# Patient Record
Sex: FEMALE | Race: WHITE | NOT HISPANIC OR LATINO | Employment: OTHER | URBAN - METROPOLITAN AREA
[De-identification: names, ages, dates, MRNs, and addresses within clinical notes are randomized per-mention and may not be internally consistent; named-entity substitution may affect disease eponyms.]

---

## 2017-02-10 DIAGNOSIS — Z94.4 LIVER REPLACED BY TRANSPLANT (H): ICD-10-CM

## 2017-02-10 LAB
ALBUMIN SERPL-MCNC: 3.8 G/DL (ref 3.4–5)
ALP SERPL-CCNC: 137 U/L (ref 40–150)
ALT SERPL W P-5'-P-CCNC: 25 U/L (ref 0–50)
ANION GAP SERPL CALCULATED.3IONS-SCNC: 7 MMOL/L (ref 3–14)
AST SERPL W P-5'-P-CCNC: 31 U/L (ref 0–45)
BILIRUB DIRECT SERPL-MCNC: 0.3 MG/DL (ref 0–0.2)
BILIRUB SERPL-MCNC: 1.1 MG/DL (ref 0.2–1.3)
BUN SERPL-MCNC: 14 MG/DL (ref 7–30)
CALCIUM SERPL-MCNC: 8.8 MG/DL (ref 8.5–10.1)
CHLORIDE SERPL-SCNC: 105 MMOL/L (ref 94–109)
CO2 SERPL-SCNC: 27 MMOL/L (ref 20–32)
CREAT SERPL-MCNC: 0.71 MG/DL (ref 0.52–1.04)
ERYTHROCYTE [DISTWIDTH] IN BLOOD BY AUTOMATED COUNT: 12.5 % (ref 10–15)
GFR SERPL CREATININE-BSD FRML MDRD: NORMAL ML/MIN/1.7M2
GLUCOSE SERPL-MCNC: 72 MG/DL (ref 70–99)
HCT VFR BLD AUTO: 40.2 % (ref 35–47)
HGB BLD-MCNC: 14.2 G/DL (ref 11.7–15.7)
MCH RBC QN AUTO: 36.6 PG (ref 26.5–33)
MCHC RBC AUTO-ENTMCNC: 35.3 G/DL (ref 31.5–36.5)
MCV RBC AUTO: 104 FL (ref 78–100)
PLATELET # BLD AUTO: 210 10E9/L (ref 150–450)
POTASSIUM SERPL-SCNC: 4.7 MMOL/L (ref 3.4–5.3)
PROT SERPL-MCNC: 7.2 G/DL (ref 6.8–8.8)
RBC # BLD AUTO: 3.88 10E12/L (ref 3.8–5.2)
SODIUM SERPL-SCNC: 140 MMOL/L (ref 133–144)
TACROLIMUS BLD-MCNC: 3.9 UG/L (ref 5–15)
TME LAST DOSE: ABNORMAL H
WBC # BLD AUTO: 4.3 10E9/L (ref 4–11)

## 2017-02-25 DIAGNOSIS — F41.0 PANIC DISORDER: ICD-10-CM

## 2017-02-27 NOTE — TELEPHONE ENCOUNTER
PARoxetine (PAXIL) 40 MG tablet     Last Written Prescription Date: 11/7/16  Last Fill Quantity: 45, # refills: 1  Last Office Visit with G primary care provider:  9/15/16        Last PHQ-9 score on record= No flowsheet data found.

## 2017-02-28 RX ORDER — PAROXETINE 40 MG/1
TABLET, FILM COATED ORAL
Qty: 45 TABLET | Refills: 3 | Status: SHIPPED | OUTPATIENT
Start: 2017-02-28 | End: 2017-06-25

## 2017-02-28 NOTE — TELEPHONE ENCOUNTER
Prescription approved per [facility] Refill Protocol.  Pt. Using for anxiety only.  Lyndsay Segura RN

## 2017-03-26 ENCOUNTER — OFFICE VISIT (OUTPATIENT)
Dept: URGENT CARE | Facility: URGENT CARE | Age: 37
End: 2017-03-26
Payer: COMMERCIAL

## 2017-03-26 VITALS
TEMPERATURE: 99.2 F | SYSTOLIC BLOOD PRESSURE: 123 MMHG | BODY MASS INDEX: 21.61 KG/M2 | OXYGEN SATURATION: 100 % | DIASTOLIC BLOOD PRESSURE: 77 MMHG | HEART RATE: 79 BPM | WEIGHT: 122 LBS

## 2017-03-26 DIAGNOSIS — R68.83 CHILLS: ICD-10-CM

## 2017-03-26 DIAGNOSIS — R30.0 DYSURIA: Primary | ICD-10-CM

## 2017-03-26 LAB
ALBUMIN UR-MCNC: ABNORMAL MG/DL
APPEARANCE UR: ABNORMAL
BACTERIA #/AREA URNS HPF: ABNORMAL /HPF
BASOPHILS # BLD AUTO: 0.1 10E9/L (ref 0–0.2)
BASOPHILS NFR BLD AUTO: 1.2 %
BILIRUB UR QL STRIP: NEGATIVE
COLOR UR AUTO: YELLOW
DIFFERENTIAL METHOD BLD: ABNORMAL
EOSINOPHIL # BLD AUTO: 0.3 10E9/L (ref 0–0.7)
EOSINOPHIL NFR BLD AUTO: 6.2 %
ERYTHROCYTE [DISTWIDTH] IN BLOOD BY AUTOMATED COUNT: 11.7 % (ref 10–15)
FLUAV+FLUBV AG SPEC QL: NEGATIVE
FLUAV+FLUBV AG SPEC QL: NORMAL
GLUCOSE UR STRIP-MCNC: NEGATIVE MG/DL
HCT VFR BLD AUTO: 39.4 % (ref 35–47)
HGB BLD-MCNC: 13.6 G/DL (ref 11.7–15.7)
HGB UR QL STRIP: ABNORMAL
KETONES UR STRIP-MCNC: NEGATIVE MG/DL
LEUKOCYTE ESTERASE UR QL STRIP: ABNORMAL
LYMPHOCYTES # BLD AUTO: 1.3 10E9/L (ref 0.8–5.3)
LYMPHOCYTES NFR BLD AUTO: 32.8 %
MCH RBC QN AUTO: 35.8 PG (ref 26.5–33)
MCHC RBC AUTO-ENTMCNC: 34.5 G/DL (ref 31.5–36.5)
MCV RBC AUTO: 104 FL (ref 78–100)
MICRO REPORT STATUS: NORMAL
MONOCYTES # BLD AUTO: 0.5 10E9/L (ref 0–1.3)
MONOCYTES NFR BLD AUTO: 11.1 %
NEUTROPHILS # BLD AUTO: 2 10E9/L (ref 1.6–8.3)
NEUTROPHILS NFR BLD AUTO: 48.7 %
NITRATE UR QL: ABNORMAL
NON-SQ EPI CELLS #/AREA URNS LPF: ABNORMAL /LPF
PH UR STRIP: 6 PH (ref 5–7)
PLATELET # BLD AUTO: 200 10E9/L (ref 150–450)
RBC # BLD AUTO: 3.8 10E12/L (ref 3.8–5.2)
RBC #/AREA URNS AUTO: ABNORMAL /HPF (ref 0–2)
SP GR UR STRIP: ABNORMAL (ref 1–1.03)
SPECIMEN SOURCE: NORMAL
SPECIMEN SOURCE: NORMAL
URN SPEC COLLECT METH UR: ABNORMAL
UROBILINOGEN UR STRIP-ACNC: 0.2 EU/DL (ref 0.2–1)
WBC # BLD AUTO: 4.1 10E9/L (ref 4–11)
WBC #/AREA URNS AUTO: ABNORMAL /HPF (ref 0–2)
WET PREP SPEC: NORMAL

## 2017-03-26 PROCEDURE — 85025 COMPLETE CBC W/AUTO DIFF WBC: CPT | Performed by: PHYSICIAN ASSISTANT

## 2017-03-26 PROCEDURE — 99213 OFFICE O/P EST LOW 20 MIN: CPT | Performed by: PHYSICIAN ASSISTANT

## 2017-03-26 PROCEDURE — 87804 INFLUENZA ASSAY W/OPTIC: CPT | Performed by: PHYSICIAN ASSISTANT

## 2017-03-26 PROCEDURE — 87086 URINE CULTURE/COLONY COUNT: CPT | Performed by: PHYSICIAN ASSISTANT

## 2017-03-26 PROCEDURE — 81001 URINALYSIS AUTO W/SCOPE: CPT | Performed by: PHYSICIAN ASSISTANT

## 2017-03-26 PROCEDURE — 87210 SMEAR WET MOUNT SALINE/INK: CPT | Performed by: PHYSICIAN ASSISTANT

## 2017-03-26 PROCEDURE — 36415 COLL VENOUS BLD VENIPUNCTURE: CPT | Performed by: PHYSICIAN ASSISTANT

## 2017-03-26 NOTE — MR AVS SNAPSHOT
After Visit Summary   3/26/2017    Charito Wilcox    MRN: [id number]           Patient Information     Date Of Birth          1980        Visit Information        Provider Department      3/26/2017 9:25 AM Carole Bowers PA-C [facility]        Today's Diagnoses     Dysuria    -  1    Chills           Follow-ups after your visit        Your next 10 appointments already scheduled     Jun 02, 2017  8:00 AM CDT   Lab with  LAB   [facility] ([facility])    [address]  1st Floor  [facility] [id number] [phone]            Jun 02, 2017  8:30 AM CDT   (Arrive by 8:15 AM)   Return Liver Transplant with Roldan Maria MD   [facility] ([facility])    [address] [place] [address]  3rd Floor  [facility] [id number] [phone]              Who to contact     If you have questions or need follow up information about today's clinic visit or your schedule please contact [facility] directly at [phone].  Normal or non-critical lab and imaging results will be communicated to you by MongoDBhart, letter or phone within 4 business days after the clinic has received the results. If you do not hear from us within 7 days, please contact the clinic through Smarter Remarketert or phone. If you have a critical or abnormal lab result, we will notify you by phone as soon as possible.  Submit refill requests through PartSimple or call your pharmacy and they will forward the refill request to us. Please allow 3 business days for your refill to be completed.          Additional Information About Your Visit        MongoDBhart Information     PartSimple gives you secure access to your electronic health record. If you see a primary care provider, you can also send messages to your care team and make appointments. If you have questions, please call your primary care clinic.  If you do not have a primary care provider, please call  [phone] and they will assist you.        Care EveryWhere ID     This is your Care EveryWhere ID. This could be used by other organizations to access your [facility] medical records  [id number]        Your Vitals Were     Pulse Temperature Pulse Oximetry Breastfeeding? BMI (Body Mass Index)       79 99.2  F (37.3  C) (Oral) 100% No 21.61 kg/m2        Blood Pressure from Last 3 Encounters:   03/26/17 123/77   12/02/16 112/67   11/17/16 106/69    Weight from Last 3 Encounters:   03/26/17 122 lb (55.3 kg)   12/02/16 119 lb (54 kg)   09/16/16 116 lb (52.6 kg)              We Performed the Following     CBC with platelets and differential     Influenza A/B antigen     UA with Microscopic reflex to Culture     Urine Culture Aerobic Bacterial     Wet prep        Primary Care Provider Office Phone # Fax #    Katharine Gaxiola [phone]       [address] [address]        Thank you!     Thank you for choosing [facility]  for your care. Our goal is always to provide you with excellent care. Hearing back from our patients is one way we can continue to improve our services. Please take a few minutes to complete the written survey that you may receive in the mail after your visit with us. Thank you!             Your Updated Medication List - Protect others around you: Learn how to safely use, store and throw away your medicines at www.disposemymeds.org.          This list is accurate as of: 3/26/17 11:26 AM.  Always use your most recent med list.                   Brand Name Dispense Instructions for use    ACIDOPHILUS EXTRA STRENGTH Caps      Take by mouth daily       ALPRAZolam 0.5 MG tablet    XANAX    30 tablet    Take 1 tablet (0.5 mg) by mouth 3 times daily as needed for anxiety 1 month supply       azaTHIOprine 100 MG Tabs      Take 50 mg by mouth daily       MIRALAX powder   Generic drug:  polyethylene glycol     510 g    Take 17 g (1  capful) by mouth daily       omeprazole 40 MG capsule    priLOSEC    180 capsule    Take 1 capsule (40 mg) by mouth 2 times daily (before meals) Take 30-60 minutes before a meal.       ondansetron 4 MG ODT tab    ZOFRAN ODT    20 tablet    Take 1-2 tablets (4-8 mg) by mouth 3 times daily (before meals)       PARoxetine 40 MG tablet    PAXIL    45 tablet    TAKE 1 AND 1/2 TABLETS(60 MG) BY MOUTH AT BEDTIME       sennosides 8.6 MG tablet    SENOKOT    60 each    Take 1 tablet by mouth daily as needed for constipation       tacrolimus 1 MG capsule    PROGRAF - GENERIC EQUIVALENT    180 capsule    Take 1 capsule (1 mg) by mouth every 12 hours       ursodiol 300 MG capsule    ACTIGALL    60 capsule    TAKE ONE CAPSULE BY MOUTH TWICE A DAY

## 2017-03-26 NOTE — PROGRESS NOTES
SUBJECTIVE:                                                    Charito Wilcox is a 36 year old female who presents to clinic today for the following health issues:    URINARY TRACT SYMPTOMS      Duration: 2-3 days    Description  Vaginal pain, fatigue, chills/shakiness, lightheadedness, vaginal burning sensation and uncomfortableness    Intensity:  moderate    Accompanying signs and symptoms:  Fever/chills: low-grade  Flank pain YES- some right pain. Pt states that she is going to be having her menstrual cycle soon.  Nausea and vomiting: YES some.   Vaginal symptoms: itching  Abdominal/Pelvic Pain: no     History  History of frequent UTI's: YES  History of kidney stones: no   Sexually Active: YES  Possibility of pregnancy: No    Precipitating or alleviating factors: None    Therapies tried and outcome: none   Outcome:         s/p liver transplant. Does not feel. Chills, fatigue, mild congestion. Dysuria started yest. S/P TL        Allergies   Allergen Reactions     Gluten Meal Nausea and Vomiting     Milk Protein Extract Nausea and Vomiting       Past Medical History:   Diagnosis Date     Esophageal reflux      Liver replaced by transplant (H) 2000    liver failure of unknown etiology, doing well with new liver     Moderate recurrent major depression (H) 12/2/2008     Papanicolaou smear of cervix with atypical squamous cells of undetermined significance (ASC-US)     negative HPV         Current Outpatient Prescriptions on File Prior to Visit:  PARoxetine (PAXIL) 40 MG tablet TAKE 1 AND 1/2 TABLETS(60 MG) BY MOUTH AT BEDTIME   tacrolimus (PROGRAF - GENERIC EQUIVALENT) 1 MG capsule Take 1 capsule (1 mg) by mouth every 12 hours   omeprazole (PRILOSEC) 40 MG capsule Take 1 capsule (40 mg) by mouth 2 times daily (before meals) Take 30-60 minutes before a meal.   ondansetron (ZOFRAN ODT) 4 MG disintegrating tablet Take 1-2 tablets (4-8 mg) by mouth 3 times daily (before meals)   ursodiol (ACTIGALL) 300 MG capsule  TAKE ONE CAPSULE BY MOUTH TWICE A DAY   azaTHIOprine 100 MG TABS Take 50 mg by mouth daily    polyethylene glycol (MIRALAX) powder Take 17 g (1 capful) by mouth daily   Lactobacillus (ACIDOPHILUS EXTRA STRENGTH) CAPS Take by mouth daily    ALPRAZolam (XANAX) 0.5 MG tablet Take 1 tablet (0.5 mg) by mouth 3 times daily as needed for anxiety 1 month supply (Patient not taking: Reported on 3/26/2017)   sennosides (SENOKOT) 8.6 MG tablet Take 1 tablet by mouth daily as needed for constipation (Patient not taking: Reported on 3/26/2017)     No current facility-administered medications on file prior to visit.     Social History   Substance Use Topics     Smoking status: Never Smoker     Smokeless tobacco: Never Used     Alcohol use No      Comment: Quit drinking in 2012       ROS:  General: negative for fever  ABD: Denies abd pain  : as above    OBJECTIVE:  /77 (BP Location: Left arm, Patient Position: Chair, Cuff Size: Adult Regular)  Pulse 79  Temp 99.2  F (37.3  C) (Oral)  Wt 122 lb (55.3 kg)  SpO2 100%  Breastfeeding? No  BMI 21.61 kg/m2   General:   awake, alert, and cooperative.  NAD.   Head: Normocephalic, atraumatic.  Eyes: Conjunctiva clear, non icteric.   ABD: soft, no tenderness to palpation , no rigidity, guarding or rebound . No CVAT  Neuro: Alert and oriented - normal speech.   Results for orders placed or performed in visit on 03/26/17   UA with Microscopic reflex to Culture   Result Value Ref Range    Color Urine Yellow     Appearance Urine       Clear  CORRECTED ON 03/26 AT 1018: PREVIOUSLY REPORTED AS Slightly Cloudy      Glucose Urine Negative NEG mg/dL    Bilirubin Urine Negative NEG    Ketones Urine Negative NEG mg/dL    Specific Gravity Urine  1.003 - 1.035     <1.005  CORRECTED ON 03/26 AT 1018: PREVIOUSLY REPORTED AS 1.025      pH Urine 6.0 5.0 - 7.0 pH    Protein Albumin Urine  NEG mg/dL     Negative  CORRECTED ON 03/26 AT 1018: PREVIOUSLY REPORTED AS Trace      Urobilinogen Urine 0.2  0.2 - 1.0 EU/dL    Nitrite Urine  NEG     Negative  CORRECTED ON 03/26 AT 1018: PREVIOUSLY REPORTED AS Positive      Blood Urine (A) NEG     Trace  CORRECTED ON 03/26 AT 1018: PREVIOUSLY REPORTED AS Large      Leukocyte Esterase Urine  NEG     Negative  CORRECTED ON 03/26 AT 1018: PREVIOUSLY REPORTED AS Moderate      Source Midstream Urine     WBC Urine  0 - 2 /HPF     O - 2  CORRECTED ON 03/26 AT 1018: PREVIOUSLY REPORTED AS >100      RBC Urine  0 - 2 /HPF     O - 2  CORRECTED ON 03/26 AT 1018: PREVIOUSLY REPORTED AS 5 10      Squamous Epithelial /LPF Urine  FEW /LPF     Few  CORRECTED ON 03/26 AT 1018: PREVIOUSLY REPORTED AS Moderate      Bacteria Urine (A) NEG /HPF     Few  CORRECTED ON 03/26 AT 1018: PREVIOUSLY REPORTED AS Many     CBC with platelets and differential   Result Value Ref Range    WBC 4.1 4.0 - 11.0 10e9/L    RBC Count 3.80 3.8 - 5.2 10e12/L    Hemoglobin 13.6 11.7 - 15.7 g/dL    Hematocrit 39.4 35.0 - 47.0 %     (H) 78 - 100 fl    MCH 35.8 (H) 26.5 - 33.0 pg    MCHC 34.5 31.5 - 36.5 g/dL    RDW 11.7 10.0 - 15.0 %    Platelet Count 200 150 - 450 10e9/L    Diff Method Automated Method     % Neutrophils 48.7 %    % Lymphocytes 32.8 %    % Monocytes 11.1 %    % Eosinophils 6.2 %    % Basophils 1.2 %    Absolute Neutrophil 2.0 1.6 - 8.3 10e9/L    Absolute Lymphocytes 1.3 0.8 - 5.3 10e9/L    Absolute Monocytes 0.5 0.0 - 1.3 10e9/L    Absolute Eosinophils 0.3 0.0 - 0.7 10e9/L    Absolute Basophils 0.1 0.0 - 0.2 10e9/L   Wet prep   Result Value Ref Range    Specimen Description Vagina     Wet Prep       No Trichomonas seen  No clue cells seen  No yeast seen      Micro Report Status FINAL 03/26/2017    Influenza A/B antigen   Result Value Ref Range    Influenza A/B Agn Specimen Nasal     Influenza A Negative NEG    Influenza B  NEG     Negative   Test results must be correlated with clinical data. If necessary, results   should be confirmed by a molecular assay or viral culture.       CBC RESULTS:    Recent Labs   Lab Test  03/26/17   1035   WBC  4.1   RBC  3.80   HGB  13.6   HCT  39.4   MCV  104*   MCH  35.8*   MCHC  34.5   RDW  11.7   PLT  200       ASSESSMENT:      ICD-10-CM    1. Dysuria R30.0 UA with Microscopic reflex to Culture     Wet prep   2. Chills R68.83 CBC with platelets and differential     Influenza A/B antigen       PLAN:  Declines Cipro while we wait for UC. Could be early onset viral illness. F/U PCP this week if not better  As per ordered above.  Drink plenty of fluids.    Carole Bowers PA-C

## 2017-03-26 NOTE — NURSING NOTE
"Chief Complaint   Patient presents with     Vaginal Problem     Pt c/o vaginal pain, fatigue, chills, lightheadedness and some itching.       Initial /77 (BP Location: Left arm, Patient Position: Chair, Cuff Size: Adult Regular)  Pulse 79  Temp 99.2  F (37.3  C) (Oral)  Wt 122 lb (55.3 kg)  SpO2 100%  Breastfeeding? No  BMI 21.61 kg/m2 Estimated body mass index is 21.61 kg/(m^2) as calculated from the following:    Height as of 12/2/16: 5' 3\" (1.6 m).    Weight as of this encounter: 122 lb (55.3 kg).  Medication Reconciliation: complete     Erin Kumar CMA (AAMA)  .    "

## 2017-03-27 ENCOUNTER — VIRTUAL VISIT (OUTPATIENT)
Dept: FAMILY MEDICINE | Facility: CLINIC | Age: 37
End: 2017-03-27
Payer: COMMERCIAL

## 2017-03-27 ENCOUNTER — TELEPHONE (OUTPATIENT)
Dept: URGENT CARE | Facility: URGENT CARE | Age: 37
End: 2017-03-27

## 2017-03-27 DIAGNOSIS — R61 NIGHT SWEATS: ICD-10-CM

## 2017-03-27 DIAGNOSIS — R68.89 COLD FEELING: ICD-10-CM

## 2017-03-27 DIAGNOSIS — Z94.4 LIVER REPLACED BY TRANSPLANT (H): ICD-10-CM

## 2017-03-27 DIAGNOSIS — R53.83 FATIGUE, UNSPECIFIED TYPE: Primary | ICD-10-CM

## 2017-03-27 LAB
BACTERIA SPEC CULT: NO GROWTH
MICRO REPORT STATUS: NORMAL
SPECIMEN SOURCE: NORMAL

## 2017-03-27 PROCEDURE — 99207 ZZC NO BILLABLE SERVICE THIS VISIT: CPT | Performed by: FAMILY MEDICINE

## 2017-03-27 NOTE — TELEPHONE ENCOUNTER
Edith Pop contacted Charito on 03/27/17 and left a message. If patient calls back please notify with lab result. Thanks! Edith Pop MA      Urine culture was negative, no growth. Please notify.     Carole Bowers PA-C

## 2017-03-27 NOTE — PROGRESS NOTES
"Charito Wilcox is a 36 year old female who is being evaluated via a telephone visit.      The patient has been notified of following:     \"This telephone visit will be conducted via a call between you and your physician/provider. We have found that certain health care needs can be provided without the need for a physical exam.  This service lets us provide the care you need with a short phone conversation.  If a prescription is necessary we can send it directly to your pharmacy.  If lab work is needed we can place an order for that and you can then stop by our lab to have the test done at a later time.    We will bill your insurance company for this service.  Please check with your medical insurance if this type of visit is covered. You may be responsible for the cost of this type of visit if insurance coverage is denied.  The typical cost is $30 (10min), $59 (11-20min) and $85 (21-30min).  Most often these visits are shorter than 10 minutes.    If during the course of the call the physician/provider feels a telephone visit is not appropriate, you will not be charged for this service.\"       Consent has been obtained for this service by 2 care team members: yes. See the scanned image in the medical record.    Charito Wilcox complains of  No chief complaint on file.      I have reviewed and updated the patient's Past Medical History, Social History, Family History and Medication List.    ALLERGIES  Gluten meal and Milk protein extract    Nellie Torrez MA    Additional provider notes:   Has symptoms that she ascribes to low blood sugar with ovulation and leading up to her period. Gets light headed, shaky, feels she has to eat right away. Can come on quickly, often predictable. More likely to happen if she hasn't eaten in awhile.    Has had 2 weeks of being visibly cold, shaking, feeling chilled  Hands have been shaking as well past 2 months-- can be a sign of high prograf. Had labs done-- that is fine.    Had " dinner, sweet potatoes, hamburger: felt light headed, wondered about low blood sugar-- similar symptoms to when she has her menses  Has been more tired than normal.    Hasn't been sleeping as well-- wakes up more in the night, sweaty at night at times. Very cold going to bed, wakes up sweaty. Often can get back to sleep-- will wake up 3-4 times at night.    No hormones-- had tubal ligation.  1 day of bleeding, no heavy periods.     Denies other symptoms of cough, chest pain, heartburn.      Results for orders placed or performed in visit on 03/26/17   UA with Microscopic reflex to Culture   Result Value Ref Range    Color Urine Yellow     Appearance Urine       Clear  CORRECTED ON 03/26 AT 1018: PREVIOUSLY REPORTED AS Slightly Cloudy      Glucose Urine Negative NEG mg/dL    Bilirubin Urine Negative NEG    Ketones Urine Negative NEG mg/dL    Specific Gravity Urine  1.003 - 1.035     <1.005  CORRECTED ON 03/26 AT 1018: PREVIOUSLY REPORTED AS 1.025      pH Urine 6.0 5.0 - 7.0 pH    Protein Albumin Urine  NEG mg/dL     Negative  CORRECTED ON 03/26 AT 1018: PREVIOUSLY REPORTED AS Trace      Urobilinogen Urine 0.2 0.2 - 1.0 EU/dL    Nitrite Urine  NEG     Negative  CORRECTED ON 03/26 AT 1018: PREVIOUSLY REPORTED AS Positive      Blood Urine (A) NEG     Trace  CORRECTED ON 03/26 AT 1018: PREVIOUSLY REPORTED AS Large      Leukocyte Esterase Urine  NEG     Negative  CORRECTED ON 03/26 AT 1018: PREVIOUSLY REPORTED AS Moderate      Source Midstream Urine     WBC Urine  0 - 2 /HPF     O - 2  CORRECTED ON 03/26 AT 1018: PREVIOUSLY REPORTED AS >100      RBC Urine  0 - 2 /HPF     O - 2  CORRECTED ON 03/26 AT 1018: PREVIOUSLY REPORTED AS 5 10      Squamous Epithelial /LPF Urine  FEW /LPF     Few  CORRECTED ON 03/26 AT 1018: PREVIOUSLY REPORTED AS Moderate      Bacteria Urine (A) NEG /HPF     Few  CORRECTED ON 03/26 AT 1018: PREVIOUSLY REPORTED AS Many     CBC with platelets and differential   Result Value Ref Range    WBC 4.1 4.0 -  "11.0 10e9/L    RBC Count 3.80 3.8 - 5.2 10e12/L    Hemoglobin 13.6 11.7 - 15.7 g/dL    Hematocrit 39.4 35.0 - 47.0 %     (H) 78 - 100 fl    MCH 35.8 (H) 26.5 - 33.0 pg    MCHC 34.5 31.5 - 36.5 g/dL    RDW 11.7 10.0 - 15.0 %    Platelet Count 200 150 - 450 10e9/L    Diff Method Automated Method     % Neutrophils 48.7 %    % Lymphocytes 32.8 %    % Monocytes 11.1 %    % Eosinophils 6.2 %    % Basophils 1.2 %    Absolute Neutrophil 2.0 1.6 - 8.3 10e9/L    Absolute Lymphocytes 1.3 0.8 - 5.3 10e9/L    Absolute Monocytes 0.5 0.0 - 1.3 10e9/L    Absolute Eosinophils 0.3 0.0 - 0.7 10e9/L    Absolute Basophils 0.1 0.0 - 0.2 10e9/L   Wet prep   Result Value Ref Range    Specimen Description Vagina     Wet Prep       No Trichomonas seen  No clue cells seen  No yeast seen      Micro Report Status FINAL 03/26/2017    Influenza A/B antigen   Result Value Ref Range    Influenza A/B Agn Specimen Nasal     Influenza A Negative NEG    Influenza B  NEG     Negative   Test results must be correlated with clinical data. If necessary, results   should be confirmed by a molecular assay or viral culture.     Urine Culture Aerobic Bacterial   Result Value Ref Range    Specimen Description Midstream Urine     Culture Micro No growth     Micro Report Status FINAL 03/27/2017            Assessment/Plan:    ICD-10-CM    1. Fatigue, unspecified type R53.83 Glucose     TSH with free T4 reflex   2. Cold feeling R68.89    3. Night sweats R61 Follicle stimulating hormone     Lutropin     M Tuberculosis by Quantiferon   4. Liver replaced by transplant (H) Z94.4 M Tuberculosis by Quantiferon     Discussed with pt that we may not explain her symptoms  Pt complains of \"feeling low blood sugar,\" but very likely does not have low blood sugar. Last BS done in Feb was 72-- unclear if fasting or not (pt does not remember).  Given her immunosuppression from transplant and symptoms, will also check quantiferon gold test.  Thyroid disease a possibility-- " check this    Will check labs as above after a true fast (at least 10 hours without food/only water to drink)  I will follow up with her by phone/mychart after this is done    I have reviewed the note as documented above.  This accurately captures the substance of my conversation with the patient,  Katharine Gaxiola MD     Total time of call between patient and provider was 12  minutes

## 2017-03-27 NOTE — MR AVS SNAPSHOT
After Visit Summary   3/27/2017    Charito Wilcox    MRN: [id number]           Patient Information     Date Of Birth          1980        Visit Information        Provider Department      3/27/2017 6:45 PM Katharine Gaxiola MD [facility]        Today's Diagnoses     Fatigue, unspecified type    -  1    Cold feeling        Night sweats        Liver replaced by transplant (H)           Follow-ups after your visit        Your next 10 appointments already scheduled     Jun 02, 2017  8:00 AM CDT   Lab with  LAB   [facility] ([facility])    [address]  1st Floor  [facility] [address] [phone]            Jun 02, 2017  8:30 AM CDT   (Arrive by 8:15 AM)   Return Liver Transplant with Roldan Maria MD   [facility] ([facility])    [address]  3rd Floor  [facility] [address] [phone]              Future tests that were ordered for you today     Open Future Orders        Priority Expected Expires Ordered    Follicle stimulating hormone Routine  4/27/2017 3/27/2017    Lutropin Routine  4/27/2017 3/27/2017    Glucose Routine  4/27/2017 3/27/2017    TSH with free T4 reflex Routine  4/27/2017 3/27/2017    M Tuberculosis by Quantiferon Routine  4/27/2017 3/27/2017            Who to contact     If you have questions or need follow up information about today's clinic visit or your schedule please contact [facility] directly at [phone].  Normal or non-critical lab and imaging results will be communicated to you by MyChart, letter or phone within 4 business days after the clinic has received the results. If you do not hear from us within 7 days, please contact the clinic through MyChart or phone. If you have a critical or abnormal lab result, we will notify you by phone as soon as possible.  Submit refill requests through Schrodinger or call your pharmacy and they will forward the  refill request to us. Please allow 3 business days for your refill to be completed.          Additional Information About Your Visit        GL 2ourshart Information     Helic[employer]m gives you secure access to your electronic health record. If you see a primary care provider, you can also send messages to your care team and make appointments. If you have questions, please call your primary care clinic.  If you do not have a primary care provider, please call [phone] and they will assist you.        Care EveryWhere ID     This is your Care EveryWhere ID. This could be used by other organizations to access your [facility] medical records  [id number]         Blood Pressure from Last 3 Encounters:   03/26/17 123/77   12/02/16 112/67   11/17/16 106/69    Weight from Last 3 Encounters:   03/26/17 122 lb (55.3 kg)   12/02/16 119 lb (54 kg)   09/16/16 116 lb (52.6 kg)               Primary Care Provider Office Phone # Fax #    Katharine Gaxiola [phone]       [address] [address]        Thank you!     Thank you for choosing [facility]  for your care. Our goal is always to provide you with excellent care. Hearing back from our patients is one way we can continue to improve our services. Please take a few minutes to complete the written survey that you may receive in the mail after your visit with us. Thank you!             Your Updated Medication List - Protect others around you: Learn how to safely use, store and throw away your medicines at www.disposemymeds.org.          This list is accurate as of: 3/27/17  7:27 PM.  Always use your most recent med list.                   Brand Name Dispense Instructions for use    ACIDOPHILUS EXTRA STRENGTH Caps      Take by mouth daily       ALPRAZolam 0.5 MG tablet    XANAX    30 tablet    Take 1 tablet (0.5 mg) by mouth 3 times daily as needed for anxiety 1 month supply       azaTHIOprine 100 MG Tabs      Take  50 mg by mouth daily       MIRALAX powder   Generic drug:  polyethylene glycol     510 g    Take 17 g (1 capful) by mouth daily       omeprazole 40 MG capsule    priLOSEC    180 capsule    Take 1 capsule (40 mg) by mouth 2 times daily (before meals) Take 30-60 minutes before a meal.       ondansetron 4 MG ODT tab    ZOFRAN ODT    20 tablet    Take 1-2 tablets (4-8 mg) by mouth 3 times daily (before meals)       PARoxetine 40 MG tablet    PAXIL    45 tablet    TAKE 1 AND 1/2 TABLETS(60 MG) BY MOUTH AT BEDTIME       sennosides 8.6 MG tablet    SENOKOT    60 each    Take 1 tablet by mouth daily as needed for constipation       tacrolimus 1 MG capsule    PROGRAF - GENERIC EQUIVALENT    180 capsule    Take 1 capsule (1 mg) by mouth every 12 hours       ursodiol 300 MG capsule    ACTIGALL    60 capsule    TAKE ONE CAPSULE BY MOUTH TWICE A DAY

## 2017-03-29 DIAGNOSIS — Z94.4 LIVER REPLACED BY TRANSPLANT (H): ICD-10-CM

## 2017-03-29 DIAGNOSIS — R61 NIGHT SWEATS: ICD-10-CM

## 2017-03-29 DIAGNOSIS — R53.83 FATIGUE, UNSPECIFIED TYPE: ICD-10-CM

## 2017-03-29 LAB
FSH SERPL-ACNC: 9.8 IU/L
GLUCOSE SERPL-MCNC: 77 MG/DL (ref 70–99)
LH SERPL-ACNC: 9.3 IU/L
TSH SERPL DL<=0.005 MIU/L-ACNC: 1.11 MU/L (ref 0.4–4)

## 2017-03-29 PROCEDURE — 83001 ASSAY OF GONADOTROPIN (FSH): CPT | Performed by: FAMILY MEDICINE

## 2017-03-29 PROCEDURE — 82947 ASSAY GLUCOSE BLOOD QUANT: CPT | Performed by: FAMILY MEDICINE

## 2017-03-29 PROCEDURE — 84443 ASSAY THYROID STIM HORMONE: CPT | Performed by: FAMILY MEDICINE

## 2017-03-29 PROCEDURE — 83002 ASSAY OF GONADOTROPIN (LH): CPT | Performed by: FAMILY MEDICINE

## 2017-03-29 PROCEDURE — 86480 TB TEST CELL IMMUN MEASURE: CPT | Performed by: FAMILY MEDICINE

## 2017-03-29 PROCEDURE — 36415 COLL VENOUS BLD VENIPUNCTURE: CPT | Performed by: FAMILY MEDICINE

## 2017-03-30 LAB
M TB TUBERC IFN-G BLD QL: NEGATIVE
M TB TUBERC IFN-G/MITOGEN IGNF BLD: 0 IU/ML

## 2017-03-30 NOTE — PROGRESS NOTES
Your results are normal.  Your final test results are pending.  Please check your chart again within 3 to 5 days. You will receive further instruction when your full test result panel is complete.    Cortney Rivera MD

## 2017-04-07 ENCOUNTER — MYC MEDICAL ADVICE (OUTPATIENT)
Dept: FAMILY MEDICINE | Facility: CLINIC | Age: 37
End: 2017-04-07

## 2017-04-07 DIAGNOSIS — R11.0 NAUSEA: ICD-10-CM

## 2017-04-07 DIAGNOSIS — R53.83 FATIGUE, UNSPECIFIED TYPE: ICD-10-CM

## 2017-04-07 DIAGNOSIS — Z94.4 LIVER REPLACED BY TRANSPLANT (H): Primary | ICD-10-CM

## 2017-04-07 RX ORDER — ONDANSETRON 4 MG/1
4-8 TABLET, ORALLY DISINTEGRATING ORAL
Qty: 20 TABLET | Refills: 0 | Status: SHIPPED | OUTPATIENT
Start: 2017-04-07 | End: 2017-10-27

## 2017-04-07 NOTE — TELEPHONE ENCOUNTER
Patient had virtual visit on 3/27/17.    Zofran       Last Written Prescription Date: 10/26/16  Last Fill Quantity: 20,  # refills: 0   Last Office Visit with G, [facility] or [facility] prescribing provider: 9/15/16  Chantell Mcgill RN

## 2017-04-10 PROBLEM — R53.83 FATIGUE, UNSPECIFIED TYPE: Status: ACTIVE | Noted: 2017-04-10

## 2017-04-10 NOTE — TELEPHONE ENCOUNTER
Huddled with provider okay for IUD placement on 5/4/17 at 3pm or on 5/8/17 at 9:30am.  Please call patient to schedule.   Chantell Mcgill RN

## 2017-04-10 NOTE — TELEPHONE ENCOUNTER
Please call pt to help her schedule with me-- ask if she wants to do the IUD or instead have a visit to discuss more then schedule back.  Would like to do the IUD the week of her period (ie, at the end)  Huddle with me re: appointment once we know the timing    Endocrinology referral pended-- they would need to do any further testing for low sugars.  Katharine Gaxiola MD

## 2017-04-10 NOTE — TELEPHONE ENCOUNTER
Patient notified of providers message as written.  Patient states she wants to go ahead with the IUD, patient should be near the end of her period around may 4th or 5th.  Patient would like endocrinology info send via [employer].  [employer] message sent.   Chantell Mcgill RN

## 2017-04-11 ENCOUNTER — MYC MEDICAL ADVICE (OUTPATIENT)
Dept: FAMILY MEDICINE | Facility: CLINIC | Age: 37
End: 2017-04-11

## 2017-04-11 NOTE — TELEPHONE ENCOUNTER
Patient would like May 8th at 9:30  Had staffing open schedule put patient on at 9:30 sent patient a [employer] message that this was done.  Mary Muhammad,

## 2017-04-26 NOTE — TELEPHONE ENCOUNTER
Let's do Monday 5-1-- book for 1:45, but have her come at 12:30  Cancel other appointment  Katharine Gaxiola MD

## 2017-04-27 NOTE — TELEPHONE ENCOUNTER
Called patient changed her appointment to 05/01/2017 at 1:45 to show up at 12:30.  Mary Muhammad,

## 2017-04-27 NOTE — PROGRESS NOTES
IUD  INSERTION PROCEDURE   Charito Wilcox who presents for mirena IUD insertion. Indication for IUD insertion is period regulation. Patient's last menstrual period was Patient's last menstrual period was 04/26/2017.. . The patient is currently using tubal ligation for contraception. She is in a monogamous sexual relationship.   Lab Results   Component Value Date    PAP NIL 02/24/2016     A complete discussion of the risks and benefits of IUD use and the details of the insertion procedure was held with the patient.   All questions were answered. A consent form was signed.   Prior to the beginning of the procedure the team paused to verify the patient's identity, as well as the procedure to be performed and the site. All equipment required was ready and available. The patient was positioned appropriately.   IUD Lot # [id number]  NDC # 40930-596-61 USE ONLY FOR MIRENA    The patient was placed in low lithotomy. A bimanual exam was performed and the uterus noted to be mid-position. A speculum was placed and the cervix swabbed with Betadine. A tenaculum was placed on the anterior cervical lip. The uterus sounded to 7 cm. The Mirena IUD was placed to the uterine fundus without difficulty. The strings were cut to 3 cms. The tenaculum was removed and hemostasis was ensured. The speculum was removed. The patient tolerated the procedure well.   Pap smear was obtained (pt is due)  PLAN:   The patient was asked to contact the clinic for any fever/chills/severe pelvic or abdominal pain or heavy bleeding. She was instructed in how to palpate her IUD strings.   FOLLOW-UP:   She was asked to follow up in one month for IUD check, and for her routine annual screening.     Katharine Gaxiola MD        Patient has had ongoing symptoms of fatigue, decreased exercise tolerance, hot flashes, sleep disturbance and blood sugar feeling low (but no confirmed lows) with ovulation and the week prior to menses. Menses are monthly, no cramping,  heavy for 1 day, last 5-7 days in all.  Has normal Bs after a fast here in clinic-- has not purchased a glucometer to check bs at home  Is s/p liver transplant for cryptogenic cirrhosis.    A/P    ICD-10-CM    1. Encounter for IUD insertion Z30.430 INSERTION INTRAUTERINE DEVICE     IUD,MIRENA   2. Immunosuppressed status (H) D89.9    3. Liver replaced by transplant (H) Z94.4    4. Fatigue, unspecified type R53.83    5. Cervical cancer screening Z12.4 HPV High Risk Types DNA Cervical     Pap imaged thin layer screen with HPV - recommended age 30 - 65 years (select HPV order below)      Trial of mirena for 3 months to see if symptoms improve  Plan to have her get a glucometer to monitor her bs

## 2017-05-01 ENCOUNTER — OFFICE VISIT (OUTPATIENT)
Dept: FAMILY MEDICINE | Facility: CLINIC | Age: 37
End: 2017-05-01
Payer: COMMERCIAL

## 2017-05-01 VITALS
HEART RATE: 72 BPM | BODY MASS INDEX: 21.88 KG/M2 | TEMPERATURE: 97.7 F | DIASTOLIC BLOOD PRESSURE: 64 MMHG | WEIGHT: 123.5 LBS | OXYGEN SATURATION: 98 % | HEIGHT: 63 IN | SYSTOLIC BLOOD PRESSURE: 118 MMHG

## 2017-05-01 DIAGNOSIS — R53.83 FATIGUE, UNSPECIFIED TYPE: ICD-10-CM

## 2017-05-01 DIAGNOSIS — D84.9 IMMUNOSUPPRESSED STATUS (H): ICD-10-CM

## 2017-05-01 DIAGNOSIS — Z94.4 LIVER REPLACED BY TRANSPLANT (H): ICD-10-CM

## 2017-05-01 DIAGNOSIS — Z12.4 CERVICAL CANCER SCREENING: ICD-10-CM

## 2017-05-01 DIAGNOSIS — Z30.430 ENCOUNTER FOR IUD INSERTION: Primary | ICD-10-CM

## 2017-05-01 PROBLEM — Z30.431 IUD CHECK UP: Status: ACTIVE | Noted: 2017-05-01

## 2017-05-01 PROCEDURE — 87624 HPV HI-RISK TYP POOLED RSLT: CPT | Performed by: FAMILY MEDICINE

## 2017-05-01 PROCEDURE — 58300 INSERT INTRAUTERINE DEVICE: CPT | Performed by: FAMILY MEDICINE

## 2017-05-01 PROCEDURE — G0145 SCR C/V CYTO,THINLAYER,RESCR: HCPCS | Performed by: FAMILY MEDICINE

## 2017-05-01 PROCEDURE — 99212 OFFICE O/P EST SF 10 MIN: CPT | Mod: 25 | Performed by: FAMILY MEDICINE

## 2017-05-01 ASSESSMENT — PAIN SCALES - GENERAL: PAINLEVEL: NO PAIN (0)

## 2017-05-01 NOTE — NURSING NOTE
"Chief Complaint   Patient presents with     IUD     insert      Health Maintenance     PAP     Derm Problem     check spot on left upper arm, does not itch or hurt.       Initial /64  Pulse 72  Temp 97.7  F (36.5  C) (Oral)  Ht 5' 2.52\" (1.588 m)  Wt 123 lb 8 oz (56 kg)  LMP 04/26/2017  SpO2 98%  BMI 22.21 kg/m2 Estimated body mass index is 22.21 kg/(m^2) as calculated from the following:    Height as of this encounter: 5' 2.52\" (1.588 m).    Weight as of this encounter: 123 lb 8 oz (56 kg).  Medication Reconciliation: complete    "

## 2017-05-01 NOTE — MR AVS SNAPSHOT
After Visit Summary   5/1/2017    Charito Wilcox    MRN: [id number]           Patient Information     Date Of Birth          1980        Visit Information        Provider Department      5/1/2017 1:45 PM Katharine Gaxiola MD [facility]        Today's Diagnoses     Immunosuppressed status (H)    -  1    Cervical cancer screening        Encounter for IUD insertion          Care Instructions    Gynecologic Post-Procedure Patient Instructions      You may experience any of the following for a couple of days following your procedure    Mild cramping  Vaginal bleeding   Vaginal discharge that looks black and clumpy (with colposcopy only)    Please monitor for any of the following:    Fever of 100.5 degrees Farenheit or more  Cramping after 48 hours  Bleeding in the first 24-48 hours that is heavier than a normal period  Any bleeding that soaks more than a pad an hour    Please call your healthcare provider if you develop any of the above symptoms or any other problems.      OK to use tampons or pads for bleeding  You may resume sexual activity in 2-3 days, or after bleeding stops.  You may use Tylenol or ibuprofen (Motrin or Advil) for any discomfort.    Check your blood sugars when you feel low. Values under 60 would be concerning, values between 60-70 are still on the low side.       [facility]    If you have any questions regarding to your visit please contact your care team:       Team Purple:   Clinic Hours Telephone Number   [address]   7am-7pm  Monday - Thursday   7am-5pm  Fridays  [phone]  (Appointment scheduling available 24/7)    Questions about your Visit?   Team Line:  [phone]   Urgent Care - [place] and [place] - 11am-9pm Monday-Friday Saturday-Sunday- 9am-5pm   [place] - 5pm-9pm Monday-Friday Saturday-Sunday- 9am-5pm  [phone] - [place]  [phone]  - [place]       What options do I have for visits at the clinic other than the traditional office visit?  To expand how we care for you, many of our providers are utilizing electronic visits (e-visits) and telephone visits, when medically appropriate, for interactions with their patients rather than a visit in the clinic.   We also offer nurse visits for many medical concerns. Just like any other service, we will bill your insurance company for this type of visit based on time spent on the phone with your provider. Not all insurance companies cover these visits. Please check with your medical insurance if this type of visit is covered. You will be responsible for any charges that are not paid by your insurance.      E-visits via [employer]:  generally incur a $35.00 fee.  Telephone visits:  Time spent on the phone: *charged based on time that is spent on the phone in increments of 10 minutes. Estimated cost:   5-10 mins $30.00   11-20 mins. $59.00   21-30 mins. $85.00     Use [employer] (secure email communication and access to your chart) to send your primary care provider a message or make an appointment. Ask someone on your Team how to sign up for [employer].  For a Price Quote for your services, please call our [employer] Price Line at [phone].  As always, Thank you for trusting us with your health care needs!    Discharged By: An          Follow-ups after your visit        Your next 10 appointments already scheduled     May 01, 2017  1:45 PM CDT   Office Visit with Katharine Gaxiola MD   [facility] ([address] [address]   [phone]           Bring a current list of meds and any records pertaining to this visit.  For Physicals, please bring immunization records and any forms needing to be filled out.  Please arrive 10 minutes early to complete paperwork.            May 15, 2017  1:45 PM CDT   SHORT with Katharine Gaxiola MD   [facility]  [place] ([facility])    [address]  [place] [address] [phone]            Jun 07, 2017  1:45 PM CDT   Lab with  LAB   [facility] ([facility])    [address]  1st Floor  [place] [address] [phone]            Jun 07, 2017  2:45 PM CDT   (Arrive by 2:30 PM)   Return Liver Transplant with Roldan Maria MD   [facility] ([facility])    [address]  3rd Floor  [place] [address] [phone]              Who to contact     If you have questions or need follow up information about today's clinic visit or your schedule please contact [facility] directly at [phone].  Normal or non-critical lab and imaging results will be communicated to you by MyChart, letter or phone within 4 business days after the clinic has received the results. If you do not hear from us within 7 days, please contact the clinic through [employer] or phone. If you have a critical or abnormal lab result, we will notify you by phone as soon as possible.  Submit refill requests through [employer] or call your pharmacy and they will forward the refill request to us. Please allow 3 business days for your refill to be completed.          Additional Information About Your Visit        MyChart Information     [employer] gives you secure access to your electronic health record. If you see a primary care provider, you can also send messages to your care team and make appointments. If you have questions, please call your primary care clinic.  If you do not have a primary care provider, please call [phone] and they will assist you.        Care EveryWhere ID     This is your Care EveryWhere ID. This could be used by other organizations to access your [facility] medical records  [id number]        Your Vitals Were     Pulse Temperature Height Last Period Pulse Oximetry BMI (Body Mass Index)    72 97.7  F (36.5  C)  "(Oral) 5' 2.52\" (1.588 m) 04/26/2017 98% 22.21 kg/m2       Blood Pressure from Last 3 Encounters:   05/01/17 118/64   03/26/17 123/77   12/02/16 112/67    Weight from Last 3 Encounters:   05/01/17 123 lb 8 oz (56 kg)   03/26/17 122 lb (55.3 kg)   12/02/16 119 lb (54 kg)              We Performed the Following     HPV High Risk Types DNA Cervical     INSERTION INTRAUTERINE DEVICE     IUD,MIRENA     Pap imaged thin layer screen with HPV - recommended age 30 - 65 years (select HPV order below)        Primary Care Provider Office Phone # Fax #    Katharine Gaxiola [phone]       [address] [address]        Thank you!     Thank you for choosing [facility]  for your care. Our goal is always to provide you with excellent care. Hearing back from our patients is one way we can continue to improve our services. Please take a few minutes to complete the written survey that you may receive in the mail after your visit with us. Thank you!             Your Updated Medication List - Protect others around you: Learn how to safely use, store and throw away your medicines at www.disposemymeds.org.          This list is accurate as of: 5/1/17  1:23 PM.  Always use your most recent med list.                   Brand Name Dispense Instructions for use    ACIDOPHILUS EXTRA STRENGTH Caps      Take by mouth daily       ALPRAZolam 0.5 MG tablet    XANAX    30 tablet    Take 1 tablet (0.5 mg) by mouth 3 times daily as needed for anxiety 1 month supply       azaTHIOprine 100 MG Tabs      Take 50 mg by mouth daily       MIRALAX powder   Generic drug:  polyethylene glycol     510 g    Take 17 g (1 capful) by mouth daily       omeprazole 40 MG capsule    priLOSEC    180 capsule    Take 1 capsule (40 mg) by mouth 2 times daily (before meals) Take 30-60 minutes before a meal.       ondansetron 4 MG ODT tab    ZOFRAN ODT    20 tablet    Take 1-2 tablets (4-8 mg) by mouth " 3 times daily (before meals)       PARoxetine 40 MG tablet    PAXIL    45 tablet    TAKE 1 AND 1/2 TABLETS(60 MG) BY MOUTH AT BEDTIME       sennosides 8.6 MG tablet    SENOKOT    60 each    Take 1 tablet by mouth daily as needed for constipation       tacrolimus 1 MG capsule    PROGRAF - GENERIC EQUIVALENT    180 capsule    Take 1 capsule (1 mg) by mouth every 12 hours       ursodiol 300 MG capsule    ACTIGALL    60 capsule    TAKE ONE CAPSULE BY MOUTH TWICE A DAY

## 2017-05-01 NOTE — PATIENT INSTRUCTIONS
Gynecologic Post-Procedure Patient Instructions      You may experience any of the following for a couple of days following your procedure    Mild cramping  Vaginal bleeding   Vaginal discharge that looks black and clumpy (with colposcopy only)    Please monitor for any of the following:    Fever of 100.5 degrees Farenheit or more  Cramping after 48 hours  Bleeding in the first 24-48 hours that is heavier than a normal period  Any bleeding that soaks more than a pad an hour    Please call your healthcare provider if you develop any of the above symptoms or any other problems.      OK to use tampons or pads for bleeding  You may resume sexual activity in 2-3 days, or after bleeding stops.  You may use Tylenol or ibuprofen (Motrin or Advil) for any discomfort.    Check your blood sugars when you feel low. Values under 60 would be concerning, values between 60-70 are still on the low side.       [facility]    If you have any questions regarding to your visit please contact your care team:       Team Purple:   Clinic Hours Telephone Number   [address] Dr[address]   7am-7pm  Monday - Thursday   7am-5pm  Fridays  [phone]  (Appointment scheduling available 24/7)    Questions about your Visit?   Team Line:  [phone]   Urgent Care - [place] [facility] - 11am-9pm Monday-Friday Saturday-Sunday- 9am-5pm   [place] - 5pm-9pm Monday-Friday Saturday-Sunday- 9am-5pm  [phone] - [place]   [phone] - [place]       What options do I have for visits at the clinic other than the traditional office visit?  To expand how we care for you, many of our providers are utilizing electronic visits (e-visits) and telephone visits, when medically appropriate, for interactions with their patients rather than a visit in the clinic.   We also offer nurse visits for many medical concerns. Just like any other service, we will bill your  insurance company for this type of visit based on time spent on the phone with your provider. Not all insurance companies cover these visits. Please check with your medical insurance if this type of visit is covered. You will be responsible for any charges that are not paid by your insurance.      E-visits via [employer]:  generally incur a $35.00 fee.  Telephone visits:  Time spent on the phone: *charged based on time that is spent on the phone in increments of 10 minutes. Estimated cost:   5-10 mins $30.00   11-20 mins. $59.00   21-30 mins. $85.00     Use [employer] (secure email communication and access to your chart) to send your primary care provider a message or make an appointment. Ask someone on your Team how to sign up for [employer].  For a Price Quote for your services, please call our [employer] Price Line at [phone].  As always, Thank you for trusting us with your health care needs!    Discharged By: Lana

## 2017-05-02 ENCOUNTER — APPOINTMENT (OUTPATIENT)
Dept: ULTRASOUND IMAGING | Facility: CLINIC | Age: 37
End: 2017-05-02
Attending: EMERGENCY MEDICINE
Payer: COMMERCIAL

## 2017-05-02 ENCOUNTER — TELEPHONE (OUTPATIENT)
Dept: FAMILY MEDICINE | Facility: CLINIC | Age: 37
End: 2017-05-02

## 2017-05-02 ENCOUNTER — TELEPHONE (OUTPATIENT)
Dept: NURSING | Facility: CLINIC | Age: 37
End: 2017-05-02

## 2017-05-02 ENCOUNTER — HOSPITAL ENCOUNTER (EMERGENCY)
Facility: CLINIC | Age: 37
Discharge: HOME | End: 2017-05-02
Attending: EMERGENCY MEDICINE | Admitting: EMERGENCY MEDICINE
Payer: COMMERCIAL

## 2017-05-02 VITALS
HEIGHT: 62 IN | WEIGHT: 115 LBS | BODY MASS INDEX: 21.16 KG/M2 | TEMPERATURE: 98.5 F | RESPIRATION RATE: 18 BRPM | OXYGEN SATURATION: 99 % | HEART RATE: 86 BPM | SYSTOLIC BLOOD PRESSURE: 111 MMHG | DIASTOLIC BLOOD PRESSURE: 58 MMHG

## 2017-05-02 DIAGNOSIS — T83.84XA PAIN DUE TO INTRAUTERINE CONTRACEPTIVE DEVICE (IUD) (H): ICD-10-CM
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
## 2017-05-02 DIAGNOSIS — R10.2 PELVIC PAIN IN FEMALE: ICD-10-CM

## 2017-05-02 DIAGNOSIS — T83.9XXA COMPLICATION OF INTRAUTERINE DEVICE (IUD), UNSPECIFIED COMPLICATION, INITIAL ENCOUNTER (H): ICD-10-CM

## 2017-05-02 LAB
ALBUMIN UR-MCNC: NEGATIVE MG/DL
APPEARANCE UR: CLEAR
BILIRUB UR QL STRIP: NEGATIVE
COLOR UR AUTO: ABNORMAL
GLUCOSE UR STRIP-MCNC: NEGATIVE MG/DL
HGB UR QL STRIP: ABNORMAL
KETONES UR STRIP-MCNC: NEGATIVE MG/DL
LEUKOCYTE ESTERASE UR QL STRIP: NEGATIVE
MICRO REPORT STATUS: NORMAL
NITRATE UR QL: NEGATIVE
PH UR STRIP: 7.5 PH (ref 5–7)
RADIOLOGIST FLAGS: ABNORMAL
RBC #/AREA URNS AUTO: 0 /HPF (ref 0–2)
SP GR UR STRIP: 1 (ref 1–1.03)
SPECIMEN SOURCE: NORMAL
URN SPEC COLLECT METH UR: ABNORMAL
UROBILINOGEN UR STRIP-MCNC: NORMAL MG/DL (ref 0–2)
WBC #/AREA URNS AUTO: 0 /HPF (ref 0–2)
WET PREP SPEC: NORMAL

## 2017-05-02 PROCEDURE — 87491 CHLMYD TRACH DNA AMP PROBE: CPT | Performed by: EMERGENCY MEDICINE

## 2017-05-02 PROCEDURE — 25000132 ZZH RX MED GY IP 250 OP 250 PS 637

## 2017-05-02 PROCEDURE — 87591 N.GONORRHOEAE DNA AMP PROB: CPT | Performed by: EMERGENCY MEDICINE

## 2017-05-02 PROCEDURE — 96374 THER/PROPH/DIAG INJ IV PUSH: CPT

## 2017-05-02 PROCEDURE — 96361 HYDRATE IV INFUSION ADD-ON: CPT | Mod: 59

## 2017-05-02 PROCEDURE — 81001 URINALYSIS AUTO W/SCOPE: CPT | Performed by: EMERGENCY MEDICINE

## 2017-05-02 PROCEDURE — 76830 TRANSVAGINAL US NON-OB: CPT

## 2017-05-02 PROCEDURE — 87210 SMEAR WET MOUNT SALINE/INK: CPT | Performed by: EMERGENCY MEDICINE

## 2017-05-02 PROCEDURE — 99284 EMERGENCY DEPT VISIT MOD MDM: CPT | Mod: Z6 | Performed by: EMERGENCY MEDICINE

## 2017-05-02 PROCEDURE — 25000128 H RX IP 250 OP 636: Performed by: EMERGENCY MEDICINE

## 2017-05-02 PROCEDURE — 96376 TX/PRO/DX INJ SAME DRUG ADON: CPT

## 2017-05-02 PROCEDURE — 99284 EMERGENCY DEPT VISIT MOD MDM: CPT | Mod: 25

## 2017-05-02 RX ORDER — OXYCODONE HYDROCHLORIDE 5 MG/1
5 TABLET ORAL EVERY 4 HOURS PRN
Qty: 6 TABLET | Refills: 0 | Status: SHIPPED | OUTPATIENT
Start: 2017-05-02 | End: 2017-06-07

## 2017-05-02 RX ORDER — OXYCODONE HYDROCHLORIDE 5 MG/1
TABLET ORAL
Status: COMPLETED
Start: 2017-05-02 | End: 2017-05-02

## 2017-05-02 RX ORDER — KETOROLAC TROMETHAMINE 30 MG/ML
30 INJECTION, SOLUTION INTRAMUSCULAR; INTRAVENOUS ONCE
Status: COMPLETED | OUTPATIENT
Start: 2017-05-02 | End: 2017-05-02

## 2017-05-02 RX ORDER — SODIUM CHLORIDE 9 MG/ML
1000 INJECTION, SOLUTION INTRAVENOUS CONTINUOUS
Status: DISCONTINUED | OUTPATIENT
Start: 2017-05-02 | End: 2017-05-02 | Stop reason: HOSPADM

## 2017-05-02 RX ORDER — OXYCODONE HYDROCHLORIDE 5 MG/1
5 TABLET ORAL ONCE
Status: COMPLETED | OUTPATIENT
Start: 2017-05-02 | End: 2017-05-02

## 2017-05-02 RX ADMIN — OXYCODONE HYDROCHLORIDE 5 MG: 5 TABLET ORAL at 14:15

## 2017-05-02 RX ADMIN — SODIUM CHLORIDE 1000 ML: 9 INJECTION, SOLUTION INTRAVENOUS at 15:00

## 2017-05-02 RX ADMIN — KETOROLAC TROMETHAMINE 30 MG: 30 INJECTION, SOLUTION INTRAMUSCULAR at 18:24

## 2017-05-02 RX ADMIN — KETOROLAC TROMETHAMINE 30 MG: 30 INJECTION, SOLUTION INTRAMUSCULAR at 15:00

## 2017-05-02 RX ADMIN — SODIUM CHLORIDE 1000 ML: 9 INJECTION, SOLUTION INTRAVENOUS at 16:22

## 2017-05-02 ASSESSMENT — ENCOUNTER SYMPTOMS
FEVER: 0
ABDOMINAL PAIN: 0
FREQUENCY: 1

## 2017-05-02 NOTE — ED AVS SNAPSHOT
[facility], Emergency Department    [address] [address]    Phone:  [phone]                                       Charito Wilcox   MRN: [id number]    Department:  [facility], Emergency Department   Date of Visit:  5/2/2017           Patient Information     Date Of Birth          1980        Your diagnoses for this visit were:     Pelvic pain in female     Complication of intrauterine device (IUD), unspecified complication, initial encounter (H)        You were seen by Ayse Fournier MD.        Discharge Instructions       Follow up with your OB/GYN doctor or primary care for further care and for future birth control options.     Return to the ER if vaginal bleeding is aggressive (soaking 2 pads/hour for 2 hours in a row).     Future Appointments        Provider Department Dept Phone Center    5/15/2017 1:45 PM Katharine Gaxiola MD [facility] [phone] [facility]    6/7/2017 1:45 PM Lab [facility] [phone] [facility] [[email or web address]    6/7/2017 2:45 PM Roldan Maria MD [facility] Hepatology [phone] [facility] [[email or web address]]    6/15/2017 11:30 AM Katharine Gaxiola MD [facility] [phone] [facility]      24 Hour Appointment Hotline       To make an appointment at any [facility], call [phone] ([phone]). If you don't have a family doctor or clinic, we will help you find one. [facility] are conveniently located to serve the needs of you and your family.             Review of your medicines      Our records show that you are taking the medicines listed below. If these are incorrect, please call your family doctor or clinic.        Dose / Directions Last dose taken    ACIDOPHILUS EXTRA STRENGTH Caps        Take by mouth daily   Refills:  0        ALPRAZolam 0.5 MG tablet   Commonly known as:  XANAX   Dose:  0.5 mg   Quantity:  30 tablet        Take 1 tablet (0.5 mg) by mouth 3 times daily as needed for anxiety 1 month supply   Refills:  0         azaTHIOprine 100 MG Tabs   Dose:  50 mg        Take 50 mg by mouth daily   Refills:  0        MIRALAX powder   Dose:  1 capful   Quantity:  510 g   Generic drug:  polyethylene glycol        Take 17 g (1 capful) by mouth daily   Refills:  1        omeprazole 40 MG capsule   Commonly known as:  priLOSEC   Dose:  40 mg   Quantity:  180 capsule        Take 1 capsule (40 mg) by mouth 2 times daily (before meals) Take 30-60 minutes before a meal.   Refills:  3        ondansetron 4 MG ODT tab   Commonly known as:  ZOFRAN ODT   Dose:  4-8 mg   Quantity:  20 tablet        Take 1-2 tablets (4-8 mg) by mouth 3 times daily (before meals)   Refills:  0        PARoxetine 40 MG tablet   Commonly known as:  PAXIL   Quantity:  45 tablet        TAKE 1 AND 1/2 TABLETS(60 MG) BY MOUTH AT BEDTIME   Refills:  3        sennosides 8.6 MG tablet   Commonly known as:  SENOKOT   Dose:  1 tablet   Quantity:  60 each        Take 1 tablet by mouth daily as needed for constipation   Refills:  0        tacrolimus 1 MG capsule   Commonly known as:  PROGRAF - GENERIC EQUIVALENT   Dose:  1 mg   Quantity:  180 capsule        Take 1 capsule (1 mg) by mouth every 12 hours   Refills:  3        ursodiol 300 MG capsule   Commonly known as:  ACTIGALL   Quantity:  60 capsule        TAKE ONE CAPSULE BY MOUTH TWICE A DAY   Refills:  11                Procedures and tests performed during your visit     Chlamydia trachomatis PCR    Neisseria gonorrhoea PCR    Routine UA with microscopic    US Pelvic Complete w Transvaginal    Wet prep      Orders Needing Specimen Collection     None      Pending Results     Date and Time Order Name Status Description    5/2/2017 1503 Neisseria gonorrhoea PCR In process     5/2/2017 1503 Chlamydia trachomatis PCR In process     5/1/2017 1322 PAP IMAGED THIN LAYER SCREEN In process     5/1/2017 1322 HPV HIGH RISK TYPES DNA CERVICAL In process             Pending Culture Results     Date and Time Order Name Status Description     5/2/2017 1503 Neisseria gonorrhoea PCR In process     5/2/2017 1503 Chlamydia trachomatis PCR In process     5/1/2017 1322 PAP IMAGED THIN LAYER SCREEN In process     5/1/2017 1322 HPV HIGH RISK TYPES DNA CERVICAL In process             Pending Results Instructions     If you had any lab results that were not finalized at the time of your Discharge, you can call the ED Lab Result RN at [phone]. You will be contacted by this team for any positive Lab results or changes in treatment. The nurses are available 7 days a week from 10A to 6:30P.  You can leave a message 24 hours per day and they will return your call.        Thank you for choosing [facility]       Thank you for choosing [facility] for your care. Our goal is always to provide you with excellent care. Hearing back from our patients is one way we can continue to improve our services. Please take a few minutes to complete the written survey that you may receive in the mail after you visit with us. Thank you!        [employer] Information     [employer] gives you secure access to your electronic health record. If you see a primary care provider, you can also send messages to your care team and make appointments. If you have questions, please call your primary care clinic.  If you do not have a primary care provider, please call [phone] and they will assist you.        Care EveryWhere ID     This is your Care EveryWhere ID. This could be used by other organizations to access your [facility] medical records  [id number]        After Visit Summary       This is your record. Keep this with you and show to your community pharmacist(s) and doctor(s) at your next visit.

## 2017-05-02 NOTE — ED AVS SNAPSHOT
[facility], [place], Emergency Department    [address] [place] [address]    Phone:  [phone]                                       Charito Wilcox   MRN: [id number]    Department:  [facility], Emergency Department   Date of Visit:  5/2/2017           After Visit Summary Signature Page     I have received my discharge instructions, and my questions have been answered. I have discussed any challenges I see with this plan with the nurse or doctor.    ..........................................................................................................................................  Patient/Patient Representative Signature      ..........................................................................................................................................  Patient Representative Print Name and Relationship to Patient    ..................................................               ................................................  Date                                            Time    ..........................................................................................................................................  Reviewed by Signature/Title    ...................................................              ..............................................  Date                                                            Time

## 2017-05-02 NOTE — PROGRESS NOTES
OB/GYN Procedure Note    Was asked by our benign GYN colleagues on the [facility] regarding IUD removal for this patient.  Charito Wilcox is a 36 year old  female who had an IUD placed yesterday at her primary OB/GYN clinic for menstrual/pre-menstrual management (has a tubal ligation in for contraception).  Reports painful cramping ever since placement.  She is very uncomfortable.  Pelvic US today demonstrated abnormal placement of IUD which appears to track into the myometrium distally, although we were not too impressed after personal review of the images.  However, if myometrial tracking, it could be the etiology of her cramping pelvic pain.  Patient desired removal.  A speculum was placed into the vagina and the cervix with IUD strings were visible.  The IUD strings were grasped with a ring forceps and the Mirena IUD was easily pulled out and appeared intact.  Patient tolerated this well.  EBL 0 mL.  She is to follow up with her primary OB/GYN regarding further management and follow up.      Juany Rizvi MD  OB/GYN PGY4

## 2017-05-02 NOTE — ED NOTES
Sign out from Dr. Fournier.     Situation:  35 yo F presenting with pelvic pain after IUD placement.     Plan:  OB/gyn consult pending.     Events:   Pt seen by OB/gyn. IUD removed by them without difficulty. Pt to follow up with her primary care doctor or OB/gyn regarding birth control options.    MD Jhon Ferrer, Susana Gresham MD  05/02/17 [id number]

## 2017-05-02 NOTE — DISCHARGE INSTRUCTIONS
Follow up with your OB/GYN doctor or primary care for further care and for future birth control options.     Return to the ER if vaginal bleeding is aggressive (soaking 2 pads/hour for 2 hours in a row).     Use ibuprofen 800mg every 8 hours for cramping pain. Vicodin for pain not controlled with ibuprofen.

## 2017-05-02 NOTE — TELEPHONE ENCOUNTER
"Call Type: Triage Call    Presenting Problem: Charito had IUD placed 24 hours ago and today is  having  \"burning vaginally.\"  Charito is not able to get off toilet as  it is burning.  [facility] Triage/?Contraception:  Intrauterine  Device/disposition is to See ED Immediately and Charito agreed.  Triage Note:  Guideline Title: Contraception: Intrauterine Device (IUD)  Recommended Disposition: See ED Immediately  Original Inclination: Wanted to speak with a nurse  Override Disposition:  Intended Action: Go to Hospital / ED  Physician Contacted: No  Unbearable abdominal/pelvic pain or cramping ?  YES  Sexually active AND ectopic pregnancy risk factors ? NO  New or worsening signs and symptoms that may indicate shock ? NO  Profuse vaginal bleeding not contained by pads or tampons ? NO  Heavy vaginal bleeding (soaking 1 pad/tampon every hour for 2 hours or more) ? NO  Physician Instructions:  Care Advice: Allow the patient to be in a position of comfort.  Another adult should drive.  Bring any tissue that has passed for examination by provider.  Do not eat or drink anything until evaluated by provider.  Call  if signs and symptoms of shock develop (such as unable to  stand due to faintness, dizziness, or lightheadedness  new onset of confusion  slow to respond or difficult to awaken  skin is pale, gray, cool, or moist to touch  severe weakness  loss of consciousness).  IMMEDIATE ACTION  Write down provider's name. List or place the following in a bag for  transport with the patient: current prescription and/or nonprescription  medications  alternative treatments, therapies and medications  and street drugs.  "

## 2017-05-02 NOTE — ED PROVIDER NOTES
History     Chief Complaint   Patient presents with     Vaginal Bleeding     vaginal pain     HPI  Charito Wilcox is a 36 year old female with a history of pancreatitis and liver failure s/p transplant () who presents to the Emergency Department with vaginal pain. She states that she had an IUD placed yesterday and yesterday developed abdominal cramping. This morning she developed vaginal pain. She states that she has had an increase in urinary frequency but has also been drinking multiple bottles of water. Patient has had some vaginal bleeding which she states is expected, but has had very little bleeding today. No upper abdominal pain or fevers.     She took ibuprofen today and applied vaginal cream to try and manage her pain.     I have reviewed the Medications, Allergies, Past Medical and Surgical History, and Social History in the [facility] at Hand system.    PAST MEDICAL HISTORY  Past Medical History:   Diagnosis Date     Acute pancreatitis 2016     Esophageal reflux      Fatigue, unspecified type 4/10/2017     Intractable nausea and vomiting 9/15/2016     Liver replaced by transplant (H)     liver failure of unknown etiology, doing well with new liver     Moderate recurrent major depression (H) 2008     Papanicolaou smear of cervix with atypical squamous cells of undetermined significance (ASC-US)     negative HPV     PAST SURGICAL HISTORY  Past Surgical History:   Procedure Laterality Date     C TRANSPLANT LIVER,HETERTOPIC      had appendectomy, gallbladder out with transplant.       SECTION  2008      SECTION  3/2010     ESOPHAGOSCOPY, GASTROSCOPY, DUODENOSCOPY (EGD), COMBINED Left 2015    Procedure: COMBINED ESOPHAGOSCOPY, GASTROSCOPY, DUODENOSCOPY (EGD), BIOPSY SINGLE OR MULTIPLE;  Surgeon: Ottoniel Martin MD;  Location:  GI     ESOPHAGOSCOPY, GASTROSCOPY, DUODENOSCOPY (EGD), COMBINED N/A 2016    Procedure: COMBINED ESOPHAGOSCOPY, GASTROSCOPY,  DUODENOSCOPY (EGD), BIOPSY SINGLE OR MULTIPLE;  Surgeon: Homar Adams MD;  Location:  GI     ESOPHAGOSCOPY, GASTROSCOPY, DUODENOSCOPY (EGD), COMBINED N/A 11/17/2016    Procedure: COMBINED ESOPHAGOSCOPY, GASTROSCOPY, DUODENOSCOPY (EGD), BIOPSY SINGLE OR MULTIPLE;  Surgeon: Homar Adams MD;  Location:  GI     HIP SURGERY Right 2/2016    Torn labrum repair      TUBAL LIGATION  3/2010     FAMILY HISTORY  Family History   Problem Relation Age of Onset     GASTROINTESTINAL DISEASE No family hx of      C.A.D. No family hx of      Hypertension Maternal Grandfather      CEREBROVASCULAR DISEASE Paternal Grandmother      Breast Cancer No family hx of      Cancer - colorectal No family hx of      Prostate Cancer No family hx of      Alcohol/Drug No family hx of      Breast Cancer Paternal Grandmother      Depression Maternal Grandmother      Anxiety Disorder Maternal Grandmother      SOCIAL HISTORY  Social History   Substance Use Topics     Smoking status: Never Smoker     Smokeless tobacco: Never Used     Alcohol use No      Comment: Quit drinking in 2012     MEDICATIONS  Current Facility-Administered Medications   Medication     0.9% sodium chloride infusion     Current Outpatient Prescriptions   Medication     PARoxetine (PAXIL) 40 MG tablet     tacrolimus (PROGRAF - GENERIC EQUIVALENT) 1 MG capsule     omeprazole (PRILOSEC) 40 MG capsule     ALPRAZolam (XANAX) 0.5 MG tablet     ursodiol (ACTIGALL) 300 MG capsule     azaTHIOprine 100 MG TABS     ondansetron (ZOFRAN ODT) 4 MG ODT tab     sennosides (SENOKOT) 8.6 MG tablet     polyethylene glycol (MIRALAX) powder     Lactobacillus (ACIDOPHILUS EXTRA STRENGTH) CAPS     ALLERGIES  Allergies   Allergen Reactions     Gluten Meal Nausea and Vomiting     Milk Protein Extract Nausea and Vomiting      Review of Systems   Constitutional: Negative for fever.   Gastrointestinal: Negative for abdominal pain.   Genitourinary: Positive for frequency,  "pelvic pain, vaginal bleeding and vaginal pain.   All other systems reviewed and are negative.      Physical Exam   BP: 117/77  Pulse: 75  Temp: 98.5  F (36.9  C)  Resp: 16  Height: 157.5 cm (5' 2\")  Weight: 52.2 kg (115 lb)  SpO2: 100 %  Physical Exam   Constitutional: She is oriented to person, place, and time. She appears well-developed and well-nourished.   HENT:   Head: Normocephalic and atraumatic.   Neck: Normal range of motion.   Cardiovascular: Normal rate, regular rhythm and normal heart sounds.    Pulmonary/Chest: Effort normal and breath sounds normal. No respiratory distress.   Abdominal: Soft. She exhibits no distension. There is no tenderness. There is no rebound.   Genitourinary:   Genitourinary Comments: No CMT; IUD In place; no discharge; no bleeding noted;    Musculoskeletal: She exhibits no tenderness.   Neurological: She is alert and oriented to person, place, and time.   Skin: Skin is warm and dry.   Psychiatric: She has a normal mood and affect. Her behavior is normal. Thought content normal.       ED Course     ED Course     Procedures             Critical Care time:  none        Results for orders placed or performed during the hospital encounter of 05/02/17   US Pelvic Complete w Transvaginal   Result Value Ref Range    Radiologist flags Abnormal IUD (Urgent)     Narrative    EXAMINATION: US PELVIC COMPLETE WITH TRANSVAGINAL, 5/2/2017 3:53 PM     COMPARISON: None    HISTORY: Pelvic pain.    TECHNIQUE: The pelvis was scanned in standard fashion with  transabdominal and transvaginal transducer(s) using both grey scale  and color Doppler techniques.    FINDINGS:  The uterus measures 7.1 x 4.4 x 4.7 cm. There is an IUD which tracks  into the myometrium, as seen on transverse cine clip, images 62  through 78. No fibroid. The endometrium is within normal limits and  measures 6 mm. There is trace free fluid in the pelvis.    The right ovary measures 2.7 x 1.6 x 2.9 cm and the left ovary  measures " 3.8 x 1.7 x 3.0 cm. There is no adnexal mass. There is normal  blood flow to the ovaries.      Impression    IMPRESSION:   Abnormal placement of the IUD, which appears to track into the  myometrium distally. Trace free fluid in the pelvis, without evidence  of hemorrhage.    [Urgent Result: Abnormal IUD]    Finding was identified on 5/2/2017 3:59 PM.     Dr. Ayse Fourneir was contacted by Dr. Kevin Penaloza at 5/2/2017 4:03 PM  and verbalized understanding of the urgent finding.     I have personally reviewed the examination and initial interpretation  and I agree with the findings.    JESSI ESPINO MD   Routine UA with microscopic   Result Value Ref Range    Color Urine Straw     Appearance Urine Clear     Glucose Urine Negative NEG mg/dL    Bilirubin Urine Negative NEG    Ketones Urine Negative NEG mg/dL    Specific Gravity Urine 1.002 (L) 1.003 - 1.035    Blood Urine Small (A) NEG    pH Urine 7.5 (H) 5.0 - 7.0 pH    Protein Albumin Urine Negative NEG mg/dL    Urobilinogen mg/dL Normal 0.0 - 2.0 mg/dL    Nitrite Urine Negative NEG    Leukocyte Esterase Urine Negative NEG    Source Midstream Urine     WBC Urine 0 0 - 2 /HPF    RBC Urine 0 0 - 2 /HPF   Wet prep   Result Value Ref Range    Specimen Description Vagina SWAP     Wet Prep       No Trichomonas seen  No yeast seen  PMNs seen Moderate  No clue cells seen      Micro Report Status FINAL 05/02/2017      Medications   0.9% sodium chloride BOLUS (0 mLs Intravenous Stopped 5/2/17 1621)     Followed by   0.9% sodium chloride infusion (1,000 mLs Intravenous New Bag 5/2/17 1622)   oxyCODONE (ROXICODONE) IR tablet 5 mg (5 mg Oral Given 5/2/17 1415)   ketorolac (TORADOL) injection 30 mg (30 mg Intravenous Given 5/2/17 1500)            Labs Ordered and Resulted from Time of ED Arrival Up to the Time of Departure from the ED   ROUTINE UA WITH MICROSCOPIC - Abnormal; Notable for the following:        Result Value    Specific Gravity Urine 1.002 (*)     Blood Urine Small  (*)     pH Urine 7.5 (*)     All other components within normal limits   WET PREP   CHLAMYDIA TRACHOMATIS PCR   NEISSERIA GONORRHOEAE PCR            Assessments & Plan (with Medical Decision Making)  Patient is a 36 year old female who is status post liver transplant who presented to the ER due to acute pelvic pain after an IUD that was placed yesterday. Patient here said that she took some oxy at home as well as receiving some here but she has still had ongoing pain. Patient received some Toradol and is currently feeling better. Did perform a pelvic exam that showed normal IUD in place. Obtained an ultrasound that showed possible IUD in the myometrium and not the uterus itself. Discussed these findings with the gynecologist on call. They feel that the IUD is in the correct location. They are going to come and evaluate the patient to make sure that there are no other interventions that they recommend. Patient was signed out to Dr. Ferreira and patient is aware of the plan of care. Patient will likely be discharged home with no acute interventions or processes needed.    This part of the medical record was transcribed by Roldan Weathers, Medical Scribe, from a dictation done by Ayse Fournier MD.      I have reviewed the nursing notes.    I have reviewed the findings, diagnosis, plan and need for follow up with the patient.    New Prescriptions    No medications on file       Final diagnoses:   Pelvic pain in female   Complication of intrauterine device (IUD), unspecified complication, initial encounter (H)   I, Roldan Weathers, am serving as a trained medical scribe to document services personally performed by Ayse Fournier MD, based on the provider's statements to me.      IAyse MD, was physically present and have reviewed and verified the accuracy of this note documented by Roldan Weathers.      5/2/2017   [facility], [place], EMERGENCY DEPARTMENT     Ayse Forunier MD  05/03/17 [id number]

## 2017-05-03 LAB
C TRACH DNA SPEC QL NAA+PROBE: NORMAL
N GONORRHOEA DNA SPEC QL NAA+PROBE: NORMAL
SPECIMEN SOURCE: NORMAL
SPECIMEN SOURCE: NORMAL

## 2017-05-04 ENCOUNTER — MYC MEDICAL ADVICE (OUTPATIENT)
Dept: FAMILY MEDICINE | Facility: CLINIC | Age: 37
End: 2017-05-04

## 2017-05-04 LAB
COPATH REPORT: NORMAL
PAP: NORMAL

## 2017-05-05 PROBLEM — Z30.431 IUD CHECK UP: Status: RESOLVED | Noted: 2017-05-01 | Resolved: 2017-05-05

## 2017-05-05 LAB
FINAL DIAGNOSIS: NORMAL
HPV HR 12 DNA CVX QL NAA+PROBE: NEGATIVE
HPV16 DNA SPEC QL NAA+PROBE: NEGATIVE
HPV18 DNA SPEC QL NAA+PROBE: NEGATIVE
SPECIMEN DESCRIPTION: NORMAL

## 2017-05-08 DIAGNOSIS — R82.90 CLOUDY URINE: ICD-10-CM

## 2017-05-08 LAB
ALBUMIN UR-MCNC: NEGATIVE MG/DL
APPEARANCE UR: ABNORMAL
BILIRUB UR QL STRIP: NEGATIVE
COLOR UR AUTO: YELLOW
GLUCOSE UR STRIP-MCNC: NEGATIVE MG/DL
HGB UR QL STRIP: NEGATIVE
KETONES UR STRIP-MCNC: NEGATIVE MG/DL
LEUKOCYTE ESTERASE UR QL STRIP: ABNORMAL
NITRATE UR QL: NEGATIVE
NON-SQ EPI CELLS #/AREA URNS LPF: ABNORMAL /LPF
PH UR STRIP: 6.5 PH (ref 5–7)
RBC #/AREA URNS AUTO: ABNORMAL /HPF (ref 0–2)
SP GR UR STRIP: <=1.005 (ref 1–1.03)
URN SPEC COLLECT METH UR: ABNORMAL
UROBILINOGEN UR STRIP-ACNC: 0.2 EU/DL (ref 0.2–1)
WBC #/AREA URNS AUTO: ABNORMAL /HPF (ref 0–2)

## 2017-05-08 PROCEDURE — 81001 URINALYSIS AUTO W/SCOPE: CPT | Performed by: FAMILY MEDICINE

## 2017-05-09 ENCOUNTER — MYC MEDICAL ADVICE (OUTPATIENT)
Dept: FAMILY MEDICINE | Facility: CLINIC | Age: 37
End: 2017-05-09

## 2017-05-09 DIAGNOSIS — N30.00 ACUTE CYSTITIS WITHOUT HEMATURIA: Primary | ICD-10-CM

## 2017-05-09 RX ORDER — SULFAMETHOXAZOLE/TRIMETHOPRIM 800-160 MG
1 TABLET ORAL 2 TIMES DAILY
Qty: 6 TABLET | Refills: 0 | Status: SHIPPED | OUTPATIENT
Start: 2017-05-09 | End: 2017-05-12

## 2017-05-10 NOTE — PROGRESS NOTES
SUBJECTIVE:                                                    Charito Wilcox is a 36 year old female who presents to clinic today for the following health issues:    Chief Complaint   Patient presents with     Blood Sugar Problem     blood sugar is low     ER F/U     [facility]     Not checking her sugars.     ED/UC Followup:    Facility:  [facility]  Date of visit: May 2,2017  Reason for visit: Vaginal pain and bleeding, IUD was removed  Current Status: still having symptoms     Patient was seen in the ED on 2017 due to vaginal cramping. She had an IUD placed the day before her ED visit. During her ED visit, she was told that it was possible that the IUD was in the wrong location. She was evaluated by the on call OB/gyn who felt the IUD was in the correct location. Patient opted to have the IUD taken out due to severe cramping. Since then, she is still experiencing occasional very mild cramping and burning sensation when urinating. She thinks this may be an UTI since burning sensation is usually a first symptoms for her when she experiences UTI's. States that drinking water does help but feels she has to constantly drink water. She reports she did otc yeast treatment last night.       Problem list and histories reviewed & adjusted, as indicated.  Additional history: as documented    Patient Active Problem List   Diagnosis     Liver replaced by transplant (H)     Esophageal reflux     CARDIOVASCULAR SCREENING; LDL GOAL LESS THAN 160     Panic disorder     IBS (irritable bowel syndrome)     Immunosuppressed status (H)     PMS (premenstrual syndrome)     Labral tear of hip, degenerative     Right hip impingement syndrome     Fatigue, unspecified type     Past Surgical History:   Procedure Laterality Date     C TRANSPLANT LIVER,HETERTOPIC      had appendectomy, gallbladder out with transplant.       SECTION  2008      SECTION  3/2010     ESOPHAGOSCOPY,  GASTROSCOPY, DUODENOSCOPY (EGD), COMBINED Left 2/6/2015    Procedure: COMBINED ESOPHAGOSCOPY, GASTROSCOPY, DUODENOSCOPY (EGD), BIOPSY SINGLE OR MULTIPLE;  Surgeon: Ottoniel Martin MD;  Location:  GI     ESOPHAGOSCOPY, GASTROSCOPY, DUODENOSCOPY (EGD), COMBINED N/A 9/16/2016    Procedure: COMBINED ESOPHAGOSCOPY, GASTROSCOPY, DUODENOSCOPY (EGD), BIOPSY SINGLE OR MULTIPLE;  Surgeon: Homar Adams MD;  Location:  GI     ESOPHAGOSCOPY, GASTROSCOPY, DUODENOSCOPY (EGD), COMBINED N/A 11/17/2016    Procedure: COMBINED ESOPHAGOSCOPY, GASTROSCOPY, DUODENOSCOPY (EGD), BIOPSY SINGLE OR MULTIPLE;  Surgeon: Homar Adams MD;  Location:  GI     HIP SURGERY Right 2/2016    Torn labrum repair      TUBAL LIGATION  3/2010       Social History   Substance Use Topics     Smoking status: Never Smoker     Smokeless tobacco: Never Used     Alcohol use No      Comment: Quit drinking in 2012     Family History   Problem Relation Age of Onset     GASTROINTESTINAL DISEASE No family hx of      C.A.D. No family hx of      Hypertension Maternal Grandfather      CEREBROVASCULAR DISEASE Paternal Grandmother      Breast Cancer No family hx of      Cancer - colorectal No family hx of      Prostate Cancer No family hx of      Alcohol/Drug No family hx of      Breast Cancer Paternal Grandmother      Depression Maternal Grandmother      Anxiety Disorder Maternal Grandmother          Current Outpatient Prescriptions   Medication Sig Dispense Refill     Cranberry 200 MG CAPS Take 200 mg by mouth 2 times daily       L-GLUTAMINE PO        ondansetron (ZOFRAN ODT) 4 MG ODT tab Take 1-2 tablets (4-8 mg) by mouth 3 times daily (before meals) 20 tablet 0     PARoxetine (PAXIL) 40 MG tablet TAKE 1 AND 1/2 TABLETS(60 MG) BY MOUTH AT BEDTIME 45 tablet 3     tacrolimus (PROGRAF - GENERIC EQUIVALENT) 1 MG capsule Take 1 capsule (1 mg) by mouth every 12 hours 180 capsule 3     omeprazole (PRILOSEC) 40 MG capsule Take 1  capsule (40 mg) by mouth 2 times daily (before meals) Take 30-60 minutes before a meal. 180 capsule 3     ALPRAZolam (XANAX) 0.5 MG tablet Take 1 tablet (0.5 mg) by mouth 3 times daily as needed for anxiety 1 month supply 30 tablet 0     ursodiol (ACTIGALL) 300 MG capsule TAKE ONE CAPSULE BY MOUTH TWICE A DAY 60 capsule 11     azaTHIOprine 100 MG TABS Take 50 mg by mouth daily        polyethylene glycol (MIRALAX) powder Take 17 g (1 capful) by mouth daily 510 g 1     Lactobacillus (ACIDOPHILUS EXTRA STRENGTH) CAPS Take by mouth daily        oxyCODONE (ROXICODONE) 5 MG IR tablet Take 1 tablet (5 mg) by mouth every 4 hours as needed for moderate to severe pain (Patient not taking: Reported on 5/15/2017) 6 tablet 0     sennosides (SENOKOT) 8.6 MG tablet Take 1 tablet by mouth daily as needed for constipation (Patient not taking: Reported on 5/1/2017) 60 each 0     Allergies   Allergen Reactions     Gluten Meal Nausea and Vomiting     Milk Protein Extract Nausea and Vomiting     Recent Labs   Lab Test  03/29/17   0819  02/10/17   0755  12/02/16   0737  09/30/16   0804  09/17/16   0920   09/09/16   0851   09/01/16   1539   LDL   --    --   64  50   --    --   49   --    --    HDL   --    --   67  64   --    --   55   --    --    TRIG   --    --   58  64   --    --   70   --    --    ALT   --   25  17 -- 17   < >   --    < >  24   CR   --   0.71  0.74   --   0.60   < >   --    < >  0.79   GFRESTIMATED   --   >90  Non  GFR Calc    89   --   >90  Non  GFR Calc     < >   --    < >  83   GFRESTBLACK   --   >90   GFR Calc    >90   GFR Calc     --   >90   GFR Calc     < >   --    < >  >90   GFR Calc     POTASSIUM   --   4.7  4.1   --   3.8   < >   --    < >  4.3   TSH  1.11   --    --    --    --    --    --    --   0.93    < > = values in this interval not displayed.      BP Readings from Last 3 Encounters:   05/15/17 108/64    05/02/17 111/58   05/01/17 118/64    Wt Readings from Last 3 Encounters:   05/15/17 123 lb (55.8 kg)   05/02/17 115 lb (52.2 kg)   05/01/17 123 lb 8 oz (56 kg)         Reviewed and updated as needed this visit by clinical staff       Reviewed and updated as needed this visit by Provider         ROS:  Constitutional, HEENT, cardiovascular, pulmonary, GI, , musculoskeletal, neuro, skin, endocrine and psych systems are negative, except as otherwise noted.    This document serves as a record of the services and decisions personally performed and made by Katharine Gaxiola MD. It was created on her behalf by Joy Lieberman, a trained medical scribe. The creation of this document is based the provider's statements to the medical scribe.    Joy Lieberman May 15, 2017 2:16 PM    OBJECTIVE:                                                    /64  Pulse 84  Temp 98.8  F (37.1  C) (Oral)  Wt 123 lb (55.8 kg)  LMP 04/26/2017  SpO2 100%  BMI 22.5 kg/m2  Body mass index is 22.5 kg/(m^2).  GENERAL: healthy, alert and no distress  NECK: no adenopathy, no asymmetry, masses, or scars and thyroid normal to palpation  RESP: lungs clear to auscultation - no rales, rhonchi or wheezes  CV: regular rate and rhythm, normal S1 S2, no S3 or S4, no murmur, click or rub, no peripheral edema and peripheral pulses strong  ABDOMEN: soft, nontender, no hepatosplenomegaly, no masses and bowel sounds normal.  Well healed incision. Mild, bilateral flank tenderness    (female): normal female external genitalia, normal urethral meatus, vaginal mucosa, normal cervix/adnexa/uterus without masses or discharge  MS: no gross musculoskeletal defects noted, no edema    Diagnostic Test Results:  Results for orders placed or performed in visit on 05/15/17   *UA reflex to Microscopic and Culture ([place] and [facility] (except [place] and [place])   Result Value Ref Range    Color Urine Yellow     Appearance Urine Clear     Glucose Urine Negative  NEG mg/dL    Bilirubin Urine Negative NEG    Ketones Urine Negative NEG mg/dL    Specific Gravity Urine <=1.005 1.003 - 1.035    Blood Urine Negative NEG    pH Urine 5.5 5.0 - 7.0 pH    Protein Albumin Urine Negative NEG mg/dL    Urobilinogen Urine 0.2 0.2 - 1.0 EU/dL    Nitrite Urine Negative NEG    Leukocyte Esterase Urine Negative NEG    Source Midstream Urine    UA with Microscopic   Result Value Ref Range    Color Urine Yellow     Appearance Urine Clear     Glucose Urine Negative NEG mg/dL    Bilirubin Urine Negative NEG    Ketones Urine Negative NEG mg/dL    Specific Gravity Urine <=1.005 1.003 - 1.035    pH Urine 5.5 5.0 - 7.0 pH    Protein Albumin Urine Negative NEG mg/dL    Urobilinogen Urine 0.2 0.2 - 1.0 EU/dL    Nitrite Urine Negative NEG    Blood Urine Negative NEG    Leukocyte Esterase Urine Negative NEG    Source Midstream Urine     WBC Urine O - 2 0 - 2 /HPF    RBC Urine O - 2 0 - 2 /HPF    Squamous Epithelial /LPF Urine Few FEW /LPF    Bacteria Urine Few (A) NEG /HPF   Urine Culture Aerobic Bacterial   Result Value Ref Range    Specimen Description Midstream Urine     Culture Micro No growth     Micro Report Status FINAL 05/16/2017    Wet prep   Result Value Ref Range    Specimen Description Vagina     Wet Prep       No Trichomonas seen  No clue cells seen  No yeast seen      Micro Report Status FINAL 05/15/2017          ASSESSMENT/PLAN:                                                          ICD-10-CM    1. Dysuria R30.0 *UA reflex to Microscopic and Culture ([place] and [facility] (except [place] and [place])     UA with Microscopic     Urine Culture Aerobic Bacterial   2. Vaginal irritation N89.8 Wet prep   3. Liver replaced by transplant (H) Z94.4        Dysuria and vaginal irritation, s/p treatment for UTI with septra. Also with history of recent IUD insertion, malposition and pain with removal. Check labs as above.     Has history of odd feelings during menses that she is attributing  to low BS. She has a meter, getting period next week and will check sugars when she has this feeling and keep me posted.   See Patient Instructions    The information in this document, created by the medical scribe for me, accurately reflects the services I personally performed and the decisions made by me. I have reviewed and approved this document for accuracy prior to leaving the patient care area.    Katharine Gaxiola MD  [facility]

## 2017-05-15 ENCOUNTER — OFFICE VISIT (OUTPATIENT)
Dept: FAMILY MEDICINE | Facility: CLINIC | Age: 37
End: 2017-05-15
Payer: COMMERCIAL

## 2017-05-15 VITALS
DIASTOLIC BLOOD PRESSURE: 64 MMHG | SYSTOLIC BLOOD PRESSURE: 108 MMHG | BODY MASS INDEX: 22.5 KG/M2 | OXYGEN SATURATION: 100 % | WEIGHT: 123 LBS | HEART RATE: 84 BPM | TEMPERATURE: 98.8 F

## 2017-05-15 DIAGNOSIS — R30.0 DYSURIA: Primary | ICD-10-CM

## 2017-05-15 DIAGNOSIS — N89.8 VAGINAL IRRITATION: ICD-10-CM

## 2017-05-15 DIAGNOSIS — Z94.4 LIVER REPLACED BY TRANSPLANT (H): ICD-10-CM

## 2017-05-15 LAB
ALBUMIN UR-MCNC: NEGATIVE MG/DL
ALBUMIN UR-MCNC: NEGATIVE MG/DL
APPEARANCE UR: CLEAR
APPEARANCE UR: CLEAR
BACTERIA #/AREA URNS HPF: ABNORMAL /HPF
BILIRUB UR QL STRIP: NEGATIVE
BILIRUB UR QL STRIP: NEGATIVE
COLOR UR AUTO: YELLOW
COLOR UR AUTO: YELLOW
GLUCOSE UR STRIP-MCNC: NEGATIVE MG/DL
GLUCOSE UR STRIP-MCNC: NEGATIVE MG/DL
HGB UR QL STRIP: NEGATIVE
HGB UR QL STRIP: NEGATIVE
KETONES UR STRIP-MCNC: NEGATIVE MG/DL
KETONES UR STRIP-MCNC: NEGATIVE MG/DL
LEUKOCYTE ESTERASE UR QL STRIP: NEGATIVE
LEUKOCYTE ESTERASE UR QL STRIP: NEGATIVE
MICRO REPORT STATUS: NORMAL
NITRATE UR QL: NEGATIVE
NITRATE UR QL: NEGATIVE
NON-SQ EPI CELLS #/AREA URNS LPF: ABNORMAL /LPF
PH UR STRIP: 5.5 PH (ref 5–7)
PH UR STRIP: 5.5 PH (ref 5–7)
RBC #/AREA URNS AUTO: ABNORMAL /HPF (ref 0–2)
SP GR UR STRIP: <=1.005 (ref 1–1.03)
SP GR UR STRIP: <=1.005 (ref 1–1.03)
SPECIMEN SOURCE: NORMAL
URN SPEC COLLECT METH UR: ABNORMAL
URN SPEC COLLECT METH UR: NORMAL
UROBILINOGEN UR STRIP-ACNC: 0.2 EU/DL (ref 0.2–1)
UROBILINOGEN UR STRIP-ACNC: 0.2 EU/DL (ref 0.2–1)
WBC #/AREA URNS AUTO: ABNORMAL /HPF (ref 0–2)
WET PREP SPEC: NORMAL

## 2017-05-15 PROCEDURE — 81001 URINALYSIS AUTO W/SCOPE: CPT | Performed by: FAMILY MEDICINE

## 2017-05-15 PROCEDURE — 81003 URINALYSIS AUTO W/O SCOPE: CPT | Performed by: FAMILY MEDICINE

## 2017-05-15 PROCEDURE — 87086 URINE CULTURE/COLONY COUNT: CPT | Performed by: FAMILY MEDICINE

## 2017-05-15 PROCEDURE — 87210 SMEAR WET MOUNT SALINE/INK: CPT | Performed by: FAMILY MEDICINE

## 2017-05-15 PROCEDURE — 99213 OFFICE O/P EST LOW 20 MIN: CPT | Performed by: FAMILY MEDICINE

## 2017-05-15 RX ORDER — GINSENG 100 MG
200 CAPSULE ORAL 2 TIMES DAILY
COMMUNITY
End: 2017-06-07

## 2017-05-15 ASSESSMENT — PAIN SCALES - GENERAL: PAINLEVEL: MODERATE PAIN (5)

## 2017-05-15 NOTE — PATIENT INSTRUCTIONS
It's fine to finish the yeast treatment  I'll keep you posted on the labs results  Katharine Gaxiola MD   [facility]    If you have any questions regarding to your visit please contact your care team:       Team Purple:   Clinic Hours Telephone Number   [address] Dr.   7am-7pm  Monday - Thursday   7am-5pm  Fridays  [phone]  (Appointment scheduling available 24/7)    Questions about your Visit?   Team Line:  [phone]   Urgent Care - [place] - 11am-9pm Monday-Friday Saturday-Sunday- 9am-5pm   [place] - 5pm-9pm Monday-Friday Saturday-Sunday- 9am-5pm  [phone] - [facility]  [phone] - [place]       What options do I have for visits at the clinic other than the traditional office visit?  To expand how we care for you, many of our providers are utilizing electronic visits (e-visits) and telephone visits, when medically appropriate, for interactions with their patients rather than a visit in the clinic.   We also offer nurse visits for many medical concerns. Just like any other service, we will bill your insurance company for this type of visit based on time spent on the phone with your provider. Not all insurance companies cover these visits. Please check with your medical insurance if this type of visit is covered. You will be responsible for any charges that are not paid by your insurance.      E-visits via [employer]:  generally incur a $35.00 fee.  Telephone visits:  Time spent on the phone: *charged based on time that is spent on the phone in increments of 10 minutes. Estimated cost:   5-10 mins $30.00   11-20 mins. $59.00   21-30 mins. $85.00     Use [employer] (secure email communication and access to your chart) to send your primary care provider a message or make an appointment. Ask someone on your Team how to sign up for [employer].  For a Price Quote for your services, please call our  Consumer Rogers Line at [phone].  As always, Thank you for trusting us with your health care needs!    Nellie Torrez MA

## 2017-05-15 NOTE — MR AVS SNAPSHOT
After Visit Summary   5/15/2017    Charito Wilcox    MRN: [id number]           Patient Information     Date Of Birth          1980        Visit Information        Provider Department      5/15/2017 1:45 PM Katharine Gaxiola MD [facility]        Today's Diagnoses     Dysuria    -  1    Vaginal irritation        Liver replaced by transplant (H)          Care Instructions    It's fine to finish the yeast treatment  I'll keep you posted on the labs results  Katharine Gaxiola MD   [facility]    If you have any questions regarding to your visit please contact your care team:       Team Purple:   Clinic Hours Telephone Number   Dr. Dilia Wilcox, PA   7am-7pm  Monday - Thursday   7am-5pm  Fridays  [phone]  (Appointment scheduling available 24/7)    Questions about your Visit?   Team Line:  [phone]   Urgent Care - [place] - 11am-9pm Monday-Friday Saturday-Sunday- 9am-5pm   [place] - 5pm-9pm Monday-Friday Saturday-Sunday- 9am-5pm  [phone] - [place]   [phone] - [place]       What options do I have for visits at the clinic other than the traditional office visit?  To expand how we care for you, many of our providers are utilizing electronic visits (e-visits) and telephone visits, when medically appropriate, for interactions with their patients rather than a visit in the clinic.   We also offer nurse visits for many medical concerns. Just like any other service, we will bill your insurance company for this type of visit based on time spent on the phone with your provider. Not all insurance companies cover these visits. Please check with your medical insurance if this type of visit is covered. You will be responsible for any charges that are not paid by your insurance.      E-visits via [employer]:  generally incur a $35.00 fee.  Telephone visits:  Time spent on the  phone: *charged based on time that is spent on the phone in increments of 10 minutes. Estimated cost:   5-10 mins $30.00   11-20 mins. $59.00   21-30 mins. $85.00     Use [employer]: [employer] (secure email communication and access to your chart) to send your primary care provider a message or make an appointment. Ask someone on your Team how to sign up for [employer].  For a Price Quote for your services, please call our [employer] Price Line at [phone].  As always, Thank you for trusting us with your health care needs!    Nellie Torrez MA          Follow-ups after your visit        Your next 10 appointments already scheduled     Jun 07, 2017  1:45 PM CDT   Lab with  LAB   [facility] ([facility])    [address] [place] [id number] [phone]            Jun 07, 2017  2:45 PM CDT   (Arrive by 2:30 PM)   Return Liver Transplant with Roldan Maria MD   [facility] Hepatology ([facility])    [address] [place] [id number] [phone]            Leodan 15, 2017 11:30 AM CDT   Office Visit with Katharine Gaxiola MD   [facility] ([facility])    [address] [place] [id number] [phone]           Bring a current list of meds and any records pertaining to this visit.  For Physicals, please bring immunization records and any forms needing to be filled out.  Please arrive 10 minutes early to complete paperwork.              Who to contact     If you have questions or need follow up information about today's clinic visit or your schedule please contact [facility] directly at [phone].  Normal or non-critical lab and imaging results will be communicated to you by MyChart, letter or phone within 4 business days after the clinic has received the results. If you do not hear from us within 7 days, please contact the clinic through MyChart or phone. If you have a critical or  abnormal lab result, we will notify you by phone as soon as possible.  Submit refill requests through [employer] or call your pharmacy and they will forward the refill request to us. Please allow 3 business days for your refill to be completed.          Additional Information About Your Visit        [employer] Information     W[employer] gives you secure access to your electronic health record. If you see a primary care provider, you can also send messages to your care team and make appointments. If you have questions, please call your primary care clinic.  If you do not have a primary care provider, please call [phone] and they will assist you.        Care EveryWhere ID     This is your Care EveryWhere ID. This could be used by other organizations to access your [facility] medical records  [id number]        Your Vitals Were     Pulse Temperature Last Period Pulse Oximetry BMI (Body Mass Index)       84 98.8  F (37.1  C) (Oral) 04/26/2017 100% 22.5 kg/m2        Blood Pressure from Last 3 Encounters:   05/15/17 108/64   05/02/17 111/58   05/01/17 118/64    Weight from Last 3 Encounters:   05/15/17 123 lb (55.8 kg)   05/02/17 115 lb (52.2 kg)   05/01/17 123 lb 8 oz (56 kg)              We Performed the Following     *UA reflex to Microscopic and Culture ([place] [facility] (except [place] and [place])     UA with Microscopic     Urine Culture Aerobic Bacterial     Wet prep        Primary Care Provider Office Phone # Fax #    Katharine Gaxiola [phone]       [address] [address]        Thank you!     Thank you for choosing [facility]  for your care. Our goal is always to provide you with excellent care. Hearing back from our patients is one way we can continue to improve our services. Please take a few minutes to complete the written survey that you may receive in the mail after your visit with us. Thank you!             Your Updated  Medication List - Protect others around you: Learn how to safely use, store and throw away your medicines at www.disposemymeds.org.          This list is accurate as of: 5/15/17  2:52 PM.  Always use your most recent med list.                   Brand Name Dispense Instructions for use    ACIDOPHILUS EXTRA STRENGTH Caps      Take by mouth daily       ALPRAZolam 0.5 MG tablet    XANAX    30 tablet    Take 1 tablet (0.5 mg) by mouth 3 times daily as needed for anxiety 1 month supply       azaTHIOprine 100 MG Tabs      Take 50 mg by mouth daily       Cranberry 200 MG Caps      Take 200 mg by mouth 2 times daily       L-GLUTAMINE PO          MIRALAX powder   Generic drug:  polyethylene glycol     510 g    Take 17 g (1 capful) by mouth daily       omeprazole 40 MG capsule    priLOSEC    180 capsule    Take 1 capsule (40 mg) by mouth 2 times daily (before meals) Take 30-60 minutes before a meal.       ondansetron 4 MG ODT tab    ZOFRAN ODT    20 tablet    Take 1-2 tablets (4-8 mg) by mouth 3 times daily (before meals)       oxyCODONE 5 MG IR tablet    ROXICODONE    6 tablet    Take 1 tablet (5 mg) by mouth every 4 hours as needed for moderate to severe pain       PARoxetine 40 MG tablet    PAXIL    45 tablet    TAKE 1 AND 1/2 TABLETS(60 MG) BY MOUTH AT BEDTIME       sennosides 8.6 MG tablet    SENOKOT    60 each    Take 1 tablet by mouth daily as needed for constipation       tacrolimus 1 MG capsule    PROGRAF - GENERIC EQUIVALENT    180 capsule    Take 1 capsule (1 mg) by mouth every 12 hours       ursodiol 300 MG capsule    ACTIGALL    60 capsule    TAKE ONE CAPSULE BY MOUTH TWICE A DAY

## 2017-05-15 NOTE — NURSING NOTE
"Chief Complaint   Patient presents with     Blood Sugar Problem     blood sugar is low     ER F/U     [facility]       Initial /64  Pulse 84  Temp 98.8  F (37.1  C) (Oral)  Wt 123 lb (55.8 kg)  LMP 04/26/2017  SpO2 100%  BMI 22.5 kg/m2 Estimated body mass index is 22.5 kg/(m^2) as calculated from the following:    Height as of 5/2/17: 5' 2\" (1.575 m).    Weight as of this encounter: 123 lb (55.8 kg).  Medication Reconciliation: complete    "

## 2017-05-16 LAB
BACTERIA SPEC CULT: NO GROWTH
MICRO REPORT STATUS: NORMAL
SPECIMEN SOURCE: NORMAL

## 2017-06-07 ENCOUNTER — OFFICE VISIT (OUTPATIENT)
Dept: GASTROENTEROLOGY | Facility: CLINIC | Age: 37
End: 2017-06-07
Attending: INTERNAL MEDICINE
Payer: COMMERCIAL

## 2017-06-07 VITALS
WEIGHT: 122.2 LBS | RESPIRATION RATE: 16 BRPM | DIASTOLIC BLOOD PRESSURE: 72 MMHG | HEART RATE: 70 BPM | HEIGHT: 62 IN | OXYGEN SATURATION: 100 % | SYSTOLIC BLOOD PRESSURE: 107 MMHG | BODY MASS INDEX: 22.49 KG/M2 | TEMPERATURE: 98.1 F

## 2017-06-07 DIAGNOSIS — Z94.4 HISTORY OF LIVER TRANSPLANT (H): Primary | ICD-10-CM

## 2017-06-07 DIAGNOSIS — Z79.60 LONG-TERM USE OF IMMUNOSUPPRESSANT MEDICATION: ICD-10-CM

## 2017-06-07 DIAGNOSIS — Z94.4 STATUS POST LIVER TRANSPLANT (H): Primary | ICD-10-CM

## 2017-06-07 DIAGNOSIS — Z94.4 HISTORY OF LIVER TRANSPLANT (H): ICD-10-CM

## 2017-06-07 LAB
ALBUMIN SERPL-MCNC: 3.9 G/DL (ref 3.4–5)
ALBUMIN UR-MCNC: NEGATIVE MG/DL
ALP SERPL-CCNC: 166 U/L (ref 40–150)
ALT SERPL W P-5'-P-CCNC: 20 U/L (ref 0–50)
ANION GAP SERPL CALCULATED.3IONS-SCNC: 7 MMOL/L (ref 3–14)
APPEARANCE UR: CLEAR
AST SERPL W P-5'-P-CCNC: 28 U/L (ref 0–45)
BILIRUB DIRECT SERPL-MCNC: 0.2 MG/DL (ref 0–0.2)
BILIRUB SERPL-MCNC: 0.7 MG/DL (ref 0.2–1.3)
BILIRUB UR QL STRIP: NEGATIVE
BUN SERPL-MCNC: 11 MG/DL (ref 7–30)
CALCIUM SERPL-MCNC: 8.5 MG/DL (ref 8.5–10.1)
CHLORIDE SERPL-SCNC: 107 MMOL/L (ref 94–109)
CHOLEST SERPL-MCNC: 156 MG/DL
CO2 SERPL-SCNC: 27 MMOL/L (ref 20–32)
COLOR UR AUTO: YELLOW
CREAT SERPL-MCNC: 0.7 MG/DL (ref 0.52–1.04)
CREAT UR-MCNC: 69 MG/DL
ERYTHROCYTE [DISTWIDTH] IN BLOOD BY AUTOMATED COUNT: 12.2 % (ref 10–15)
GFR SERPL CREATININE-BSD FRML MDRD: NORMAL ML/MIN/1.7M2
GLUCOSE SERPL-MCNC: 89 MG/DL (ref 70–99)
GLUCOSE UR STRIP-MCNC: NEGATIVE MG/DL
HCT VFR BLD AUTO: 40.4 % (ref 35–47)
HDLC SERPL-MCNC: 70 MG/DL
HGB BLD-MCNC: 13.9 G/DL (ref 11.7–15.7)
HGB UR QL STRIP: NEGATIVE
KETONES UR STRIP-MCNC: NEGATIVE MG/DL
LDLC SERPL CALC-MCNC: 58 MG/DL
LEUKOCYTE ESTERASE UR QL STRIP: NEGATIVE
MCH RBC QN AUTO: 35.3 PG (ref 26.5–33)
MCHC RBC AUTO-ENTMCNC: 34.4 G/DL (ref 31.5–36.5)
MCV RBC AUTO: 103 FL (ref 78–100)
NITRATE UR QL: NEGATIVE
NONHDLC SERPL-MCNC: 86 MG/DL
PH UR STRIP: 7 PH (ref 5–7)
PLATELET # BLD AUTO: 197 10E9/L (ref 150–450)
POTASSIUM SERPL-SCNC: 4 MMOL/L (ref 3.4–5.3)
PROT SERPL-MCNC: 7.3 G/DL (ref 6.8–8.8)
PROT UR-MCNC: 0.13 G/L
PROT/CREAT 24H UR: 0.18 G/G CR (ref 0–0.2)
RBC # BLD AUTO: 3.94 10E12/L (ref 3.8–5.2)
RBC #/AREA URNS AUTO: 1 /HPF (ref 0–2)
SODIUM SERPL-SCNC: 141 MMOL/L (ref 133–144)
SP GR UR STRIP: 1.01 (ref 1–1.03)
SQUAMOUS #/AREA URNS AUTO: 1 /HPF (ref 0–1)
TRIGL SERPL-MCNC: 136 MG/DL
URN SPEC COLLECT METH UR: NORMAL
UROBILINOGEN UR STRIP-MCNC: 2 MG/DL (ref 0–2)
WBC # BLD AUTO: 5.3 10E9/L (ref 4–11)
WBC #/AREA URNS AUTO: <1 /HPF (ref 0–2)

## 2017-06-07 PROCEDURE — 80061 LIPID PANEL: CPT | Performed by: INTERNAL MEDICINE

## 2017-06-07 PROCEDURE — 85027 COMPLETE CBC AUTOMATED: CPT | Performed by: INTERNAL MEDICINE

## 2017-06-07 PROCEDURE — 81001 URINALYSIS AUTO W/SCOPE: CPT | Performed by: INTERNAL MEDICINE

## 2017-06-07 PROCEDURE — 80048 BASIC METABOLIC PNL TOTAL CA: CPT | Performed by: INTERNAL MEDICINE

## 2017-06-07 PROCEDURE — 84156 ASSAY OF PROTEIN URINE: CPT | Performed by: INTERNAL MEDICINE

## 2017-06-07 PROCEDURE — 99212 OFFICE O/P EST SF 10 MIN: CPT | Mod: ZF

## 2017-06-07 PROCEDURE — 80076 HEPATIC FUNCTION PANEL: CPT | Performed by: INTERNAL MEDICINE

## 2017-06-07 PROCEDURE — 36415 COLL VENOUS BLD VENIPUNCTURE: CPT | Performed by: INTERNAL MEDICINE

## 2017-06-07 PROCEDURE — 40000809 ZZH STATISTIC NO DOCUMENTATION TO SUPPORT CHARGE

## 2017-06-07 ASSESSMENT — PAIN SCALES - GENERAL: PAINLEVEL: NO PAIN (0)

## 2017-06-07 NOTE — LETTER
6/7/2017      RE: Charito Wilcox  [address]  [place] [address]       HISTORY OF PRESENT ILLNESS:  I had the pleasure of seeing Charito Wilcox for followup in the Liver Transplantation Clinic at the [facility] on 06/07/2017.  Ms. Wilcox returns for followup now almost 17 years status post liver transplantation for acute liver failure.        She is doing well at this point in time.  She has had a number of issues over the past several months.  She had an IUD placed because of some perimenopausal symptoms and unfortunately, there was a perforation that occurred and she had it removed.  She does have about 2 weeks of what sounds like fairly significant perimenopausal symptoms.      She otherwise denies any abdominal pain, itching or skin rash or fatigue.  She denies any increased abdominal girth or lower extremity edema.  She denies any fevers or chills, cough or shortness of breath.  She denies any nausea, vomiting, diarrhea or constipation.  Her appetite has been good and her weight has been stable.  There have been no other new events since she was last seen.       Current Outpatient Prescriptions   Medication     L-GLUTAMINE PO     ondansetron (ZOFRAN ODT) 4 MG ODT tab     PARoxetine (PAXIL) 40 MG tablet     tacrolimus (PROGRAF - GENERIC EQUIVALENT) 1 MG capsule     omeprazole (PRILOSEC) 40 MG capsule     ALPRAZolam (XANAX) 0.5 MG tablet     ursodiol (ACTIGALL) 300 MG capsule     azaTHIOprine 100 MG TABS     polyethylene glycol (MIRALAX) powder     Lactobacillus (ACIDOPHILUS EXTRA STRENGTH) CAPS     No current facility-administered medications for this visit.      B/P: 107/72, T: 98.1, P: 70, R: 16    HEENT exam shows no scleral icterus and no temporal muscle wasting.  Chest is clear.  Abdominal exam shows no increase in girth.  No masses or tenderness to palpation are present.  Her liver is 10 cm in span without left lobe enlargement.  No spleen tip is palpable.   Extremity exam shows no edema.  Skin exam shows no stigmata of chronic liver disease.  Neurologic exam is nonfocal.       Recent Results (from the past 168 hour(s))   Hepatic panel    Collection Time: 06/07/17  2:11 PM   Result Value Ref Range    Bilirubin Direct 0.2 0.0 - 0.2 mg/dL    Bilirubin Total 0.7 0.2 - 1.3 mg/dL    Albumin 3.9 3.4 - 5.0 g/dL    Protein Total 7.3 6.8 - 8.8 g/dL    Alkaline Phosphatase 166 (H) 40 - 150 U/L    ALT 20 0 - 50 U/L    AST 28 0 - 45 U/L   CBC with platelets    Collection Time: 06/07/17  2:11 PM   Result Value Ref Range    WBC 5.3 4.0 - 11.0 10e9/L    RBC Count 3.94 3.8 - 5.2 10e12/L    Hemoglobin 13.9 11.7 - 15.7 g/dL    Hematocrit 40.4 35.0 - 47.0 %     (H) 78 - 100 fl    MCH 35.3 (H) 26.5 - 33.0 pg    MCHC 34.4 31.5 - 36.5 g/dL    RDW 12.2 10.0 - 15.0 %    Platelet Count 197 150 - 450 10e9/L   Basic metabolic panel    Collection Time: 06/07/17  2:11 PM   Result Value Ref Range    Sodium 141 133 - 144 mmol/L    Potassium 4.0 3.4 - 5.3 mmol/L    Chloride 107 94 - 109 mmol/L    Carbon Dioxide 27 20 - 32 mmol/L    Anion Gap 7 3 - 14 mmol/L    Glucose 89 70 - 99 mg/dL    Urea Nitrogen 11 7 - 30 mg/dL    Creatinine 0.70 0.52 - 1.04 mg/dL    GFR Estimate >90  Non  GFR Calc   >60 mL/min/1.7m2    GFR Estimate If Black >90   GFR Calc   >60 mL/min/1.7m2    Calcium 8.5 8.5 - 10.1 mg/dL   Lipid Profile    Collection Time: 06/07/17  2:11 PM   Result Value Ref Range    Cholesterol 156 <200 mg/dL    Triglycerides 136 <150 mg/dL    HDL Cholesterol 70 >49 mg/dL    LDL Cholesterol Calculated 58 <100 mg/dL    Non HDL Cholesterol 86 <130 mg/dL   Protein  random urine    Collection Time: 06/07/17  2:16 PM   Result Value Ref Range    Protein Random Urine 0.13 g/L    Protein Total Urine g/gr Creatinine 0.18 0 - 0.2 g/g Cr   UA with Microscopic    Collection Time: 06/07/17  2:16 PM   Result Value Ref Range    Color Urine Yellow     Appearance Urine Clear     Glucose  Urine Negative NEG mg/dL    Bilirubin Urine Negative NEG    Ketones Urine Negative NEG mg/dL    Specific Gravity Urine 1.012 1.003 - 1.035    Blood Urine Negative NEG    pH Urine 7.0 5.0 - 7.0 pH    Protein Albumin Urine Negative NEG mg/dL    Urobilinogen mg/dL 2.0 0.0 - 2.0 mg/dL    Nitrite Urine Negative NEG    Leukocyte Esterase Urine Negative NEG    Source Midstream Urine     WBC Urine <1 0 - 2 /HPF    RBC Urine 1 0 - 2 /HPF    Squamous Epithelial /HPF Urine 1 0 - 1 /HPF   Creatinine urine calculation only    Collection Time: 06/07/17  2:16 PM   Result Value Ref Range    Creatinine Urine 69 mg/dL      My impression is that Ms. Wilcox is now almost 17 years status post liver transplantation for acute liver failure.  From a liver transplant standpoint, she is doing very well.  She is on azathioprine and tacrolimus, which is controlling her liver quite well.      In terms of her perimenopausal symptoms, while ordinarily I do not like to see a 37-year-old woman on estrogens, if that is all that is really going to control her symptoms, I would be fine with her being on it.  One could also explore other things such as progestins to try to manage those symptoms as well.  Whether they would do as effective a job as estrogens is not clear.  Otherwise, she is up-to-date with regard to vaccines and cancer screening, and my plan will be to see her back in the clinic in 1 year.      Thank you very much for allowing me to participate in the care of this patient.  If you have any questions regarding my recommendations, please do not hesitate to contact me.       Roldan Maria MD      Professor of Medicine  [facility] [employer]      Executive Medical Director, Solid Organ Transplant Program  [facility]     Roldan Maria MD

## 2017-06-07 NOTE — PROGRESS NOTES
HISTORY OF PRESENT ILLNESS:  I had the pleasure of seeing Charito Wilcox for followup in the Liver Transplantation Clinic at the [facility] on 06/07/2017.  Ms. Wilcox returns for followup now almost 17 years status post liver transplantation for acute liver failure.        She is doing well at this point in time.  She has had a number of issues over the past several months.  She had an IUD placed because of some perimenopausal symptoms and unfortunately, there was a perforation that occurred and she had it removed.  She does have about 2 weeks of what sounds like fairly significant perimenopausal symptoms.      She otherwise denies any abdominal pain, itching or skin rash or fatigue.  She denies any increased abdominal girth or lower extremity edema.  She denies any fevers or chills, cough or shortness of breath.  She denies any nausea, vomiting, diarrhea or constipation.  Her appetite has been good and her weight has been stable.  There have been no other new events since she was last seen.       Current Outpatient Prescriptions   Medication     L-GLUTAMINE PO     ondansetron (ZOFRAN ODT) 4 MG ODT tab     PARoxetine (PAXIL) 40 MG tablet     tacrolimus (PROGRAF - GENERIC EQUIVALENT) 1 MG capsule     omeprazole (PRILOSEC) 40 MG capsule     ALPRAZolam (XANAX) 0.5 MG tablet     ursodiol (ACTIGALL) 300 MG capsule     azaTHIOprine 100 MG TABS     polyethylene glycol (MIRALAX) powder     Lactobacillus (ACIDOPHILUS EXTRA STRENGTH) CAPS     No current facility-administered medications for this visit.      B/P: 107/72, T: 98.1, P: 70, R: 16    HEENT exam shows no scleral icterus and no temporal muscle wasting.  Chest is clear.  Abdominal exam shows no increase in girth.  No masses or tenderness to palpation are present.  Her liver is 10 cm in span without left lobe enlargement.  No spleen tip is palpable.  Extremity exam shows no edema.  Skin exam shows no stigmata of chronic liver disease.   Neurologic exam is nonfocal.       Recent Results (from the past 168 hour(s))   Hepatic panel    Collection Time: 06/07/17  2:11 PM   Result Value Ref Range    Bilirubin Direct 0.2 0.0 - 0.2 mg/dL    Bilirubin Total 0.7 0.2 - 1.3 mg/dL    Albumin 3.9 3.4 - 5.0 g/dL    Protein Total 7.3 6.8 - 8.8 g/dL    Alkaline Phosphatase 166 (H) 40 - 150 U/L    ALT 20 0 - 50 U/L    AST 28 0 - 45 U/L   CBC with platelets    Collection Time: 06/07/17  2:11 PM   Result Value Ref Range    WBC 5.3 4.0 - 11.0 10e9/L    RBC Count 3.94 3.8 - 5.2 10e12/L    Hemoglobin 13.9 11.7 - 15.7 g/dL    Hematocrit 40.4 35.0 - 47.0 %     (H) 78 - 100 fl    MCH 35.3 (H) 26.5 - 33.0 pg    MCHC 34.4 31.5 - 36.5 g/dL    RDW 12.2 10.0 - 15.0 %    Platelet Count 197 150 - 450 10e9/L   Basic metabolic panel    Collection Time: 06/07/17  2:11 PM   Result Value Ref Range    Sodium 141 133 - 144 mmol/L    Potassium 4.0 3.4 - 5.3 mmol/L    Chloride 107 94 - 109 mmol/L    Carbon Dioxide 27 20 - 32 mmol/L    Anion Gap 7 3 - 14 mmol/L    Glucose 89 70 - 99 mg/dL    Urea Nitrogen 11 7 - 30 mg/dL    Creatinine 0.70 0.52 - 1.04 mg/dL    GFR Estimate >90  Non  GFR Calc   >60 mL/min/1.7m2    GFR Estimate If Black >90   GFR Calc   >60 mL/min/1.7m2    Calcium 8.5 8.5 - 10.1 mg/dL   Lipid Profile    Collection Time: 06/07/17  2:11 PM   Result Value Ref Range    Cholesterol 156 <200 mg/dL    Triglycerides 136 <150 mg/dL    HDL Cholesterol 70 >49 mg/dL    LDL Cholesterol Calculated 58 <100 mg/dL    Non HDL Cholesterol 86 <130 mg/dL   Protein  random urine    Collection Time: 06/07/17  2:16 PM   Result Value Ref Range    Protein Random Urine 0.13 g/L    Protein Total Urine g/gr Creatinine 0.18 0 - 0.2 g/g Cr   UA with Microscopic    Collection Time: 06/07/17  2:16 PM   Result Value Ref Range    Color Urine Yellow     Appearance Urine Clear     Glucose Urine Negative NEG mg/dL    Bilirubin Urine Negative NEG    Ketones Urine Negative NEG  mg/dL    Specific Gravity Urine 1.012 1.003 - 1.035    Blood Urine Negative NEG    pH Urine 7.0 5.0 - 7.0 pH    Protein Albumin Urine Negative NEG mg/dL    Urobilinogen mg/dL 2.0 0.0 - 2.0 mg/dL    Nitrite Urine Negative NEG    Leukocyte Esterase Urine Negative NEG    Source Midstream Urine     WBC Urine <1 0 - 2 /HPF    RBC Urine 1 0 - 2 /HPF    Squamous Epithelial /HPF Urine 1 0 - 1 /HPF   Creatinine urine calculation only    Collection Time: 06/07/17  2:16 PM   Result Value Ref Range    Creatinine Urine 69 mg/dL      My impression is that Ms. Wilcox is now almost 17 years status post liver transplantation for acute liver failure.  From a liver transplant standpoint, she is doing very well.  She is on azathioprine and tacrolimus, which is controlling her liver quite well.      In terms of her perimenopausal symptoms, while ordinarily I do not like to see a 37-year-old woman on estrogens, if that is all that is really going to control her symptoms, I would be fine with her being on it.  One could also explore other things such as progestins to try to manage those symptoms as well.  Whether they would do as effective a job as estrogens is not clear.  Otherwise, she is up-to-date with regard to vaccines and cancer screening, and my plan will be to see her back in the clinic in 1 year.      Thank you very much for allowing me to participate in the care of this patient.  If you have any questions regarding my recommendations, please do not hesitate to contact me.       Roldan Maria MD      Professor of Medicine  [employer]      Executive Medical Director, Solid Organ Transplant Program  [facility]

## 2017-06-07 NOTE — MR AVS SNAPSHOT
"              After Visit Summary   6/7/2017    Charito Wilcox    MRN: [id number]           Patient Information     Date Of Birth          1980        Visit Information        Provider Department      6/7/2017 2:45 PM Roldan Maria MD [facility] Hepatology        Today's Diagnoses     Status post liver transplant (H)    -  1       Follow-ups after your visit        Follow-up notes from your care team     Return in about 1 year (around 6/7/2018).      Who to contact     If you have questions or need follow up information about today's clinic visit or your schedule please contact [facility]PATOLOG[facility] directly at [phone].  Normal or non-critical lab and imaging results will be communicated to you by MyChart, letter or phone within 4 business days after the clinic has received the results. If you do not hear from us within 7 days, please contact the clinic through Grabbed[employer] or phone. If you have a critical or abnormal lab result, we will notify you by phone as soon as possible.  Submit refill requests through [employer] or call your pharmacy and they will forward the refill request to us. Please allow 3 business days for your refill to be completed.          Additional Information About Your Visit        MyChart Information     [employer] gives you secure access to your electronic health record. If you see a primary care provider, you can also send messages to your care team and make appointments. If you have questions, please call your primary care clinic.  If you do not have a primary care provider, please call [phone] and they will assist you.        Care EveryWhere ID     This is your Care EveryWhere ID. This could be used by other organizations to access your [facility] medical records  [id number]        Your Vitals Were     Pulse Temperature Respirations Height Pulse Oximetry BMI (Body Mass Index)    70 98.1  F (36.7  C) (Oral) 16 1.575 m (5' 2.01\") 100% 22.34 kg/m2       Blood Pressure from Last 3 " Encounters:   06/07/17 107/72   05/15/17 108/64   05/02/17 111/58    Weight from Last 3 Encounters:   06/07/17 55.4 kg (122 lb 3.2 oz)   05/15/17 55.8 kg (123 lb)   05/02/17 52.2 kg (115 lb)              Today, you had the following     No orders found for display       Primary Care Provider Office Phone # Fax #    Katharine Gaxiola [phone]       [place] [id number] [facility] [address]        Thank you!     Thank you for choosing [facility]PAT[facility]  for your care. Our goal is always to provide you with excellent care. Hearing back from our patients is one way we can continue to improve our services. Please take a few minutes to complete the written survey that you may receive in the mail after your visit with us. Thank you!             Your Updated Medication List - Protect others around you: Learn how to safely use, store and throw away your medicines at www.disposemymeds.org.          This list is accurate as of: 6/7/17 11:59 PM.  Always use your most recent med list.                   Brand Name Dispense Instructions for use    ACIDOPHILUS EXTRA STRENGTH Caps      Take by mouth daily       ALPRAZolam 0.5 MG tablet    XANAX    30 tablet    Take 1 tablet (0.5 mg) by mouth 3 times daily as needed for anxiety 1 month supply       azaTHIOprine 100 MG Tabs      Take 50 mg by mouth daily       L-GLUTAMINE PO      Take 1 tablet by mouth daily       MIRALAX powder   Generic drug:  polyethylene glycol     510 g    Take 17 g (1 capful) by mouth daily       omeprazole 40 MG capsule    priLOSEC    180 capsule    Take 1 capsule (40 mg) by mouth 2 times daily (before meals) Take 30-60 minutes before a meal.       ondansetron 4 MG ODT tab    ZOFRAN ODT    20 tablet    Take 1-2 tablets (4-8 mg) by mouth 3 times daily (before meals)       PARoxetine 40 MG tablet    PAXIL    45 tablet    TAKE 1 AND 1/2 TABLETS(60 MG) BY MOUTH AT BEDTIME       tacrolimus 1 MG capsule    PROGRAF -  GENERIC EQUIVALENT    180 capsule    Take 1 capsule (1 mg) by mouth every 12 hours       ursodiol 300 MG capsule    ACTIGALL    60 capsule    TAKE ONE CAPSULE BY MOUTH TWICE A DAY

## 2017-06-14 ENCOUNTER — OFFICE VISIT (OUTPATIENT)
Dept: URGENT CARE | Facility: URGENT CARE | Age: 37
End: 2017-06-14
Payer: COMMERCIAL

## 2017-06-14 VITALS
TEMPERATURE: 99 F | SYSTOLIC BLOOD PRESSURE: 102 MMHG | HEART RATE: 78 BPM | OXYGEN SATURATION: 98 % | DIASTOLIC BLOOD PRESSURE: 68 MMHG | WEIGHT: 124 LBS | BODY MASS INDEX: 22.67 KG/M2

## 2017-06-14 DIAGNOSIS — N89.8 VAGINAL DISCHARGE: ICD-10-CM

## 2017-06-14 DIAGNOSIS — R30.0 DYSURIA: Primary | ICD-10-CM

## 2017-06-14 DIAGNOSIS — N94.9 VAGINAL DISCOMFORT: ICD-10-CM

## 2017-06-14 LAB
ALBUMIN UR-MCNC: NEGATIVE MG/DL
APPEARANCE UR: CLEAR
BILIRUB UR QL STRIP: NEGATIVE
COLOR UR AUTO: YELLOW
GLUCOSE UR STRIP-MCNC: NEGATIVE MG/DL
HGB UR QL STRIP: NEGATIVE
KETONES UR STRIP-MCNC: NEGATIVE MG/DL
LEUKOCYTE ESTERASE UR QL STRIP: NEGATIVE
MICRO REPORT STATUS: NORMAL
NITRATE UR QL: NEGATIVE
PH UR STRIP: 7 PH (ref 5–7)
SP GR UR STRIP: <=1.005 (ref 1–1.03)
SPECIMEN SOURCE: NORMAL
URN SPEC COLLECT METH UR: NORMAL
UROBILINOGEN UR STRIP-ACNC: 0.2 EU/DL (ref 0.2–1)
WET PREP SPEC: NORMAL

## 2017-06-14 PROCEDURE — 99213 OFFICE O/P EST LOW 20 MIN: CPT | Performed by: NURSE PRACTITIONER

## 2017-06-14 PROCEDURE — 81003 URINALYSIS AUTO W/O SCOPE: CPT | Performed by: NURSE PRACTITIONER

## 2017-06-14 PROCEDURE — 87210 SMEAR WET MOUNT SALINE/INK: CPT | Performed by: NURSE PRACTITIONER

## 2017-06-14 RX ORDER — METRONIDAZOLE 7.5 MG/G
1 GEL VAGINAL AT BEDTIME
Qty: 70 G | Refills: 0 | Status: SHIPPED | OUTPATIENT
Start: 2017-06-14 | End: 2017-06-19

## 2017-06-14 NOTE — MR AVS SNAPSHOT
After Visit Summary   6/14/2017    Charito Wilcox    MRN: [id number]           Patient Information     Date Of Birth          1980        Visit Information        Provider Department      6/14/2017 3:50 PM Yazmin Curran NP [facility]        Today's Diagnoses     Dysuria    -  1    Vaginal discharge        Vaginal discomfort           Follow-ups after your visit        Who to contact     If you have questions or need follow up information about today's clinic visit or your schedule please contact [facility] directly at [phone].  Normal or non-critical lab and imaging results will be communicated to you by [employer], letter or phone within 4 business days after the clinic has received the results. If you do not hear from us within 7 days, please contact the clinic through [employer] or phone. If you have a critical or abnormal lab result, we will notify you by phone as soon as possible.  Submit refill requests through [employer] or call your pharmacy and they will forward the refill request to us. Please allow 3 business days for your refill to be completed.          Additional Information About Your Visit        MyChart Information     [employer] gives you secure access to your electronic health record. If you see a primary care provider, you can also send messages to your care team and make appointments. If you have questions, please call your primary care clinic.  If you do not have a primary care provider, please call [phone] and they will assist you.        Care EveryWhere ID     This is your Care EveryWhere ID. This could be used by other organizations to access your [facility] medical records  [id number]        Your Vitals Were     Pulse Temperature Pulse Oximetry BMI (Body Mass Index)          78 99  F (37.2  C) (Oral) 98% 22.67 kg/m2         Blood Pressure from Last 3 Encounters:   06/14/17 102/68   06/07/17 107/72   05/15/17 108/64    Weight from  Last 3 Encounters:   06/14/17 124 lb (56.2 kg)   06/07/17 122 lb 3.2 oz (55.4 kg)   05/15/17 123 lb (55.8 kg)              We Performed the Following     *UA reflex to Microscopic and Culture ([place] [facility] (except [place] and [place])     Wet prep          Today's Medication Changes          These changes are accurate as of: 6/14/17  5:02 PM.  If you have any questions, ask your nurse or doctor.               Start taking these medicines.        Dose/Directions    metroNIDAZOLE 0.75 % vaginal gel   Commonly known as:  METROGEL   Used for:  Vaginal discomfort   Started by:  Yazmin Curran NP        Dose:  1 applicator   Place 1 applicator (5 g) vaginally At Bedtime for 5 days   Quantity:  70 g   Refills:  0            Where to get your medicines      These medications were sent to [facility] - [place], [place] - [address]  [address], [facility] [address]     Phone:  [phone]     metroNIDAZOLE 0.75 % vaginal gel                Primary Care Provider Office Phone # Fax #    Katharine Gaxiola [phone]       [address] [address]        Thank you!     Thank you for choosing [facility]  for your care. Our goal is always to provide you with excellent care. Hearing back from our patients is one way we can continue to improve our services. Please take a few minutes to complete the written survey that you may receive in the mail after your visit with us. Thank you!             Your Updated Medication List - Protect others around you: Learn how to safely use, store and throw away your medicines at www.disposemymeds.org.          This list is accurate as of: 6/14/17  5:02 PM.  Always use your most recent med list.                   Brand Name Dispense Instructions for use    ACIDOPHILUS EXTRA STRENGTH Caps      Take by mouth daily       ALPRAZolam 0.5 MG tablet    XANAX    30 tablet     Take 1 tablet (0.5 mg) by mouth 3 times daily as needed for anxiety 1 month supply       azaTHIOprine 100 MG Tabs      Take 50 mg by mouth daily       L-GLUTAMINE PO      Take 1 tablet by mouth daily       metroNIDAZOLE 0.75 % vaginal gel    METROGEL    70 g    Place 1 applicator (5 g) vaginally At Bedtime for 5 days       MIRALAX powder   Generic drug:  polyethylene glycol     510 g    Take 17 g (1 capful) by mouth daily       omeprazole 40 MG capsule    priLOSEC    180 capsule    Take 1 capsule (40 mg) by mouth 2 times daily (before meals) Take 30-60 minutes before a meal.       ondansetron 4 MG ODT tab    ZOFRAN ODT    20 tablet    Take 1-2 tablets (4-8 mg) by mouth 3 times daily (before meals)       PARoxetine 40 MG tablet    PAXIL    45 tablet    TAKE 1 AND 1/2 TABLETS(60 MG) BY MOUTH AT BEDTIME       tacrolimus 1 MG capsule    PROGRAF - GENERIC EQUIVALENT    180 capsule    Take 1 capsule (1 mg) by mouth every 12 hours       ursodiol 300 MG capsule    ACTIGALL    60 capsule    TAKE ONE CAPSULE BY MOUTH TWICE A DAY

## 2017-06-14 NOTE — NURSING NOTE
"Chief Complaint   Patient presents with     Vaginal Problem     4-5 days, [facility]       Initial /68 (BP Location: Right arm, Patient Position: Chair, Cuff Size: Adult Regular)  Pulse 78  Temp 99  F (37.2  C) (Oral)  Wt 124 lb (56.2 kg)  SpO2 98%  BMI 22.67 kg/m2 Estimated body mass index is 22.67 kg/(m^2) as calculated from the following:    Height as of 6/7/17: 5' 2.01\" (1.575 m).    Weight as of this encounter: 124 lb (56.2 kg).  Medication Reconciliation: complete     Marilyn Fernandez CMA      "

## 2017-06-14 NOTE — PROGRESS NOTES
SUBJECTIVE:                                                    Charito Wilcox is a 36 year old female who presents to clinic today for the following health issues:      Vaginal Symptoms      Duration: 4-5 days    Description  vaginal discharge - white clear, itching, burning and odor    Intensity:  moderate    Accompanying signs and symptoms (fever/dysuria/abdominal or back pain): back pain    History  Sexually active: yes, single partner, contraception - not needed  Possibility of pregnancy: No  Recent antibiotic use: no     Precipitating or alleviating factors: None    Therapies tried and outcome: Monistat   Outcome: somewhat helped        Allergies   Allergen Reactions     Gluten Meal Nausea and Vomiting     Milk Protein Extract Nausea and Vomiting       Past Medical History:   Diagnosis Date     Acute pancreatitis 9/9/2016     Esophageal reflux      Fatigue, unspecified type 4/10/2017     Intractable nausea and vomiting 9/15/2016     Liver replaced by transplant (H) 2000    liver failure of unknown etiology, doing well with new liver     Moderate recurrent major depression (H) 12/2/2008     Papanicolaou smear of cervix with atypical squamous cells of undetermined significance (ASC-US)     negative HPV         Current Outpatient Prescriptions on File Prior to Visit:  L-GLUTAMINE PO Take 1 tablet by mouth daily    ondansetron (ZOFRAN ODT) 4 MG ODT tab Take 1-2 tablets (4-8 mg) by mouth 3 times daily (before meals)   PARoxetine (PAXIL) 40 MG tablet TAKE 1 AND 1/2 TABLETS(60 MG) BY MOUTH AT BEDTIME   tacrolimus (PROGRAF - GENERIC EQUIVALENT) 1 MG capsule Take 1 capsule (1 mg) by mouth every 12 hours   omeprazole (PRILOSEC) 40 MG capsule Take 1 capsule (40 mg) by mouth 2 times daily (before meals) Take 30-60 minutes before a meal.   ALPRAZolam (XANAX) 0.5 MG tablet Take 1 tablet (0.5 mg) by mouth 3 times daily as needed for anxiety 1 month supply   ursodiol (ACTIGALL) 300 MG capsule TAKE ONE CAPSULE BY MOUTH  TWICE A DAY   azaTHIOprine 100 MG TABS Take 50 mg by mouth daily    polyethylene glycol (MIRALAX) powder Take 17 g (1 capful) by mouth daily   Lactobacillus (ACIDOPHILUS EXTRA STRENGTH) CAPS Take by mouth daily      No current facility-administered medications on file prior to visit.     Social History   Substance Use Topics     Smoking status: Never Smoker     Smokeless tobacco: Never Used     Alcohol use Yes      Comment: once per month       ROS:  General: negative for fever  ABD: Denies abd pain  : as above    OBJECTIVE:  /68 (BP Location: Right arm, Patient Position: Chair, Cuff Size: Adult Regular)  Pulse 78  Temp 99  F (37.2  C) (Oral)  Wt 124 lb (56.2 kg)  SpO2 98%  BMI 22.67 kg/m2   General:   awake, alert, and cooperative.  NAD.   Head: Normocephalic, atraumatic.  Eyes: Conjunctiva clear, non icteric.   ABD: soft, no tenderness to palpation , no rigidity, guarding or rebound . No CVAT  Neuro: Alert and oriented - normal speech.     ASSESSMENT:      ICD-10-CM    1. Dysuria R30.0 *UA reflex to Microscopic and Culture ([place] [facility] (except [place] and [place])   2. Vaginal discharge N89.8 Wet prep   3. Vaginal discomfort N94.9 metroNIDAZOLE (METROGEL) 0.75 % vaginal gel           PLAN:   I discussed lab results with the patient.  Patient has been on monistat for the last 3 days. No infection in urine, she has vaginal discomfort. I advised follow up with primary care physician if not improving.  Patient educational/instructional material provided including reasons for follow-up . The patient indicates understanding of these issues and agrees with the plan.

## 2017-06-25 DIAGNOSIS — F41.0 PANIC DISORDER: ICD-10-CM

## 2017-06-27 RX ORDER — PAROXETINE 40 MG/1
TABLET, FILM COATED ORAL
Qty: 45 TABLET | Refills: 10 | Status: SHIPPED | OUTPATIENT
Start: 2017-06-27 | End: 2018-06-12

## 2017-06-27 NOTE — TELEPHONE ENCOUNTER
PARoxetine (PAXIL) 40 MG tablet     Last Written Prescription Date: 2/28/17  Last Fill Quantity: 45, # refills: 3  Last Office Visit with [facility] primary care provider:  5/15/17        Last PHQ-9 score on record= No flowsheet data found.

## 2017-08-23 DIAGNOSIS — Z94.4 LIVER REPLACED BY TRANSPLANT (H): ICD-10-CM

## 2017-08-23 DIAGNOSIS — K21.9 ACID REFLUX: ICD-10-CM

## 2017-08-23 RX ORDER — OMEPRAZOLE 40 MG/1
CAPSULE, DELAYED RELEASE ORAL
Qty: 180 CAPSULE | Refills: 0 | Status: SHIPPED | OUTPATIENT
Start: 2017-08-23 | End: 2017-11-24

## 2017-09-26 DIAGNOSIS — Z94.4 LIVER REPLACED BY TRANSPLANT (H): Primary | ICD-10-CM

## 2017-09-26 RX ORDER — AZATHIOPRINE 100 MG/1
50 TABLET ORAL DAILY
Qty: 30 TABLET | Refills: 11 | Status: SHIPPED | OUTPATIENT
Start: 2017-09-26 | End: 2017-11-12 | Stop reason: DRUGHIGH

## 2017-10-17 DIAGNOSIS — Z94.4 LIVER REPLACED BY TRANSPLANT (H): ICD-10-CM

## 2017-10-17 NOTE — TELEPHONE ENCOUNTER
Patient states new dose is 100mg daily, please send new rx    Thank you    Caren Garcia   [facility]  [phone]

## 2017-10-18 RX ORDER — AZATHIOPRINE 100 MG/1
100 TABLET ORAL DAILY
Qty: 30 TABLET | Refills: 11 | Status: CANCELLED | OUTPATIENT
Start: 2017-10-18

## 2017-10-18 RX ORDER — AZATHIOPRINE 50 MG/1
50 TABLET ORAL DAILY
Qty: 90 TABLET | Refills: 3 | Status: SHIPPED | OUTPATIENT
Start: 2017-10-18 | End: 2017-10-24

## 2017-10-20 DIAGNOSIS — Z94.4 LIVER REPLACED BY TRANSPLANT (H): ICD-10-CM

## 2017-10-20 NOTE — TELEPHONE ENCOUNTER
Drug Name: ursodiol 300mg  Last Fill Date: 9/27/17  Quantity: 60    Caren Garcia   [facility]  [phone]

## 2017-10-23 RX ORDER — URSODIOL 300 MG/1
300 CAPSULE ORAL 2 TIMES DAILY
Qty: 60 CAPSULE | Refills: 11 | Status: SHIPPED | OUTPATIENT
Start: 2017-10-23 | End: 2018-03-01

## 2017-10-24 ENCOUNTER — TELEPHONE (OUTPATIENT)
Dept: TRANSPLANT | Facility: CLINIC | Age: 37
End: 2017-10-24

## 2017-10-24 DIAGNOSIS — Z94.4 LIVER REPLACED BY TRANSPLANT (H): ICD-10-CM

## 2017-10-24 RX ORDER — AZATHIOPRINE 50 MG/1
50 TABLET ORAL DAILY
Qty: 90 TABLET | Refills: 3 | Status: SHIPPED | OUTPATIENT
Start: 2017-10-24 | End: 2018-03-01

## 2017-10-24 NOTE — TELEPHONE ENCOUNTER
Phone message left for Charito regarding imuran dose.  Clarified that  has ordered a dose of 50mg q day, and [facility].

## 2017-10-24 NOTE — TELEPHONE ENCOUNTER
----- Message from Roldan Maria MD sent at 10/18/2017  3:49 PM CDT -----  Regarding: RE: dose of imuran  Use 50 mg.    ----- Message -----     From: Yue Duckworth RN     Sent: 10/18/2017   3:47 PM       To: Roldan Maria MD  Subject: dose of imuran                                   We have 2 different scripts for imuran  50mg q day or 100mg q day.    Which dose is the one?   Yue

## 2017-10-27 ENCOUNTER — MYC MEDICAL ADVICE (OUTPATIENT)
Dept: FAMILY MEDICINE | Facility: CLINIC | Age: 37
End: 2017-10-27

## 2017-10-27 DIAGNOSIS — F41.0 PANIC DISORDER: ICD-10-CM

## 2017-10-27 DIAGNOSIS — Z94.4 LIVER REPLACED BY TRANSPLANT (H): ICD-10-CM

## 2017-10-27 DIAGNOSIS — R53.83 FATIGUE, UNSPECIFIED TYPE: ICD-10-CM

## 2017-10-27 DIAGNOSIS — R11.0 NAUSEA: ICD-10-CM

## 2017-10-27 RX ORDER — ONDANSETRON 4 MG/1
4-8 TABLET, ORALLY DISINTEGRATING ORAL
Qty: 20 TABLET | Refills: 0 | Status: SHIPPED | OUTPATIENT
Start: 2017-10-27 | End: 2018-03-01

## 2017-10-27 RX ORDER — ALPRAZOLAM 0.5 MG
0.5 TABLET ORAL 3 TIMES DAILY PRN
Qty: 30 TABLET | Refills: 0 | Status: SHIPPED | OUTPATIENT
Start: 2017-10-27 | End: 2018-03-01

## 2017-10-27 NOTE — TELEPHONE ENCOUNTER
Controlled Substance Refill Request for Xanax   Problem List Complete:  Yes  Lifelong  Emesis phobia  Has seen therapy on going  Very sparing use of alprazolam  Patient is followed by JORJE MULLINS for ongoing prescription of benzodiazepines.  All refills should be approved by this provider, or covering partner.    Medication(s): alprazolam, 0.5 mg.   Maximum quantity per month: 20  Clinic visit frequency required: Q 6  months     Controlled substance agreement on file: No  Benzodiazepine use reviewed by psychiatry:  No    Last [facility] website verification:  done on 11-, 10/27/17   [email or web address]     checked in past 6 months?  Yes 10/27/17     [place]:  reviewed- no concerns    [place]  [email or web address]    Zofran      Last Written Prescription Date: 4/7/17   Last Fill Quantity: 20,  # refills: 0   Last Office Visit with [facility], [facility] or [facility] prescribing provider: 5/15/17                                               Chantell Mcgill RN

## 2017-11-12 ENCOUNTER — TELEPHONE (OUTPATIENT)
Dept: TRANSPLANT | Facility: CLINIC | Age: 37
End: 2017-11-12

## 2017-11-12 ENCOUNTER — OFFICE VISIT (OUTPATIENT)
Dept: URGENT CARE | Facility: URGENT CARE | Age: 37
End: 2017-11-12
Payer: COMMERCIAL

## 2017-11-12 VITALS
DIASTOLIC BLOOD PRESSURE: 80 MMHG | WEIGHT: 124 LBS | OXYGEN SATURATION: 100 % | TEMPERATURE: 99.2 F | HEART RATE: 88 BPM | BODY MASS INDEX: 22.67 KG/M2 | SYSTOLIC BLOOD PRESSURE: 122 MMHG

## 2017-11-12 DIAGNOSIS — B27.90 INFECTIOUS MONONUCLEOSIS WITHOUT COMPLICATION, INFECTIOUS MONONUCLEOSIS DUE TO UNSPECIFIED ORGANISM: Primary | ICD-10-CM

## 2017-11-12 DIAGNOSIS — R50.9 FEVER, UNSPECIFIED FEVER CAUSE: ICD-10-CM

## 2017-11-12 DIAGNOSIS — R07.0 THROAT PAIN: ICD-10-CM

## 2017-11-12 LAB
ALBUMIN UR-MCNC: NEGATIVE MG/DL
APPEARANCE UR: CLEAR
BACTERIA #/AREA URNS HPF: ABNORMAL /HPF
BILIRUB UR QL STRIP: NEGATIVE
COLOR UR AUTO: YELLOW
DEPRECATED S PYO AG THROAT QL EIA: NORMAL
ERYTHROCYTE [DISTWIDTH] IN BLOOD BY AUTOMATED COUNT: 12 % (ref 10–15)
FLUAV+FLUBV AG SPEC QL: NEGATIVE
FLUAV+FLUBV AG SPEC QL: NEGATIVE
GLUCOSE UR STRIP-MCNC: NEGATIVE MG/DL
HCT VFR BLD AUTO: 42.2 % (ref 35–47)
HETEROPH AB SER QL: POSITIVE
HGB BLD-MCNC: 15 G/DL (ref 11.7–15.7)
HGB UR QL STRIP: ABNORMAL
KETONES UR STRIP-MCNC: NEGATIVE MG/DL
LEUKOCYTE ESTERASE UR QL STRIP: NEGATIVE
MCH RBC QN AUTO: 36 PG (ref 26.5–33)
MCHC RBC AUTO-ENTMCNC: 35.5 G/DL (ref 31.5–36.5)
MCV RBC AUTO: 101 FL (ref 78–100)
NITRATE UR QL: NEGATIVE
PH UR STRIP: 7 PH (ref 5–7)
PLATELET # BLD AUTO: 194 10E9/L (ref 150–450)
RBC # BLD AUTO: 4.17 10E12/L (ref 3.8–5.2)
RBC #/AREA URNS AUTO: ABNORMAL /HPF
SOURCE: ABNORMAL
SP GR UR STRIP: <=1.005 (ref 1–1.03)
SPECIMEN SOURCE: NORMAL
SPECIMEN SOURCE: NORMAL
UROBILINOGEN UR STRIP-ACNC: 0.2 EU/DL (ref 0.2–1)
WBC # BLD AUTO: 6.9 10E9/L (ref 4–11)
WBC #/AREA URNS AUTO: ABNORMAL /HPF

## 2017-11-12 PROCEDURE — 36415 COLL VENOUS BLD VENIPUNCTURE: CPT | Performed by: FAMILY MEDICINE

## 2017-11-12 PROCEDURE — 87880 STREP A ASSAY W/OPTIC: CPT | Performed by: FAMILY MEDICINE

## 2017-11-12 PROCEDURE — 81001 URINALYSIS AUTO W/SCOPE: CPT | Performed by: FAMILY MEDICINE

## 2017-11-12 PROCEDURE — 87804 INFLUENZA ASSAY W/OPTIC: CPT | Performed by: FAMILY MEDICINE

## 2017-11-12 PROCEDURE — 87081 CULTURE SCREEN ONLY: CPT | Performed by: FAMILY MEDICINE

## 2017-11-12 PROCEDURE — 85027 COMPLETE CBC AUTOMATED: CPT | Performed by: FAMILY MEDICINE

## 2017-11-12 PROCEDURE — 99213 OFFICE O/P EST LOW 20 MIN: CPT | Performed by: FAMILY MEDICINE

## 2017-11-12 PROCEDURE — 86308 HETEROPHILE ANTIBODY SCREEN: CPT | Performed by: FAMILY MEDICINE

## 2017-11-12 RX ORDER — OXYCODONE HYDROCHLORIDE 5 MG/1
TABLET ORAL
Refills: 0 | COMMUNITY
Start: 2017-05-02 | End: 2018-01-07

## 2017-11-12 NOTE — NURSING NOTE
"Chief Complaint   Patient presents with     URI     Transplant patient - Liver Tx 18 yrs ago, fatigued, fever/chills, sore throat, headache        Initial /80 (BP Location: Left arm, Patient Position: Chair, Cuff Size: Adult Small)  Pulse 88  Temp 99.2  F (37.3  C) (Oral)  Wt 124 lb (56.2 kg)  SpO2 100%  Breastfeeding? No  BMI 22.67 kg/m2 Estimated body mass index is 22.67 kg/(m^2) as calculated from the following:    Height as of 6/7/17: 5' 2.01\" (1.575 m).    Weight as of this encounter: 124 lb (56.2 kg).  Medication Reconciliation: complete   Nela Lino MA    "

## 2017-11-12 NOTE — PROGRESS NOTES
SUBJECTIVE:   Charito Wilcox is a 37 year old female who presents to clinic today for the following health issues:      RESPIRATORY SYMPTOMS      Duration: Since this past Tuesday evening    Description  nasal congestion, sore throat, fever, chills, ear pain left, headache, fatigue/malaise, hoarse voice and stomach ache    Severity: moderate    Accompanying signs and symptoms: as indicated above    History (predisposing factors):  none    Precipitating or alleviating factors: resting helps a little    Therapies tried and outcome:  rest and fluids      Problem list and histories reviewed & adjusted, as indicated.  Additional history: as documented    Patient Active Problem List   Diagnosis     Liver replaced by transplant (H)     Esophageal reflux     CARDIOVASCULAR SCREENING; LDL GOAL LESS THAN 160     Panic disorder     IBS (irritable bowel syndrome)     Immunosuppressed status (H)     PMS (premenstrual syndrome)     Labral tear of hip, degenerative     Right hip impingement syndrome     Fatigue, unspecified type     Past Surgical History:   Procedure Laterality Date     C TRANSPLANT LIVER,HETERTOPIC      had appendectomy, gallbladder out with transplant.       SECTION  2008      SECTION  3/2010     ESOPHAGOSCOPY, GASTROSCOPY, DUODENOSCOPY (EGD), COMBINED Left 2015    Procedure: COMBINED ESOPHAGOSCOPY, GASTROSCOPY, DUODENOSCOPY (EGD), BIOPSY SINGLE OR MULTIPLE;  Surgeon: Ottoniel Martin MD;  Location: [facility]     ESOPHAGOSCOPY, GASTROSCOPY, DUODENOSCOPY (EGD), COMBINED N/A 2016    Procedure: COMBINED ESOPHAGOSCOPY, GASTROSCOPY, DUODENOSCOPY (EGD), BIOPSY SINGLE OR MULTIPLE;  Surgeon: Homar Adams MD;  Location: [facility]     ESOPHAGOSCOPY, GASTROSCOPY, DUODENOSCOPY (EGD), COMBINED N/A 2016    Procedure: COMBINED ESOPHAGOSCOPY, GASTROSCOPY, DUODENOSCOPY (EGD), BIOPSY SINGLE OR MULTIPLE;  Surgeon: Homar Adams MD;  Location: [facility]     HIP  SURGERY Right 2/2016    Torn labrum repair      TUBAL LIGATION  3/2010       Social History   Substance Use Topics     Smoking status: Never Smoker     Smokeless tobacco: Never Used     Alcohol use Yes      Comment: once per month     Family History   Problem Relation Age of Onset     GASTROINTESTINAL DISEASE No family hx of      C.A.D. No family hx of      Hypertension Maternal Grandfather      CEREBROVASCULAR DISEASE Paternal Grandmother      Breast Cancer No family hx of      Cancer - colorectal No family hx of      Prostate Cancer No family hx of      Alcohol/Drug No family hx of      Breast Cancer Paternal Grandmother      Depression Maternal Grandmother      Anxiety Disorder Maternal Grandmother          Current Outpatient Prescriptions   Medication Sig Dispense Refill     ALPRAZolam (XANAX) 0.5 MG tablet Take 1 tablet (0.5 mg) by mouth 3 times daily as needed for anxiety 1 month supply 30 tablet 0     ondansetron (ZOFRAN ODT) 4 MG ODT tab Take 1-2 tablets (4-8 mg) by mouth 3 times daily (before meals) 20 tablet 0     azaTHIOprine (IMURAN) 50 MG tablet Take 1 tablet (50 mg) by mouth daily 90 tablet 3     ursodiol (ACTIGALL) 300 MG capsule Take 1 capsule (300 mg) by mouth 2 times daily 60 capsule 11     omeprazole (PRILOSEC) 40 MG capsule TAKE 1 CAPSULE BY MOUTH TWICE DAILY BEFORE MEALS(TAKE 30 TO 60 MINUTES BEFORE A MEAL) 180 capsule 0     PARoxetine (PAXIL) 40 MG tablet TAKE 1 AND 1/2 TABLETS(60 MG) BY MOUTH AT BEDTIME 45 tablet 10     L-GLUTAMINE PO Take 1 tablet by mouth daily        tacrolimus (PROGRAF - GENERIC EQUIVALENT) 1 MG capsule Take 1 capsule (1 mg) by mouth every 12 hours 180 capsule 3     polyethylene glycol (MIRALAX) powder Take 17 g (1 capful) by mouth daily 510 g 1     Lactobacillus (ACIDOPHILUS EXTRA STRENGTH) CAPS Take by mouth daily        oxyCODONE IR (ROXICODONE) 5 MG tablet TK 1 T PO  EVERY 4 HOURS AS NEEDED FOR MODERATE TO SEVERE PAIN  0     [DISCONTINUED] azaTHIOprine 100 MG TABS Take  50 mg by mouth daily 30 tablet 11     Allergies   Allergen Reactions     Gluten Meal Nausea and Vomiting     Milk Protein Extract Nausea and Vomiting     Recent Labs   Lab Test  06/07/17   1411  03/29/17   0819  02/10/17   0755  12/02/16   0737  09/30/16   0804   09/01/16   1539   LDL  58   --    --   64  50   < >   --    HDL  70   --    --   67  64   < >   --    TRIG  136   --    --   58  64   < >   --    ALT  20   --   25  17   --    < >  24   CR  0.70   --   0.71  0.74   --    < >  0.79   GFRESTIMATED  >90  Non  GFR Calc     --   >90  Non  GFR Calc    89   --    < >  83   GFRESTBLACK  >90   GFR Calc     --   >90   GFR Calc    >90   GFR Calc     --    < >  >90   GFR Calc     POTASSIUM  4.0   --   4.7  4.1   --    < >  4.3   TSH   --   1.11   --    --    --    --   0.93    < > = values in this interval not displayed.      BP Readings from Last 3 Encounters:   11/12/17 122/80   06/14/17 102/68   06/07/17 107/72    Wt Readings from Last 3 Encounters:   11/12/17 124 lb (56.2 kg)   06/14/17 124 lb (56.2 kg)   06/07/17 122 lb 3.2 oz (55.4 kg)                  Labs reviewed in EPIC          Reviewed and updated as needed this visit by clinical staffTobao  Allergies  Meds       Reviewed and updated as needed this visit by Provider         ROS:  Constitutional, HEENT, cardiovascular, pulmonary, gi and gu systems are negative, except as otherwise noted.      OBJECTIVE:   /80 (BP Location: Left arm, Patient Position: Chair, Cuff Size: Adult Small)  Pulse 88  Temp 99.2  F (37.3  C) (Oral)  Wt 124 lb (56.2 kg)  SpO2 100%  Breastfeeding? No  BMI 22.67 kg/m2  Body mass index is 22.67 kg/(m^2).  GENERAL: healthy, alert and no distress  EYES: Eyes grossly normal to inspection, PERRL and conjunctivae and sclerae normal  HENT: normal cephalic/atraumatic, ear canals and TM's normal, oral mucous membranes moist and  oropharxnx crowded  NECK: no adenopathy, no asymmetry, masses, or scars and thyroid normal to palpation  RESP: lungs clear to auscultation - no rales, rhonchi or wheezes  CV: regular rate and rhythm, normal S1 S2, no S3 or S4, no murmur, click or rub, no peripheral edema and peripheral pulses strong  ABDOMEN: soft, nontender, no hepatosplenomegaly, no masses and bowel sounds normal  MS: no gross musculoskeletal defects noted, no edema    Diagnostic Test Results:  Results for orders placed or performed in visit on 11/12/17   CBC with platelets   Result Value Ref Range    WBC 6.9 4.0 - 11.0 10e9/L    RBC Count 4.17 3.8 - 5.2 10e12/L    Hemoglobin 15.0 11.7 - 15.7 g/dL    Hematocrit 42.2 35.0 - 47.0 %     (H) 78 - 100 fl    MCH 36.0 (H) 26.5 - 33.0 pg    MCHC 35.5 31.5 - 36.5 g/dL    RDW 12.0 10.0 - 15.0 %    Platelet Count 194 150 - 450 10e9/L   Mononucleosis screen   Result Value Ref Range    Mononucleosis Screen Positive (A) NEG^Negative   UA reflex to Microscopic   Result Value Ref Range    Color Urine Yellow     Appearance Urine Clear     Glucose Urine Negative NEG^Negative mg/dL    Bilirubin Urine Negative NEG^Negative    Ketones Urine Negative NEG^Negative mg/dL    Specific Gravity Urine <=1.005 1.003 - 1.035    Blood Urine Trace (A) NEG^Negative    pH Urine 7.0 5.0 - 7.0 pH    Protein Albumin Urine Negative NEG^Negative mg/dL    Urobilinogen Urine 0.2 0.2 - 1.0 EU/dL    Nitrite Urine Negative NEG^Negative    Leukocyte Esterase Urine Negative NEG^Negative    Source Midstream Urine    Urine Microscopic   Result Value Ref Range    WBC Urine O - 2 OTO2^O - 2 /HPF    RBC Urine O - 2 OTO2^O - 2 /HPF    Bacteria Urine Few (A) NEG^Negative /HPF   Strep, Rapid Screen   Result Value Ref Range    Specimen Description Throat     Rapid Strep A Screen       NEGATIVE: No Group A streptococcal antigen detected by immunoassay, await culture report.   Influenza A/B antigen   Result Value Ref Range    Influenza A/B Agn  Specimen Nasal     Influenza A Negative NEG^Negative    Influenza B Negative NEG^Negative       ASSESSMENT/PLAN:         ICD-10-CM    1. Infectious mononucleosis without complication, infectious mononucleosis due to unspecified organism B27.90    2. Throat pain R07.0 Strep, Rapid Screen     Mononucleosis screen     Influenza A/B antigen     Urine Microscopic   3. Fever, unspecified fever cause R50.9 CBC with platelets     UA reflex to Microscopic     Influenza A/B antigen       Discussed in detail differentials and further management. Symptoms are secondary to infectious mononucleosis. Recommended well hydration, over-the-counter analgesia, warm fluids and saline gargles. Patient should be avoiding sports activities for 3 weeks or so. Written instructions/information provided. Patient understood and in agreement with the above plan. All questions are answered. Follow-up if symptoms persist or worsen.        Patient Instructions     Mononucleosis  Mononucleosis (also called mono) is a contagious viral infection. Most infants and children exposed to the virus get only mild flu-like symptoms or no symptoms at all. However, infection is usually more serious in teens and young adults. While the virus is active it causes symptoms and can spread to others. After symptoms subside, the virus stays in the body and eventually becomes inactive. Once you have one case of mono, you are unlikely to develop symptoms again.  The virus is usually spread by contact with saliva, often by kissing. It may also spread by breast milk, blood, or sexual contact. It takes about 4 to 6 weeks to develop symptoms after exposure.  Early symptoms include headache, nausea, tiredness and general muscle aching. This is followed by sore throat and fever. Lymph glands in the neck, under the arms, or in the groin may be swollen. Symptoms usually go away in about 1 to 2 months. But they can last up to four months.  If symptoms have been present less than  1 week or more than 3 weeks, the blood test used to diagnose this disease may be negative even though you have the illness.  In this case, other tests may be done.  Note: Taking the antibiotics ampicillin or amoxicillin during a mono infection may cause a skin rash. This is not serious and will fade in about one week. The cause is a reaction of the drug with the virus.  Note: Mono can cause your spleen to swell. The spleen is a fist-sized organ in the upper left abdomen that stores red blood cells. Injury to a swollen spleen can cause the spleen to rupture. This can cause life-threatening internal bleeding. To avoid this, do not play contact sports or perform strenuous activity for 8 weeks, or until cleared by your healthcare provider. A sharp blow could rupture a swollen spleen  Home care    Rest in bed until the fever and weakness have gone away.    Drink plenty of fluids, but avoid alcohol. Otherwise, you may eat a regular diet.    Ask your healthcare provider about using over-the-counter medicines to treat symptoms such as fever, pain, or an itchy rash.    Over-the-counter throat lozenges may help soothe a sore throat. Gargling with warm salt water (1/2 teaspoon in 1 glass of warm water) may also be soothing to the throat.    You may return to work or school after the fever goes away and you are feeling better. Continue to follow any activity restrictions you have been given.  Preventing spread of the virus  To limit the spread of the virus, avoid exposing others to your saliva for at least 6 months after your illness (no kissing or sharing utensils, drinking glasses, or toothbrushes).  Follow-up care  Follow up with your healthcare provider within 1 to 2 weeks or as advised by our staff to be sure that there are no complications. If symptoms of extreme fatigue and swollen glands last longer than 6 months, see your healthcare provider for further testing.  When to seek medical advice  Call your healthcare  provider if any of the following occur:    Excessive coughing    Yellow skin or eyes     Trouble swallowing  Call 911  Get emergency medical care if any of the following occur:    Severe or worsening abdominal pain    Trouble breathing  Date Last Reviewed: 9/25/2015 2000-2017 [employer]. [address] [place] [address]. All rights reserved. This information is not intended as a substitute for professional medical care. Always follow your healthcare professional's instructions.            Tapan Paez MD  [facility]

## 2017-11-12 NOTE — MR AVS SNAPSHOT
After Visit Summary   11/12/2017    Charito Wilcox    MRN: [id number]           Patient Information     Date Of Birth          1980        Visit Information        Provider Department      11/12/2017 10:30 AM Tapan Paez MD [facility]        Today's Diagnoses     Infectious mononucleosis without complication, infectious mononucleosis due to unspecified organism    -  1    Throat pain        Fever, unspecified fever cause          Care Instructions      Mononucleosis  Mononucleosis (also called mono) is a contagious viral infection. Most infants and children exposed to the virus get only mild flu-like symptoms or no symptoms at all. However, infection is usually more serious in teens and young adults. While the virus is active it causes symptoms and can spread to others. After symptoms subside, the virus stays in the body and eventually becomes inactive. Once you have one case of mono, you are unlikely to develop symptoms again.  The virus is usually spread by contact with saliva, often by kissing. It may also spread by breast milk, blood, or sexual contact. It takes about 4 to 6 weeks to develop symptoms after exposure.  Early symptoms include headache, nausea, tiredness and general muscle aching. This is followed by sore throat and fever. Lymph glands in the neck, under the arms, or in the groin may be swollen. Symptoms usually go away in about 1 to 2 months. But they can last up to four months.  If symptoms have been present less than 1 week or more than 3 weeks, the blood test used to diagnose this disease may be negative even though you have the illness.  In this case, other tests may be done.  Note: Taking the antibiotics ampicillin or amoxicillin during a mono infection may cause a skin rash. This is not serious and will fade in about one week. The cause is a reaction of the drug with the virus.  Note: Mono can cause your spleen to swell. The spleen is a  fist-sized organ in the upper left abdomen that stores red blood cells. Injury to a swollen spleen can cause the spleen to rupture. This can cause life-threatening internal bleeding. To avoid this, do not play contact sports or perform strenuous activity for 8 weeks, or until cleared by your healthcare provider. A sharp blow could rupture a swollen spleen  Home care    Rest in bed until the fever and weakness have gone away.    Drink plenty of fluids, but avoid alcohol. Otherwise, you may eat a regular diet.    Ask your healthcare provider about using over-the-counter medicines to treat symptoms such as fever, pain, or an itchy rash.    Over-the-counter throat lozenges may help soothe a sore throat. Gargling with warm salt water (1/2 teaspoon in 1 glass of warm water) may also be soothing to the throat.    You may return to work or school after the fever goes away and you are feeling better. Continue to follow any activity restrictions you have been given.  Preventing spread of the virus  To limit the spread of the virus, avoid exposing others to your saliva for at least 6 months after your illness (no kissing or sharing utensils, drinking glasses, or toothbrushes).  Follow-up care  Follow up with your healthcare provider within 1 to 2 weeks or as advised by our staff to be sure that there are no complications. If symptoms of extreme fatigue and swollen glands last longer than 6 months, see your healthcare provider for further testing.  When to seek medical advice  Call your healthcare provider if any of the following occur:    Excessive coughing    Yellow skin or eyes     Trouble swallowing  Call 911  Get emergency medical care if any of the following occur:    Severe or worsening abdominal pain    Trouble breathing  Date Last Reviewed: 9/25/2015 2000-2017 [employer]. [address] [place] [address], [place] [address]. All rights reserved. This information is not intended as a substitute for professional  medical care. Always follow your healthcare professional's instructions.                Follow-ups after your visit        Who to contact     If you have questions or need follow up information about today's clinic visit or your schedule please contact [facility] directly at [phone].  Normal or non-critical lab and imaging results will be communicated to you by MyChart, letter or phone within 4 business days after the clinic has received the results. If you do not hear from us within 7 days, please contact the clinic through [employer] or phone. If you have a critical or abnormal lab result, we will notify you by phone as soon as possible.  Submit refill requests through [employer] or call your pharmacy and they will forward the refill request to us. Please allow 3 business days for your refill to be completed.          Additional Information About Your Visit        MyChart Information     [employer] gives you secure access to your electronic health record. If you see a primary care provider, you can also send messages to your care team and make appointments. If you have questions, please call your primary care clinic.  If you do not have a primary care provider, please call [phone] and they will assist you.        Care EveryWhere ID     This is your Care EveryWhere ID. This could be used by other organizations to access your [facility] medical records  [id number]        Your Vitals Were     Pulse Temperature Pulse Oximetry Breastfeeding? BMI (Body Mass Index)       88 99.2  F (37.3  C) (Oral) 100% No 22.67 kg/m2        Blood Pressure from Last 3 Encounters:   11/12/17 122/80   06/14/17 102/68   06/07/17 107/72    Weight from Last 3 Encounters:   11/12/17 124 lb (56.2 kg)   06/14/17 124 lb (56.2 kg)   06/07/17 122 lb 3.2 oz (55.4 kg)              We Performed the Following     CBC with platelets     Influenza A/B antigen     Mononucleosis screen     Strep, Rapid Screen     UA reflex to  Microscopic     Urine Microscopic          Today's Medication Changes          These changes are accurate as of: 11/12/17  1:07 PM.  If you have any questions, ask your nurse or doctor.               These medicines have changed or have updated prescriptions.        Dose/Directions    azaTHIOprine 50 MG tablet   Commonly known as:  IMURAN   This may have changed:  Another medication with the same name was removed. Continue taking this medication, and follow the directions you see here.   Used for:  Liver replaced by transplant (H)   Changed by:  Yue Duckworth RN        Dose:  50 mg   Take 1 tablet (50 mg) by mouth daily   Quantity:  90 tablet   Refills:  3                Primary Care Provider Office Phone # Fax #    Katharine Gaxiola [phone] [address] [facility] [address]        Equal Access to Services     [facility] : Hadii kristy rasheed hadasho Soomaali, waaxda luqadaha, qaybta kaalmada adeegyada, waxay nathaliain tatum foote . So [facility] [phone].    ATENCIÓN: Si habla español, tiene a rowell disposición servicios gratuitos de asistencia lingüística. Llame al [phone].    We comply with applicable federal civil rights laws and [place] laws. We do not discriminate on the basis of race, color, national origin, age, disability, sex, sexual orientation, or gender identity.            Thank you!     Thank you for choosing [facility]  for your care. Our goal is always to provide you with excellent care. Hearing back from our patients is one way we can continue to improve our services. Please take a few minutes to complete the written survey that you may receive in the mail after your visit with us. Thank you!             Your Updated Medication List - Protect others around you: Learn how to safely use, store and throw away your medicines at www.disposemymeds.org.          This list is accurate as of: 11/12/17  1:07 PM.  Always use your most recent  med list.                   Brand Name Dispense Instructions for use Diagnosis    ACIDOPHILUS EXTRA STRENGTH Caps      Take by mouth daily    Cellulitis       ALPRAZolam 0.5 MG tablet    XANAX    30 tablet    Take 1 tablet (0.5 mg) by mouth 3 times daily as needed for anxiety 1 month supply    Panic disorder       azaTHIOprine 50 MG tablet    IMURAN    90 tablet    Take 1 tablet (50 mg) by mouth daily    Liver replaced by transplant (H)       L-GLUTAMINE PO      Take 1 tablet by mouth daily        MIRALAX powder   Generic drug:  polyethylene glycol     510 g    Take 17 g (1 capful) by mouth daily    Other constipation       omeprazole 40 MG capsule    priLOSEC    180 capsule    TAKE 1 CAPSULE BY MOUTH TWICE DAILY BEFORE MEALS(TAKE 30 TO 60 MINUTES BEFORE A MEAL)    Acid reflux, Liver replaced by transplant (H)       ondansetron 4 MG ODT tab    ZOFRAN ODT    20 tablet    Take 1-2 tablets (4-8 mg) by mouth 3 times daily (before meals)    Nausea, Liver replaced by transplant (H), Fatigue, unspecified type       oxyCODONE IR 5 MG tablet    ROXICODONE     TK 1 T PO  EVERY 4 HOURS AS NEEDED FOR MODERATE TO SEVERE PAIN        PARoxetine 40 MG tablet    PAXIL    45 tablet    TAKE 1 AND 1/2 TABLETS(60 MG) BY MOUTH AT BEDTIME    Panic disorder       tacrolimus 1 MG capsule    GENERIC EQUIVALENT    180 capsule    Take 1 capsule (1 mg) by mouth every 12 hours    Liver replaced by transplant (H)       ursodiol 300 MG capsule    ACTIGALL    60 capsule    Take 1 capsule (300 mg) by mouth 2 times daily    Liver replaced by transplant (H)

## 2017-11-12 NOTE — TELEPHONE ENCOUNTER
2:05 PM  November 12, 2017  Returned Cesar's call.  Charito had a positive mono spot test today at Urgent Care.  Per Dr. Newberry, notify coordinator in AM to determine if ID consult is needed.  He expressed understanding.  Edda Grullon, RN, CCTC  On Call Organ Coordinator

## 2017-11-12 NOTE — PATIENT INSTRUCTIONS
Mononucleosis  Mononucleosis (also called mono) is a contagious viral infection. Most infants and children exposed to the virus get only mild flu-like symptoms or no symptoms at all. However, infection is usually more serious in teens and young adults. While the virus is active it causes symptoms and can spread to others. After symptoms subside, the virus stays in the body and eventually becomes inactive. Once you have one case of mono, you are unlikely to develop symptoms again.  The virus is usually spread by contact with saliva, often by kissing. It may also spread by breast milk, blood, or sexual contact. It takes about 4 to 6 weeks to develop symptoms after exposure.  Early symptoms include headache, nausea, tiredness and general muscle aching. This is followed by sore throat and fever. Lymph glands in the neck, under the arms, or in the groin may be swollen. Symptoms usually go away in about 1 to 2 months. But they can last up to four months.  If symptoms have been present less than 1 week or more than 3 weeks, the blood test used to diagnose this disease may be negative even though you have the illness.  In this case, other tests may be done.  Note: Taking the antibiotics ampicillin or amoxicillin during a mono infection may cause a skin rash. This is not serious and will fade in about one week. The cause is a reaction of the drug with the virus.  Note: Mono can cause your spleen to swell. The spleen is a fist-sized organ in the upper left abdomen that stores red blood cells. Injury to a swollen spleen can cause the spleen to rupture. This can cause life-threatening internal bleeding. To avoid this, do not play contact sports or perform strenuous activity for 8 weeks, or until cleared by your healthcare provider. A sharp blow could rupture a swollen spleen  Home care    Rest in bed until the fever and weakness have gone away.    Drink plenty of fluids, but avoid alcohol. Otherwise, you may eat a regular  diet.    Ask your healthcare provider about using over-the-counter medicines to treat symptoms such as fever, pain, or an itchy rash.    Over-the-counter throat lozenges may help soothe a sore throat. Gargling with warm salt water (1/2 teaspoon in 1 glass of warm water) may also be soothing to the throat.    You may return to work or school after the fever goes away and you are feeling better. Continue to follow any activity restrictions you have been given.  Preventing spread of the virus  To limit the spread of the virus, avoid exposing others to your saliva for at least 6 months after your illness (no kissing or sharing utensils, drinking glasses, or toothbrushes).  Follow-up care  Follow up with your healthcare provider within 1 to 2 weeks or as advised by our staff to be sure that there are no complications. If symptoms of extreme fatigue and swollen glands last longer than 6 months, see your healthcare provider for further testing.  When to seek medical advice  Call your healthcare provider if any of the following occur:    Excessive coughing    Yellow skin or eyes     Trouble swallowing  Call 911  Get emergency medical care if any of the following occur:    Severe or worsening abdominal pain    Trouble breathing  Date Last Reviewed: 9/25/2015 2000-2017 [employer]. [address] [place] [address], [place] [address]. All rights reserved. This information is not intended as a substitute for professional medical care. Always follow your healthcare professional's instructions.

## 2017-11-13 ENCOUNTER — TELEPHONE (OUTPATIENT)
Dept: TRANSPLANT | Facility: CLINIC | Age: 37
End: 2017-11-13

## 2017-11-13 DIAGNOSIS — Z94.4 LIVER REPLACED BY TRANSPLANT (H): Primary | ICD-10-CM

## 2017-11-13 DIAGNOSIS — Z94.4 HISTORY OF LIVER TRANSPLANT (H): ICD-10-CM

## 2017-11-13 DIAGNOSIS — Z79.60 LONG-TERM USE OF IMMUNOSUPPRESSANT MEDICATION: ICD-10-CM

## 2017-11-13 DIAGNOSIS — Z20.828 MONO EXPOSURE: ICD-10-CM

## 2017-11-13 LAB
ALBUMIN SERPL-MCNC: 3.8 G/DL (ref 3.4–5)
ALP SERPL-CCNC: 147 U/L (ref 40–150)
ALT SERPL W P-5'-P-CCNC: 25 U/L (ref 0–50)
ANION GAP SERPL CALCULATED.3IONS-SCNC: 6 MMOL/L (ref 3–14)
AST SERPL W P-5'-P-CCNC: 24 U/L (ref 0–45)
BACTERIA SPEC CULT: NORMAL
BILIRUB DIRECT SERPL-MCNC: 0.2 MG/DL (ref 0–0.2)
BILIRUB SERPL-MCNC: 0.8 MG/DL (ref 0.2–1.3)
BUN SERPL-MCNC: 10 MG/DL (ref 7–30)
CALCIUM SERPL-MCNC: 8.8 MG/DL (ref 8.5–10.1)
CHLORIDE SERPL-SCNC: 105 MMOL/L (ref 94–109)
CO2 SERPL-SCNC: 25 MMOL/L (ref 20–32)
CREAT SERPL-MCNC: 0.75 MG/DL (ref 0.52–1.04)
ERYTHROCYTE [DISTWIDTH] IN BLOOD BY AUTOMATED COUNT: 11.7 % (ref 10–15)
GFR SERPL CREATININE-BSD FRML MDRD: 88 ML/MIN/1.7M2
GLUCOSE SERPL-MCNC: 137 MG/DL (ref 70–99)
HCT VFR BLD AUTO: 42.9 % (ref 35–47)
HGB BLD-MCNC: 15.5 G/DL (ref 11.7–15.7)
MCH RBC QN AUTO: 35.6 PG (ref 26.5–33)
MCHC RBC AUTO-ENTMCNC: 36.1 G/DL (ref 31.5–36.5)
MCV RBC AUTO: 99 FL (ref 78–100)
PLATELET # BLD AUTO: 206 10E9/L (ref 150–450)
POTASSIUM SERPL-SCNC: 4.2 MMOL/L (ref 3.4–5.3)
PROT SERPL-MCNC: 7.5 G/DL (ref 6.8–8.8)
RBC # BLD AUTO: 4.35 10E12/L (ref 3.8–5.2)
SODIUM SERPL-SCNC: 137 MMOL/L (ref 133–144)
SPECIMEN SOURCE: NORMAL
WBC # BLD AUTO: 7.4 10E9/L (ref 4–11)

## 2017-11-13 NOTE — TELEPHONE ENCOUNTER
Spoke with Charito who has found out she has mono, had gone in to see her doctor thinking she had strep throat, and surprised to find that she has mono.  She has come in to the lab and did her post tx labs, her liver labs are unchanged, labs unchanged.  She asks about abd pain, and if she should be taking something for mono, advised to be off work and rest until symptoms improve.    Plan made with Charito to review her symptoms with  to identify any other f/u she may need.

## 2017-11-13 NOTE — TELEPHONE ENCOUNTER
Patient Call: Transplant Illness  Duration of illness: was just diagnosed with mono  Illness: Pain   Please call  Cesar or Charito #[phone]

## 2017-11-21 ENCOUNTER — OFFICE VISIT (OUTPATIENT)
Dept: FAMILY MEDICINE | Facility: CLINIC | Age: 37
End: 2017-11-21
Payer: COMMERCIAL

## 2017-11-21 ENCOUNTER — TELEPHONE (OUTPATIENT)
Dept: TRANSPLANT | Facility: CLINIC | Age: 37
End: 2017-11-21

## 2017-11-21 VITALS
DIASTOLIC BLOOD PRESSURE: 62 MMHG | OXYGEN SATURATION: 97 % | BODY MASS INDEX: 22.95 KG/M2 | WEIGHT: 125.5 LBS | SYSTOLIC BLOOD PRESSURE: 98 MMHG | TEMPERATURE: 98.3 F | HEART RATE: 100 BPM

## 2017-11-21 DIAGNOSIS — B34.9 HEADACHE DUE TO VIRAL INFECTION: Primary | ICD-10-CM

## 2017-11-21 DIAGNOSIS — B27.00 GAMMAHERPESVIRAL MONONUCLEOSIS WITHOUT COMPLICATION: ICD-10-CM

## 2017-11-21 DIAGNOSIS — Z94.4 STATUS POST LIVER TRANSPLANTATION (H): ICD-10-CM

## 2017-11-21 DIAGNOSIS — R51.9 HEADACHE DUE TO VIRAL INFECTION: Primary | ICD-10-CM

## 2017-11-21 LAB
ALBUMIN SERPL-MCNC: 3.8 G/DL (ref 3.4–5)
ALP SERPL-CCNC: 142 U/L (ref 40–150)
ALT SERPL W P-5'-P-CCNC: 30 U/L (ref 0–50)
ANION GAP SERPL CALCULATED.3IONS-SCNC: 5 MMOL/L (ref 3–14)
AST SERPL W P-5'-P-CCNC: 33 U/L (ref 0–45)
BILIRUB SERPL-MCNC: 0.5 MG/DL (ref 0.2–1.3)
BUN SERPL-MCNC: 9 MG/DL (ref 7–30)
CALCIUM SERPL-MCNC: 8.6 MG/DL (ref 8.5–10.1)
CHLORIDE SERPL-SCNC: 107 MMOL/L (ref 94–109)
CO2 SERPL-SCNC: 28 MMOL/L (ref 20–32)
CREAT SERPL-MCNC: 0.68 MG/DL (ref 0.52–1.04)
ERYTHROCYTE [DISTWIDTH] IN BLOOD BY AUTOMATED COUNT: 11.9 % (ref 10–15)
GFR SERPL CREATININE-BSD FRML MDRD: >90 ML/MIN/1.7M2
GLUCOSE SERPL-MCNC: 91 MG/DL (ref 70–99)
HCT VFR BLD AUTO: 40 % (ref 35–47)
HGB BLD-MCNC: 14.2 G/DL (ref 11.7–15.7)
MCH RBC QN AUTO: 35.7 PG (ref 26.5–33)
MCHC RBC AUTO-ENTMCNC: 35.5 G/DL (ref 31.5–36.5)
MCV RBC AUTO: 101 FL (ref 78–100)
PLATELET # BLD AUTO: 203 10E9/L (ref 150–450)
POTASSIUM SERPL-SCNC: 4.3 MMOL/L (ref 3.4–5.3)
PROT SERPL-MCNC: 7.3 G/DL (ref 6.8–8.8)
RBC # BLD AUTO: 3.98 10E12/L (ref 3.8–5.2)
SODIUM SERPL-SCNC: 140 MMOL/L (ref 133–144)
WBC # BLD AUTO: 7.9 10E9/L (ref 4–11)

## 2017-11-21 PROCEDURE — 99214 OFFICE O/P EST MOD 30 MIN: CPT | Mod: 25 | Performed by: FAMILY MEDICINE

## 2017-11-21 PROCEDURE — 85027 COMPLETE CBC AUTOMATED: CPT | Performed by: FAMILY MEDICINE

## 2017-11-21 PROCEDURE — 96372 THER/PROPH/DIAG INJ SC/IM: CPT | Performed by: FAMILY MEDICINE

## 2017-11-21 PROCEDURE — 80053 COMPREHEN METABOLIC PANEL: CPT | Performed by: FAMILY MEDICINE

## 2017-11-21 PROCEDURE — 36415 COLL VENOUS BLD VENIPUNCTURE: CPT | Performed by: FAMILY MEDICINE

## 2017-11-21 RX ORDER — METOCLOPRAMIDE 10 MG/1
10 TABLET ORAL
Qty: 20 TABLET | Refills: 1 | Status: SHIPPED | OUTPATIENT
Start: 2017-11-21 | End: 2018-03-01

## 2017-11-21 RX ORDER — KETOROLAC TROMETHAMINE 30 MG/ML
30 INJECTION, SOLUTION INTRAMUSCULAR; INTRAVENOUS ONCE
Qty: 1 ML | Refills: 0 | OUTPATIENT
Start: 2017-11-21 | End: 2017-11-21

## 2017-11-21 ASSESSMENT — PAIN SCALES - GENERAL: PAINLEVEL: SEVERE PAIN (6)

## 2017-11-21 NOTE — PATIENT INSTRUCTIONS
Mononucleosis  Mononucleosis (also called mono) is a contagious viral infection. Most infants and children exposed to the virus get only mild flu-like symptoms or no symptoms at all. However, infection is usually more serious in teens and young adults. While the virus is active it causes symptoms and can spread to others. After symptoms subside, the virus stays in the body and eventually becomes inactive. Once you have one case of mono, you are unlikely to develop symptoms again.  The virus is usually spread by contact with saliva, often by kissing. It may also spread by breast milk, blood, or sexual contact. It takes about 4 to 6 weeks to develop symptoms after exposure.  Early symptoms include headache, nausea, tiredness and general muscle aching. This is followed by sore throat and fever. Lymph glands in the neck, under the arms, or in the groin may be swollen. Symptoms usually go away in about 1 to 2 months. But they can last up to four months.  If symptoms have been present less than 1 week or more than 3 weeks, the blood test used to diagnose this disease may be negative even though you have the illness.  In this case, other tests may be done.  Note: Taking the antibiotics ampicillin or amoxicillin during a mono infection may cause a skin rash. This is not serious and will fade in about one week. The cause is a reaction of the drug with the virus.  Note: Mono can cause your spleen to swell. The spleen is a fist-sized organ in the upper left abdomen that stores red blood cells. Injury to a swollen spleen can cause the spleen to rupture. This can cause life-threatening internal bleeding. To avoid this, do not play contact sports or perform strenuous activity for 8 weeks, or until cleared by your healthcare provider. A sharp blow could rupture a swollen spleen  Home care    Rest in bed until the fever and weakness have gone away.    Drink plenty of fluids, but avoid alcohol. Otherwise, you may eat a regular  diet.    Ask your healthcare provider about using over-the-counter medicines to treat symptoms such as fever, pain, or an itchy rash.    Over-the-counter throat lozenges may help soothe a sore throat. Gargling with warm salt water (1/2 teaspoon in 1 glass of warm water) may also be soothing to the throat.    You may return to work or school after the fever goes away and you are feeling better. Continue to follow any activity restrictions you have been given.  Preventing spread of the virus  To limit the spread of the virus, avoid exposing others to your saliva for at least 6 months after your illness (no kissing or sharing utensils, drinking glasses, or toothbrushes).  Follow-up care  Follow up with your healthcare provider within 1 to 2 weeks or as advised by our staff to be sure that there are no complications. If symptoms of extreme fatigue and swollen glands last longer than 6 months, see your healthcare provider for further testing.  When to seek medical advice  Call your healthcare provider if any of the following occur:    Excessive coughing    Yellow skin or eyes     Trouble swallowing  Call 911  Get emergency medical care if any of the following occur:    Severe or worsening abdominal pain    Trouble breathing  Date Last Reviewed: 9/25/2015 2000-2017 [employer]. [address] [place] [address], [place] [address]. All rights reserved. This information is not intended as a substitute for professional medical care. Always follow your healthcare professional's instructions.      [facility]    If you have any questions regarding to your visit please contact your care team:       Team Purple:   Clinic Hours Telephone Number   Dr. Dilia Rizvi   7am-7pm  Monday - Thursday   7am-5pm  Fridays  [phone]  (Appointment scheduling available 24/7)    Questions about your Visit?   Team Line:  [phone]   Urgent Care - [place]  and [place] - 11am-9pm Monday-Friday Saturday-Sunday- 9am-5pm   [place] - 5pm-9pm Monday-Friday Saturday-Sunday- 9am-5pm  [phone] - [place]   [phone] - [place]       What options do I have for visits at the clinic other than the traditional office visit?  To expand how we care for you, many of our providers are utilizing electronic visits (e-visits) and telephone visits, when medically appropriate, for interactions with their patients rather than a visit in the clinic.   We also offer nurse visits for many medical concerns. Just like any other service, we will bill your insurance company for this type of visit based on time spent on the phone with your provider. Not all insurance companies cover these visits. Please check with your medical insurance if this type of visit is covered. You will be responsible for any charges that are not paid by your insurance.      E-visits via [employer] 3P:  generally incur a $35.00 fee.  Telephone visits:  Time spent on the phone: *charged based on time that is spent on the phone in increments of 10 minutes. Estimated cost:   5-10 mins $30.00   11-20 mins. $59.00   21-30 mins. $85.00     Use [employer] (secure email communication and access to your chart) to send your primary care provider a message or make an appointment. Ask someone on your Team how to sign up for [employer] 3P.  For a Price Quote for your services, please call our Consumer Price Line at [phone].  As always, Thank you for trusting us with your health care needs!    Discharged By: Lana

## 2017-11-21 NOTE — MR AVS SNAPSHOT
After Visit Summary   11/21/2017    Charito Wilcox    MRN: [id number]           Patient Information     Date Of Birth          1980        Visit Information        Provider Department      11/21/2017 11:20 AM Italo Naqvi MD [facility]        Today's Diagnoses     Gammaherpesviral mononucleosis without complication    -  1    Status post liver transplantation (H)        Headache due to viral infection          Care Instructions      Mononucleosis  Mononucleosis (also called mono) is a contagious viral infection. Most infants and children exposed to the virus get only mild flu-like symptoms or no symptoms at all. However, infection is usually more serious in teens and young adults. While the virus is active it causes symptoms and can spread to others. After symptoms subside, the virus stays in the body and eventually becomes inactive. Once you have one case of mono, you are unlikely to develop symptoms again.  The virus is usually spread by contact with saliva, often by kissing. It may also spread by breast milk, blood, or sexual contact. It takes about 4 to 6 weeks to develop symptoms after exposure.  Early symptoms include headache, nausea, tiredness and general muscle aching. This is followed by sore throat and fever. Lymph glands in the neck, under the arms, or in the groin may be swollen. Symptoms usually go away in about 1 to 2 months. But they can last up to four months.  If symptoms have been present less than 1 week or more than 3 weeks, the blood test used to diagnose this disease may be negative even though you have the illness.  In this case, other tests may be done.  Note: Taking the antibiotics ampicillin or amoxicillin during a mono infection may cause a skin rash. This is not serious and will fade in about one week. The cause is a reaction of the drug with the virus.  Note: Mono can cause your spleen to swell. The spleen is a fist-sized organ  in the upper left abdomen that stores red blood cells. Injury to a swollen spleen can cause the spleen to rupture. This can cause life-threatening internal bleeding. To avoid this, do not play contact sports or perform strenuous activity for 8 weeks, or until cleared by your healthcare provider. A sharp blow could rupture a swollen spleen  Home care    Rest in bed until the fever and weakness have gone away.    Drink plenty of fluids, but avoid alcohol. Otherwise, you may eat a regular diet.    Ask your healthcare provider about using over-the-counter medicines to treat symptoms such as fever, pain, or an itchy rash.    Over-the-counter throat lozenges may help soothe a sore throat. Gargling with warm salt water (1/2 teaspoon in 1 glass of warm water) may also be soothing to the throat.    You may return to work or school after the fever goes away and you are feeling better. Continue to follow any activity restrictions you have been given.  Preventing spread of the virus  To limit the spread of the virus, avoid exposing others to your saliva for at least 6 months after your illness (no kissing or sharing utensils, drinking glasses, or toothbrushes).  Follow-up care  Follow up with your healthcare provider within 1 to 2 weeks or as advised by our staff to be sure that there are no complications. If symptoms of extreme fatigue and swollen glands last longer than 6 months, see your healthcare provider for further testing.  When to seek medical advice  Call your healthcare provider if any of the following occur:    Excessive coughing    Yellow skin or eyes     Trouble swallowing  Call 911  Get emergency medical care if any of the following occur:    Severe or worsening abdominal pain    Trouble breathing  Date Last Reviewed: 9/25/2015 2000-2017 [employer]. [address] [place] [address]. All rights reserved. This information is not intended as a substitute for professional medical care.  Always follow your healthcare professional's instructions.      [facility]    If you have any questions regarding to your visit please contact your care team:       Team Purple:   Clinic Hours Telephone Number   Dr. Dilia Rizvi   7am-7pm  Monday - Thursday   7am-5pm  Fridays  [phone]  (Appointment scheduling available 24/7)    Questions about your Visit?   Team Line:  [phone]   Urgent Care - [place] and [facility] - 11am-9pm Monday-Friday Saturday-Sunday- 9am-5pm   [place] - 5pm-9pm Monday-Friday Saturday-Sunday- 9am-5pm  [phone] - [facility]  [phone] - [place]       What options do I have for visits at the clinic other than the traditional office visit?  To expand how we care for you, many of our providers are utilizing electronic visits (e-visits) and telephone visits, when medically appropriate, for interactions with their patients rather than a visit in the clinic.   We also offer nurse visits for many medical concerns. Just like any other service, we will bill your insurance company for this type of visit based on time spent on the phone with your provider. Not all insurance companies cover these visits. Please check with your medical insurance if this type of visit is covered. You will be responsible for any charges that are not paid by your insurance.      E-visits via [employer]:  generally incur a $35.00 fee.  Telephone visits:  Time spent on the phone: *charged based on time that is spent on the phone in increments of 10 minutes. Estimated cost:   5-10 mins $30.00   11-20 mins. $59.00   21-30 mins. $85.00     Use [employer]out (secure email communication and access to your chart) to send your primary care provider a message or make an appointment. Ask someone on your Team how to sign up for [employer].  For a Price Quote for your services, please call our Consumer Price Line at [phone].  As  always, Thank you for trusting us with your health care needs!    Discharged By: An            Follow-ups after your visit        Your next 10 appointments already scheduled     Nov 22, 2017  7:40 AM CST   US ABDOMEN COMPLETE with [id number]   [place] [facility] [place] ([facility] [place])    [address] [place] [address]  [place] [address]   [phone]           Please bring a list of your medicines (including vitamins, minerals and over-the-counter drugs). Also, tell your doctor about any allergies you may have. Wear comfortable clothes and leave your valuables at home.  Adults: No eating or drinking for 8 hours before the exam. You may take medicine with a small sip of water.  Children: - Children 6+ years: No food or drink for 6 hours before exam. - Children 1-5 years: No food or drink for 4 hours before exam. - Infants, breast-fed: may have breast milk up to 2 hours before exam. - Infants, formula: may have bottle until 4 hours before exam.  Please call the Imaging Department at your exam site with any questions.              Future tests that were ordered for you today     Open Future Orders        Priority Expected Expires Ordered    US Abdomen Complete Routine  11/21/2018 11/21/2017            Who to contact     If you have questions or need follow up information about today's clinic visit or your schedule please contact [facility] [place] directly at [phone].  Normal or non-critical lab and imaging results will be communicated to you by MyChart, letter or phone within 4 business days after the clinic has received the results. If you do not hear from us within 7 days, please contact the clinic through MyChart or phone. If you have a critical or abnormal lab result, we will notify you by phone as soon as possible.  Submit refill requests through [employer] or call your pharmacy and they will forward the refill request to us. Please allow 3 business days for your refill to be completed.           Additional Information About Your Visit        [employer] Information     [employer] gives you secure access to your electronic health record. If you see a primary care provider, you can also send messages to your care team and make appointments. If you have questions, please call your primary care clinic.  If you do not have a primary care provider, please call [phone] and they will assist you.        Care EveryWhere ID     This is your Care EveryWhere ID. This could be used by other organizations to access your [place] medical records  [id number]        Your Vitals Were     Pulse Temperature Pulse Oximetry BMI (Body Mass Index)          100 98.3  F (36.8  C) (Oral) 97% 22.95 kg/m2         Blood Pressure from Last 3 Encounters:   11/21/17 98/62   11/12/17 122/80   06/14/17 102/68    Weight from Last 3 Encounters:   11/21/17 125 lb 8 oz (56.9 kg)   11/12/17 124 lb (56.2 kg)   06/14/17 124 lb (56.2 kg)              We Performed the Following     CBC with platelets     Comprehensive metabolic panel     INJECTION INTRAMUSCULAR OR SUB-Q     KETOROLAC TROMETHAMINE 15MG          Today's Medication Changes          These changes are accurate as of: 11/21/17 12:45 PM.  If you have any questions, ask your nurse or doctor.               Start taking these medicines.        Dose/Directions    ketorolac 30 MG/ML injection   Commonly known as:  TORADOL   Used for:  Gammaherpesviral mononucleosis without complication, Headache due to viral infection   Started by:  Italo Naqvi MD        Dose:  30 mg   Inject 1 mL (30 mg) into the muscle once for 1 dose   Quantity:  1 mL   Refills:  0       metoclopramide 10 MG tablet   Commonly known as:  REGLAN   Used for:  Headache due to viral infection   Started by:  Italo Naqvi MD        Dose:  10 mg   Take 1 tablet (10 mg) by mouth once as needed   Quantity:  20 tablet   Refills:  1            Where to get your medicines      These medications  were sent to [facility] - [place], [place] - [address] AT [facility] [address], [place] [address]     Phone:  [phone]     metoclopramide 10 MG tablet         Some of these will need a paper prescription and others can be bought over the counter.  Ask your nurse if you have questions.     You don't need a prescription for these medications     ketorolac 30 MG/ML injection                Primary Care Provider Office Phone # Fax #    Katharine Gaxiola -[phone] [address] [facility]  [place] [address]        Equal Access to Services     NAOMI BELL : Hadii kristy dhillono Soraman, waaxda luqadaha, qaybta kaalmada adejozefyada, radha alba. So [facility] [phone].    ATENCIÓN: Si habla español, tiene a rowell disposición servicios gratuitos de asistencia lingüística. Llame al [phone].    We comply with applicable federal civil rights laws and [place] laws. We do not discriminate on the basis of race, color, national origin, age, disability, sex, sexual orientation, or gender identity.            Thank you!     Thank you for choosing [facility]  for your care. Our goal is always to provide you with excellent care. Hearing back from our patients is one way we can continue to improve our services. Please take a few minutes to complete the written survey that you may receive in the mail after your visit with us. Thank you!             Your Updated Medication List - Protect others around you: Learn how to safely use, store and throw away your medicines at www.disposemymeds.org.          This list is accurate as of: 11/21/17 12:45 PM.  Always use your most recent med list.                   Brand Name Dispense Instructions for use Diagnosis    ACIDOPHILUS EXTRA STRENGTH Caps      Take by mouth daily    Cellulitis       ALPRAZolam 0.5 MG tablet    XANAX    30 tablet    Take 1 tablet (0.5  mg) by mouth 3 times daily as needed for anxiety 1 month supply    Panic disorder       azaTHIOprine 50 MG tablet    IMURAN    90 tablet    Take 1 tablet (50 mg) by mouth daily    Liver replaced by transplant (H)       ketorolac 30 MG/ML injection    TORADOL    1 mL    Inject 1 mL (30 mg) into the muscle once for 1 dose    Gammaherpesviral mononucleosis without complication, Headache due to viral infection       L-GLUTAMINE PO      Take 1 tablet by mouth daily        metoclopramide 10 MG tablet    REGLAN    20 tablet    Take 1 tablet (10 mg) by mouth once as needed    Headache due to viral infection       MIRALAX powder   Generic drug:  polyethylene glycol     510 g    Take 17 g (1 capful) by mouth daily    Other constipation       omeprazole 40 MG capsule    priLOSEC    180 capsule    TAKE 1 CAPSULE BY MOUTH TWICE DAILY BEFORE MEALS(TAKE 30 TO 60 MINUTES BEFORE A MEAL)    Acid reflux, Liver replaced by transplant (H)       ondansetron 4 MG ODT tab    ZOFRAN ODT    20 tablet    Take 1-2 tablets (4-8 mg) by mouth 3 times daily (before meals)    Nausea, Liver replaced by transplant (H), Fatigue, unspecified type       oxyCODONE IR 5 MG tablet    ROXICODONE     TK 1 T PO  EVERY 4 HOURS AS NEEDED FOR MODERATE TO SEVERE PAIN        PARoxetine 40 MG tablet    PAXIL    45 tablet    TAKE 1 AND 1/2 TABLETS(60 MG) BY MOUTH AT BEDTIME    Panic disorder       tacrolimus 1 MG capsule    GENERIC EQUIVALENT    180 capsule    Take 1 capsule (1 mg) by mouth every 12 hours    Liver replaced by transplant (H)       ursodiol 300 MG capsule    ACTIGALL    60 capsule    Take 1 capsule (300 mg) by mouth 2 times daily    Liver replaced by transplant (H)

## 2017-11-21 NOTE — PROGRESS NOTES
SUBJECTIVE:   Charito Wilcox is a 37 year old female who presents to clinic today for the following health issues:    Chief Complaint   Patient presents with     RECHECK     Infectious mononucleosis.      Headache     Liver Transplant     patient is requesting liver test today.       ED/UC Followup:    Facility:  [facility]  Date of visit: November 12, 2017  Reason for visit: Fever, throat pain, headaches. Diagnosed with Mononucleosis  Current Status: Headaches are more severe, unable to sleep the last 4 days.        Patient with recent diagnosis of mono presents headache at night 4 days ago, prevents her from going to sleep.  Taking tylenol, which helps somewhat.  No nausea, vomiting, headache that wakes her up, no aura.  Of note, patient has h/o liver transplant done at 20yoa and would like to make sure her liver is ok.    Problem list and histories reviewed & adjusted, as indicated.  Additional history: as documented    BP Readings from Last 3 Encounters:   11/21/17 98/62   11/12/17 122/80   06/14/17 102/68    Wt Readings from Last 3 Encounters:   11/21/17 125 lb 8 oz (56.9 kg)   11/12/17 124 lb (56.2 kg)   06/14/17 124 lb (56.2 kg)            Reviewed and updated as needed this visit by clinical staffTobacco  Allergies  Meds  Problems       Reviewed and updated as needed this visit by Provider  Allergies  Meds  Problems         ROS:  Constitutional, HEENT, cardiovascular, pulmonary, gi and gu systems are negative, except as otherwise noted.      OBJECTIVE:   BP 98/62  Pulse 100  Temp 98.3  F (36.8  C) (Oral)  Wt 125 lb 8 oz (56.9 kg)  SpO2 97%  BMI 22.95 kg/m2  Body mass index is 22.95 kg/(m^2).  GENERAL: healthy, alert and no distress  EYES: Eyes grossly normal to inspection, PERRL and conjunctivae and sclerae normal  HENT: ear canals and TM's normal, nose and mouth without ulcers or lesions  NECK: no adenopathy, no asymmetry, masses, or scars and thyroid normal to  palpation  RESP: lungs clear to auscultation - no rales, rhonchi or wheezes  CV: regular rate and rhythm, normal S1 S2, no S3 or S4, no murmur, click or rub, no peripheral edema and peripheral pulses strong  NEURO: Normal strength and tone, mentation intact and speech normal  PSYCH: mentation appears normal, affect normal/bright    ASSESSMENT/PLAN:     1. Headache due to viral infection  Toradol injection given in clinic today, will prescribe reglan for headache.  Discussed that headache is a common symptom of infectious mono.  - ketorolac (TORADOL) 30 MG/ML injection; Inject 1 mL (30 mg) into the muscle once for 1 dose  Dispense: 1 mL; Refill: 0  - metoclopramide (REGLAN) 10 MG tablet; Take 1 tablet (10 mg) by mouth once as needed  Dispense: 20 tablet; Refill: 1  - KETOROLAC TROMETHAMINE 15MG  - INJECTION INTRAMUSCULAR OR SUB-Q  - CBC with platelets    2. Gammaherpesviral mononucleosis without complication  Conservative symptom management  - Comprehensive metabolic panel  - ketorolac (TORADOL) 30 MG/ML injection; Inject 1 mL (30 mg) into the muscle once for 1 dose  Dispense: 1 mL; Refill: 0  - KETOROLAC TROMETHAMINE 15MG  - INJECTION INTRAMUSCULAR OR SUB-Q  - CBC with platelets  - US Abdomen Complete; Future    3. Status post liver transplantation (H)  Will obtain cmp and abdominal u/s  - Comprehensive metabolic panel  - US Abdomen Complete; Future    Follow up if symptoms worsen or fail to improve.    Italo Rizvi MD  [facility]

## 2017-11-21 NOTE — TELEPHONE ENCOUNTER
Message left for Charito to let her know that  requests that Charito have a test called Merced Purvis viral load.   This is the common virus linked to mono,  had reviewed her chart and noted that she has previously had elevations of this virus.  Left a detailed message that her questions about metoclopramide that she sent to , were sent over to transplant center.  Provided information that the metoclopramide, also known as reglan is typically used for nausea, and that her questions about taking it would be sent to , but unclear when an answer would be received.  Advised that she should recontact the Primary care clinic with questions about headache treatment.    Did detail that she can take up to 4 extra strength tylenol or 6 regular strength tylenol for headaches, this is considered safe for post transplant pts.

## 2017-11-21 NOTE — NURSING NOTE
"Chief Complaint   Patient presents with     RECHECK     Infectious mononucleosis      Headache       Initial BP 98/62  Pulse 100  Temp 98.3  F (36.8  C) (Oral)  Wt 125 lb 8 oz (56.9 kg)  SpO2 97%  BMI 22.95 kg/m2 Estimated body mass index is 22.95 kg/(m^2) as calculated from the following:    Height as of 6/7/17: 5' 2.01\" (1.575 m).    Weight as of this encounter: 125 lb 8 oz (56.9 kg).  Medication Reconciliation: complete    "

## 2017-11-21 NOTE — NURSING NOTE
The following medication was given:     MEDICATION: Ketorolac Tromethamine  (30 mg/mL) (Toradol)  ROUTE: IM  SITE: Ventrogluteal - Left  DOSE: 1 ML  LOT #: [id number]  :  Fiix  EXPIRATION DATE:  06/01/2018  NDC#: 18069-409-53  Lana Hughes MA

## 2017-11-22 ENCOUNTER — MYC MEDICAL ADVICE (OUTPATIENT)
Dept: FAMILY MEDICINE | Facility: CLINIC | Age: 37
End: 2017-11-22

## 2017-11-22 ENCOUNTER — RADIANT APPOINTMENT (OUTPATIENT)
Dept: ULTRASOUND IMAGING | Facility: CLINIC | Age: 37
End: 2017-11-22
Attending: FAMILY MEDICINE
Payer: COMMERCIAL

## 2017-11-22 DIAGNOSIS — Z94.4 STATUS POST LIVER TRANSPLANTATION (H): ICD-10-CM

## 2017-11-22 DIAGNOSIS — B27.00 GAMMAHERPESVIRAL MONONUCLEOSIS WITHOUT COMPLICATION: ICD-10-CM

## 2017-11-22 PROCEDURE — 76700 US EXAM ABDOM COMPLETE: CPT

## 2017-11-22 NOTE — TELEPHONE ENCOUNTER
Spoke with Charito, who describes headaches that interfere with sleep.  She did not want to take the medication prescribed at pcp clinic.  Reviewed with her that she can take tylenol.  Reviewed with  who recommends that she take headache medication, can take tylenol and also episodically she can take ibuprofen  Provided information to Charito, to take tylenol, up to 2000mg /day, she can try tylenol pm for overnight, take regular tylenol and ibuprofen sparingly.  She and her family are in [place], verbalizes understanding of plan.

## 2017-11-24 DIAGNOSIS — K21.9 ACID REFLUX: ICD-10-CM

## 2017-11-24 DIAGNOSIS — Z94.4 LIVER REPLACED BY TRANSPLANT (H): ICD-10-CM

## 2017-11-24 RX ORDER — OMEPRAZOLE 40 MG/1
CAPSULE, DELAYED RELEASE ORAL
Qty: 180 CAPSULE | Refills: 0 | Status: SHIPPED | OUTPATIENT
Start: 2017-11-24 | End: 2018-03-01

## 2017-11-27 DIAGNOSIS — Z20.828 MONO EXPOSURE: ICD-10-CM

## 2017-11-27 DIAGNOSIS — Z94.4 LIVER REPLACED BY TRANSPLANT (H): ICD-10-CM

## 2017-11-27 LAB
ALBUMIN SERPL-MCNC: 4.1 G/DL (ref 3.4–5)
ALP SERPL-CCNC: 165 U/L (ref 40–150)
ALT SERPL W P-5'-P-CCNC: 38 U/L (ref 0–50)
ANION GAP SERPL CALCULATED.3IONS-SCNC: 6 MMOL/L (ref 3–14)
AST SERPL W P-5'-P-CCNC: 38 U/L (ref 0–45)
BILIRUB DIRECT SERPL-MCNC: 0.1 MG/DL (ref 0–0.2)
BILIRUB SERPL-MCNC: 0.5 MG/DL (ref 0.2–1.3)
BUN SERPL-MCNC: 13 MG/DL (ref 7–30)
CALCIUM SERPL-MCNC: 9 MG/DL (ref 8.5–10.1)
CHLORIDE SERPL-SCNC: 105 MMOL/L (ref 94–109)
CO2 SERPL-SCNC: 28 MMOL/L (ref 20–32)
CREAT SERPL-MCNC: 0.7 MG/DL (ref 0.52–1.04)
ERYTHROCYTE [DISTWIDTH] IN BLOOD BY AUTOMATED COUNT: 11.4 % (ref 10–15)
GFR SERPL CREATININE-BSD FRML MDRD: >90 ML/MIN/1.7M2
GLUCOSE SERPL-MCNC: 86 MG/DL (ref 70–99)
HCT VFR BLD AUTO: 40.3 % (ref 35–47)
HGB BLD-MCNC: 14 G/DL (ref 11.7–15.7)
MCH RBC QN AUTO: 34.9 PG (ref 26.5–33)
MCHC RBC AUTO-ENTMCNC: 34.7 G/DL (ref 31.5–36.5)
MCV RBC AUTO: 101 FL (ref 78–100)
PLATELET # BLD AUTO: 205 10E9/L (ref 150–450)
POTASSIUM SERPL-SCNC: 4.2 MMOL/L (ref 3.4–5.3)
PROT SERPL-MCNC: 7.5 G/DL (ref 6.8–8.8)
RBC # BLD AUTO: 4.01 10E12/L (ref 3.8–5.2)
SODIUM SERPL-SCNC: 139 MMOL/L (ref 133–144)
WBC # BLD AUTO: 4.6 10E9/L (ref 4–11)

## 2017-11-29 LAB
EBV DNA # SPEC NAA+PROBE: NORMAL {COPIES}/ML
EBV DNA SPEC NAA+PROBE-LOG#: NORMAL {LOG_COPIES}/ML

## 2017-12-08 DIAGNOSIS — Z94.4 LIVER REPLACED BY TRANSPLANT (H): ICD-10-CM

## 2017-12-08 RX ORDER — TACROLIMUS 1 MG/1
1 CAPSULE ORAL EVERY 12 HOURS
Qty: 180 CAPSULE | Refills: 3 | Status: SHIPPED | OUTPATIENT
Start: 2017-12-08 | End: 2018-03-01

## 2017-12-08 NOTE — TELEPHONE ENCOUNTER
Drug Name: tacrolimus 1mg  Last Fill Date: 11/15/17  Quantity: 180    Caren Jose   [facility]  [phone]

## 2018-01-07 ENCOUNTER — OFFICE VISIT (OUTPATIENT)
Dept: URGENT CARE | Facility: URGENT CARE | Age: 38
End: 2018-01-07
Payer: COMMERCIAL

## 2018-01-07 VITALS
SYSTOLIC BLOOD PRESSURE: 120 MMHG | WEIGHT: 124.38 LBS | BODY MASS INDEX: 22.74 KG/M2 | DIASTOLIC BLOOD PRESSURE: 79 MMHG | OXYGEN SATURATION: 100 % | TEMPERATURE: 98.1 F | HEART RATE: 71 BPM

## 2018-01-07 DIAGNOSIS — R30.0 DYSURIA: Primary | ICD-10-CM

## 2018-01-07 DIAGNOSIS — N89.8 VAGINAL DISCHARGE: ICD-10-CM

## 2018-01-07 LAB
ALBUMIN UR-MCNC: NEGATIVE MG/DL
APPEARANCE UR: CLEAR
BILIRUB UR QL STRIP: NEGATIVE
COLOR UR AUTO: YELLOW
GLUCOSE UR STRIP-MCNC: NEGATIVE MG/DL
HGB UR QL STRIP: NEGATIVE
KETONES UR STRIP-MCNC: NEGATIVE MG/DL
LEUKOCYTE ESTERASE UR QL STRIP: NEGATIVE
NITRATE UR QL: NEGATIVE
PH UR STRIP: 6 PH (ref 5–7)
SOURCE: NORMAL
SP GR UR STRIP: <=1.005 (ref 1–1.03)
SPECIMEN SOURCE: NORMAL
UROBILINOGEN UR STRIP-ACNC: 0.2 EU/DL (ref 0.2–1)
WET PREP SPEC: NORMAL

## 2018-01-07 PROCEDURE — 87210 SMEAR WET MOUNT SALINE/INK: CPT | Performed by: NURSE PRACTITIONER

## 2018-01-07 PROCEDURE — 81003 URINALYSIS AUTO W/O SCOPE: CPT | Performed by: NURSE PRACTITIONER

## 2018-01-07 PROCEDURE — 99213 OFFICE O/P EST LOW 20 MIN: CPT | Performed by: NURSE PRACTITIONER

## 2018-01-07 RX ORDER — METRONIDAZOLE 7.5 MG/G
1 GEL VAGINAL AT BEDTIME
Qty: 70 G | Refills: 0 | Status: SHIPPED | OUTPATIENT
Start: 2018-01-07 | End: 2018-01-12

## 2018-01-07 RX ORDER — NITROFURANTOIN 25; 75 MG/1; MG/1
CAPSULE ORAL
Refills: 0 | COMMUNITY
Start: 2017-12-05 | End: 2018-01-07

## 2018-01-07 ASSESSMENT — ENCOUNTER SYMPTOMS
NEUROLOGICAL NEGATIVE: 1
DYSURIA: 1
RESPIRATORY NEGATIVE: 1
CARDIOVASCULAR NEGATIVE: 1
CONSTITUTIONAL NEGATIVE: 1

## 2018-01-07 NOTE — MR AVS SNAPSHOT
After Visit Summary   1/7/2018    Charito Wilcox    MRN: [id number]           Patient Information     Date Of Birth          1980        Visit Information        Provider Department      1/7/2018 10:00 AM Yazmin Curran NP [facility]        Today's Diagnoses     Dysuria    -  1    Vaginal discharge          Care Instructions      Dysuria with Uncertain Cause (Adult)    The urethra is the tube that allows urine to pass out of the body. In a woman, the urethra is the opening above the vagina. In men, the urethra is the opening on the tip of the penis. Dysuria is the feeling of pain or burning in the urethra when passing urine.  Dysuria can be caused by anything that irritates or inflames the urethra. An infection or chemical irritation can cause this reaction. A bladder infection is the most common cause of dysuria in adults. A urine test can diagnose this. A bladder infection needs antibiotic treatment.  Soaps, lotions, colognes and feminine hygiene products can cause dysuria. So can birth control jellies, creams, and foams. It will go away 1 to 3 days after using these irritants.  Sexually transmitted diseases (STDs) such as chlamydia or gonorrhea can cause dysuria. Your healthcare provider may take a culture sample. Your provider may start you on antibiotic medicine before the culture test returns.  In women who have gone through menopause, dysuria can be from dryness in the lining of the urethra. This can be treated with hormones. Dysuria becomes long-term (chronic) when it lasts for weeks or months. You may need to see a specialist (urologist) to diagnose and treat chronic dysuria.  Home care  These home care tips may help:    Don't use any chemicals or products that you think may be causing your symptoms.    If you were given a prescription medicine, take as directed. Be sure to take it until it is all used up.    If a culture was taken, don't have sex until you have been  told that it is negative. This means you don't have an infection. Then follow your healthcare provider's advice to treat your condition.  If a culture was done and it is positive:    Both you and your sexual partner may need to be treated. This is true even if your partner has no symptoms.    Contact your healthcare provider or go to an urgent care clinic or the public health department to be looked at and treated.    Don't have sex until both you and your partner(s) have finished all antibiotics and your healthcare provider says you are no longer contagious.    Learn about and use safe sex practices. The safest sex is with a partner who has tested negative and only has sex with you. Condoms can prevent STDs from spreading, but they aren't a guarantee.  Follow-up care  Follow up with your healthcare provider, or as advised. If a culture was taken, you may call as directed for the results. If you have an STD, follow up with your provider or the public health department for a complete STD screening, including HIV testing. For more information, contact CDC-INFO at [phone].  When to seek medical advice  Call your healthcare provider right away if any of these occur:    You aren't better after 3 days of treatment    Fever of 100.4 F (38 C) or higher, or as directed by your healthcare provider    Back or belly pain that gets worse    You can't urinate because of pain    New discharge from the urethra, vagina, or penis    Painful sores on the penis    Rash or joint pain    Painful lumps (lymph nodes) in the groin    Testicle pain or swelling of the scrotum  Date Last Reviewed: 11/1/2016 2000-2017 [employer]. [address] [place] [address], [place] [address]. All rights reserved. This information is not intended as a substitute for professional medical care. Always follow your healthcare professional's instructions.                Follow-ups after your visit        Who to contact     If you have questions or  need follow up information about today's clinic visit or your schedule please contact [facility] KIRAN CHAVARRIA directly at [phone].  Normal or non-critical lab and imaging results will be communicated to you by MyChart, letter or phone within 4 business days after the clinic has received the results. If you do not hear from us within 7 days, please contact the clinic through [employer] or phone. If you have a critical or abnormal lab result, we will notify you by phone as soon as possible.  Submit refill requests through [employer] or call your pharmacy and they will forward the refill request to us. Please allow 3 business days for your refill to be completed.          Additional Information About Your Visit        [employer] Information     [employer] gives you secure access to your electronic health record. If you see a primary care provider, you can also send messages to your care team and make appointments. If you have questions, please call your primary care clinic.  If you do not have a primary care provider, please call [phone] and they will assist you.        Care EveryWhere ID     This is your Care EveryWhere ID. This could be used by other organizations to access your [place] medical records  [id number]        Your Vitals Were     Pulse Temperature Pulse Oximetry Breastfeeding? BMI (Body Mass Index)       71 98.1  F (36.7  C) (Oral) 100% No 22.74 kg/m2        Blood Pressure from Last 3 Encounters:   01/07/18 120/79   11/21/17 98/62   11/12/17 122/80    Weight from Last 3 Encounters:   01/07/18 124 lb 6 oz (56.4 kg)   11/21/17 125 lb 8 oz (56.9 kg)   11/12/17 124 lb (56.2 kg)              We Performed the Following     *UA reflex to Microscopic and Culture ([place] and [facility] (except [place] and [place])     Wet prep          Today's Medication Changes          These changes are accurate as of: 1/7/18 11:53 AM.  If you have any questions, ask your nurse or doctor.               Start  taking these medicines.        Dose/Directions    metroNIDAZOLE 0.75 % vaginal gel   Commonly known as:  METROGEL   Used for:  Vaginal discharge   Started by:  Yazmin Curran NP        Dose:  1 applicator   Place 1 applicator (5 g) vaginally At Bedtime for 5 days   Quantity:  70 g   Refills:  0            Where to get your medicines      These medications [place] [facility] [place] [address]  [address], [facility] [address]     Phone:  [phone]     metroNIDAZOLE 0.75 % vaginal gel                Primary Care Provider Office Phone # Fax #    Katharine Gaxiola [phone] [address] [facility]  [place] [address]        Equal Access to Services     [facility]: Hadii kristy ku hadasho Soomaali, waaxda luqadaha, qaybta kaalmada adeegyada, waxay idiin hayrio foote . So [facility] [phone].    ATENCIÓN: Si habla español, tiene a rowell disposición servicios gratuitos de asistencia lingüística. Ll[facility] [phone].    We comply with applicable federal civil rights laws and [place] laws. We do not discriminate on the basis of race, color, national origin, age, disability, sex, sexual orientation, or gender identity.            Thank you!     Thank you for choosing [facility]  for your care. Our goal is always to provide you with excellent care. Hearing back from our patients is one way we can continue to improve our services. Please take a few minutes to complete the written survey that you may receive in the mail after your visit with us. Thank you!             Your Updated Medication List - Protect others around you: Learn how to safely use, store and throw away your medicines at www.disposemymeds.org.          This list is accurate as of: 1/7/18 11:53 AM.  Always use your most recent med list.                   Brand Name Dispense Instructions for use Diagnosis    ACIDOPHILUS EXTRA STRENGTH Caps      Take by  mouth daily    Cellulitis       ALPRAZolam 0.5 MG tablet    XANAX    30 tablet    Take 1 tablet (0.5 mg) by mouth 3 times daily as needed for anxiety 1 month supply    Panic disorder       azaTHIOprine 50 MG tablet    IMURAN    90 tablet    Take 1 tablet (50 mg) by mouth daily    Liver replaced by transplant (H)       L-GLUTAMINE PO      Take 1 tablet by mouth daily        metoclopramide 10 MG tablet    REGLAN    20 tablet    Take 1 tablet (10 mg) by mouth once as needed    Headache due to viral infection       metroNIDAZOLE 0.75 % vaginal gel    METROGEL    70 g    Place 1 applicator (5 g) vaginally At Bedtime for 5 days    Vaginal discharge       MIRALAX powder   Generic drug:  polyethylene glycol     510 g    Take 17 g (1 capful) by mouth daily    Other constipation       omeprazole 40 MG capsule    priLOSEC    180 capsule    TAKE 1 CAPSULE BY MOUTH TWICE DAILY BEFORE MEALS, TAKE 30-60 MINUTES PRIOR TO A MEAL    Acid reflux, Liver replaced by transplant (H)       ondansetron 4 MG ODT tab    ZOFRAN ODT    20 tablet    Take 1-2 tablets (4-8 mg) by mouth 3 times daily (before meals)    Nausea, Liver replaced by transplant (H), Fatigue, unspecified type       PARoxetine 40 MG tablet    PAXIL    45 tablet    TAKE 1 AND 1/2 TABLETS(60 MG) BY MOUTH AT BEDTIME    Panic disorder       sucralfate 1 GM/10ML suspension    CARAFATE    1200 mL    Take 10 mLs (1 g) by mouth 4 times daily (before meals and nightly)    Duodenal ulcer without hemorrhage or perforation and without obstruction       tacrolimus 1 MG capsule    GENERIC EQUIVALENT    180 capsule    Take 1 capsule (1 mg) by mouth every 12 hours    Liver replaced by transplant (H)       ursodiol 300 MG capsule    ACTIGALL    60 capsule    Take 1 capsule (300 mg) by mouth 2 times daily    Liver replaced by transplant (H)

## 2018-01-07 NOTE — NURSING NOTE
"Chief Complaint   Patient presents with     Vaginal Problem     burning, abnormal whitish discharge, discomfort, hx frequent UTI's, a little bit of fever/chills       Initial /79 (BP Location: Left arm, Patient Position: Chair, Cuff Size: Adult Regular)  Pulse 71  Temp 98.1  F (36.7  C) (Oral)  Wt 124 lb 6 oz (56.4 kg)  SpO2 100%  Breastfeeding? No  BMI 22.74 kg/m2 Estimated body mass index is 22.74 kg/(m^2) as calculated from the following:    Height as of 6/7/17: 5' 2.01\" (1.575 m).    Weight as of this encounter: 124 lb 6 oz (56.4 kg).  Medication Reconciliation: complete   Nela Lino MA    "

## 2018-01-07 NOTE — PROGRESS NOTES
SUBJECTIVE:   Charito Wilcox is a 37 year old female who presents to clinic today for the following health issues:      URINARY TRACT SYMPTOMS      Duration: 3 days    Description    dysuria, frequency, odor,     Intensity:  moderate    Accompanying signs and symptoms:  Fever/chills: YES  Flank pain no   Nausea and vomiting: no   Vaginal symptoms: discharge, odor and itching  Abdominal/Pelvic Pain: no     History  History of frequent UTI's: YES  History of kidney stones: no   Sexually Active: YES  Possibility of pregnancy: No    Precipitating or alleviating factors: None    Therapies tried and outcome: increase fluid intake and taking Cranberry pills   Outcome: helped somewhat      Allergies   Allergen Reactions     Gluten Meal Nausea and Vomiting     Milk Protein Extract Nausea and Vomiting       Past Medical History:   Diagnosis Date     Acute pancreatitis 9/9/2016     Esophageal reflux      Fatigue, unspecified type 4/10/2017     Intractable nausea and vomiting 9/15/2016     Liver replaced by transplant (H) 2000    liver failure of unknown etiology, doing well with new liver     Moderate recurrent major depression (H) 12/2/2008     Papanicolaou smear of cervix with atypical squamous cells of undetermined significance (ASC-US)     negative HPV         Current Outpatient Prescriptions on File Prior to Visit:  tacrolimus (GENERIC EQUIVALENT) 1 MG capsule Take 1 capsule (1 mg) by mouth every 12 hours   omeprazole (PRILOSEC) 40 MG capsule TAKE 1 CAPSULE BY MOUTH TWICE DAILY BEFORE MEALS, TAKE 30-60 MINUTES PRIOR TO A MEAL   ALPRAZolam (XANAX) 0.5 MG tablet Take 1 tablet (0.5 mg) by mouth 3 times daily as needed for anxiety 1 month supply   azaTHIOprine (IMURAN) 50 MG tablet Take 1 tablet (50 mg) by mouth daily   ursodiol (ACTIGALL) 300 MG capsule Take 1 capsule (300 mg) by mouth 2 times daily   PARoxetine (PAXIL) 40 MG tablet TAKE 1 AND 1/2 TABLETS(60 MG) BY MOUTH AT BEDTIME   L-GLUTAMINE PO Take 1 tablet by  mouth daily    polyethylene glycol (MIRALAX) powder Take 17 g (1 capful) by mouth daily   Lactobacillus (ACIDOPHILUS EXTRA STRENGTH) CAPS Take by mouth daily    sucralfate (CARAFATE) 1 GM/10ML suspension Take 10 mLs (1 g) by mouth 4 times daily (before meals and nightly) (Patient not taking: Reported on 1/7/2018)   metoclopramide (REGLAN) 10 MG tablet Take 1 tablet (10 mg) by mouth once as needed (Patient not taking: Reported on 1/7/2018)   ondansetron (ZOFRAN ODT) 4 MG ODT tab Take 1-2 tablets (4-8 mg) by mouth 3 times daily (before meals) (Patient not taking: Reported on 1/7/2018)     No current facility-administered medications on file prior to visit.     Social History   Substance Use Topics     Smoking status: Never Smoker     Smokeless tobacco: Never Used     Alcohol use Yes      Comment: once per month       ROS:  Review of Systems   Constitutional: Negative.    HENT: Negative.    Respiratory: Negative.    Cardiovascular: Negative.    Genitourinary: Positive for dysuria.        Vaginal discharge, odor   Skin: Negative.    Neurological: Negative.        OBJECTIVE:  /79 (BP Location: Left arm, Patient Position: Chair, Cuff Size: Adult Regular)  Pulse 71  Temp 98.1  F (36.7  C) (Oral)  Wt 124 lb 6 oz (56.4 kg)  SpO2 100%  Breastfeeding? No  BMI 22.74 kg/m2   General:   awake, alert, and cooperative.  NAD.   Head: Normocephalic, atraumatic.  Eyes: Conjunctiva clear, non icteric.   ABD: soft, no tenderness to palpation , no rigidity, guarding or rebound . No CVAT  Neuro: Alert and oriented - normal speech.     ASSESSMENT:      ICD-10-CM    1. Dysuria R30.0 *UA reflex to Microscopic and Culture ([place] [facility] (except [place] and [place])   2. Vaginal discharge N89.8 Wet prep     metroNIDAZOLE (METROGEL) 0.75 % vaginal gel         PLAN:   UA is normal. I discussed how PH plays a role in the vaginal environment. Advised to take a probiotic to bring a balance and promote good bacteria.    She is not concerned for any std.   As per ordered above.  Drink plenty of fluids.  Prevention and treatment of UTI's discussed. Follow up with primary care physician if not improving.  Advised about symptoms which might herald more serious problems.    Yazmin Curran  [facility]  Family Nurse Practitoner

## 2018-01-07 NOTE — PATIENT INSTRUCTIONS
Dysuria with Uncertain Cause (Adult)    The urethra is the tube that allows urine to pass out of the body. In a woman, the urethra is the opening above the vagina. In men, the urethra is the opening on the tip of the penis. Dysuria is the feeling of pain or burning in the urethra when passing urine.  Dysuria can be caused by anything that irritates or inflames the urethra. An infection or chemical irritation can cause this reaction. A bladder infection is the most common cause of dysuria in adults. A urine test can diagnose this. A bladder infection needs antibiotic treatment.  Soaps, lotions, colognes and feminine hygiene products can cause dysuria. So can birth control jellies, creams, and foams. It will go away 1 to 3 days after using these irritants.  Sexually transmitted diseases (STDs) such as chlamydia or gonorrhea can cause dysuria. Your healthcare provider may take a culture sample. Your provider may start you on antibiotic medicine before the culture test returns.  In women who have gone through menopause, dysuria can be from dryness in the lining of the urethra. This can be treated with hormones. Dysuria becomes long-term (chronic) when it lasts for weeks or months. You may need to see a specialist (urologist) to diagnose and treat chronic dysuria.  Home care  These home care tips may help:    Don't use any chemicals or products that you think may be causing your symptoms.    If you were given a prescription medicine, take as directed. Be sure to take it until it is all used up.    If a culture was taken, don't have sex until you have been told that it is negative. This means you don't have an infection. Then follow your healthcare provider's advice to treat your condition.  If a culture was done and it is positive:    Both you and your sexual partner may need to be treated. This is true even if your partner has no symptoms.    Contact your healthcare provider or go to an urgent care clinic or the  [facility] health department to be looked at and treated.    Don't have sex until both you and your partner(s) have finished all antibiotics and your healthcare provider says you are no longer contagious.    Learn about and use safe sex practices. The safest sex is with a partner who has tested negative and only has sex with you. Condoms can prevent STDs from spreading, but they aren't a guarantee.  Follow-up care  Follow up with your healthcare provider, or as advised. If a culture was taken, you may call as directed for the results. If you have an STD, follow up with your provider or the public health department for a complete STD screening, including HIV testing. For more information, contact CDC-INFO at 433-383-1329.  When to seek medical advice  Call your healthcare provider right away if any of these occur:    You aren't better after 3 days of treatment    Fever of 100.4 F (38 C) or higher, or as directed by your healthcare provider    Back or belly pain that gets worse    You can't urinate because of pain    New discharge from the urethra, vagina, or penis    Painful sores on the penis    Rash or joint pain    Painful lumps (lymph nodes) in the groin    Testicle pain or swelling of the scrotum  Date Last Reviewed: 11/1/2016 2000-2017 [employer]. [address] [place] [address], [place] [address]. All rights reserved. This information is not intended as a substitute for professional medical care. Always follow your healthcare professional's instructions.

## 2018-02-16 ENCOUNTER — MYC MEDICAL ADVICE (OUTPATIENT)
Dept: FAMILY MEDICINE | Facility: CLINIC | Age: 38
End: 2018-02-16

## 2018-02-16 NOTE — TELEPHONE ENCOUNTER
Yes-- can use a same day spot. She is complex and will take more than 15 min, so if possible use 2 SD spots together OR use one and hold one later in the day. Thanks!  Katharine Gaxiola MD

## 2018-02-16 NOTE — TELEPHONE ENCOUNTER
30 min spot opened on 3/1/18, appointment scheduled for patient at 12pm.  Advised patient to call back to confirm this works or to reschedule.   Per PCP: needs 30 min visit-use 2 SD spots together OR use one and hold one later in the day.     Chantell Mcgill RN

## 2018-02-19 ENCOUNTER — MYC MEDICAL ADVICE (OUTPATIENT)
Dept: FAMILY MEDICINE | Facility: CLINIC | Age: 38
End: 2018-02-19

## 2018-02-19 DIAGNOSIS — J02.0 STREPTOCOCCAL SORE THROAT: ICD-10-CM

## 2018-02-19 DIAGNOSIS — Z94.4 LIVER REPLACED BY TRANSPLANT (H): ICD-10-CM

## 2018-02-19 DIAGNOSIS — J02.0 STREPTOCOCCAL SORE THROAT: Primary | ICD-10-CM

## 2018-02-19 LAB
ERYTHROCYTE [DISTWIDTH] IN BLOOD BY AUTOMATED COUNT: 11.8 % (ref 10–15)
HCT VFR BLD AUTO: 38.7 % (ref 35–47)
HETEROPH AB SER QL: NEGATIVE
HGB BLD-MCNC: 13.6 G/DL (ref 11.7–15.7)
MCH RBC QN AUTO: 35.9 PG (ref 26.5–33)
MCHC RBC AUTO-ENTMCNC: 35.1 G/DL (ref 31.5–36.5)
MCV RBC AUTO: 102 FL (ref 78–100)
PLATELET # BLD AUTO: 207 10E9/L (ref 150–450)
RBC # BLD AUTO: 3.79 10E12/L (ref 3.8–5.2)
WBC # BLD AUTO: 6.5 10E9/L (ref 4–11)

## 2018-02-19 PROCEDURE — 80048 BASIC METABOLIC PNL TOTAL CA: CPT | Performed by: INTERNAL MEDICINE

## 2018-02-19 PROCEDURE — 86308 HETEROPHILE ANTIBODY SCREEN: CPT | Performed by: INTERNAL MEDICINE

## 2018-02-19 PROCEDURE — 36415 COLL VENOUS BLD VENIPUNCTURE: CPT | Performed by: INTERNAL MEDICINE

## 2018-02-19 PROCEDURE — 80076 HEPATIC FUNCTION PANEL: CPT | Performed by: INTERNAL MEDICINE

## 2018-02-19 PROCEDURE — 85027 COMPLETE CBC AUTOMATED: CPT | Performed by: INTERNAL MEDICINE

## 2018-02-20 LAB
ALBUMIN SERPL-MCNC: 3.9 G/DL (ref 3.4–5)
ALP SERPL-CCNC: 157 U/L (ref 40–150)
ALT SERPL W P-5'-P-CCNC: 21 U/L (ref 0–50)
ANION GAP SERPL CALCULATED.3IONS-SCNC: 7 MMOL/L (ref 3–14)
AST SERPL W P-5'-P-CCNC: 29 U/L (ref 0–45)
BILIRUB DIRECT SERPL-MCNC: 0.1 MG/DL (ref 0–0.2)
BILIRUB SERPL-MCNC: 0.3 MG/DL (ref 0.2–1.3)
BUN SERPL-MCNC: 12 MG/DL (ref 7–30)
CALCIUM SERPL-MCNC: 8.7 MG/DL (ref 8.5–10.1)
CHLORIDE SERPL-SCNC: 108 MMOL/L (ref 94–109)
CO2 SERPL-SCNC: 26 MMOL/L (ref 20–32)
CREAT SERPL-MCNC: 0.71 MG/DL (ref 0.52–1.04)
GFR SERPL CREATININE-BSD FRML MDRD: >90 ML/MIN/1.7M2
GLUCOSE SERPL-MCNC: 89 MG/DL (ref 70–99)
POTASSIUM SERPL-SCNC: 3.9 MMOL/L (ref 3.4–5.3)
PROT SERPL-MCNC: 7.1 G/DL (ref 6.8–8.8)
SODIUM SERPL-SCNC: 141 MMOL/L (ref 133–144)

## 2018-02-21 ENCOUNTER — MYC MEDICAL ADVICE (OUTPATIENT)
Dept: GASTROENTEROLOGY | Facility: CLINIC | Age: 38
End: 2018-02-21

## 2018-02-22 NOTE — PROGRESS NOTES
SUBJECTIVE:   Charito Wilcox is a 37 year old female who presents to clinic today for the following health issues:      Skin changes      Duration: 3-4 years    Description  Location: lower arms  Itching: no    Intensity:  mild    Accompanying signs and symptoms: None    History (similar episodes/previous evaluation): None    Precipitating or alleviating factors:  New exposures:  None  Recent travel: no      Therapies tried and outcome: none    Patient is a 38 yo woman who is s/p liver transplant 18 years ago for cryptogenic cirrhosis, managed by Dr. Maria at [facility], gastric ulcer that was negative for h. Pylori and intermittent severe dyspepsia/GERD, recent Mononucleosis last year, anxiety and irritable bowel syndrome, who presents today for care.  Last 1 year of office notes reviewed from transplant and visits related to mono, endoscopies.     Bumps: present on patient's right forearm for the past 3-4 years.Sometimes more prominent. Forgets to mention this when coming in. Denies itching or pain, exposures, and recent travel. Has not grown         Problem list and histories reviewed & adjusted, as indicated.  Additional history: as documented    Patient Active Problem List   Diagnosis     Liver replaced by transplant (H)     Esophageal reflux     CARDIOVASCULAR SCREENING; LDL GOAL LESS THAN 160     Panic disorder     IBS (irritable bowel syndrome)     Immunosuppressed status (H)     PMS (premenstrual syndrome)     Labral tear of hip, degenerative     Right hip impingement syndrome     Fatigue, unspecified type     Localized superficial swelling, mass, or lump     Past Surgical History:   Procedure Laterality Date     C TRANSPLANT LIVER,HETERTOPIC      had appendectomy, gallbladder out with transplant.       SECTION  2008      SECTION  3/2010     ESOPHAGOSCOPY, GASTROSCOPY, DUODENOSCOPY (EGD), COMBINED Left 2015    Procedure: COMBINED ESOPHAGOSCOPY, GASTROSCOPY, DUODENOSCOPY (EGD),  BIOPSY SINGLE OR MULTIPLE;  Surgeon: Ottoniel Martin MD;  Location:  GI     ESOPHAGOSCOPY, GASTROSCOPY, DUODENOSCOPY (EGD), COMBINED N/A 9/16/2016    Procedure: COMBINED ESOPHAGOSCOPY, GASTROSCOPY, DUODENOSCOPY (EGD), BIOPSY SINGLE OR MULTIPLE;  Surgeon: Homar Adams MD;  Location:  GI     ESOPHAGOSCOPY, GASTROSCOPY, DUODENOSCOPY (EGD), COMBINED N/A 11/17/2016    Procedure: COMBINED ESOPHAGOSCOPY, GASTROSCOPY, DUODENOSCOPY (EGD), BIOPSY SINGLE OR MULTIPLE;  Surgeon: Homar Adams MD;  Location:  GI     HIP SURGERY Right 2/2016    Torn labrum repair      TUBAL LIGATION  3/2010       Social History   Substance Use Topics     Smoking status: Never Smoker     Smokeless tobacco: Never Used     Alcohol use Yes      Comment: once per month     Family History   Problem Relation Age of Onset     GASTROINTESTINAL DISEASE No family hx of      C.A.D. No family hx of      Hypertension Maternal Grandfather      CEREBROVASCULAR DISEASE Paternal Grandmother      Breast Cancer No family hx of      Cancer - colorectal No family hx of      Prostate Cancer No family hx of      Alcohol/Drug No family hx of      Breast Cancer Paternal Grandmother      Depression Maternal Grandmother      Anxiety Disorder Maternal Grandmother          Current Outpatient Prescriptions   Medication Sig Dispense Refill     B Complex Vitamins (VITAMIN B COMPLEX PO)        MELATONIN PO Take 5 mg by mouth nightly as needed       omeprazole (PRILOSEC) 40 MG capsule TAKE 1 CAPSULE BY MOUTH TWICE DAILY BEFORE MEALS, TAKE 30-60 MINUTES PRIOR TO A MEAL 180 capsule 1     ondansetron (ZOFRAN ODT) 4 MG ODT tab Take 1-2 tablets (4-8 mg) by mouth 3 times daily (before meals) 20 tablet 0     ALPRAZolam (XANAX) 0.5 MG tablet Take 1 tablet (0.5 mg) by mouth 3 times daily as needed for anxiety 1 month supply 30 tablet 0     PARoxetine (PAXIL) 40 MG tablet TAKE 1 AND 1/2 TABLETS(60 MG) BY MOUTH AT BEDTIME 45 tablet 10      L-GLUTAMINE PO Take 1 tablet by mouth daily        polyethylene glycol (MIRALAX) powder Take 17 g (1 capful) by mouth daily 510 g 1     Lactobacillus (ACIDOPHILUS EXTRA STRENGTH) CAPS Take by mouth daily        ursodiol (ACTIGALL) 300 MG capsule Take 1 capsule (300 mg) by mouth 2 times daily 180 capsule 3     azaTHIOprine (IMURAN) 50 MG tablet Take 1 tablet (50 mg) by mouth daily 90 tablet 3     tacrolimus (GENERIC EQUIVALENT) 1 MG capsule Take 1 capsule (1 mg) by mouth every 12 hours 180 capsule 3     Recent Labs   Lab Test  02/19/18   1705  11/27/17   1221  11/21/17   1249   06/07/17   1411  03/29/17   0819   12/02/16   0737  09/30/16   0804   09/01/16   1539   LDL   --    --    --    --   58   --    --   64  50   < >   --    HDL   --    --    --    --   70   --    --   67  64   < >   --    TRIG   --    --    --    --   136   --    --   58  64   < >   --    ALT  21  38  30   < >  20   --    < >  17   --    < >  24   CR  0.71  0.70  0.68   < >  0.70   --    < >  0.74   --    < >  0.79   GFRESTIMATED  >90  >90  >90   < >  >90  Non  GFR Calc     --    < >  89   --    < >  83   GFRESTBLACK  >90  >90  >90   < >  >90   GFR Calc     --    < >  >90   GFR Calc     --    < >  >90   GFR Calc     POTASSIUM  3.9  4.2  4.3   < >  4.0   --    < >  4.1   --    < >  4.3   TSH   --    --    --    --    --   1.11   --    --    --    --   0.93    < > = values in this interval not displayed.      BP Readings from Last 3 Encounters:   03/01/18 100/62   01/07/18 120/79   11/21/17 98/62    Wt Readings from Last 3 Encounters:   03/01/18 125 lb (56.7 kg)   01/07/18 124 lb 6 oz (56.4 kg)   11/21/17 125 lb 8 oz (56.9 kg)      Reviewed and updated as needed this visit by clinical staff  Tobacco  Allergies  Meds       Reviewed and updated as needed this visit by Provider         ROS:  Constitutional, HEENT, cardiovascular, pulmonary, GI, , musculoskeletal, neuro, skin,  "endocrine and psych systems are negative, except as otherwise noted.    This document serves as a record of the services and decisions personally performed and made by Katharine Gaxiola MD. It was created on her behalf by Alex Dunbar, a trained medical scribe. The creation of this document is based the provider's statements to the medical scribe.    Alex Dunbar March 1, 2018 12:22 PM      OBJECTIVE:     /62 (BP Location: Right arm, Patient Position: Chair, Cuff Size: Adult Regular)  Pulse 70  Temp 98.6  F (37  C) (Oral)  Ht 5' 2.01\" (1.575 m)  Wt 125 lb (56.7 kg)  SpO2 100%  BMI 22.86 kg/m2  Body mass index is 22.86 kg/(m^2).  GENERAL: healthy, alert and no distress  SKIN: 3 very small subcutaneous masses in the right volar forearm, mobile, nontender, nonadherent    PSYCH: mentation appears normal, affect normal/bright    Diagnostic Test Results:  No results found for this or any previous visit (from the past 24 hour(s)).    ASSESSMENT/PLAN:           ICD-10-CM    1. Chronic gastric ulcer without hemorrhage and without perforation K25.7 omeprazole (PRILOSEC) 40 MG capsule   2. Liver replaced by transplant (H) Z94.4 ondansetron (ZOFRAN ODT) 4 MG ODT tab   3. Localized superficial swelling, mass, or lump R22.9     right forearm, stable, likely lipoma, can't rule out sebaceous cyst but less likely     4. Nausea R11.0 ondansetron (ZOFRAN ODT) 4 MG ODT tab   5. Gastroesophageal reflux disease without esophagitis K21.9    6. Panic disorder F41.0 ALPRAZolam (XANAX) 0.5 MG tablet       Lump in forearm: most likely lipoma. Return if growing, painful, skin irritation for removal.  Gastric ulcer in liver transplant patient-- using omeprazole-- continue due to having symptoms whenever she tries to stop medication  Nausea: sometimes and anxiety equivalent, sometimes related to GERD. Does very well with minimal ondansetron  Panic: precipitated by her significant medical history. Uses alprazolam VERY sparingly. " Continue    See Patient Instructions    Patient Instructions   I recommend seeing Dr. Inge Valle at [facility]-- RANDY Chavez is also at that location  Plan to see her for a physical in May    The lumps are most likely lipomas-- they should be removed if they grow or become painful.       The information in this document, created by the medical scribe for me, accurately reflects the services I personally performed and the decisions made by me. I have reviewed and approved this document for accuracy prior to leaving the patient care area.    Katharine Gaxiola MD  [facility] [place]

## 2018-02-28 ENCOUNTER — TELEPHONE (OUTPATIENT)
Dept: TRANSPLANT | Facility: CLINIC | Age: 38
End: 2018-02-28

## 2018-02-28 DIAGNOSIS — K21.9 ACID REFLUX: ICD-10-CM

## 2018-02-28 DIAGNOSIS — Z94.4 LIVER REPLACED BY TRANSPLANT (H): ICD-10-CM

## 2018-02-28 NOTE — TELEPHONE ENCOUNTER
----- Message from Daniel Marie RPH sent at 2/27/2018  3:13 PM CST -----  Regarding: RE: primrose oil capsule  Yue,  I do not recommend this product for oral use in our solid organ transplant patients.   1. Increased bleeding risk   2. Has evidence of interaction with substrates of Cytochrome P450 3A4 enzymes. Tacrolimus is a substrate of this system.    I do not see a problem if used topically.   I will contact her tomorrow over the phone if you would prefer.   Hope this helps.   Daniel Marie, R.Ph.  [facility]  [phone]  [phone] pager  ----- Message -----     From: Yue Duckworth RN     Sent: 2/26/2018   8:32 AM       To: Daniel Marie RPH, Emily Miller  Subject: primrose oil capsule                             Charito Cifuentes would like to take primrose capsule, for her problem periods, I may or may not have sent this to you already.     If you could advise. Yue Wagner

## 2018-03-01 ENCOUNTER — OFFICE VISIT (OUTPATIENT)
Dept: FAMILY MEDICINE | Facility: CLINIC | Age: 38
End: 2018-03-01
Payer: COMMERCIAL

## 2018-03-01 VITALS
SYSTOLIC BLOOD PRESSURE: 100 MMHG | OXYGEN SATURATION: 100 % | TEMPERATURE: 98.6 F | HEART RATE: 70 BPM | WEIGHT: 125 LBS | HEIGHT: 62 IN | DIASTOLIC BLOOD PRESSURE: 62 MMHG | BODY MASS INDEX: 23 KG/M2

## 2018-03-01 DIAGNOSIS — R22.9 LOCALIZED SUPERFICIAL SWELLING, MASS, OR LUMP: Primary | ICD-10-CM

## 2018-03-01 DIAGNOSIS — K21.9 GASTROESOPHAGEAL REFLUX DISEASE WITHOUT ESOPHAGITIS: ICD-10-CM

## 2018-03-01 DIAGNOSIS — Z94.4 LIVER REPLACED BY TRANSPLANT (H): ICD-10-CM

## 2018-03-01 DIAGNOSIS — F41.0 PANIC DISORDER: ICD-10-CM

## 2018-03-01 DIAGNOSIS — R11.0 NAUSEA: ICD-10-CM

## 2018-03-01 DIAGNOSIS — K25.7 CHRONIC GASTRIC ULCER WITHOUT HEMORRHAGE AND WITHOUT PERFORATION: ICD-10-CM

## 2018-03-01 PROCEDURE — 99214 OFFICE O/P EST MOD 30 MIN: CPT | Performed by: FAMILY MEDICINE

## 2018-03-01 RX ORDER — ONDANSETRON 4 MG/1
4-8 TABLET, ORALLY DISINTEGRATING ORAL
Qty: 20 TABLET | Refills: 0 | Status: SHIPPED | OUTPATIENT
Start: 2018-03-01 | End: 2018-08-29

## 2018-03-01 RX ORDER — ALPRAZOLAM 0.5 MG
0.5 TABLET ORAL 3 TIMES DAILY PRN
Qty: 30 TABLET | Refills: 0 | Status: SHIPPED | OUTPATIENT
Start: 2018-03-01 | End: 2018-08-29

## 2018-03-01 RX ORDER — OMEPRAZOLE 40 MG/1
CAPSULE, DELAYED RELEASE ORAL
Qty: 180 CAPSULE | Refills: 1 | Status: SHIPPED | OUTPATIENT
Start: 2018-03-01 | End: 2018-09-25

## 2018-03-01 RX ORDER — OMEPRAZOLE 40 MG/1
CAPSULE, DELAYED RELEASE ORAL
Qty: 180 CAPSULE | Refills: 3 | Status: CANCELLED | OUTPATIENT
Start: 2018-03-01

## 2018-03-01 ASSESSMENT — PAIN SCALES - GENERAL: PAINLEVEL: NO PAIN (0)

## 2018-03-01 NOTE — Clinical Note
Hi Inge, This patient is planning to follow up with you (on my recommendation). Don't hesitate to contact me if you have questions. Janeen

## 2018-03-01 NOTE — MR AVS SNAPSHOT
After Visit Summary   3/1/2018    Charito Wilcox    MRN: [id number]           Patient Information     Date Of Birth          1980        Visit Information        Provider Department      3/1/2018 12:00 PM Katharine Gaxiola MD [facility]        Today's Diagnoses     Chronic gastric ulcer without hemorrhage and without perforation    -  1    Liver replaced by transplant (H)        Localized superficial swelling, mass, or lump        Nausea        Gastroesophageal reflux disease without esophagitis        Panic disorder          Care Instructions    I recommend seeing Dr. Inge Valle at [facility]-- RANDY Chavez is also at that location  Plan to see her for a physical in May    The lumps are most likely lipomas-- they should be removed if they grow or become painful.           Follow-ups after your visit        Who to contact     If you have questions or need follow up information about today's clinic visit or your schedule please contact [facility] directly at [phone].  Normal or non-critical lab and imaging results will be communicated to you by [employer], letter or phone within 4 business days after the clinic has received the results. If you do not hear from us within 7 days, please contact the clinic through [employer] or phone. If you have a critical or abnormal lab result, we will notify you by phone as soon as possible.  Submit refill requests through [employer] or call your pharmacy and they will forward the refill request to us. Please allow 3 business days for your refill to be completed.          Additional Information About Your Visit        MyChart Information     [employer] gives you secure access to your electronic health record. If you see a primary care provider, you can also send messages to your care team and make appointments. If you have questions, please call your primary care clinic.  If you do not have a primary care provider, please  "call [phone] and they will assist you.        Care EveryWhere ID     This is your Care EveryWhere ID. This could be used by other organizations to access your [place] medical records  [id number]        Your Vitals Were     Pulse Temperature Height Pulse Oximetry BMI (Body Mass Index)       70 98.6  F (37  C) (Oral) 5' 2.01\" (1.575 m) 100% 22.86 kg/m2        Blood Pressure from Last 3 Encounters:   03/01/18 100/62   01/07/18 120/79   11/21/17 98/62    Weight from Last 3 Encounters:   03/01/18 125 lb (56.7 kg)   01/07/18 124 lb 6 oz (56.4 kg)   11/21/17 125 lb 8 oz (56.9 kg)              Today, you had the following     No orders found for display         Today's Medication Changes          These changes are accurate as of 3/1/18  1:02 PM.  If you have any questions, ask your nurse or doctor.               These medicines have changed or have updated prescriptions.        Dose/Directions    omeprazole 40 MG capsule   Commonly known as:  priLOSEC   This may have changed:  See the new instructions.   Used for:  Chronic gastric ulcer without hemorrhage and without perforation   Changed by:  Katharine Gaxiola MD        TAKE 1 CAPSULE BY MOUTH TWICE DAILY BEFORE MEALS, TAKE 30-60 MINUTES PRIOR TO A MEAL   Quantity:  180 capsule   Refills:  1         Stop taking these medicines if you haven't already. Please contact your care team if you have questions.     metoclopramide 10 MG tablet   Commonly known as:  REGLAN   Stopped by:  Katharine Gaxiola MD           sucralfate 1 GM/10ML suspension   Commonly known as:  CARAFATE   Stopped by:  Katharine Gaxiola MD                Where to get your medicines      These medications were sent to [id number] - [place], [place] - [address] AT [facility] [address], [place] [address]     Phone:  [phone]     omeprazole 40 MG capsule    ondansetron 4 MG ODT tab         Some of these will need a paper " prescription and others can be bought over the counter.  Ask your nurse if you have questions.     Bring a paper prescription for each of these medications     ALPRAZolam 0.5 MG tablet                Primary Care Provider Office Phone # Fax #    Katharine Gaxiola [phone] [address] [facility]  [place] [address]        Equal Access to Services     [facility]: Hadii aad ku hadasho Soomaali, waaxda luqadaha, qaybta kaalmada adeegyada, waxay idiin hayaan adeeg kharash la'aan . So [facility] [phone].    ATENCIÓN: Si habla español, tiene a rowell disposición servicios gratuitos de asistencia lingüística. Llame al [phone].    We comply with applicable federal civil rights laws and [place] laws. We do not discriminate on the basis of race, color, national origin, age, disability, sex, sexual orientation, or gender identity.            Thank you!     Thank you for choosing [facility]  for your care. Our goal is always to provide you with excellent care. Hearing back from our patients is one way we can continue to improve our services. Please take a few minutes to complete the written survey that you may receive in the mail after your visit with us. Thank you!             Your Updated Medication List - Protect others around you: Learn how to safely use, store and throw away your medicines at www.disposemymeds.org.          This list is accurate as of 3/1/18  1:02 PM.  Always use your most recent med list.                   Brand Name Dispense Instructions for use Diagnosis    ACIDOPHILUS EXTRA STRENGTH Caps      Take by mouth daily    Cellulitis       ALPRAZolam 0.5 MG tablet    XANAX    30 tablet    Take 1 tablet (0.5 mg) by mouth 3 times daily as needed for anxiety 1 month supply    Panic disorder       azaTHIOprine 50 MG tablet    IMURAN    90 tablet    Take 1 tablet (50 mg) by mouth daily    Liver replaced by transplant (H)       L-GLUTAMINE PO      Take 1 tablet by mouth  daily        MELATONIN PO      Take 5 mg by mouth nightly as needed        MIRALAX powder   Generic drug:  polyethylene glycol     510 g    Take 17 g (1 capful) by mouth daily    Other constipation       omeprazole 40 MG capsule    priLOSEC    180 capsule    TAKE 1 CAPSULE BY MOUTH TWICE DAILY BEFORE MEALS, TAKE 30-60 MINUTES PRIOR TO A MEAL    Chronic gastric ulcer without hemorrhage and without perforation       ondansetron 4 MG ODT tab    ZOFRAN ODT    20 tablet    Take 1-2 tablets (4-8 mg) by mouth 3 times daily (before meals)    Nausea, Liver replaced by transplant (H)       PARoxetine 40 MG tablet    PAXIL    45 tablet    TAKE 1 AND 1/2 TABLETS(60 MG) BY MOUTH AT BEDTIME    Panic disorder       tacrolimus 1 MG capsule    GENERIC EQUIVALENT    180 capsule    Take 1 capsule (1 mg) by mouth every 12 hours    Liver replaced by transplant (H)       ursodiol 300 MG capsule    ACTIGALL    60 capsule    Take 1 capsule (300 mg) by mouth 2 times daily    Liver replaced by transplant (H)       VITAMIN B COMPLEX PO

## 2018-03-01 NOTE — PATIENT INSTRUCTIONS
I recommend seeing Dr. Inge Valle at [facility]-- RANDY Chavez is also at that location  Plan to see her for a physical in May    The lumps are most likely lipomas-- they should be removed if they grow or become painful.

## 2018-03-02 RX ORDER — AZATHIOPRINE 50 MG/1
50 TABLET ORAL DAILY
Qty: 90 TABLET | Refills: 3 | Status: SHIPPED | OUTPATIENT
Start: 2018-03-02 | End: 2019-03-11

## 2018-03-02 RX ORDER — URSODIOL 300 MG/1
300 CAPSULE ORAL 2 TIMES DAILY
Qty: 180 CAPSULE | Refills: 3 | Status: SHIPPED | OUTPATIENT
Start: 2018-03-02 | End: 2019-02-15

## 2018-03-02 RX ORDER — TACROLIMUS 1 MG/1
1 CAPSULE ORAL EVERY 12 HOURS
Qty: 180 CAPSULE | Refills: 3 | Status: SHIPPED | OUTPATIENT
Start: 2018-03-02 | End: 2019-02-15

## 2018-03-08 ENCOUNTER — TELEPHONE (OUTPATIENT)
Dept: PHARMACY | Facility: CLINIC | Age: 38
End: 2018-03-08

## 2018-03-08 NOTE — TELEPHONE ENCOUNTER
Called Charito per request by transplant coordinator. Charito had requested advice on Evening Primrose capsules and if this would be safe for her.  She is a Liver transplant patient from 2000.  She is doing well but is struggling with difficult menses.  She was told by friends to try Evening Primrose.    I spoke with the patient about concerns of increased bleeding risk as product does contain gamma-linolenic acid (GLA) and could reduce platelet aggregation and prolong bleeding time. This is of concern as patient reports having a gastric(?) ulcer in the past.    Also, there is reported in literature a minor interaction with  3A4 substrates, which would include tacrolimus, which Charito is taking.    I told Charito that I could not recommend this product. If she really wanted to pursue it she would need to work with her physician to monitor therapy closely.  She said she understands and will not take this medication.   Charito then asked about progesterone for her difficult menses.  I told her this might be a good option to try. She will need to discuss with her OB-Gyn. She said she will reach out to her to have a discussion regarding this.   Charito voiced appreciation for the information.    I let her know she can contact me anytime. She said she will have Yue Duckworth give her my contact information or have Yue pass questions on. I told her that is absolutely fine.   No further questions for this pharmacist.   Daniel Marie, R.Ph.  [facility]  [phone] pager

## 2018-03-22 ENCOUNTER — OFFICE VISIT (OUTPATIENT)
Dept: OBGYN | Facility: CLINIC | Age: 38
End: 2018-03-22
Attending: OBSTETRICS & GYNECOLOGY
Payer: COMMERCIAL

## 2018-03-22 VITALS
SYSTOLIC BLOOD PRESSURE: 110 MMHG | BODY MASS INDEX: 22.24 KG/M2 | DIASTOLIC BLOOD PRESSURE: 71 MMHG | HEIGHT: 63 IN | WEIGHT: 125.5 LBS | HEART RATE: 73 BPM

## 2018-03-22 DIAGNOSIS — N94.3 PRE-MENSTRUAL SYNDROME: Primary | ICD-10-CM

## 2018-03-22 RX ORDER — ACETAMINOPHEN AND CODEINE PHOSPHATE 120; 12 MG/5ML; MG/5ML
1 SOLUTION ORAL DAILY
Qty: 28 TABLET | Refills: 11 | Status: SHIPPED | OUTPATIENT
Start: 2018-03-22 | End: 2018-05-09 | Stop reason: SINTOL

## 2018-03-22 ASSESSMENT — ENCOUNTER SYMPTOMS
NECK PAIN: 1
DECREASED CONCENTRATION: 0
DECREASED LIBIDO: 0
RECTAL PAIN: 0
EYE WATERING: 0
CONSTIPATION: 1
PANIC: 0
EYE REDNESS: 0
NAUSEA: 1
DIARRHEA: 0
HEARTBURN: 1
EYE IRRITATION: 1
ABDOMINAL PAIN: 1
NERVOUS/ANXIOUS: 1
MUSCLE WEAKNESS: 0
BLOATING: 1
ARTHRALGIAS: 0
MYALGIAS: 1
EYE PAIN: 1
VOMITING: 0
BOWEL INCONTINENCE: 0
STIFFNESS: 0
JOINT SWELLING: 0
BACK PAIN: 1
JAUNDICE: 0
HOT FLASHES: 1
INSOMNIA: 0
BLOOD IN STOOL: 0
DOUBLE VISION: 0
MUSCLE CRAMPS: 0
DEPRESSION: 0

## 2018-03-22 ASSESSMENT — ANXIETY QUESTIONNAIRES
6. BECOMING EASILY ANNOYED OR IRRITABLE: NOT AT ALL
5. BEING SO RESTLESS THAT IT IS HARD TO SIT STILL: NOT AT ALL
4. TROUBLE RELAXING: NOT AT ALL
7. FEELING AFRAID AS IF SOMETHING AWFUL MIGHT HAPPEN: NOT AT ALL
GAD7 TOTAL SCORE: 1
7. FEELING AFRAID AS IF SOMETHING AWFUL MIGHT HAPPEN: NOT AT ALL
3. WORRYING TOO MUCH ABOUT DIFFERENT THINGS: NOT AT ALL
2. NOT BEING ABLE TO STOP OR CONTROL WORRYING: NOT AT ALL
GAD7 TOTAL SCORE: 1
1. FEELING NERVOUS, ANXIOUS, OR ON EDGE: SEVERAL DAYS

## 2018-03-22 ASSESSMENT — PAIN SCALES - GENERAL: PAINLEVEL: NO PAIN (0)

## 2018-03-22 NOTE — PROGRESS NOTES
"S GYN Clinic Visit    CC: symptoms with periods    HPI: Charito Wilcox is a 37 year old P2 female with PMH of liver transplantation in  who presents to establish care and to discuss symptoms with her periods. Patient reports a longstanding history of fatigue, bloating, bowel issues and mood issues around the time of cycle. She says she has dealt with these symptoms \"for a long time\" and she's sick of it. She is very self aware as she is a transplant patient and very focused on health and nutrition. She notices these symptoms are always the 5 days prior to her period and also a few days prior to ovulation. 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 She finds that these days where she is fatigued, bloated and has bowel issues make it extremely hard to train and it is significantly impacted her life. She tried a Mirena IUD last year but had such severe pain and cramping she had it removed the following day. She is interested in trying a different progesterone method. She knows that she can't use an estrogen-containing method and is not interested in another IUD.    OB/GYN Hx:  ,  section x 2  S/p bilateral tubal ligation  Last pap 2017 NILM, HPV negative. Initially was doing annual paps after transplant but now spaced to every 3 years w/ primary care doctor.  Regular monthly menses, 28-30 days. No heavy bleeding but endorses bloating, headache, fatigue, bowel issues    PMH:  Liver failure s/p transplant 18 years ago  Depression  GERD    PSH:  Liver transplant   section x 2  Tubal ligation    Social Hx:  Lives in [place] with  and two children. Very active and physically fit. No alcohol, tobacco or drug use.    ROS:  WILLOW 7 TOTAL SCORE: 1  PHQ-2 Score: 0  General Symptoms: No  Skin Symptoms: No  HENT Symptoms: No  EYE SYMPTOMS: Yes  HEART SYMPTOMS: No  LUNG SYMPTOMS: No  INTESTINAL SYMPTOMS: Yes  URINARY SYMPTOMS: " No  GYNECOLOGIC SYMPTOMS: Yes  BREAST SYMPTOMS: No  SKELETAL SYMPTOMS: Yes  BLOOD SYMPTOMS: No  NERVOUS SYSTEM SYMPTOMS: No  MENTAL HEALTH SYMPTOMS: Yes  Eye pain: Yes  Vision loss: No  Dry eyes: Yes  Watery eyes: No  Eye bulging: No  Double vision: No  Flashing of lights: No  Spots: No  Floaters: No  Redness: No  Crossed eyes: No  Tunnel Vision: No  Yellowing of eyes: No  Eye irritation: Yes  Heart burn or indigestion: Yes  Nausea: Yes  Vomiting: No  Abdominal pain: Yes  Bloating: Yes  Constipation: Yes  Diarrhea: No  Blood in stool: No  Black stools: No  Rectal or Anal pain: No  Fecal incontinence: No  Yellowing of skin or eyes: No  Vomit with blood: No  Change in stools: No  Back pain: Yes  Muscle aches: Yes  Neck pain: Yes  Swollen joints: No  Joint pain: No  Bone pain: No  Muscle cramps: No  Muscle weakness: No  Joint stiffness: No  Bone fracture: No  Bleeding or spotting between periods: No  Heavy or painful periods: No  Irregular periods: No  Vaginal discharge: No  Hot flashes: Yes  Vaginal dryness: Yes  Genital ulcers: No  Reduced libido: No  Painful intercourse: No  Difficulty with sexual arousal: No  Post-menopausal bleeding: No  Nervous or Anxious: Yes  Depression: No  Trouble sleeping: No  Trouble thinking or concentrating: No  Mood changes: No  Panic attacks: No    Gen brando: NAD, sitting comfortably, healthy, alert and oriented  Ext: warm and well perfused    A/P: Charito Wilcox is a 37 year old P2 otherwise healthy female with history of liver transplant with premenstrual syndrome.  We discussed treatment options for PMS and she is interested in progestin-only oral contraceptive pills which is a safe option in light of her liver transplant. We also discussed other delivery methods of progesterone including Depo Provera and the Nexplanon arm implant. She would like to start with the pills and if she finds them helpful, may consider the other longer-acting options. We discussed that this therapy may not  alleviate symptoms or may only partially help with symptoms as typically would recommend use of combined OCPs. SSRIs are also used for PMS symptoms and patient is already on Paxil. We also discussed the potential side effects of pills including irregular/unscheduled bleeding, headaches, bloating, weight gain and mood changes. Patient desires a trial of progesterone only pills; encouraged her to follow up in 3 months if she feels like symptoms are not improving.    Patient staffed with Dr. Stevens.    Marjan Pabon MD  OB/GYN Resident PGY4  Pager [phone]  03/22/18    The Patient was seen in Resident Continuity Clinic by BLAS PABON.  I reviewed the history & exam. Assessment and plan were jointly made.    Yue Stevens MD

## 2018-03-22 NOTE — MR AVS SNAPSHOT
After Visit Summary   3/22/2018    Charito Wilcox    MRN: [id number]           Patient Information     Date Of Birth          1980        Visit Information        Provider Department      3/22/2018 2:00 PM Doreen Puentes MD [facility]        Today's Diagnoses     Pre-menstrual syndrome    -  1       Follow-ups after your visit        Follow-up notes from your care team     Return if symptoms worsen or fail to improve.      Your next 10 appointments already scheduled     Apr 11, 2018 12:30 PM CDT   New Patient with Linda MARGARITO Valle DO   [facility] ([facility])    [address] [facility] [address] [phone]              Who to contact     Please call your clinic at [phone] to:    Ask questions about your health    Make or cancel appointments    Discuss your medicines    Learn about your test results    Speak to your doctor            Additional Information About Your Visit        MyChart Information     TORIA gives you secure access to your electronic health record. If you see a primary care provider, you can also send messages to your care team and make appointments. If you have questions, please call your primary care clinic.  If you do not have a primary care provider, please call [phone] and they will assist you.      TORIA is an electronic gateway that provides easy, online access to your medical records. With TORIA, you can request a clinic appointment, read your test results, renew a prescription or communicate with your care team.     To access your existing account, please contact your [facility] or call [phone] for assistance.        Care EveryWhere ID     This is your Care EveryWhere ID. This could be used by other organizations to access your [facility] medical records  [id number]        Your Vitals Were     Pulse Height Last Period Breastfeeding? BMI  "(Body Mass Index)       73 1.594 m (5' 2.75\") 03/14/2018 No 22.41 kg/m2        Blood Pressure from Last 3 Encounters:   03/22/18 110/71   03/01/18 100/62   01/07/18 120/79    Weight from Last 3 Encounters:   03/22/18 56.9 kg (125 lb 8 oz)   03/01/18 56.7 kg (125 lb)   01/07/18 56.4 kg (124 lb 6 oz)              Today, you had the following     No orders found for display         Today's Medication Changes          These changes are accurate as of 3/22/18  2:25 PM.  If you have any questions, ask your nurse or doctor.               Start taking these medicines.        Dose/Directions    norethindrone 0.35 MG per tablet   Commonly known as:  MICRONOR   Used for:  Pre-menstrual syndrome   Started by:  Doreen Puentes MD        Dose:  1 tablet   Take 1 tablet (0.35 mg) by mouth daily   Quantity:  28 tablet   Refills:  11            Where to get your medicines      These medications were sent to [id number] - [place], [place] - [address] AT [facility] [address]  [address], [place] [address]     Phone:  [phone]     norethindrone 0.35 MG per tablet                Primary Care Provider Office Phone # Fax #    Katharine Gaxiola [phone]        [facility] [address] [place] [address]        Equal Access to Services     JUANPABLO BELL AH: Hadii kristy rasheed hadasho Soomaali, waaxda luqadaha, qaybta kaalmada adeegyada, waxay rajni alba. So [facility] [phone].    ATENCIÓN: Si habla español, tiene a rowell disposición servicios gratuitos de asistencia lingüística. Llame al [phone].    We comply with applicable federal civil rights laws and [place] laws. We do not discriminate on the basis of race, color, national origin, age, disability, sex, sexual orientation, or gender identity.            Thank you!     Thank you for choosing [facility]  for your care. Our goal is always to " provide you with excellent care. Hearing back from our patients is one way we can continue to improve our services. Please take a few minutes to complete the written survey that you may receive in the mail after your visit with us. Thank you!             Your Updated Medication List - Protect others around you: Learn how to safely use, store and throw away your medicines at www.disposemymeds.org.          This list is accurate as of 3/22/18  2:25 PM.  Always use your most recent med list.                   Brand Name Dispense Instructions for use Diagnosis    ACIDOPHILUS EXTRA STRENGTH Caps      Take by mouth daily    Cellulitis       ALPRAZolam 0.5 MG tablet    XANAX    30 tablet    Take 1 tablet (0.5 mg) by mouth 3 times daily as needed for anxiety 1 month supply    Panic disorder       azaTHIOprine 50 MG tablet    IMURAN    90 tablet    Take 1 tablet (50 mg) by mouth daily    Liver replaced by transplant (H)       L-GLUTAMINE PO      Take 1 tablet by mouth daily        MELATONIN PO      Take 5 mg by mouth nightly as needed        MIRALAX powder   Generic drug:  polyethylene glycol     510 g    Take 17 g (1 capful) by mouth daily    Other constipation       norethindrone 0.35 MG per tablet    MICRONOR    28 tablet    Take 1 tablet (0.35 mg) by mouth daily    Pre-menstrual syndrome       omeprazole 40 MG capsule    priLOSEC    180 capsule    TAKE 1 CAPSULE BY MOUTH TWICE DAILY BEFORE MEALS, TAKE 30-60 MINUTES PRIOR TO A MEAL    Chronic gastric ulcer without hemorrhage and without perforation       ondansetron 4 MG ODT tab    ZOFRAN ODT    20 tablet    Take 1-2 tablets (4-8 mg) by mouth 3 times daily (before meals)    Nausea, Liver replaced by transplant (H)       PARoxetine 40 MG tablet    PAXIL    45 tablet    TAKE 1 AND 1/2 TABLETS(60 MG) BY MOUTH AT BEDTIME    Panic disorder       tacrolimus 1 MG capsule    GENERIC EQUIVALENT    180 capsule    Take 1 capsule (1 mg) by mouth every 12 hours    Liver replaced by  transplant (H)       ursodiol 300 MG capsule    ACTIGALL    180 capsule    Take 1 capsule (300 mg) by mouth 2 times daily    Liver replaced by transplant (H)       VITAMIN B COMPLEX PO

## 2018-03-22 NOTE — LETTER
"3/22/2018       RE: Charito Wilcox  [address]  [facility] [address]     Dear Colleague,    Thank you for referring your patient, Charito Wilcox, to the [facility] at [facility]. Please see a copy of my visit note below.    S GYN Clinic Visit    CC: symptoms with periods    HPI: Charito Wilcox is a 37 year old P2 female with PMH of liver transplantation in  who presents to establish care and to discuss symptoms with her periods. Patient reports a longstanding history of fatigue, bloating, bowel issues and mood issues around the time of cycle. She says she has dealt with these symptoms \"for a long time\" and she's sick of it. She is very self aware as she is a transplant patient and very focused on health and nutrition. She notices these symptoms are always the 5 days prior to her period and also a few days prior to ovulation. 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 She finds that these days where she is fatigued, bloated and has bowel issues make it extremely hard to train and it is significantly impacted her life. She tried a Mirena IUD last year but had such severe pain and cramping she had it removed the following day. She is interested in trying a different progesterone method. She knows that she can't use an estrogen-containing method and is not interested in another IUD.    OB/GYN Hx:  ,  section x 2  S/p bilateral tubal ligation  Last pap 2017 NILM, HPV negative. Initially was doing annual paps after transplant but now spaced to every 3 years w/ primary care doctor.  Regular monthly menses, 28-30 days. No heavy bleeding but endorses bloating, headache, fatigue, bowel issues    PMH:  Liver failure s/p transplant 18 years ago  Depression  GERD    PSH:  Liver transplant   section x 2  Tubal ligation    Social Hx:  Lives in [place] with "  and two children. Very active and physically fit. No alcohol, tobacco or drug use.    ROS:      Gen brando: NAD, sitting comfortably, healthy, alert and oriented  Ext: warm and well perfused    A/P: Charito Wilcox is a 37 year old P2 otherwise healthy female with history of liver transplant with premenstrual syndrome.  We discussed treatment options for PMS and she is interested in progestin-only oral contraceptive pills which is a safe option in light of her liver transplant. We also discussed other delivery methods of progesterone including Depo Provera and the Nexplanon arm implant. She would like to start with the pills and if she finds them helpful, may consider the other longer-acting options. We discussed that this therapy may not alleviate symptoms or may only partially help with symptoms as typically would recommend use of combined OCPs. SSRIs are also used for PMS symptoms and patient is already on Paxil. We also discussed the potential side effects of pills including irregular/unscheduled bleeding, headaches, bloating, weight gain and mood changes. Patient desires a trial of progesterone only pills; encouraged her to follow up in 3 months if she feels like symptoms are not improving.    Patient staffed with Dr. Stevens.      The Patient was seen in Resident Continuity Clinic by BLAS PUENTES.  I reviewed the history & exam. Assessment and plan were jointly made.    Yue Stevens MD      Again, thank you for allowing me to participate in the care of your patient.      Sincerely,    Blas Puentes MD

## 2018-03-23 ASSESSMENT — ANXIETY QUESTIONNAIRES: GAD7 TOTAL SCORE: 1

## 2018-04-07 ENCOUNTER — TRANSFERRED RECORDS (OUTPATIENT)
Dept: HEALTH INFORMATION MANAGEMENT | Facility: CLINIC | Age: 38
End: 2018-04-07

## 2018-04-11 ENCOUNTER — OFFICE VISIT (OUTPATIENT)
Dept: FAMILY MEDICINE | Facility: CLINIC | Age: 38
End: 2018-04-11
Payer: COMMERCIAL

## 2018-04-11 VITALS
BODY MASS INDEX: 22.28 KG/M2 | OXYGEN SATURATION: 100 % | SYSTOLIC BLOOD PRESSURE: 106 MMHG | RESPIRATION RATE: 16 BRPM | WEIGHT: 124.8 LBS | TEMPERATURE: 96.1 F | DIASTOLIC BLOOD PRESSURE: 66 MMHG | HEART RATE: 74 BPM

## 2018-04-11 DIAGNOSIS — F41.0 PANIC DISORDER: ICD-10-CM

## 2018-04-11 DIAGNOSIS — Z23 VACCINE FOR DIPHTHERIA-TETANUS-PERTUSSIS, COMBINED: ICD-10-CM

## 2018-04-11 DIAGNOSIS — R30.0 DYSURIA: ICD-10-CM

## 2018-04-11 DIAGNOSIS — N94.3 PMS (PREMENSTRUAL SYNDROME): ICD-10-CM

## 2018-04-11 DIAGNOSIS — D84.9 IMMUNOSUPPRESSED STATUS (H): ICD-10-CM

## 2018-04-11 DIAGNOSIS — Z94.4 LIVER REPLACED BY TRANSPLANT (H): Primary | ICD-10-CM

## 2018-04-11 LAB
ALBUMIN UR-MCNC: NEGATIVE MG/DL
APPEARANCE UR: CLEAR
BILIRUB UR QL STRIP: NEGATIVE
COLOR UR AUTO: YELLOW
GLUCOSE UR STRIP-MCNC: NEGATIVE MG/DL
HGB UR QL STRIP: NEGATIVE
KETONES UR STRIP-MCNC: NEGATIVE MG/DL
LEUKOCYTE ESTERASE UR QL STRIP: NEGATIVE
NITRATE UR QL: NEGATIVE
PH UR STRIP: 8.5 PH (ref 5–7)
RBC #/AREA URNS AUTO: ABNORMAL /HPF
SOURCE: ABNORMAL
SP GR UR STRIP: 1.01 (ref 1–1.03)
UROBILINOGEN UR STRIP-ACNC: 1 EU/DL (ref 0.2–1)
WBC #/AREA URNS AUTO: ABNORMAL /HPF

## 2018-04-11 PROCEDURE — 99214 OFFICE O/P EST MOD 30 MIN: CPT | Performed by: FAMILY MEDICINE

## 2018-04-11 PROCEDURE — 90715 TDAP VACCINE 7 YRS/> IM: CPT | Performed by: FAMILY MEDICINE

## 2018-04-11 PROCEDURE — 81001 URINALYSIS AUTO W/SCOPE: CPT | Performed by: FAMILY MEDICINE

## 2018-04-11 NOTE — MR AVS SNAPSHOT
After Visit Summary   4/11/2018    Charito Wilcox    MRN: [id number]           Patient Information     Date Of Birth          1980        Visit Information        Provider Department      4/11/2018 12:30 PM Linda Valle DO [facility]        Today's Diagnoses     Dysuria    -  1    Liver replaced by transplant (H)           Follow-ups after your visit        Additional Services     DERMATOLOGY REFERRAL       Your provider has referred you to: [facility]: Dermatology Clinic [facility] [phone]   [email or web address]    Please be aware that coverage of these services is subject to the terms and limitations of your health insurance plan.  Call member services at your health plan with any benefit or coverage questions.      Please bring the following with you to your appointment:    (1) Any X-Rays, CTs or MRIs which have been performed.  Contact the facility where they were done to arrange for  prior to your scheduled appointment.    (2) List of current medications  (3) This referral request   (4) Any documents/labs given to you for this referral                  Who to contact     If you have questions or need follow up information about today's clinic visit or your schedule please contact [facility] directly at [phone].  Normal or non-critical lab and imaging results will be communicated to you by MyChart, letter or phone within 4 business days after the clinic has received the results. If you do not hear from us within 7 days, please contact the clinic through MyChart or phone. If you have a critical or abnormal lab result, we will notify you by phone as soon as possible.  Submit refill requests through lark or call your pharmacy and they will forward the refill request to us. Please allow 3 business days for your refill to be completed.          Additional Information About Your Visit        MyChart Information      [facility] gives you secure access to your electronic health record. If you see a primary care provider, you can also send messages to your care team and make appointments. If you have questions, please call your primary care clinic.  If you do not have a primary care provider, please call [phone] and they will assist you.        Care EveryWhere ID     This is your Care EveryWhere ID. This could be used by other organizations to access your [facility] medical records  [id number]        Your Vitals Were     Pulse Temperature Respirations Last Period Pulse Oximetry Breastfeeding?    74 96.1  F (35.6  C) (Tympanic) 16 04/11/2018 100% No    BMI (Body Mass Index)                   22.28 kg/m2            Blood Pressure from Last 3 Encounters:   04/11/18 106/66   03/22/18 110/71   03/01/18 100/62    Weight from Last 3 Encounters:   04/11/18 124 lb 12.8 oz (56.6 kg)   03/22/18 125 lb 8 oz (56.9 kg)   03/01/18 125 lb (56.7 kg)              We Performed the Following     DERMATOLOGY REFERRAL     UA with Microscopic reflex to Culture        Primary Care Provider Office Phone # Fax #    Katharine Gaxiola [phone]        [facility] [address] [place] [address]        Equal Access to Services     NAOMI BELL : Hadii aad ku hadasho Soomaali, waaxda luqadaha, qaybta kaalmada adeegyada, radha urban hayrio foote . So [facility] [phone].    ATENCIÓN: Si habla español, tiene a rowell disposición servicios gratuitos de asistencia lingüística. Llame al [phone].    We comply with applicable federal civil rights laws and [place] laws. We do not discriminate on the basis of race, color, national origin, age, disability, sex, sexual orientation, or gender identity.            Thank you!     Thank you for choosing [facility]  for your care. Our goal is always to provide you with excellent care. Hearing back from our patients is one way we can continue to improve our  services. Please take a few minutes to complete the written survey that you may receive in the mail after your visit with us. Thank you!             Your Updated Medication List - Protect others around you: Learn how to safely use, store and throw away your medicines at www.disposemymeds.org.          This list is accurate as of 4/11/18  1:29 PM.  Always use your most recent med list.                   Brand Name Dispense Instructions for use Diagnosis    ACIDOPHILUS EXTRA STRENGTH Caps      Take by mouth daily    Cellulitis       ALPRAZolam 0.5 MG tablet    XANAX    30 tablet    Take 1 tablet (0.5 mg) by mouth 3 times daily as needed for anxiety 1 month supply    Panic disorder       azaTHIOprine 50 MG tablet    IMURAN    90 tablet    Take 1 tablet (50 mg) by mouth daily    Liver replaced by transplant (H)       L-GLUTAMINE PO      Take 1 tablet by mouth daily        MELATONIN PO      Take 5 mg by mouth nightly as needed        MIRALAX powder   Generic drug:  polyethylene glycol     510 g    Take 17 g (1 capful) by mouth daily    Other constipation       norethindrone 0.35 MG per tablet    MICRONOR    28 tablet    Take 1 tablet (0.35 mg) by mouth daily    Pre-menstrual syndrome       omeprazole 40 MG capsule    priLOSEC    180 capsule    TAKE 1 CAPSULE BY MOUTH TWICE DAILY BEFORE MEALS, TAKE 30-60 MINUTES PRIOR TO A MEAL    Chronic gastric ulcer without hemorrhage and without perforation       ondansetron 4 MG ODT tab    ZOFRAN ODT    20 tablet    Take 1-2 tablets (4-8 mg) by mouth 3 times daily (before meals)    Nausea, Liver replaced by transplant (H)       PARoxetine 40 MG tablet    PAXIL    45 tablet    TAKE 1 AND 1/2 TABLETS(60 MG) BY MOUTH AT BEDTIME    Panic disorder       tacrolimus 1 MG capsule    GENERIC EQUIVALENT    180 capsule    Take 1 capsule (1 mg) by mouth every 12 hours    Liver replaced by transplant (H)       ursodiol 300 MG capsule    ACTIGALL    180 capsule    Take 1 capsule (300 mg) by mouth  2 times daily    Liver replaced by transplant (H)       VITAMIN B COMPLEX PO

## 2018-04-11 NOTE — NURSING NOTE
"Chief Complaint   Patient presents with     UTI     x 2 days      Initial /66  Pulse 74  Temp 96.1  F (35.6  C) (Tympanic)  Resp 16  Wt 124 lb 12.8 oz (56.6 kg)  LMP 04/11/2018  SpO2 100%  Breastfeeding? No  BMI 22.28 kg/m2 Estimated body mass index is 22.28 kg/(m^2) as calculated from the following:    Height as of 3/22/18: 5' 2.75\" (1.594 m).    Weight as of this encounter: 124 lb 12.8 oz (56.6 kg).  BP completed using cuff size: regular. R arm       Health Maintenance that is potentially due pending provider review:   Pap Smear    Pap smear is being scheduled at a later date per pt.        Tiffany Hammer CMA     "

## 2018-04-11 NOTE — PROGRESS NOTES
SUBJECTIVE:   Charito Wilcox is a 37 year old female who presents to clinic today for the following health issues:  URINARY TRACT SYMPTOMS      Duration: x 2 days     Description  urgency and discomfort    Intensity:  mild    Accompanying signs and symptoms:  Fever/chills: YES  Flank pain no   Nausea and vomiting: no   Vaginal symptoms: odor and itching  Abdominal/Pelvic Pain: YES- maybe from period    History  History of frequent UTI's: YES  History of kidney stones: no   Sexually Active: YES  Possibility of pregnancy: No    Precipitating or alleviating factors: drinking water     Therapies tried and outcome: increase fluid intake   Outcome: feels better       Almost 18 years ago 7-year-old female who is here to establish care.  Patient had a liver transplant approximately 8 years ago due to acute liver failure of unknown etiology.  She has been doing well since that time and follows regularly with Dr. Maria.  She does get liver tests done at least every 6 months or more frequent depending on how things are going.  Patient is currently on Imuran and Tacrolimus.  She also takes ursodiol for pruritus.      Patient's only symptom today is some urinary frequency.  She notes very mild symptoms over the past 2 days.  She states she has been prone to UTIs in the past getting them approximately every 4-5 months.  It has been a while.      Patient also recently started oral progesterone birth control pills.  She is doing this because of.  Menstrual symptoms.  She states she has approximately 2 weeks out of the month that she feels bad with dizziness and lightheadedness.  Since starting oral progesterone she recently started have a little cramping and some spotting.    Patient is also been struggling with the left knee bursitis.  She was seen at [facility] urgent care and placed on oral prednisone taper over 5 days.  She is currently taking.      Patient does have a history of anxiety and is on fluoxetine  40 mg daily.  She had increased to 60 mg over the winter this year but recently tried to go back down to 40 mg.  She admits to anxiety that is triggered by nausea.  If she feels nauseous it really makes her anxiety much worse.  She does have Zofran which seems to help with this.  Patient also given a prescription for alprazolam which she uses very infrequently approximately 1 per month or every other month.  She states she is prior to traveling and if she is nauseous so she does not get anxious.    Problem list and histories reviewed & adjusted, as indicated.  Additional history: as documented    Patient Active Problem List   Diagnosis     Liver replaced by transplant (H)     Esophageal reflux     CARDIOVASCULAR SCREENING; LDL GOAL LESS THAN 160     Panic disorder     IBS (irritable bowel syndrome)     Immunosuppressed status (H)     PMS (premenstrual syndrome)     Labral tear of hip, degenerative     Right hip impingement syndrome     Fatigue, unspecified type     Localized superficial swelling, mass, or lump     Past Surgical History:   Procedure Laterality Date     C TRANSPLANT LIVER,HETERTOPIC      had appendectomy, gallbladder out with transplant.       SECTION  2008      SECTION  3/2010     ESOPHAGOSCOPY, GASTROSCOPY, DUODENOSCOPY (EGD), COMBINED Left 2015    Procedure: COMBINED ESOPHAGOSCOPY, GASTROSCOPY, DUODENOSCOPY (EGD), BIOPSY SINGLE OR MULTIPLE;  Surgeon: Ottoniel Martin MD;  Location: [facility]     ESOPHAGOSCOPY, GASTROSCOPY, DUODENOSCOPY (EGD), COMBINED N/A 2016    Procedure: COMBINED ESOPHAGOSCOPY, GASTROSCOPY, DUODENOSCOPY (EGD), BIOPSY SINGLE OR MULTIPLE;  Surgeon: Homar Adams MD;  Location: [facility]     ESOPHAGOSCOPY, GASTROSCOPY, DUODENOSCOPY (EGD), COMBINED N/A 2016    Procedure: COMBINED ESOPHAGOSCOPY, GASTROSCOPY, DUODENOSCOPY (EGD), BIOPSY SINGLE OR MULTIPLE;  Surgeon: Homar Adams MD;  Location:  GI     HIP SURGERY  Right 2/2016    Torn labrum repair      TUBAL LIGATION  3/2010       Social History   Substance Use Topics     Smoking status: Never Smoker     Smokeless tobacco: Never Used     Alcohol use Yes      Comment: once per month     Family History   Problem Relation Age of Onset     Depression Maternal Grandmother      Anxiety Disorder Maternal Grandmother      Hypertension Maternal Grandfather      CEREBROVASCULAR DISEASE Paternal Grandmother      Breast Cancer Paternal Grandmother      GASTROINTESTINAL DISEASE No family hx of      C.A.D. No family hx of      Cancer - colorectal No family hx of      Prostate Cancer No family hx of      Alcohol/Drug No family hx of            Reviewed and updated as needed this visit by clinical staff  Tobacco  Allergies  Meds  Med Hx  Surg Hx  Fam Hx       Reviewed and updated as needed this visit by Provider         ROS:  Constitutional, HEENT, cardiovascular, pulmonary, GI, , musculoskeletal, neuro, skin, endocrine and psych systems are negative, except as otherwise noted.    OBJECTIVE:     /66  Pulse 74  Temp 96.1  F (35.6  C) (Tympanic)  Resp 16  Wt 124 lb 12.8 oz (56.6 kg)  LMP 04/11/2018  SpO2 100%  Breastfeeding? No  BMI 22.28 kg/m2  Body mass index is 22.28 kg/(m^2).  GENERAL: healthy, alert and no distress  RESP: lungs clear to auscultation - no rales, rhonchi or wheezes  CV: regular rate and rhythm, normal S1 S2, no S3 or S4, no murmur, click or rub, no peripheral edema and peripheral pulses strong  ABDOMEN: soft, nontender, without hepatosplenomegaly or masses and y shape scar over the upper abdomen    Diagnostic Test Results:  Results for orders placed or performed in visit on 04/11/18 (from the past 24 hour(s))   UA with Microscopic reflex to Culture   Result Value Ref Range    Color Urine Yellow     Appearance Urine Clear     Glucose Urine Negative NEG^Negative mg/dL    Bilirubin Urine Negative NEG^Negative    Ketones Urine Negative NEG^Negative mg/dL     Specific Gravity Urine 1.015 1.003 - 1.035    pH Urine 8.5 (H) 5.0 - 7.0 pH    Protein Albumin Urine Negative NEG^Negative mg/dL    Urobilinogen Urine 1.0 0.2 - 1.0 EU/dL    Nitrite Urine Negative NEG^Negative    Blood Urine Negative NEG^Negative    Leukocyte Esterase Urine Negative NEG^Negative    Source Midstream Urine     WBC Urine 0 - 5 OTO5^0 - 5 /HPF    RBC Urine O - 2 OTO2^O - 2 /HPF       ASSESSMENT/PLAN:     1. Liver replaced by transplant (H)  Patient is status post liver transplant almost 18 years ago.  She is followed by Dr. Maria hepatology who monitors her lab results.  She is currently on tacrolimus and azathioprine.  She also takes ursodiol for pruritus.  Patient has some increased risks due to the  immunosuppression meds.  We discussed routine screening including Pap smears which I recommend she have done annually.  Will address possible early mammograms at her physical in May.  Asked her to check with Dr. Maria to see if he recommends colonoscopies or when she would start those.  I also recommended she see dermatology for skin checks due to increased risk of skin cancers.  We also reviewed her vaccinations and it looks like she is due for tetanus which was given today.  She may also want to consider a pneumonia vaccine but she will think about this.  - DERMATOLOGY REFERRAL    2. Dysuria  UA today does not show any signs of infection will just monitor closely.  - UA with Microscopic reflex to Culture    3. Vaccine for diphtheria-tetanus-pertussis, combined  Given  - TDAP VACCINE (ADACEL)    4. PMS (premenstrual syndrome)  Currently patient started on oral progesterone only pills to help with some PMS symptoms that include 2 weeks of fatigue and feeling miserable.  She is only 3 weeks in the first cycle and will see how it goes for a few months.    5. Immunosuppressed status (H)  As above    6. Panic disorder  Patient has a history of some anxiety does take Paxil 40 mg a day.  Previously she was at  60 mg for the last year to year and a half.  She recently decreased her dose back down to 40 and has been feeling okay.  Patient also uses as needed alprazolam.  She uses this infrequently.      Follow-up in May for an annual physical.    Linda Valle,   [facility] patient is a 37-year-old female status post liver transplant

## 2018-05-09 ENCOUNTER — OFFICE VISIT (OUTPATIENT)
Dept: FAMILY MEDICINE | Facility: CLINIC | Age: 38
End: 2018-05-09
Payer: COMMERCIAL

## 2018-05-09 VITALS
HEIGHT: 63 IN | TEMPERATURE: 98.9 F | WEIGHT: 121 LBS | DIASTOLIC BLOOD PRESSURE: 59 MMHG | OXYGEN SATURATION: 100 % | SYSTOLIC BLOOD PRESSURE: 93 MMHG | BODY MASS INDEX: 21.44 KG/M2 | HEART RATE: 68 BPM | RESPIRATION RATE: 14 BRPM

## 2018-05-09 DIAGNOSIS — D84.9 IMMUNOSUPPRESSED STATUS (H): ICD-10-CM

## 2018-05-09 DIAGNOSIS — E16.2 LOW BLOOD SUGAR: ICD-10-CM

## 2018-05-09 DIAGNOSIS — Z00.00 ENCOUNTER FOR ROUTINE ADULT HEALTH EXAMINATION WITHOUT ABNORMAL FINDINGS: ICD-10-CM

## 2018-05-09 DIAGNOSIS — Z94.4 LIVER REPLACED BY TRANSPLANT (H): Primary | ICD-10-CM

## 2018-05-09 PROCEDURE — 99395 PREV VISIT EST AGE 18-39: CPT | Mod: 25 | Performed by: FAMILY MEDICINE

## 2018-05-09 PROCEDURE — 90471 IMMUNIZATION ADMIN: CPT | Performed by: FAMILY MEDICINE

## 2018-05-09 PROCEDURE — 90732 PPSV23 VACC 2 YRS+ SUBQ/IM: CPT | Performed by: FAMILY MEDICINE

## 2018-05-09 PROCEDURE — 87624 HPV HI-RISK TYP POOLED RSLT: CPT | Performed by: FAMILY MEDICINE

## 2018-05-09 PROCEDURE — G0145 SCR C/V CYTO,THINLAYER,RESCR: HCPCS | Performed by: FAMILY MEDICINE

## 2018-05-09 NOTE — NURSING NOTE
"Chief Complaint   Patient presents with     Physical     pap due       Initial BP 93/59  Pulse 68  Temp 98.9  F (37.2  C) (Oral)  Resp 14  Ht 5' 2.75\" (1.594 m)  Wt 121 lb (54.9 kg)  LMP 04/11/2018  SpO2 100%  Breastfeeding? No  BMI 21.61 kg/m2 Estimated body mass index is 21.61 kg/(m^2) as calculated from the following:    Height as of this encounter: 5' 2.75\" (1.594 m).    Weight as of this encounter: 121 lb (54.9 kg).  BP completed using cuff size: regular    Health Maintenance that is potentially due pending provider review:  Pap Smear    PAP--Possibly completing today, per Provider review  "

## 2018-05-09 NOTE — MR AVS SNAPSHOT
After Visit Summary   5/9/2018    Charito Wilcox    MRN: [id number]           Patient Information     Date Of Birth          1980        Visit Information        Provider Department      5/9/2018 1:30 PM Linda Valle DO [facility]        Today's Diagnoses     Liver replaced by transplant (H)    -  1    Encounter for routine adult health examination without abnormal findings        Immunosuppressed status (H)        Low blood sugar          Care Instructions      Preventive Health Recommendations  Female Ages 26 - 39  Yearly exam:   See your health care provider every year in order to    Review health changes.     Discuss preventive care.      Review your medicines if you your doctor has prescribed any.    Until age 30: Get a Pap test every three years (more often if you have had an abnormal result).    After age 30: Talk to your doctor about whether you should have a Pap test every 3 years or have a Pap test with HPV screening every 5 years.   You do not need a Pap test if your uterus was removed (hysterectomy) and you have not had cancer.  You should be tested each year for STDs (sexually transmitted diseases), if you're at risk.   Talk to your provider about how often to have your cholesterol checked.  If you are at risk for diabetes, you should have a diabetes test (fasting glucose).  Shots: Get a flu shot each year. Get a tetanus shot every 10 years.   Nutrition:     Eat at least 5 servings of fruits and vegetables each day.    Eat whole-grain bread, whole-wheat pasta and brown rice instead of white grains and rice.    Talk to your provider about Calcium and Vitamin D.     Lifestyle    Exercise at least 150 minutes a week (30 minutes a day, 5 days of the week). This will help you control your weight and prevent disease.    Limit alcohol to one drink per day.    No smoking.     Wear sunscreen to prevent skin cancer.    See your dentist every six months for an exam and  "cleaning.            Follow-ups after your visit        Who to contact     If you have questions or need follow up information about today's clinic visit or your schedule please contact [facility] directly at [phone].  Normal or non-critical lab and imaging results will be communicated to you by MyChart, letter or phone within 4 business days after the clinic has received the results. If you do not hear from us within 7 days, please contact the clinic through [facility] or phone. If you have a critical or abnormal lab result, we will notify you by phone as soon as possible.  Submit refill requests through [facility] or call your pharmacy and they will forward the refill request to us. Please allow 3 business days for your refill to be completed.          Additional Information About Your Visit        MyChart Information     [facility] gives you secure access to your electronic health record. If you see a primary care provider, you can also send messages to your care team and make appointments. If you have questions, please call your primary care clinic.  If you do not have a primary care provider, please call [phone] and they will assist you.        Care EveryWhere ID     This is your Care EveryWhere ID. This could be used by other organizations to access your [facility] medical records  [id number]        Your Vitals Were     Pulse Temperature Respirations Height Last Period Pulse Oximetry    68 98.9  F (37.2  C) (Oral) 14 5' 2.75\" (1.594 m) 04/11/2018 100%    Breastfeeding? BMI (Body Mass Index)                No 21.61 kg/m2           Blood Pressure from Last 3 Encounters:   05/09/18 93/59   04/11/18 106/66   03/22/18 110/71    Weight from Last 3 Encounters:   05/09/18 121 lb (54.9 kg)   04/11/18 124 lb 12.8 oz (56.6 kg)   03/22/18 125 lb 8 oz (56.9 kg)              We Performed the Following     ADMIN: Vaccine, Initial (84167)     HPV High Risk Types DNA Cervical     Pap imaged thin layer screen with " HPV - recommended age 30 - 65 years (select HPV order below)     Pneumococcal vaccine 23 valent PPSV23  (Pneumovax) [25672]          Today's Medication Changes          These changes are accurate as of 5/9/18 11:59 PM.  If you have any questions, ask your nurse or doctor.               Stop taking these medicines if you haven't already. Please contact your care team if you have questions.     norethindrone 0.35 MG per tablet   Commonly known as:  MICRONOR   Stopped by:  Linda Valle DO                    Primary Care Provider Office Phone # Fax #    Linda Valle [phone] [address] [address]        Equal Access to Services     JUANPABLO BELL : Ad Sears, walorenzo crews, qamouna kaalmahodan kendall, radha foote . So [facility] [phone].    ATENCIÓN: Si habla español, tiene a rowell disposición servicios gratuitos de asistencia lingüística. Llame al [phone].    We comply with applicable federal civil rights laws and [place] laws. We do not discriminate on the basis of race, color, national origin, age, disability, sex, sexual orientation, or gender identity.            Thank you!     Thank you for choosing [facility]  for your care. Our goal is always to provide you with excellent care. Hearing back from our patients is one way we can continue to improve our services. Please take a few minutes to complete the written survey that you may receive in the mail after your visit with us. Thank you!             Your Updated Medication List - Protect others around you: Learn how to safely use, store and throw away your medicines at www.disposemymeds.org.          This list is accurate as of 5/9/18 11:59 PM.  Always use your most recent med list.                   Brand Name Dispense Instructions for use Diagnosis    ACIDOPHILUS EXTRA STRENGTH Caps      Take by mouth daily    Cellulitis       ALPRAZolam 0.5 MG  tablet    XANAX    30 tablet    Take 1 tablet (0.5 mg) by mouth 3 times daily as needed for anxiety 1 month supply    Panic disorder       azaTHIOprine 50 MG tablet    IMURAN    90 tablet    Take 1 tablet (50 mg) by mouth daily    Liver replaced by transplant (H)       L-GLUTAMINE PO      Take 1 tablet by mouth daily        MELATONIN PO      Take 5 mg by mouth nightly as needed        MIRALAX powder   Generic drug:  polyethylene glycol     510 g    Take 17 g (1 capful) by mouth daily    Other constipation       omeprazole 40 MG capsule    priLOSEC    180 capsule    TAKE 1 CAPSULE BY MOUTH TWICE DAILY BEFORE MEALS, TAKE 30-60 MINUTES PRIOR TO A MEAL    Chronic gastric ulcer without hemorrhage and without perforation       ondansetron 4 MG ODT tab    ZOFRAN ODT    20 tablet    Take 1-2 tablets (4-8 mg) by mouth 3 times daily (before meals)    Nausea, Liver replaced by transplant (H)       PARoxetine 40 MG tablet    PAXIL    45 tablet    TAKE 1 AND 1/2 TABLETS(60 MG) BY MOUTH AT BEDTIME    Panic disorder       tacrolimus 1 MG capsule    GENERIC EQUIVALENT    180 capsule    Take 1 capsule (1 mg) by mouth every 12 hours    Liver replaced by transplant (H)       ursodiol 300 MG capsule    ACTIGALL    180 capsule    Take 1 capsule (300 mg) by mouth 2 times daily    Liver replaced by transplant (H)       VITAMIN B COMPLEX PO

## 2018-05-09 NOTE — PROGRESS NOTES
SUBJECTIVE:   CC: Charito Wilcox is an 37 year old woman who presents for preventive health visit.     Healthy Habits:    Do you get at least three servings of calcium containing foods daily (dairy, green leafy vegetables, etc.)? yes    Amount of exercise or daily activities, outside of work: 5-6 day(s) per week    Problems taking medications regularly No    Medication side effects: No    Have you had an eye exam in the past two years? yes    Do you see a dentist twice per year? yes    Do you have sleep apnea, excessive snoring or daytime drowsiness?no        -------------------------------------    Today's PHQ-2 Score:   PHQ-2 ( 1999 [employer]) 5/9/2018 3/22/2018   Q1: Little interest or pleasure in doing things 0 0   Q2: Feeling down, depressed or hopeless 0 0   PHQ-2 Score 0 0   Q1: Little interest or pleasure in doing things - Not at all   Q2: Feeling down, depressed or hopeless - Not at all   PHQ-2 Score - 0       Abuse: Current or Past(Physical, Sexual or Emotional)- No  Do you feel safe in your environment - Yes    Social History   Substance Use Topics     Smoking status: Never Smoker     Smokeless tobacco: Never Used     Alcohol use Yes      Comment: once per month     If you drink alcohol do you typically have >3 drinks per day or >7 drinks per week? No                     Reviewed orders with patient.  Reviewed health maintenance and updated orders accordingly - Yes  Patient Active Problem List   Diagnosis     Liver replaced by transplant (H)     Esophageal reflux     CARDIOVASCULAR SCREENING; LDL GOAL LESS THAN 160     Panic disorder     IBS (irritable bowel syndrome)     Immunosuppressed status (H)     PMS (premenstrual syndrome)     Labral tear of hip, degenerative     Right hip impingement syndrome     Fatigue, unspecified type     Localized superficial swelling, mass, or lump     Past Surgical History:   Procedure Laterality Date     C TRANSPLANT LIVER,HETERTOPIC  2000    had appendectomy,  gallbladder out with transplant.       SECTION  2008      SECTION  3/2010     ESOPHAGOSCOPY, GASTROSCOPY, DUODENOSCOPY (EGD), COMBINED Left 2015    Procedure: COMBINED ESOPHAGOSCOPY, GASTROSCOPY, DUODENOSCOPY (EGD), BIOPSY SINGLE OR MULTIPLE;  Surgeon: Ottoniel Martin MD;  Location:  GI     ESOPHAGOSCOPY, GASTROSCOPY, DUODENOSCOPY (EGD), COMBINED N/A 2016    Procedure: COMBINED ESOPHAGOSCOPY, GASTROSCOPY, DUODENOSCOPY (EGD), BIOPSY SINGLE OR MULTIPLE;  Surgeon: Homar Adams MD;  Location: [facility]     ESOPHAGOSCOPY, GASTROSCOPY, DUODENOSCOPY (EGD), COMBINED N/A 2016    Procedure: COMBINED ESOPHAGOSCOPY, GASTROSCOPY, DUODENOSCOPY (EGD), BIOPSY SINGLE OR MULTIPLE;  Surgeon: Homar Adams MD;  Location:  GI     HIP SURGERY Right 2016    Torn labrum repair      TUBAL LIGATION  3/2010       Social History   Substance Use Topics     Smoking status: Never Smoker     Smokeless tobacco: Never Used     Alcohol use Yes      Comment: once per month     Family History   Problem Relation Age of Onset     Depression Maternal Grandmother      Anxiety Disorder Maternal Grandmother      Hypertension Maternal Grandfather      CEREBROVASCULAR DISEASE Paternal Grandmother      Breast Cancer Paternal Grandmother      GASTROINTESTINAL DISEASE No family hx of      C.A.D. No family hx of      Cancer - colorectal No family hx of      Prostate Cancer No family hx of      Alcohol/Drug No family hx of            Mammogram not appropriate for this patient based on age.    Pertinent mammograms are reviewed under the imaging tab.  History of abnormal Pap smear: immune suppressed due to liver transplant - q1 year pap     Reviewed and updated as needed this visit by clinical staff  Tobacco  Allergies  Meds  Problems  Med Hx  Surg Hx  Fam Hx  Soc Hx          Reviewed and updated as needed this visit by Provider  Allergies  Meds  Problems       "      ROS:  CONSTITUTIONAL: NEGATIVE for fever, chills, change in weight  INTEGUMENTARU/SKIN: NEGATIVE for worrisome rashes, moles or lesions  EYES: NEGATIVE for vision changes or irritation  ENT: NEGATIVE for ear, mouth and throat problems  RESP: NEGATIVE for significant cough or SOB  BREAST: NEGATIVE for masses, tenderness or discharge  CV: NEGATIVE for chest pain, palpitations or peripheral edema  GI: NEGATIVE for nausea, abdominal pain, heartburn, or change in bowel habits  : NEGATIVE for unusual urinary or vaginal symptoms. Periods are regular.  MUSCULOSKELETAL: NEGATIVE for significant arthralgias or myalgia  NEURO: NEGATIVE for weakness, dizziness or paresthesias  PSYCHIATRIC: NEGATIVE for changes in mood or affect    OBJECTIVE:   BP 93/59  Pulse 68  Temp 98.9  F (37.2  C) (Oral)  Resp 14  Ht 5' 2.75\" (1.594 m)  Wt 121 lb (54.9 kg)  LMP 04/11/2018  SpO2 100%  Breastfeeding? No  BMI 21.61 kg/m2  EXAM:  GENERAL: healthy, alert and no distress  EYES: Eyes grossly normal to inspection, PERRL and conjunctivae and sclerae normal  HENT: ear canals and TM's normal, nose and mouth without ulcers or lesions  NECK: no adenopathy, no asymmetry, masses, or scars and thyroid normal to palpation  RESP: lungs clear to auscultation - no rales, rhonchi or wheezes  BREAST: normal without masses, tenderness or nipple discharge and no palpable axillary masses or adenopathy  CV: regular rate and rhythm, normal S1 S2, no S3 or S4, no murmur, click or rub, no peripheral edema and peripheral pulses strong  ABDOMEN: soft, nontender, without hepatosplenomegaly or masses, bowel sounds normal and scar from liver transplant   (female): normal female external genitalia, normal urethral meatus, vaginal mucosa pink, moist, well rugated, and normal cervix/adnexa/uterus without masses or discharge  MS: no gross musculoskeletal defects noted, no edema  SKIN: no suspicious lesions or rashes  NEURO: Normal strength and tone, " "mentation intact and speech normal  PSYCH: mentation appears normal, affect normal/bright    ASSESSMENT/PLAN:   1. Encounter for routine adult health examination without abnormal findings  Routine screening - for transplant recommended annually  Also will consider mammogram screening annually but hold off at this time.  - Pap imaged thin layer screen with HPV - recommended age 30 - 65 years (select HPV order below)  - HPV High Risk Types DNA Cervical    2. Liver replaced by transplant (H)       3. Immunosuppressed status (H)     - Pneumococcal vaccine 23 valent PPSV23  (Pneumovax) [66265]  - ADMIN: Vaccine, Initial (55747)    4. Low blood sugar  Future   - **TSH with free T4 reflex FUTURE anytime; Future    COUNSELING:   Reviewed preventive health counseling, as reflected in patient instructions         reports that she has never smoked. She has never used smokeless tobacco.    Estimated body mass index is 21.61 kg/(m^2) as calculated from the following:    Height as of this encounter: 5' 2.75\" (1.594 m).    Weight as of this encounter: 121 lb (54.9 kg).       Counseling Resources:  ATP IV Guidelines  Pooled Cohorts Equation Calculator  Breast Cancer Risk Calculator  FRAX Risk Assessment  ICSI Preventive Guidelines  Dietary Guidelines for Americans, 2010  USDA's MyPlate  ASA Prophylaxis  Lung CA Screening    Linda Valle, DO  [facility]  "

## 2018-05-11 LAB
COPATH REPORT: NORMAL
PAP: NORMAL

## 2018-05-15 LAB
FINAL DIAGNOSIS: NORMAL
HPV HR 12 DNA CVX QL NAA+PROBE: NEGATIVE
HPV16 DNA SPEC QL NAA+PROBE: NEGATIVE
HPV18 DNA SPEC QL NAA+PROBE: NEGATIVE
SPECIMEN DESCRIPTION: NORMAL
SPECIMEN SOURCE CVX/VAG CYTO: NORMAL

## 2018-06-28 DIAGNOSIS — Z94.4 LIVER REPLACED BY TRANSPLANT (H): ICD-10-CM

## 2018-06-28 DIAGNOSIS — E16.2 LOW BLOOD SUGAR: ICD-10-CM

## 2018-06-28 LAB
ALBUMIN SERPL-MCNC: 4 G/DL (ref 3.4–5)
ALP SERPL-CCNC: 122 U/L (ref 40–150)
ALT SERPL W P-5'-P-CCNC: 20 U/L (ref 0–50)
ANION GAP SERPL CALCULATED.3IONS-SCNC: 7 MMOL/L (ref 3–14)
AST SERPL W P-5'-P-CCNC: 25 U/L (ref 0–45)
BILIRUB DIRECT SERPL-MCNC: 0.2 MG/DL (ref 0–0.2)
BILIRUB SERPL-MCNC: 0.8 MG/DL (ref 0.2–1.3)
BUN SERPL-MCNC: 12 MG/DL (ref 7–30)
CALCIUM SERPL-MCNC: 8.8 MG/DL (ref 8.5–10.1)
CHLORIDE SERPL-SCNC: 106 MMOL/L (ref 94–109)
CO2 SERPL-SCNC: 26 MMOL/L (ref 20–32)
CREAT SERPL-MCNC: 0.78 MG/DL (ref 0.52–1.04)
ERYTHROCYTE [DISTWIDTH] IN BLOOD BY AUTOMATED COUNT: 11.5 % (ref 10–15)
GFR SERPL CREATININE-BSD FRML MDRD: 84 ML/MIN/1.7M2
GLUCOSE SERPL-MCNC: 79 MG/DL (ref 70–99)
HCT VFR BLD AUTO: 42.2 % (ref 35–47)
HGB BLD-MCNC: 14.6 G/DL (ref 11.7–15.7)
MCH RBC QN AUTO: 34.5 PG (ref 26.5–33)
MCHC RBC AUTO-ENTMCNC: 34.6 G/DL (ref 31.5–36.5)
MCV RBC AUTO: 100 FL (ref 78–100)
PLATELET # BLD AUTO: 182 10E9/L (ref 150–450)
POTASSIUM SERPL-SCNC: 4.3 MMOL/L (ref 3.4–5.3)
PROT SERPL-MCNC: 7.3 G/DL (ref 6.8–8.8)
RBC # BLD AUTO: 4.23 10E12/L (ref 3.8–5.2)
SODIUM SERPL-SCNC: 139 MMOL/L (ref 133–144)
TACROLIMUS BLD-MCNC: 4.2 UG/L (ref 5–15)
TME LAST DOSE: ABNORMAL H
TSH SERPL DL<=0.005 MIU/L-ACNC: 0.66 MU/L (ref 0.4–4)
WBC # BLD AUTO: 4.6 10E9/L (ref 4–11)

## 2018-07-03 NOTE — PROGRESS NOTES
Dear Charito,   Your test results are all back -   Thyroid is normal.  Let us know if you have any questions.  -Linda Valle, DO

## 2018-07-25 ENCOUNTER — DOCUMENTATION ONLY (OUTPATIENT)
Dept: TRANSPLANT | Facility: CLINIC | Age: 38
End: 2018-07-25

## 2018-07-25 NOTE — PROGRESS NOTES
Annual chart review completed.    Pt is doing labs every 2-3 months  Pt [facility] within the past year:  6/2017.      Pt is due for post transplant follow-up appointment.  Please call and schedule.

## 2018-07-26 ENCOUNTER — TELEPHONE (OUTPATIENT)
Dept: TRANSPLANT | Facility: CLINIC | Age: 38
End: 2018-07-26

## 2018-07-26 DIAGNOSIS — Z94.4 LIVER REPLACED BY TRANSPLANT (H): Primary | ICD-10-CM

## 2018-07-26 NOTE — TELEPHONE ENCOUNTER
----- Message from Yue Duckworth RN sent at 7/25/2018  4:06 PM CDT -----  Regarding: lab orders  Please update lab orders, to do monthly, tacrolimus , .    Pt training for tc marathon in the fall, so will do labs more frequently.    Yue

## 2018-08-03 ENCOUNTER — TELEPHONE (OUTPATIENT)
Dept: TRANSPLANT | Facility: CLINIC | Age: 38
End: 2018-08-03

## 2018-08-03 NOTE — TELEPHONE ENCOUNTER
"August 3, 2018: I left a message for the patient to call back and schedule her annual liver transplant appointments with Dr. Maria. Her previous appointments have been six-month follow ups (which would put her into December), but Dr. Maria's note from 6/2017 states, \"my plan will be to see her back in the clinic in 1 year. \" I will ask the coordinator to clarify timing.    Tory Khanna  Post-Liver Transplant   [phone]    Progress notes          Yue Duckworth RN at 7/25/2018  4:08 PM        Status: Signed            Annual chart review completed.     Pt is doing labs every 2-3 months  Pt has been seen at [facility] within the past year:  6/2017.       Pt is due for post transplant follow-up appointment.  Please call and schedule.             "

## 2018-08-08 DIAGNOSIS — F41.0 PANIC DISORDER: ICD-10-CM

## 2018-08-08 NOTE — TELEPHONE ENCOUNTER
"Requested Prescriptions   Pending Prescriptions Disp Refills     PARoxetine (PAXIL) 40 MG tablet [Pharmacy Med Name: PAROXETINE 40MG TABLETS] 45 tablet 0     Sig: TAKE 1 AND ONE-HALF TABLET BY MOUTH AT BEDTIME    SSRIs Protocol Passed    8/8/2018  9:57 AM       Passed - Recent (12 mo) or future (30 days) visit within the authorizing provider's specialty    Patient had office visit in the last 12 months or has a visit in the next 30 days with authorizing provider or within the authorizing provider's specialty.  See \"Patient Info\" tab in inbasket, or \"Choose Columns\" in Meds & Orders section of the refill encounter.           Passed - Patient is age 18 or older       Passed - No active pregnancy on record       Passed - No positive pregnancy test in last 12 months        "

## 2018-08-09 NOTE — TELEPHONE ENCOUNTER
PN  Routing refill request to provider for review/approval because:  Drug not on the [facility] refill protocol for Dx: Panic Disorder.  Last OV 5/9/18  Last Rx 6/13/18 for #45 with 1 refill.  Thanks, Va Reddy RN

## 2018-08-10 RX ORDER — PAROXETINE 40 MG/1
TABLET, FILM COATED ORAL
Qty: 45 TABLET | Refills: 5 | Status: SHIPPED | OUTPATIENT
Start: 2018-08-10 | End: 2019-02-19

## 2018-08-14 NOTE — TELEPHONE ENCOUNTER
August 14, 2018: I spoke with Charito who scheduled lab and MD on 8/13. She will look at the [employer] brando if she needs more info.    Tory Khanna  Post-Heart Transplant   [phone]

## 2018-08-15 DIAGNOSIS — Z94.4 LIVER REPLACED BY TRANSPLANT (H): ICD-10-CM

## 2018-08-15 LAB
ALBUMIN SERPL-MCNC: 3.8 G/DL (ref 3.4–5)
ALBUMIN UR-MCNC: NEGATIVE MG/DL
ALP SERPL-CCNC: 138 U/L (ref 40–150)
ALT SERPL W P-5'-P-CCNC: 22 U/L (ref 0–50)
ANION GAP SERPL CALCULATED.3IONS-SCNC: 8 MMOL/L (ref 3–14)
APPEARANCE UR: ABNORMAL
AST SERPL W P-5'-P-CCNC: 29 U/L (ref 0–45)
BILIRUB DIRECT SERPL-MCNC: 0.3 MG/DL (ref 0–0.2)
BILIRUB SERPL-MCNC: 0.9 MG/DL (ref 0.2–1.3)
BILIRUB UR QL STRIP: NEGATIVE
BUN SERPL-MCNC: 10 MG/DL (ref 7–30)
CALCIUM SERPL-MCNC: 8.9 MG/DL (ref 8.5–10.1)
CHLORIDE SERPL-SCNC: 106 MMOL/L (ref 94–109)
CHOLEST SERPL-MCNC: 141 MG/DL
CO2 SERPL-SCNC: 24 MMOL/L (ref 20–32)
COLOR UR AUTO: YELLOW
CREAT SERPL-MCNC: 0.83 MG/DL (ref 0.52–1.04)
CREAT UR-MCNC: 45 MG/DL
ERYTHROCYTE [DISTWIDTH] IN BLOOD BY AUTOMATED COUNT: 11.8 % (ref 10–15)
GFR SERPL CREATININE-BSD FRML MDRD: 78 ML/MIN/1.7M2
GLUCOSE SERPL-MCNC: 87 MG/DL (ref 70–99)
GLUCOSE UR STRIP-MCNC: NEGATIVE MG/DL
HCT VFR BLD AUTO: 41.6 % (ref 35–47)
HDLC SERPL-MCNC: 61 MG/DL
HGB BLD-MCNC: 14.4 G/DL (ref 11.7–15.7)
HGB UR QL STRIP: NEGATIVE
HYALINE CASTS #/AREA URNS LPF: 3 /LPF (ref 0–2)
KETONES UR STRIP-MCNC: NEGATIVE MG/DL
LDLC SERPL CALC-MCNC: 43 MG/DL
LEUKOCYTE ESTERASE UR QL STRIP: NEGATIVE
MCH RBC QN AUTO: 34.7 PG (ref 26.5–33)
MCHC RBC AUTO-ENTMCNC: 34.6 G/DL (ref 31.5–36.5)
MCV RBC AUTO: 100 FL (ref 78–100)
MUCOUS THREADS #/AREA URNS LPF: PRESENT /LPF
NITRATE UR QL: NEGATIVE
NONHDLC SERPL-MCNC: 80 MG/DL
PH UR STRIP: 6 PH (ref 5–7)
PLATELET # BLD AUTO: 212 10E9/L (ref 150–450)
POTASSIUM SERPL-SCNC: 4.1 MMOL/L (ref 3.4–5.3)
PROT SERPL-MCNC: 7.4 G/DL (ref 6.8–8.8)
PROT UR-MCNC: 0.06 G/L
PROT/CREAT 24H UR: 0.14 G/G CR (ref 0–0.2)
RBC # BLD AUTO: 4.15 10E12/L (ref 3.8–5.2)
RBC #/AREA URNS AUTO: <1 /HPF (ref 0–2)
SODIUM SERPL-SCNC: 139 MMOL/L (ref 133–144)
SOURCE: ABNORMAL
SP GR UR STRIP: 1 (ref 1–1.03)
TACROLIMUS BLD-MCNC: 3.9 UG/L (ref 5–15)
TME LAST DOSE: ABNORMAL H
TRIGL SERPL-MCNC: 184 MG/DL
UROBILINOGEN UR STRIP-MCNC: 0 MG/DL (ref 0–2)
WBC # BLD AUTO: 8.9 10E9/L (ref 4–11)
WBC #/AREA URNS AUTO: <1 /HPF (ref 0–5)

## 2018-08-17 ENCOUNTER — TELEPHONE (OUTPATIENT)
Dept: TRANSPLANT | Facility: CLINIC | Age: 38
End: 2018-08-17

## 2018-08-17 ENCOUNTER — HOSPITAL ENCOUNTER (EMERGENCY)
Facility: CLINIC | Age: 38
Discharge: HOME | End: 2018-08-17
Attending: EMERGENCY MEDICINE | Admitting: EMERGENCY MEDICINE
Payer: COMMERCIAL

## 2018-08-17 VITALS
WEIGHT: 119.9 LBS | HEIGHT: 62 IN | BODY MASS INDEX: 22.07 KG/M2 | TEMPERATURE: 98.2 F | OXYGEN SATURATION: 100 % | SYSTOLIC BLOOD PRESSURE: 107 MMHG | RESPIRATION RATE: 18 BRPM | DIASTOLIC BLOOD PRESSURE: 70 MMHG | HEART RATE: 80 BPM

## 2018-08-17 DIAGNOSIS — R10.84 ABDOMINAL PAIN, GENERALIZED: ICD-10-CM

## 2018-08-17 DIAGNOSIS — R11.0 NAUSEA: ICD-10-CM

## 2018-08-17 DIAGNOSIS — R19.7 DIARRHEA, UNSPECIFIED TYPE: ICD-10-CM

## 2018-08-17 LAB
ALBUMIN SERPL-MCNC: 3.5 G/DL (ref 3.4–5)
ALP SERPL-CCNC: 136 U/L (ref 40–150)
ALT SERPL W P-5'-P-CCNC: 25 U/L (ref 0–50)
ANION GAP SERPL CALCULATED.3IONS-SCNC: 6 MMOL/L (ref 3–14)
APTT PPP: 29 SEC (ref 22–37)
AST SERPL W P-5'-P-CCNC: 33 U/L (ref 0–45)
BASOPHILS # BLD AUTO: 0 10E9/L (ref 0–0.2)
BASOPHILS NFR BLD AUTO: 0.6 %
BILIRUB SERPL-MCNC: 0.6 MG/DL (ref 0.2–1.3)
BUN SERPL-MCNC: 8 MG/DL (ref 7–30)
CALCIUM SERPL-MCNC: 8.8 MG/DL (ref 8.5–10.1)
CHLORIDE SERPL-SCNC: 108 MMOL/L (ref 94–109)
CO2 SERPL-SCNC: 27 MMOL/L (ref 20–32)
CREAT SERPL-MCNC: 0.78 MG/DL (ref 0.52–1.04)
DIFFERENTIAL METHOD BLD: ABNORMAL
EOSINOPHIL # BLD AUTO: 0.2 10E9/L (ref 0–0.7)
EOSINOPHIL NFR BLD AUTO: 2.6 %
ERYTHROCYTE [DISTWIDTH] IN BLOOD BY AUTOMATED COUNT: 12.3 % (ref 10–15)
GFR SERPL CREATININE-BSD FRML MDRD: 84 ML/MIN/1.7M2
GLUCOSE SERPL-MCNC: 76 MG/DL (ref 70–99)
HCT VFR BLD AUTO: 41.9 % (ref 35–47)
HGB BLD-MCNC: 14.7 G/DL (ref 11.7–15.7)
IMM GRANULOCYTES # BLD: 0 10E9/L (ref 0–0.4)
IMM GRANULOCYTES NFR BLD: 0.2 %
INR PPP: 1.03 (ref 0.86–1.14)
LIPASE SERPL-CCNC: 205 U/L (ref 73–393)
LYMPHOCYTES # BLD AUTO: 1.1 10E9/L (ref 0.8–5.3)
LYMPHOCYTES NFR BLD AUTO: 16.1 %
MCH RBC QN AUTO: 34.9 PG (ref 26.5–33)
MCHC RBC AUTO-ENTMCNC: 35.1 G/DL (ref 31.5–36.5)
MCV RBC AUTO: 100 FL (ref 78–100)
MONOCYTES # BLD AUTO: 0.5 10E9/L (ref 0–1.3)
MONOCYTES NFR BLD AUTO: 8.3 %
NEUTROPHILS # BLD AUTO: 4.7 10E9/L (ref 1.6–8.3)
NEUTROPHILS NFR BLD AUTO: 72.2 %
NRBC # BLD AUTO: 0 10*3/UL
NRBC BLD AUTO-RTO: 0 /100
PLATELET # BLD AUTO: 172 10E9/L (ref 150–450)
POTASSIUM SERPL-SCNC: 4 MMOL/L (ref 3.4–5.3)
PROT SERPL-MCNC: 7.2 G/DL (ref 6.8–8.8)
RBC # BLD AUTO: 4.21 10E12/L (ref 3.8–5.2)
SODIUM SERPL-SCNC: 141 MMOL/L (ref 133–144)
WBC # BLD AUTO: 6.5 10E9/L (ref 4–11)

## 2018-08-17 PROCEDURE — 96376 TX/PRO/DX INJ SAME DRUG ADON: CPT

## 2018-08-17 PROCEDURE — 83690 ASSAY OF LIPASE: CPT | Performed by: EMERGENCY MEDICINE

## 2018-08-17 PROCEDURE — 85610 PROTHROMBIN TIME: CPT | Performed by: EMERGENCY MEDICINE

## 2018-08-17 PROCEDURE — 80053 COMPREHEN METABOLIC PANEL: CPT | Performed by: EMERGENCY MEDICINE

## 2018-08-17 PROCEDURE — 25000125 ZZHC RX 250: Performed by: EMERGENCY MEDICINE

## 2018-08-17 PROCEDURE — 96374 THER/PROPH/DIAG INJ IV PUSH: CPT

## 2018-08-17 PROCEDURE — 96361 HYDRATE IV INFUSION ADD-ON: CPT | Mod: 59

## 2018-08-17 PROCEDURE — 25000128 H RX IP 250 OP 636: Performed by: EMERGENCY MEDICINE

## 2018-08-17 PROCEDURE — 99285 EMERGENCY DEPT VISIT HI MDM: CPT | Mod: 25

## 2018-08-17 PROCEDURE — 85730 THROMBOPLASTIN TIME PARTIAL: CPT | Performed by: EMERGENCY MEDICINE

## 2018-08-17 PROCEDURE — 25000132 ZZH RX MED GY IP 250 OP 250 PS 637: Performed by: EMERGENCY MEDICINE

## 2018-08-17 PROCEDURE — 96375 TX/PRO/DX INJ NEW DRUG ADDON: CPT

## 2018-08-17 PROCEDURE — 85025 COMPLETE CBC W/AUTO DIFF WBC: CPT | Performed by: EMERGENCY MEDICINE

## 2018-08-17 RX ORDER — ONDANSETRON 2 MG/ML
4 INJECTION INTRAMUSCULAR; INTRAVENOUS EVERY 30 MIN PRN
Status: DISCONTINUED | OUTPATIENT
Start: 2018-08-17 | End: 2018-08-17 | Stop reason: HOSPADM

## 2018-08-17 RX ORDER — SODIUM CHLORIDE 9 MG/ML
1000 INJECTION, SOLUTION INTRAVENOUS CONTINUOUS
Status: DISCONTINUED | OUTPATIENT
Start: 2018-08-17 | End: 2018-08-17 | Stop reason: HOSPADM

## 2018-08-17 RX ORDER — DICYCLOMINE HYDROCHLORIDE 10 MG/1
10 CAPSULE ORAL ONCE
Status: COMPLETED | OUTPATIENT
Start: 2018-08-17 | End: 2018-08-17

## 2018-08-17 RX ORDER — HYDROMORPHONE HYDROCHLORIDE 1 MG/ML
0.5 INJECTION, SOLUTION INTRAMUSCULAR; INTRAVENOUS; SUBCUTANEOUS
Status: COMPLETED | OUTPATIENT
Start: 2018-08-17 | End: 2018-08-17

## 2018-08-17 RX ORDER — DICYCLOMINE HCL 20 MG
20 TABLET ORAL 2 TIMES DAILY
Qty: 20 TABLET | Refills: 0 | Status: ON HOLD | OUTPATIENT
Start: 2018-08-17 | End: 2018-08-20

## 2018-08-17 RX ADMIN — HYDROMORPHONE HYDROCHLORIDE 0.5 MG: 10 INJECTION, SOLUTION INTRAMUSCULAR; INTRAVENOUS; SUBCUTANEOUS at 11:37

## 2018-08-17 RX ADMIN — DICYCLOMINE HYDROCHLORIDE 10 MG: 10 CAPSULE ORAL at 14:22

## 2018-08-17 RX ADMIN — LIDOCAINE HYDROCHLORIDE 30 ML: 20 SOLUTION ORAL; TOPICAL at 14:02

## 2018-08-17 RX ADMIN — SODIUM CHLORIDE 1000 ML: 900 INJECTION, SOLUTION INTRAVENOUS at 12:58

## 2018-08-17 RX ADMIN — SODIUM CHLORIDE 1000 ML: 900 INJECTION, SOLUTION INTRAVENOUS at 11:34

## 2018-08-17 RX ADMIN — ONDANSETRON 4 MG: 2 INJECTION INTRAMUSCULAR; INTRAVENOUS at 13:14

## 2018-08-17 RX ADMIN — SODIUM CHLORIDE 1000 ML: 9 INJECTION, SOLUTION INTRAVENOUS at 15:39

## 2018-08-17 RX ADMIN — ONDANSETRON 4 MG: 2 INJECTION INTRAMUSCULAR; INTRAVENOUS at 11:36

## 2018-08-17 RX ADMIN — HYDROMORPHONE HYDROCHLORIDE 0.5 MG: 10 INJECTION, SOLUTION INTRAMUSCULAR; INTRAVENOUS; SUBCUTANEOUS at 13:14

## 2018-08-17 ASSESSMENT — ENCOUNTER SYMPTOMS
VOMITING: 0
SHORTNESS OF BREATH: 0
FATIGUE: 1
DIARRHEA: 1
ARTHRALGIAS: 0
DIFFICULTY URINATING: 0
CONFUSION: 0
FEVER: 0
ABDOMINAL PAIN: 1
HEADACHES: 1
COLOR CHANGE: 0
NAUSEA: 1
EYE REDNESS: 0
NECK STIFFNESS: 0

## 2018-08-17 NOTE — TELEPHONE ENCOUNTER
Patient Call: General    Reason for call: Pt wanted to talk with Coordinator- not feeling well today    Call back needed? Yes    Return Call Needed  Same as documented in contacts section  When to return call?: Same day: Route [facility]

## 2018-08-17 NOTE — ED AVS SNAPSHOT
[facility], [place], Emergency Department    [address] [place] [id number]    Phone:  [phone]                                       Charito Wilcox   MRN: [id number]    Department:  [facility], Emergency Department   Date of Visit:  8/17/2018           After Visit Summary Signature Page     I have received my discharge instructions, and my questions have been answered. I have discussed any challenges I see with this plan with the nurse or doctor.    ..........................................................................................................................................  Patient/Patient Representative Signature      ..........................................................................................................................................  Patient Representative Print Name and Relationship to Patient    ..................................................               ................................................  Date                                            Time    ..........................................................................................................................................  Reviewed by Signature/Title    ...................................................              ..............................................  Date                                                            Time

## 2018-08-17 NOTE — ED PROVIDER NOTES
History     Chief Complaint   Patient presents with     Abdominal Pain     Nausea, Vomiting, & Diarrhea     HPI  Charito Wilcox is a 38 year old female with a history of liver transplant (), acute pancreatitis, stomach ulcer, IBS, reflux who presents to the Emergency Department for the evaluation of abdominal pain and nausea. Patient states due to history of IBS, she normally has some abdominal pain, however since  she has been experiencing a different type of abdominal pain accompanied by nausea and headache that has worsened significantly since Wednesday 8/15 with several episodes of diarrhea though no vomiting. Patient states the pain is more similar to previous ulcer-related pain. She complains of intense cramping after food or water; patient has not been eating or drinking much lately and complains of dehydration. Patient reports her spouse was sick last week with similar symptoms though more mild and shorter duration. Patient is training for a marathon and ran 7 miles on Wednesday 8/15. Patient denies medication changes. Patient is taking Prilosec since previous ulcer 1-2 years ago.    I have reviewed the Medications, Allergies, Past Medical and Surgical History, and Social History in the [employer] system.  Past Medical History:   Diagnosis Date     Acute pancreatitis 2016     Esophageal reflux      Fatigue, unspecified type 4/10/2017     Intractable nausea and vomiting 9/15/2016     Liver replaced by transplant (H)     liver failure of unknown etiology, doing well with new liver     Moderate recurrent major depression (H) 2008     Papanicolaou smear of cervix with atypical squamous cells of undetermined significance (ASC-US)     negative HPV       Past Surgical History:   Procedure Laterality Date     C TRANSPLANT LIVER,HETERTOPIC      had appendectomy, gallbladder out with transplant.       SECTION  2008      SECTION  3/2010     ESOPHAGOSCOPY,  GASTROSCOPY, DUODENOSCOPY (EGD), COMBINED Left 2/6/2015    Procedure: COMBINED ESOPHAGOSCOPY, GASTROSCOPY, DUODENOSCOPY (EGD), BIOPSY SINGLE OR MULTIPLE;  Surgeon: Ottoniel Martin MD;  Location:  GI     ESOPHAGOSCOPY, GASTROSCOPY, DUODENOSCOPY (EGD), COMBINED N/A 9/16/2016    Procedure: COMBINED ESOPHAGOSCOPY, GASTROSCOPY, DUODENOSCOPY (EGD), BIOPSY SINGLE OR MULTIPLE;  Surgeon: Homar Adams MD;  Location:  GI     ESOPHAGOSCOPY, GASTROSCOPY, DUODENOSCOPY (EGD), COMBINED N/A 11/17/2016    Procedure: COMBINED ESOPHAGOSCOPY, GASTROSCOPY, DUODENOSCOPY (EGD), BIOPSY SINGLE OR MULTIPLE;  Surgeon: Homar Adams MD;  Location:  GI     HIP SURGERY Right 2/2016    Torn labrum repair      TUBAL LIGATION  3/2010       Family History   Problem Relation Age of Onset     Depression Maternal Grandmother      Anxiety Disorder Maternal Grandmother      Hypertension Maternal Grandfather      Cerebrovascular Disease Paternal Grandmother      Breast Cancer Paternal Grandmother      GASTROINTESTINAL DISEASE No family hx of      C.A.D. No family hx of      Cancer - colorectal No family hx of      Prostate Cancer No family hx of      Alcohol/Drug No family hx of        Social History   Substance Use Topics     Smoking status: Never Smoker     Smokeless tobacco: Never Used     Alcohol use Yes      Comment: once per month       Current Facility-Administered Medications   Medication     0.9% sodium chloride BOLUS    Followed by     sodium chloride 0.9% infusion     HYDROmorphone (PF) (DILAUDID) injection 0.5 mg     ondansetron (ZOFRAN) injection 4 mg     Current Outpatient Prescriptions   Medication     ALPRAZolam (XANAX) 0.5 MG tablet     azaTHIOprine (IMURAN) 50 MG tablet     B Complex Vitamins (VITAMIN B COMPLEX PO)     L-GLUTAMINE PO     Lactobacillus (ACIDOPHILUS EXTRA STRENGTH) CAPS     MELATONIN PO     omeprazole (PRILOSEC) 40 MG capsule     ondansetron (ZOFRAN ODT) 4 MG ODT tab      "PARoxetine (PAXIL) 40 MG tablet     polyethylene glycol (MIRALAX) powder     tacrolimus (GENERIC EQUIVALENT) 1 MG capsule     ursodiol (ACTIGALL) 300 MG capsule        Allergies   Allergen Reactions     Gluten Meal Nausea and Vomiting     Milk Protein Extract Nausea and Vomiting       Review of Systems   Constitutional: Positive for fatigue. Negative for fever.   HENT: Negative for congestion.    Eyes: Negative for redness.   Respiratory: Negative for shortness of breath.    Cardiovascular: Negative for chest pain.   Gastrointestinal: Positive for abdominal pain, diarrhea and nausea. Negative for vomiting.   Genitourinary: Negative for difficulty urinating.   Musculoskeletal: Negative for arthralgias and neck stiffness.   Skin: Negative for color change.   Neurological: Positive for headaches.   Psychiatric/Behavioral: Negative for confusion.   All other systems reviewed and are negative.      Physical Exam   BP: 105/82  Pulse: 84  Temp: 98.2  F (36.8  C)  Resp: 18  Height: 157.5 cm (5' 2\")  Weight: 54.4 kg (119 lb 14.4 oz)  SpO2: 100 %      Physical Exam   Constitutional: She is oriented to person, place, and time. She appears well-developed and well-nourished. No distress.   HENT:   Head: Normocephalic and atraumatic.   Mouth/Throat: No oropharyngeal exudate.   Eyes: Pupils are equal, round, and reactive to light. Right eye exhibits no discharge. Left eye exhibits no discharge. No scleral icterus.   Neck: Normal range of motion. Neck supple.   Cardiovascular: Normal rate, regular rhythm, normal heart sounds and intact distal pulses.  Exam reveals no gallop and no friction rub.    No murmur heard.  Pulmonary/Chest: Effort normal and breath sounds normal. No respiratory distress. She has no wheezes. She exhibits no tenderness.   Abdominal: Soft. Bowel sounds are normal. She exhibits no distension. There is tenderness.   Epigastric tenderness.   Musculoskeletal: Normal range of motion. She exhibits no edema, " tenderness or deformity.   Neurological: She is alert and oriented to person, place, and time. No cranial nerve deficit.   Skin: Skin is warm and dry. No rash noted. She is not diaphoretic. No erythema. No pallor.   Psychiatric: She has a normal mood and affect.   Nursing note and vitals reviewed.      ED Course   10:59 AM  The patient was seen and examined by Dimitry Dominguez DO in Room 11.     ED Course     Procedures             Critical Care time:  none             Labs Ordered and Resulted from Time of ED Arrival Up to the Time of Departure from the ED - No data to display         Assessments & Plan (with Medical Decision Making)   Is a 38-year-old female with abdominal pain nausea and diarrhea.  She has a history of liver transplant in 2001.  Patient has a family member with similar symptoms but not as bad.  She states she has not had much to eat or drink due to the pain.  Pain is worse with eating and drinking.  Patient had some relief of symptoms after IV fluids, pain medication and antiemetics.  I discussed the patient with the transplant physician and they recommended follow-up as an outpatient.Discussed follow-up for further duration as patient does have a history of peptic ulcer disease in the past.  She was given a GI cocktail as well as Bentyl and trialed on p.o. fluids and food.  She was able to eat and drink without pain.  Discussed discharge versus observation with patient.  She states she would prefer to be discharged and will return if needed.    I have reviewed the nursing notes.    I have reviewed the findings, diagnosis, plan and need for follow up with the patient.    New Prescriptions    No medications on file       Final diagnoses:   None     Rashad RESENDIZ, am serving as a trained medical scribe to document services personally performed by Dimitry Dominguez DO, based on the provider's statements to me.   Dimitry RESENDIZ DO, was physically present and have reviewed and verified the accuracy of  this note documented by Rashad Gonzalez.    8/17/2018   [facility], [place], EMERGENCY DEPARTMENT     Dimitry Dominguez,   08/17/18 [id number]

## 2018-08-17 NOTE — ED TRIAGE NOTES
"Triage Assessment & Note:    /82  Pulse 84  Temp 98.2  F (36.8  C) (Oral)  Resp 18  Ht 1.575 m (5' 2\")  Wt 54.4 kg (119 lb 14.4 oz)  LMP 08/08/2018  SpO2 100%  BMI 21.93 kg/m2    Patient presents with: c/o abdominal pain with N/V/D and pain with eating. PT reports she is a liver Txp pt.     Home Treatments/Remedies: None    Febrile / Afebrile? Afebrile    Duration of C/o: 1 week  Dylan Resendiz  August 17, 2018      "

## 2018-08-17 NOTE — DISCHARGE INSTRUCTIONS
Return to the emergency department if symptoms continue, get worse, there are any new symptoms or any cause for concern.  Abdominal Pain    Abdominal pain is pain in the stomach or belly area. Everyone has this pain from time to time. In many cases it goes away on its own. But abdominal pain can sometimes be due to a serious problem, such as appendicitis. So it s important to know when to seek help.  Causes of abdominal pain  There are many possible causes of abdominal pain. Common causes in adults include:    Constipation, diarrhea, or gas    Stomach acid flowing back up into the esophagus (acid reflux or heartburn)    Severe acid reflux, called GERD (gastroesophageal reflux disease)    A sore in the lining of the stomach or small intestine (peptic ulcer)    Inflammation of the gallbladder, liver, or pancreas    Gallstones or kidney stones    Appendicitis     Intestinal blockage     An internal organ pushing through a muscle or other tissue (hernia)    Urinary tract infections    In women, menstrual cramps, fibroids, or endometriosis    Inflammation or infection of the intestines  Diagnosing the cause of abdominal pain  Your healthcare provider will do a physical exam help find the cause of your pain. If needed, tests will be ordered. Belly pain has many possible causes. So it can be hard to find the reason for your pain. Giving details about your pain can help. Tell your provider where and when you feel the pain, and what makes it better or worse. Also let your provider know if you have other symptoms such as:    Fever    Tiredness    Upset stomach (nausea)    Vomiting    Changes in bathroom habits  Treating abdominal pain  Some causes of pain need emergency medical treatment right away. These include appendicitis or a bowel blockage. Other problems can be treated with rest, fluids, or medicines. Your healthcare provider can give you specific instructions for treatment or self-care based on what is causing your  pain.  If you have vomiting or diarrhea, sip water or other clear fluids. When you are ready to eat solid foods again, start with small amounts of easy-to-digest, low-fat foods. These include apple sauce, toast, or crackers.   When to seek medical care  Call 911 or go to the hospital right away if you:    Can t pass stool and are vomiting    Are vomiting blood or have bloody diarrhea or black, tarry diarrhea    Have chest, neck, or shoulder pain    Feel like you might pass out    Have pain in your shoulder blades with nausea    Have sudden, severe belly pain    Have new, severe pain unlike any you have felt before    Have a belly that is rigid, hard, and tender to touch  Call your healthcare provider if you have:    Pain for more than 5 days    Bloating for more than 2 days    Diarrhea for more than 5 days    A fever of 100.4 F (38 C) or higher, or as directed by your healthcare provider    Pain that gets worse    Weight loss for no reason    Continued lack of appetite    Blood in your stool  How to prevent abdominal pain  Here are some tips to help prevent abdominal pain:    Eat smaller amounts of food at one time.    Avoid greasy, fried, or other high-fat foods.    Avoid foods that give you gas.    Exercise regularly.    Drink plenty of fluids.  To help prevent GERD symptoms:    Quit smoking.    Reduce alcohol and certain foods that increase stomach acid.    Avoid aspirin and over-the-counter pain and fever medicines (NSAIDS or nonsteroidal anti-inflammatory drugs), if possible    Lose extra weight.    Finish eating at least 2 hours before you go to bed or lie down.    Raise the head of your bed.  Date Last Reviewed: 7/1/2016 2000-2017 [employer]. [address] [place] [address], [place] [address]. All rights reserved. This information is not intended as a substitute for professional medical care. Always follow your healthcare professional's instructions.

## 2018-08-17 NOTE — ED AVS SNAPSHOT
[facility], Emergency Department    [address] [address]    Phone:  [phone]                                       Charito Wilcox   MRN: [id number]    Department:  [facility], Emergency Department   Date of Visit:  8/17/2018           Patient Information     Date Of Birth          1980        Your diagnoses for this visit were:     Abdominal pain, generalized     Nausea     Diarrhea, unspecified type        You were seen by Dimitry Dominguez DO.        Discharge Instructions         Return to the emergency department if symptoms continue, get worse, there are any new symptoms or any cause for concern.  Abdominal Pain    Abdominal pain is pain in the stomach or belly area. Everyone has this pain from time to time. In many cases it goes away on its own. But abdominal pain can sometimes be due to a serious problem, such as appendicitis. So it s important to know when to seek help.  Causes of abdominal pain  There are many possible causes of abdominal pain. Common causes in adults include:    Constipation, diarrhea, or gas    Stomach acid flowing back up into the esophagus (acid reflux or heartburn)    Severe acid reflux, called GERD (gastroesophageal reflux disease)    A sore in the lining of the stomach or small intestine (peptic ulcer)    Inflammation of the gallbladder, liver, or pancreas    Gallstones or kidney stones    Appendicitis     Intestinal blockage     An internal organ pushing through a muscle or other tissue (hernia)    Urinary tract infections    In women, menstrual cramps, fibroids, or endometriosis    Inflammation or infection of the intestines  Diagnosing the cause of abdominal pain  Your healthcare provider will do a physical exam help find the cause of your pain. If needed, tests will be ordered. Belly pain has many possible causes. So it can be hard to find the reason for your pain. Giving details about your pain can help. Tell your provider where and when  you feel the pain, and what makes it better or worse. Also let your provider know if you have other symptoms such as:    Fever    Tiredness    Upset stomach (nausea)    Vomiting    Changes in bathroom habits  Treating abdominal pain  Some causes of pain need emergency medical treatment right away. These include appendicitis or a bowel blockage. Other problems can be treated with rest, fluids, or medicines. Your healthcare provider can give you specific instructions for treatment or self-care based on what is causing your pain.  If you have vomiting or diarrhea, sip water or other clear fluids. When you are ready to eat solid foods again, start with small amounts of easy-to-digest, low-fat foods. These include apple sauce, toast, or crackers.   When to seek medical care  Call 911 or go to the hospital right away if you:    Can t pass stool and are vomiting    Are vomiting blood or have bloody diarrhea or black, tarry diarrhea    Have chest, neck, or shoulder pain    Feel like you might pass out    Have pain in your shoulder blades with nausea    Have sudden, severe belly pain    Have new, severe pain unlike any you have felt before    Have a belly that is rigid, hard, and tender to touch  Call your healthcare provider if you have:    Pain for more than 5 days    Bloating for more than 2 days    Diarrhea for more than 5 days    A fever of 100.4 F (38 C) or higher, or as directed by your healthcare provider    Pain that gets worse    Weight loss for no reason    Continued lack of appetite    Blood in your stool  How to prevent abdominal pain  Here are some tips to help prevent abdominal pain:    Eat smaller amounts of food at one time.    Avoid greasy, fried, or other high-fat foods.    Avoid foods that give you gas.    Exercise regularly.    Drink plenty of fluids.  To help prevent GERD symptoms:    Quit smoking.    Reduce alcohol and certain foods that increase stomach acid.    Avoid aspirin and over-the-counter pain  and fever medicines (NSAIDS or nonsteroidal anti-inflammatory drugs), if possible    Lose extra weight.    Finish eating at least 2 hours before you go to bed or lie down.    Raise the head of your bed.  Date Last Reviewed: 7/1/2016 2000-2017 [employer]. [address] [place] [id number]. All rights reserved. This information is not intended as a substitute for professional medical care. Always follow your healthcare professional's instructions.          Your next 10 appointments already scheduled     Aug 31, 2018  9:15 AM CDT   LAB with  LAB   [facility] ([facility])    [address]  1st Floor  [facility] [id number] [phone]           Please do not eat 10-12 hours before your appointment if you are coming in fasting for labs on lipids, cholesterol, or glucose (sugar). This does not apply to pregnant women. Water, hot tea and black coffee (with nothing added) are okay. Do not drink other fluids, diet soda or chew gum.            Aug 31, 2018 10:15 AM CDT   (Arrive by 10:00 AM)   Return Liver Transplant with Roldan Maria MD   [facility] ([facility])    [address] [place] [address]  [address]  [facility] [address] [phone]              24 Hour Appointment Hotline       To make an appointment at any [facility], call [phone] ([phone]). If you don't have a family doctor or clinic, we will help you find one. [place] clinics are conveniently located to serve the needs of you and your family.             Review of your medicines      START taking        Dose / Directions Last dose taken    dicyclomine 20 MG tablet   Commonly known as:  BENTYL   Dose:  20 mg   Quantity:  20 tablet        Take 1 tablet (20 mg) by mouth 2 times daily for 10 days   Refills:  0          Our records show that you are taking the medicines listed below. If these are incorrect, please call your family doctor or clinic.         Dose / Directions Last dose taken    ACIDOPHILUS EXTRA STRENGTH Caps        Take by mouth daily   Refills:  0        ALPRAZolam 0.5 MG tablet   Commonly known as:  XANAX   Dose:  0.5 mg   Quantity:  30 tablet        Take 1 tablet (0.5 mg) by mouth 3 times daily as needed for anxiety 1 month supply   Refills:  0        azaTHIOprine 50 MG tablet   Commonly known as:  IMURAN   Dose:  50 mg   Quantity:  90 tablet        Take 1 tablet (50 mg) by mouth daily   Refills:  3        L-GLUTAMINE PO   Dose:  1 tablet        Take 1 tablet by mouth daily   Refills:  0        MELATONIN PO   Dose:  5 mg        Take 5 mg by mouth nightly as needed   Refills:  0        MIRALAX powder   Dose:  1 capful   Quantity:  510 g   Generic drug:  polyethylene glycol        Take 17 g (1 capful) by mouth daily   Refills:  1        omeprazole 40 MG capsule   Commonly known as:  priLOSEC   Quantity:  180 capsule        TAKE 1 CAPSULE BY MOUTH TWICE DAILY BEFORE MEALS, TAKE 30-60 MINUTES PRIOR TO A MEAL   Refills:  1        ondansetron 4 MG ODT tab   Commonly known as:  ZOFRAN ODT   Dose:  4-8 mg   Quantity:  20 tablet        Take 1-2 tablets (4-8 mg) by mouth 3 times daily (before meals)   Refills:  0        PARoxetine 40 MG tablet   Commonly known as:  PAXIL   Quantity:  45 tablet        TAKE 1 AND ONE-HALF TABLET BY MOUTH AT BEDTIME   Refills:  5        tacrolimus 1 MG capsule   Commonly known as:  GENERIC EQUIVALENT   Dose:  1 mg   Quantity:  180 capsule        Take 1 capsule (1 mg) by mouth every 12 hours   Refills:  3        ursodiol 300 MG capsule   Commonly known as:  ACTIGALL   Dose:  300 mg   Quantity:  180 capsule        Take 1 capsule (300 mg) by mouth 2 times daily   Refills:  3        VITAMIN B COMPLEX PO        Refills:  0                Prescriptions were sent or printed at these locations (1 Prescription)                   Other Prescriptions                Printed at Department/Unit printer (1 of 1)         dicyclomine  (BENTYL) 20 MG tablet                Procedures and tests performed during your visit     CBC with platelets differential    Comprehensive metabolic panel    INR    Lipase    Partial thromboplastin time    Peripheral IV catheter      Orders Needing Specimen Collection     None      Pending Results     No orders found from 8/15/2018 to 8/18/2018.            Pending Culture Results     No orders found from 8/15/2018 to 8/18/2018.            Pending Results Instructions     If you had any lab results that were not finalized at the time of your Discharge, you can call the ED Lab Result RN at [phone]. You will be contacted by this team for any positive Lab results or changes in treatment. The nurses are available 7 days a week from 10A to 6:30P.  You can leave a message 24 hours per day and they will return your call.        Thank you for choosing [facility]       Thank you for choosing [facility] for your care. Our goal is always to provide you with excellent care. Hearing back from our patients is one way we can continue to improve our services. Please take a few minutes to complete the written survey that you may receive in the mail after you visit with us. Thank you!        [employer] Information     [employer] gives you secure access to your electronic health record. If you see a primary care provider, you can also send messages to your care team and make appointments. If you have questions, please call your primary care clinic.  If you do not have a primary care provider, please call [phone] and they will assist you.        Care EveryWhere ID     This is your Care EveryWhere ID. This could be used by other organizations to access your [facility] medical records  [id number]        Equal Access to Services     NAOMI BELL : Ad Sears, les crews, radha moran. So [facility] [phone].    ATENCIÓN: Si rachella español, tiene a rowell disposición  servicios gratuitos de asistencia lingüística. Maia kaminski [phone].    We comply with applicable federal civil rights laws and [place] laws. We do not discriminate on the basis of race, color, national origin, age, disability, sex, sexual orientation, or gender identity.            After Visit Summary       This is your record. Keep this with you and show to your community pharmacist(s) and doctor(s) at your next visit.

## 2018-08-18 ENCOUNTER — HOSPITAL ENCOUNTER (INPATIENT)
Facility: CLINIC | Age: 38
LOS: 2 days | Discharge: HOME | DRG: 392 | End: 2018-08-20
Attending: EMERGENCY MEDICINE | Admitting: INTERNAL MEDICINE
Payer: COMMERCIAL

## 2018-08-18 DIAGNOSIS — R10.13 ABDOMINAL PAIN, EPIGASTRIC: ICD-10-CM

## 2018-08-18 PROBLEM — R10.9 ABDOMINAL PAIN: Status: ACTIVE | Noted: 2018-08-18

## 2018-08-18 LAB
ALBUMIN SERPL-MCNC: 3.4 G/DL (ref 3.4–5)
ALBUMIN UR-MCNC: NEGATIVE MG/DL
ALP SERPL-CCNC: 137 U/L (ref 40–150)
ALT SERPL W P-5'-P-CCNC: 24 U/L (ref 0–50)
ANION GAP SERPL CALCULATED.3IONS-SCNC: 6 MMOL/L (ref 3–14)
APPEARANCE UR: CLEAR
AST SERPL W P-5'-P-CCNC: 26 U/L (ref 0–45)
BASOPHILS # BLD AUTO: 0.1 10E9/L (ref 0–0.2)
BASOPHILS NFR BLD AUTO: 0.8 %
BILIRUB SERPL-MCNC: 0.5 MG/DL (ref 0.2–1.3)
BILIRUB UR QL STRIP: NEGATIVE
BUN SERPL-MCNC: 3 MG/DL (ref 7–30)
CALCIUM SERPL-MCNC: 8.7 MG/DL (ref 8.5–10.1)
CHLORIDE SERPL-SCNC: 110 MMOL/L (ref 94–109)
CO2 SERPL-SCNC: 26 MMOL/L (ref 20–32)
COLOR UR AUTO: YELLOW
CREAT SERPL-MCNC: 0.78 MG/DL (ref 0.52–1.04)
CRP SERPL-MCNC: 6.7 MG/L (ref 0–8)
DIFFERENTIAL METHOD BLD: ABNORMAL
EOSINOPHIL # BLD AUTO: 0.2 10E9/L (ref 0–0.7)
EOSINOPHIL NFR BLD AUTO: 3.2 %
ERYTHROCYTE [DISTWIDTH] IN BLOOD BY AUTOMATED COUNT: 12.1 % (ref 10–15)
GFR SERPL CREATININE-BSD FRML MDRD: 84 ML/MIN/1.7M2
GLUCOSE SERPL-MCNC: 90 MG/DL (ref 70–99)
GLUCOSE UR STRIP-MCNC: NEGATIVE MG/DL
HCT VFR BLD AUTO: 38.6 % (ref 35–47)
HGB BLD-MCNC: 13.8 G/DL (ref 11.7–15.7)
HGB UR QL STRIP: NEGATIVE
IMM GRANULOCYTES # BLD: 0 10E9/L (ref 0–0.4)
IMM GRANULOCYTES NFR BLD: 0.2 %
KETONES UR STRIP-MCNC: NEGATIVE MG/DL
LACTATE BLD-SCNC: 0.8 MMOL/L (ref 0.7–2)
LEUKOCYTE ESTERASE UR QL STRIP: NEGATIVE
LIPASE SERPL-CCNC: 283 U/L (ref 73–393)
LYMPHOCYTES # BLD AUTO: 1.3 10E9/L (ref 0.8–5.3)
LYMPHOCYTES NFR BLD AUTO: 22.2 %
MCH RBC QN AUTO: 35.1 PG (ref 26.5–33)
MCHC RBC AUTO-ENTMCNC: 35.8 G/DL (ref 31.5–36.5)
MCV RBC AUTO: 98 FL (ref 78–100)
MONOCYTES # BLD AUTO: 0.5 10E9/L (ref 0–1.3)
MONOCYTES NFR BLD AUTO: 7.7 %
MUCOUS THREADS #/AREA URNS LPF: PRESENT /LPF
NEUTROPHILS # BLD AUTO: 3.9 10E9/L (ref 1.6–8.3)
NEUTROPHILS NFR BLD AUTO: 65.9 %
NITRATE UR QL: NEGATIVE
NRBC # BLD AUTO: 0 10*3/UL
NRBC BLD AUTO-RTO: 0 /100
PH UR STRIP: 6.5 PH (ref 5–7)
PLATELET # BLD AUTO: 170 10E9/L (ref 150–450)
POTASSIUM SERPL-SCNC: 3.9 MMOL/L (ref 3.4–5.3)
PROT SERPL-MCNC: 6.8 G/DL (ref 6.8–8.8)
RBC # BLD AUTO: 3.93 10E12/L (ref 3.8–5.2)
RBC #/AREA URNS AUTO: <1 /HPF (ref 0–2)
SODIUM SERPL-SCNC: 141 MMOL/L (ref 133–144)
SOURCE: ABNORMAL
SP GR UR STRIP: 1.01 (ref 1–1.03)
SQUAMOUS #/AREA URNS AUTO: 2 /HPF (ref 0–1)
UROBILINOGEN UR STRIP-MCNC: NORMAL MG/DL (ref 0–2)
WBC # BLD AUTO: 6 10E9/L (ref 4–11)
WBC #/AREA URNS AUTO: <1 /HPF (ref 0–5)

## 2018-08-18 PROCEDURE — 85025 COMPLETE CBC W/AUTO DIFF WBC: CPT | Performed by: EMERGENCY MEDICINE

## 2018-08-18 PROCEDURE — 25000132 ZZH RX MED GY IP 250 OP 250 PS 637: Performed by: EMERGENCY MEDICINE

## 2018-08-18 PROCEDURE — 81001 URINALYSIS AUTO W/SCOPE: CPT | Performed by: STUDENT IN AN ORGANIZED HEALTH CARE EDUCATION/TRAINING PROGRAM

## 2018-08-18 PROCEDURE — 83605 ASSAY OF LACTIC ACID: CPT | Performed by: EMERGENCY MEDICINE

## 2018-08-18 PROCEDURE — 86140 C-REACTIVE PROTEIN: CPT | Performed by: EMERGENCY MEDICINE

## 2018-08-18 PROCEDURE — 96375 TX/PRO/DX INJ NEW DRUG ADDON: CPT | Performed by: EMERGENCY MEDICINE

## 2018-08-18 PROCEDURE — 25000125 ZZHC RX 250: Performed by: STUDENT IN AN ORGANIZED HEALTH CARE EDUCATION/TRAINING PROGRAM

## 2018-08-18 PROCEDURE — 12000001 ZZH R&B MED SURG/OB UMMC

## 2018-08-18 PROCEDURE — 80053 COMPREHEN METABOLIC PANEL: CPT | Performed by: EMERGENCY MEDICINE

## 2018-08-18 PROCEDURE — 99285 EMERGENCY DEPT VISIT HI MDM: CPT | Mod: Z6 | Performed by: EMERGENCY MEDICINE

## 2018-08-18 PROCEDURE — 99223 1ST HOSP IP/OBS HIGH 75: CPT | Mod: AI | Performed by: INTERNAL MEDICINE

## 2018-08-18 PROCEDURE — 83690 ASSAY OF LIPASE: CPT | Performed by: EMERGENCY MEDICINE

## 2018-08-18 PROCEDURE — 99285 EMERGENCY DEPT VISIT HI MDM: CPT | Mod: 25 | Performed by: EMERGENCY MEDICINE

## 2018-08-18 PROCEDURE — 25000131 ZZH RX MED GY IP 250 OP 636 PS 637: Performed by: STUDENT IN AN ORGANIZED HEALTH CARE EDUCATION/TRAINING PROGRAM

## 2018-08-18 PROCEDURE — 96374 THER/PROPH/DIAG INJ IV PUSH: CPT | Performed by: EMERGENCY MEDICINE

## 2018-08-18 PROCEDURE — 96361 HYDRATE IV INFUSION ADD-ON: CPT | Performed by: EMERGENCY MEDICINE

## 2018-08-18 PROCEDURE — 25000128 H RX IP 250 OP 636: Performed by: EMERGENCY MEDICINE

## 2018-08-18 PROCEDURE — 25000132 ZZH RX MED GY IP 250 OP 250 PS 637: Performed by: STUDENT IN AN ORGANIZED HEALTH CARE EDUCATION/TRAINING PROGRAM

## 2018-08-18 PROCEDURE — 25000125 ZZHC RX 250: Performed by: EMERGENCY MEDICINE

## 2018-08-18 RX ORDER — URSODIOL 300 MG/1
300 CAPSULE ORAL 2 TIMES DAILY
Status: DISCONTINUED | OUTPATIENT
Start: 2018-08-18 | End: 2018-08-20 | Stop reason: HOSPADM

## 2018-08-18 RX ORDER — PROCHLORPERAZINE 25 MG
25 SUPPOSITORY, RECTAL RECTAL EVERY 12 HOURS PRN
Status: DISCONTINUED | OUTPATIENT
Start: 2018-08-18 | End: 2018-08-20 | Stop reason: HOSPADM

## 2018-08-18 RX ORDER — PROCHLORPERAZINE MALEATE 5 MG
10 TABLET ORAL EVERY 6 HOURS PRN
Status: DISCONTINUED | OUTPATIENT
Start: 2018-08-18 | End: 2018-08-20 | Stop reason: HOSPADM

## 2018-08-18 RX ORDER — MAGNESIUM HYDROXIDE/ALUMINUM HYDROXICE/SIMETHICONE 120; 1200; 1200 MG/30ML; MG/30ML; MG/30ML
15 SUSPENSION ORAL EVERY 4 HOURS PRN
Status: ON HOLD | COMMUNITY
End: 2018-08-20

## 2018-08-18 RX ORDER — PAROXETINE 30 MG/1
60 TABLET, FILM COATED ORAL AT BEDTIME
Status: DISCONTINUED | OUTPATIENT
Start: 2018-08-18 | End: 2018-08-20 | Stop reason: HOSPADM

## 2018-08-18 RX ORDER — ALPRAZOLAM 0.5 MG/1
0.5 TABLET, EXTENDED RELEASE ORAL ONCE
Status: COMPLETED | OUTPATIENT
Start: 2018-08-19 | End: 2018-08-19

## 2018-08-18 RX ORDER — METOCLOPRAMIDE HYDROCHLORIDE 5 MG/ML
10 INJECTION INTRAMUSCULAR; INTRAVENOUS EVERY 6 HOURS PRN
Status: DISCONTINUED | OUTPATIENT
Start: 2018-08-18 | End: 2018-08-20 | Stop reason: HOSPADM

## 2018-08-18 RX ORDER — TACROLIMUS 1 MG/1
1 CAPSULE ORAL EVERY 12 HOURS
Status: DISCONTINUED | OUTPATIENT
Start: 2018-08-18 | End: 2018-08-20 | Stop reason: HOSPADM

## 2018-08-18 RX ORDER — DICYCLOMINE HYDROCHLORIDE 10 MG/1
10 CAPSULE ORAL 4 TIMES DAILY
Status: DISCONTINUED | OUTPATIENT
Start: 2018-08-18 | End: 2018-08-18

## 2018-08-18 RX ORDER — NALOXONE HYDROCHLORIDE 0.4 MG/ML
.1-.4 INJECTION, SOLUTION INTRAMUSCULAR; INTRAVENOUS; SUBCUTANEOUS
Status: DISCONTINUED | OUTPATIENT
Start: 2018-08-18 | End: 2018-08-20 | Stop reason: HOSPADM

## 2018-08-18 RX ORDER — IBUPROFEN 200 MG
400 TABLET ORAL DAILY PRN
COMMUNITY
End: 2019-02-26

## 2018-08-18 RX ORDER — AZATHIOPRINE 50 MG/1
50 TABLET ORAL DAILY
Status: DISCONTINUED | OUTPATIENT
Start: 2018-08-19 | End: 2018-08-20 | Stop reason: HOSPADM

## 2018-08-18 RX ORDER — DICYCLOMINE HCL 20 MG
20 TABLET ORAL 2 TIMES DAILY
Status: DISCONTINUED | OUTPATIENT
Start: 2018-08-18 | End: 2018-08-20 | Stop reason: HOSPADM

## 2018-08-18 RX ORDER — SUCRALFATE 1 G/1
1 TABLET ORAL
Status: DISCONTINUED | OUTPATIENT
Start: 2018-08-18 | End: 2018-08-20 | Stop reason: HOSPADM

## 2018-08-18 RX ORDER — LACTOBACILLUS RHAMNOSUS GG 10B CELL
1 CAPSULE ORAL DAILY
COMMUNITY

## 2018-08-18 RX ORDER — ONDANSETRON 4 MG/1
4 TABLET, ORALLY DISINTEGRATING ORAL EVERY 6 HOURS PRN
Status: DISCONTINUED | OUTPATIENT
Start: 2018-08-18 | End: 2018-08-20 | Stop reason: HOSPADM

## 2018-08-18 RX ORDER — POLYETHYLENE GLYCOL 3350 17 G/17G
17 POWDER, FOR SOLUTION ORAL DAILY
Status: DISCONTINUED | OUTPATIENT
Start: 2018-08-18 | End: 2018-08-20 | Stop reason: HOSPADM

## 2018-08-18 RX ORDER — METOCLOPRAMIDE 5 MG/1
10 TABLET ORAL EVERY 6 HOURS PRN
Status: DISCONTINUED | OUTPATIENT
Start: 2018-08-18 | End: 2018-08-20 | Stop reason: HOSPADM

## 2018-08-18 RX ORDER — ACETAMINOPHEN 325 MG/1
325 TABLET ORAL EVERY 4 HOURS PRN
Status: DISCONTINUED | OUTPATIENT
Start: 2018-08-18 | End: 2018-08-20 | Stop reason: HOSPADM

## 2018-08-18 RX ORDER — HYDROMORPHONE HYDROCHLORIDE 1 MG/ML
1 INJECTION, SOLUTION INTRAMUSCULAR; INTRAVENOUS; SUBCUTANEOUS ONCE
Status: COMPLETED | OUTPATIENT
Start: 2018-08-18 | End: 2018-08-18

## 2018-08-18 RX ORDER — ONDANSETRON 2 MG/ML
4 INJECTION INTRAMUSCULAR; INTRAVENOUS EVERY 6 HOURS PRN
Status: DISCONTINUED | OUTPATIENT
Start: 2018-08-18 | End: 2018-08-20 | Stop reason: HOSPADM

## 2018-08-18 RX ORDER — LACTOBACILLUS RHAMNOSUS GG 10B CELL
1 CAPSULE ORAL DAILY
Status: DISCONTINUED | OUTPATIENT
Start: 2018-08-18 | End: 2018-08-20 | Stop reason: HOSPADM

## 2018-08-18 RX ADMIN — DICYCLOMINE HYDROCHLORIDE 20 MG: 20 TABLET ORAL at 20:57

## 2018-08-18 RX ADMIN — PANTOPRAZOLE SODIUM 40 MG: 40 INJECTION, POWDER, FOR SOLUTION INTRAVENOUS at 13:10

## 2018-08-18 RX ADMIN — TACROLIMUS 1 MG: 1 CAPSULE ORAL at 20:57

## 2018-08-18 RX ADMIN — DICYCLOMINE HYDROCHLORIDE 10 MG: 10 CAPSULE ORAL at 13:25

## 2018-08-18 RX ADMIN — SUCRALFATE 1 G: 1 TABLET ORAL at 19:00

## 2018-08-18 RX ADMIN — Medication 1 MG: at 12:58

## 2018-08-18 RX ADMIN — OMEPRAZOLE 40 MG: 20 CAPSULE, DELAYED RELEASE ORAL at 19:01

## 2018-08-18 RX ADMIN — ACETAMINOPHEN 325 MG: 325 TABLET, FILM COATED ORAL at 19:00

## 2018-08-18 RX ADMIN — Medication 1 CAPSULE: at 19:01

## 2018-08-18 RX ADMIN — LIDOCAINE HYDROCHLORIDE 30 ML: 20 SOLUTION ORAL; TOPICAL at 12:58

## 2018-08-18 RX ADMIN — PAROXETINE HYDROCHLORIDE 60 MG: 30 TABLET, FILM COATED ORAL at 21:35

## 2018-08-18 RX ADMIN — SUCRALFATE 1 G: 1 TABLET ORAL at 21:35

## 2018-08-18 RX ADMIN — LIDOCAINE HYDROCHLORIDE 30 ML: 20 SOLUTION ORAL; TOPICAL at 22:20

## 2018-08-18 RX ADMIN — SODIUM CHLORIDE 2000 ML: 9 INJECTION, SOLUTION INTRAVENOUS at 13:59

## 2018-08-18 RX ADMIN — URSODIOL 300 MG: 300 CAPSULE ORAL at 20:58

## 2018-08-18 ASSESSMENT — ENCOUNTER SYMPTOMS
EYE REDNESS: 0
APPETITE CHANGE: 1
CHILLS: 0
COLOR CHANGE: 0
NECK STIFFNESS: 0
CONFUSION: 0
FEVER: 0
ARTHRALGIAS: 0
DIAPHORESIS: 0
VOMITING: 1
SHORTNESS OF BREATH: 0
HEADACHES: 0
TREMORS: 0
DIARRHEA: 1
NAUSEA: 1
DIFFICULTY URINATING: 0
ABDOMINAL PAIN: 1

## 2018-08-18 ASSESSMENT — ACTIVITIES OF DAILY LIVING (ADL): ADLS_ACUITY_SCORE: 10

## 2018-08-18 NOTE — IP AVS SNAPSHOT
MRN:[id number]                      After Visit Summary   8/18/2018    Charito Wilcox    MRN: [id number]           Thank you!     Thank you for choosing [facility] for your care. Our goal is always to provide you with excellent care. Hearing back from our patients is one way we can continue to improve our services. Please take a few minutes to complete the written survey that you may receive in the mail after you visit with us. Thank you!        Patient Information     Date Of Birth          1980        Designated Caregiver       Most Recent Value    Caregiver    Will someone help with your care after discharge? no      About your hospital stay     You were admitted on:  August 18, 2018 You last received care in the:  [address] [facility] [place]    You were discharged on:  August 20, 2018        Reason for your hospital stay       Abdominal pain                  Who to Call     For medical emergencies, please call 911.  For non-urgent questions about your medical care, please call your primary care provider or clinic, [phone]  For questions related to your surgery, please call your surgery clinic        Attending Provider     Provider Specialty    Kamar Castano MD Emergency Medicine    [facility], Harjit Pinto MD Internal Medicine       Primary Care Provider Office Phone # Fax #    Linda PIMENTEL DO Tori [phone]      After Care Instructions     Activity       Your activity upon discharge: activity as tolerated            Diet       Follow this diet upon discharge: Orders Placed This Encounter      Regular Diet Adult            Discharge Instructions       Please take carafate and GI Cocktails as needed prior to eating to relieve pain  Follow up with Dr. Maria at already scheduled appointment                  Follow-up Appointments     Adult [facility] Follow-up and recommended labs and tests       Follow up with Dr. Maria at regularly scheduled appointment Friday    Appointments on  "[place] and/or [facility] (with [facility] or [facility] provider or service). Call [phone] if you haven't heard regarding these appointments within 7 days of discharge.                  Your next 10 appointments already scheduled     Aug 31, 2018  9:15 AM CDT   LAB with  LAB   [facility] ([facility])    [address]  1st Floor  [facility] [id number] [phone]           Please do not eat 10-12 hours before your appointment if you are coming in fasting for labs on lipids, cholesterol, or glucose (sugar). This does not apply to pregnant women. Water, hot tea and black coffee (with nothing added) are okay. Do not drink other fluids, diet soda or chew gum.            Aug 31, 2018 10:15 AM CDT   (Arrive by 10:00 AM)   Return Liver Transplant with Roldan Maria MD   [facility] ([facility])    [address]  [address]  [facility] [id number] [phone]              Pending Results     Date and Time Order Name Status Description    8/20/2018 1233 Surgical pathology exam In process     8/20/2018 0904 Adenovirus antigen stool In process     8/20/2018 0845 Cryptosporidium in stool stain In process     8/20/2018 0845 Microsporidia stool In process     8/20/2018 0845 Ova and Parasite Exam Routine In process     8/20/2018 0845 Giardia antigen In process             Statement of Approval     Ordered          08/20/18 1611  I have reviewed and agree with all the recommendations and orders detailed in this document.  EFFECTIVE NOW     Approved and electronically signed by:  Wayne Reza MD             Admission Information     Date & Time Provider Department Dept. Phone    8/18/2018 Harjit Johnson MD [address] [facility] [place] [phone]      Your Vitals Were     Blood Pressure Pulse Temperature Respirations Height Weight    116/75 (BP Location: Right arm) 71 97.9  F (36.6  C) (Oral) 16 1.575 m (5' 2\") 56.8 kg (125 lb 3.2 oz)    " Last Period Pulse Oximetry BMI (Body Mass Index)             08/08/2018 99% 22.9 kg/m2         [employer] Information     [employer] gives you secure access to your electronic health record. If you see a primary care provider, you can also send messages to your care team and make appointments. If you have questions, please call your primary care clinic.  If you do not have a primary care provider, please call [phone] and they will assist you.        Care EveryWhere ID     This is your Care EveryWhere ID. This could be used by other organizations to access your [facility] medical records  [id number]        Equal Access to Services     [facility]: Ad Sears, les crews, ana kendall, radha foote . So [facility] [phone].    ATENCIÓN: Si habla español, tiene a rowell disposición servicios gratuitos de asistencia lingüística. Llame al [phone].    We comply with applicable federal civil rights laws and [place] laws. We do not discriminate on the basis of race, color, national origin, age, disability, sex, sexual orientation, or gender identity.               Review of your medicines      START taking        Dose / Directions    sucralfate 1 GM tablet   Commonly known as:  CARAFATE   Used for:  Abdominal pain, epigastric        Dose:  1 g   Take 1 tablet (1 g) by mouth 4 times daily (before meals and nightly)   Quantity:  120 tablet   Refills:  0         CONTINUE these medicines which have NOT CHANGED        Dose / Directions    ALPRAZolam 0.5 MG tablet   Commonly known as:  XANAX   Used for:  Panic disorder        Dose:  0.5 mg   Take 1 tablet (0.5 mg) by mouth 3 times daily as needed for anxiety 1 month supply   Quantity:  30 tablet   Refills:  0       alum & mag hydroxide-simethicone 200-200-20 MG/5ML Susp suspension   Commonly known as:  MYLANTA/MAALOX   Used for:  Abdominal pain, epigastric        Dose:  15 mL   Take 15 mLs by mouth every 4 hours  as needed   Quantity:  769 mL   Refills:  0       azaTHIOprine 50 MG tablet   Commonly known as:  IMURAN   Used for:  Liver replaced by transplant (H)        Dose:  50 mg   Take 1 tablet (50 mg) by mouth daily   Quantity:  90 tablet   Refills:  3       diclofenac 1 % Gel topical gel   Commonly known as:  VOLTAREN        Dose:  2 g   Apply 2 g topically 2 times daily   Refills:  0       ibuprofen 200 MG tablet   Commonly known as:  ADVIL/MOTRIN        Dose:  400 mg   Take 400 mg by mouth daily as needed for pain   Refills:  0       lactobacillus rhamnosus (GG) capsule        Dose:  1 capsule   Take 1 capsule by mouth daily   Refills:  0       melatonin 5 MG tablet        Dose:  5 mg   Take 5 mg by mouth nightly as needed for sleep   Refills:  0       MIRALAX powder   Used for:  Other constipation   Generic drug:  polyethylene glycol        Dose:  1 capful   Take 17 g (1 capful) by mouth daily   Quantity:  510 g   Refills:  1       omeprazole 40 MG capsule   Commonly known as:  priLOSEC   Used for:  Chronic gastric ulcer without hemorrhage and without perforation        TAKE 1 CAPSULE BY MOUTH TWICE DAILY BEFORE MEALS, TAKE 30-60 MINUTES PRIOR TO A MEAL   Quantity:  180 capsule   Refills:  1       ondansetron 4 MG ODT tab   Commonly known as:  ZOFRAN ODT   Used for:  Nausea, Liver replaced by transplant (H)        Dose:  4-8 mg   Take 1-2 tablets (4-8 mg) by mouth 3 times daily (before meals)   Quantity:  20 tablet   Refills:  0       PARoxetine 40 MG tablet   Commonly known as:  PAXIL   Used for:  Panic disorder        TAKE 1 AND ONE-HALF TABLET BY MOUTH AT BEDTIME   Quantity:  45 tablet   Refills:  5       tacrolimus 1 MG capsule   Commonly known as:  GENERIC EQUIVALENT   Used for:  Liver replaced by transplant (H)        Dose:  1 mg   Take 1 capsule (1 mg) by mouth every 12 hours   Quantity:  180 capsule   Refills:  3       ursodiol 300 MG capsule   Commonly known as:  ACTIGALL   Used for:  Liver replaced by  transplant (H)        Dose:  300 mg   Take 1 capsule (300 mg) by mouth 2 times daily   Quantity:  180 capsule   Refills:  3         STOP taking     dicyclomine 20 MG tablet   Commonly known as:  BENTYL                Where to get your medicines      These medications were sent to [facility] - [place], [place] - [address]  [address] [address]     Phone:  [phone]     alum & mag hydroxide-simethicone 200-200-20 MG/5ML Susp suspension    sucralfate 1 GM tablet                Protect others around you: Learn how to safely use, store and throw away your medicines at www.disposemymeds.org.             Medication List: This is a list of all your medications and when to take them. Check marks below indicate your daily home schedule. Keep this list as a reference.      Medications           Morning Afternoon Evening Bedtime As Needed    ALPRAZolam 0.5 MG tablet   Commonly known as:  XANAX   Take 1 tablet (0.5 mg) by mouth 3 times daily as needed for anxiety 1 month supply   Last time this was given:  0.5 mg on 8/20/2018  8:11 AM                                alum & mag hydroxide-simethicone 200-200-20 MG/5ML Susp suspension   Commonly known as:  MYLANTA/MAALOX   Take 15 mLs by mouth every 4 hours as needed                                azaTHIOprine 50 MG tablet   Commonly known as:  IMURAN   Take 1 tablet (50 mg) by mouth daily   Last time this was given:  50 mg on 8/20/2018  8:11 AM                                diclofenac 1 % Gel topical gel   Commonly known as:  VOLTAREN   Apply 2 g topically 2 times daily                                ibuprofen 200 MG tablet   Commonly known as:  ADVIL/MOTRIN   Take 400 mg by mouth daily as needed for pain                                lactobacillus rhamnosus (GG) capsule   Take 1 capsule by mouth daily   Last time this was given:  1 capsule on 8/20/2018  8:11 AM                                melatonin 5 MG tablet   Take 5 mg by  mouth nightly as needed for sleep                                MIRALAX powder   Take 17 g (1 capful) by mouth daily   Generic drug:  polyethylene glycol                                omeprazole 40 MG capsule   Commonly known as:  priLOSEC   TAKE 1 CAPSULE BY MOUTH TWICE DAILY BEFORE MEALS, TAKE 30-60 MINUTES PRIOR TO A MEAL   Last time this was given:  40 mg on 8/20/2018  3:46 PM                                ondansetron 4 MG ODT tab   Commonly known as:  ZOFRAN ODT   Take 1-2 tablets (4-8 mg) by mouth 3 times daily (before meals)                                PARoxetine 40 MG tablet   Commonly known as:  PAXIL   TAKE 1 AND ONE-HALF TABLET BY MOUTH AT BEDTIME   Last time this was given:  60 mg on 8/19/2018  9:54 PM                                sucralfate 1 GM tablet   Commonly known as:  CARAFATE   Take 1 tablet (1 g) by mouth 4 times daily (before meals and nightly)   Last time this was given:  1 g on 8/20/2018  3:46 PM                                tacrolimus 1 MG capsule   Commonly known as:  GENERIC EQUIVALENT   Take 1 capsule (1 mg) by mouth every 12 hours   Last time this was given:  1 mg on 8/20/2018  8:12 AM                                ursodiol 300 MG capsule   Commonly known as:  ACTIGALL   Take 1 capsule (300 mg) by mouth 2 times daily   Last time this was given:  300 mg on 8/20/2018  8:11 AM

## 2018-08-18 NOTE — PHARMACY-ADMISSION MEDICATION HISTORY
"Admission medication history interview status for the 8/18/2018 admission is complete. See Epic admission navigator for allergy information, pharmacy, prior to admission medications and immunization status.     Medication history interview sources:  Patient, Patient's , EMR    Changes made to PTA medication list (reason)  Added:   -Maalox: Patient took one dose this week after it was recommended for stomach upset  -Diclofenac 1% Gel: Apply to knee two times daily (last dose was Wednesday)  -Ibuprofen 200 mg: Take 400 mg by mouth as needed daily for pain.     Deleted:   -B complex: Patient is no longer taking this medication  -L-Glutamine: Patient is no longer taking this medication    Changed:   -Lactobacillus (extra strength) capsule-> lactobacillus rhamnosus, GG (Culturelle) capsule    Additional medication history information (including reliability of information, actions taken by pharmacist):  -Patient was a good historian  -Patient rarely takes ibuprofen, takes about 2 tablets every few weeks (only when needed because it \"isn't good after her transplant\")  -Patient was not sure what the name or strength of her probiotic was, she mixes it in her coffee in the mornings (her last dose was Tuesday)  -Usually takes Polyethylene glycol every morning but she has had diarrhea lately so hasn't had a dose since Wednesday  -Dicyclomine 20 mg tablet: On day 2 of taking this medication  -Generic immunsuppression: Tacrolimus and azathioprine    Prior to Admission medications    Medication Sig Last Dose Taking? Auth Provider   ALPRAZolam (XANAX) 0.5 MG tablet Take 1 tablet (0.5 mg) by mouth 3 times daily as needed for anxiety 1 month supply 8/17/2018 at PM Yes Katharine Gaxiola MD   alum & mag hydroxide-simethicone (MYLANTA/MAALOX) 200-200-20 MG/5ML SUSP suspension Take 15 mLs by mouth every 4 hours as needed Past Week at PRN Yes Unknown, Entered By History   azaTHIOprine (IMURAN) 50 MG tablet Take 1 tablet (50 mg) by " mouth daily 8/18/2018 at AM Yes Roldan Maria MD   diclofenac (VOLTAREN) 1 % GEL topical gel Apply 2 g topically 2 times daily 8/15/2018 at Unknown Yes Unknown, Entered By History   dicyclomine (BENTYL) 20 MG tablet Take 1 tablet (20 mg) by mouth 2 times daily for 10 days 8/18/2018 at AM Yes Dimitry Dominguez DO   ibuprofen (ADVIL/MOTRIN) 200 MG tablet Take 400 mg by mouth daily as needed for pain Past Month at Unknown time Yes Unknown, Entered By History   lactobacillus rhamnosus, GG, (CULTURELL) capsule Take 1 capsule by mouth daily 8/14/2018 at AM Yes Unknown, Entered By History   melatonin 5 MG tablet Take 5 mg by mouth nightly as needed for sleep Past Month at PRN Yes Unknown, Entered By History   omeprazole (PRILOSEC) 40 MG capsule TAKE 1 CAPSULE BY MOUTH TWICE DAILY BEFORE MEALS, TAKE 30-60 MINUTES PRIOR TO A MEAL 8/18/2018 at AM Yes Katharine Gaxiola MD   ondansetron (ZOFRAN ODT) 4 MG ODT tab Take 1-2 tablets (4-8 mg) by mouth 3 times daily (before meals) 8/17/2018 at PM Yes Katharine Gaxiola MD   PARoxetine (PAXIL) 40 MG tablet TAKE 1 AND ONE-HALF TABLET BY MOUTH AT BEDTIME 8/17/2018 at PM Yes Linda Valle DO   tacrolimus (GENERIC EQUIVALENT) 1 MG capsule Take 1 capsule (1 mg) by mouth every 12 hours 8/18/2018 at AM Yes Roldan Maria MD   ursodiol (ACTIGALL) 300 MG capsule Take 1 capsule (300 mg) by mouth 2 times daily 8/18/2018 at AM Yes Roldan Maria MD   polyethylene glycol (MIRALAX) powder Take 17 g (1 capful) by mouth daily 8/15/2018 at AM  Katharine Gaxiola MD     Medication history completed by:   Stacy Price   Student Pharmacist   [facility]  08/18/2018

## 2018-08-18 NOTE — ED NOTES
[facility], [place]   ED Nurse to Floor Handoff     Charito Wilcox is a 38 year old female who speaks English and lives with a spouse,  in a home  They arrived in the ED by car from home    ED Chief Complaint: Abdominal Pain and Diarrhea    ED Dx;   Final diagnoses:   Abdominal pain, epigastric         Needed?: No    Allergies:   Allergies   Allergen Reactions     Gluten Meal Nausea and Vomiting     Milk Protein Extract Nausea and Vomiting   .  Past Medical Hx:   Past Medical History:   Diagnosis Date     Acute pancreatitis 9/9/2016     Esophageal reflux      Intractable nausea and vomiting 9/15/2016     Liver replaced by transplant (H) 2000    liver failure of unknown etiology     Moderate recurrent major depression (H) 12/2/2008     Papanicolaou smear of cervix with atypical squamous cells of undetermined significance (ASC-US)     negative HPV      Baseline Mental status: WDL  Current Mental Status changes: at basesline    Infection present or suspected this encounter: no  Sepsis suspected: No  Isolation type: Enteric     Activity level - Baseline/Home:  Independent  Activity Level - Current:   Stand with Assist    Bariatric equipment needed?: No    In the ED these meds were given:   Medications   dicyclomine (BENTYL) capsule 10 mg (10 mg Oral Given 8/18/18 1325)   0.9% sodium chloride BOLUS (2,000 mLs Intravenous New Bag 8/18/18 1359)   lidocaine (viscous) (XYLOCAINE) 2 % 15 mL, alum & mag hydroxide-simethicone (MYLANTA ES/MAALOX  ES) 15 mL GI Cocktail (30 mLs Oral Given 8/18/18 1258)   HYDROmorphone (PF) (DILAUDID) injection 1 mg (1 mg Intravenous Given 8/18/18 1258)   pantoprazole (PROTONIX) 40 mg IV push injection (40 mg Intravenous Given 8/18/18 1310)       Drips running?  Yes    Home pump  No    Current LDAs  Peripheral IV 08/18/18 Right Lower forearm (Active)   Site Assessment WDL 8/18/2018 12:45 PM   Line Status Saline locked 8/18/2018 12:45 PM   Number of days:0        Labs results:   Labs Ordered and Resulted from Time of ED Arrival Up to the Time of Departure from the ED   CBC WITH PLATELETS DIFFERENTIAL - Abnormal; Notable for the following:        Result Value    MCH 35.1 (*)     All other components within normal limits   COMPREHENSIVE METABOLIC PANEL - Abnormal; Notable for the following:     Chloride 110 (*)     Urea Nitrogen 3 (*)     All other components within normal limits   LACTIC ACID WHOLE BLOOD   LIPASE   CRP INFLAMMATION   CLOSTRIDIUM DIFFICILE TOXIN B       Imaging Studies: No results found for this or any previous visit (from the past 24 hour(s)).    Recent vital signs:   /79  Temp 98.7  F (37.1  C) (Oral)  Resp 16  Wt 56 kg (123 lb 8 oz)  LMP 08/08/2018  SpO2 97%  BMI 22.59 kg/m2    Cardiac Rhythm: Normal Sinus  Pt needs tele? No  Skin/wound Issues: None    Code Status: Full Code    Pain control: good    Nausea control: fair    Abnormal labs/tests/findings requiring intervention: Sx management. Enteric isolation until ruled out    Family present during ED course? Yes   Family Comments/Social Situation comments: two children aged 8 & 10 at home    Tasks needing completion: stool sample    Jacob Fowler, CRYSTAL  ascom-- [phone] [place] [facility]  [phone] [facility]

## 2018-08-18 NOTE — ED PROVIDER NOTES
History     Chief Complaint   Patient presents with     Abdominal Pain     Diarrhea     HPI  Charito Wilcox is a 38 year old female with a history of acute pancreatitis, stomach ulcer, IBS, GERD, and liver transplant (2000) who presents for evaluation of abdominal pain. Per chart review, patient was seen and evaluated here in the Emergency Department yesterday for identical symptoms including abdominal pain with associated nausea that initially began on Monday, five days ago, as well as watery diarrhea that began on Wednesday, three days ago. During that visit she was treated with a GI cocktail, IV Dilaudid, Zofran, and Bentyl with improvement though not complete resolution of her symptoms, and patient ultimately elected to be discharged home.    Today, she returns with complains of persistent burning abdominal pain, nausea, and diarrhea. She states she has been unable to keep down any PO intake or fluids for the past few days secondary to it causing excruciating pain. She reports her diarrhea seems to be resolving as it is decreasing in frequency and becoming more formed though she has still passed three diarrheal stools since midnight. Her pain has also caused difficulty sleeping secondary to pain. Her symptoms do remind her of similar pain she had associated with a stomach ulcer two years ago. She has been taking prescribed Prilosec 40 mg BID and Maalox without significant improvement. She denies fever, chills, diaphoresis, or shaking.     I have reviewed the Medications, Allergies, Past Medical and Surgical History, and Social History in the [employer] system.  Past Medical History:   Diagnosis Date     Acute pancreatitis 9/9/2016     Esophageal reflux      Fatigue, unspecified type 4/10/2017     Intractable nausea and vomiting 9/15/2016     Liver replaced by transplant (H) 2000    liver failure of unknown etiology, doing well with new liver     Moderate recurrent major depression (H) 12/2/2008     Beckie  smear of cervix with atypical squamous cells of undetermined significance (ASC-US)     negative HPV       Past Surgical History:   Procedure Laterality Date     C TRANSPLANT LIVER,HETERTOPIC      had appendectomy, gallbladder out with transplant.       SECTION  2008      SECTION  3/2010     ESOPHAGOSCOPY, GASTROSCOPY, DUODENOSCOPY (EGD), COMBINED Left 2015    Procedure: COMBINED ESOPHAGOSCOPY, GASTROSCOPY, DUODENOSCOPY (EGD), BIOPSY SINGLE OR MULTIPLE;  Surgeon: Ottoniel Martin MD;  Location:  GI     ESOPHAGOSCOPY, GASTROSCOPY, DUODENOSCOPY (EGD), COMBINED N/A 2016    Procedure: COMBINED ESOPHAGOSCOPY, GASTROSCOPY, DUODENOSCOPY (EGD), BIOPSY SINGLE OR MULTIPLE;  Surgeon: Homar Adams MD;  Location:  GI     ESOPHAGOSCOPY, GASTROSCOPY, DUODENOSCOPY (EGD), COMBINED N/A 2016    Procedure: COMBINED ESOPHAGOSCOPY, GASTROSCOPY, DUODENOSCOPY (EGD), BIOPSY SINGLE OR MULTIPLE;  Surgeon: Homar Adams MD;  Location:  GI     HIP SURGERY Right 2016    Torn labrum repair      TUBAL LIGATION  3/2010       Family History   Problem Relation Age of Onset     Depression Maternal Grandmother      Anxiety Disorder Maternal Grandmother      Hypertension Maternal Grandfather      Cerebrovascular Disease Paternal Grandmother      Breast Cancer Paternal Grandmother      GASTROINTESTINAL DISEASE No family hx of      C.A.D. No family hx of      Cancer - colorectal No family hx of      Prostate Cancer No family hx of      Alcohol/Drug No family hx of        Social History   Substance Use Topics     Smoking status: Never Smoker     Smokeless tobacco: Never Used     Alcohol use Yes      Comment: once per month       Current Facility-Administered Medications   Medication     dicyclomine (BENTYL) capsule 10 mg     Current Outpatient Prescriptions   Medication     ALPRAZolam (XANAX) 0.5 MG tablet     azaTHIOprine (IMURAN) 50 MG tablet     B Complex Vitamins  (VITAMIN B COMPLEX PO)     dicyclomine (BENTYL) 20 MG tablet     L-GLUTAMINE PO     Lactobacillus (ACIDOPHILUS EXTRA STRENGTH) CAPS     MELATONIN PO     omeprazole (PRILOSEC) 40 MG capsule     ondansetron (ZOFRAN ODT) 4 MG ODT tab     PARoxetine (PAXIL) 40 MG tablet     polyethylene glycol (MIRALAX) powder     tacrolimus (GENERIC EQUIVALENT) 1 MG capsule     ursodiol (ACTIGALL) 300 MG capsule        Allergies   Allergen Reactions     Gluten Meal Nausea and Vomiting     Milk Protein Extract Nausea and Vomiting       Review of Systems   Constitutional: Positive for appetite change (decreased). Negative for chills, diaphoresis and fever.   HENT: Negative for congestion.    Eyes: Negative for redness.   Respiratory: Negative for shortness of breath.    Cardiovascular: Negative for chest pain.   Gastrointestinal: Positive for abdominal pain, diarrhea, nausea and vomiting.   Genitourinary: Negative for difficulty urinating.   Musculoskeletal: Negative for arthralgias and neck stiffness.   Skin: Negative for color change.   Neurological: Negative for tremors and headaches.   Psychiatric/Behavioral: Negative for confusion.   All other systems reviewed and are negative.      Physical Exam   BP: 116/76  Heart Rate: 72  Temp: 98.7  F (37.1  C)  Resp: 16  Weight: 56 kg (123 lb 8 oz)  SpO2: 100 %      Physical Exam   Constitutional: No distress.   HENT:   Head: Atraumatic.   Mouth/Throat: Oropharynx is clear and moist. No oropharyngeal exudate.   Eyes: Pupils are equal, round, and reactive to light. No scleral icterus.   Cardiovascular: Normal heart sounds and intact distal pulses.    Pulmonary/Chest: Breath sounds normal. No respiratory distress.   Abdominal: Soft. Bowel sounds are normal. There is tenderness in the epigastric area.   Musculoskeletal: She exhibits no edema or tenderness.   Skin: Skin is warm. No rash noted. She is not diaphoretic.       ED Course     ED Course     Procedures       12:32 PM  The patient was seen  and examined by Dr. Castano in Room 18.     Results for orders placed or performed during the hospital encounter of 08/18/18 (from the past 24 hour(s))   CBC with platelets differential   Result Value Ref Range    WBC 6.0 4.0 - 11.0 10e9/L    RBC Count 3.93 3.8 - 5.2 10e12/L    Hemoglobin 13.8 11.7 - 15.7 g/dL    Hematocrit 38.6 35.0 - 47.0 %    MCV 98 78 - 100 fl    MCH 35.1 (H) 26.5 - 33.0 pg    MCHC 35.8 31.5 - 36.5 g/dL    RDW 12.1 10.0 - 15.0 %    Platelet Count 170 150 - 450 10e9/L    Diff Method Automated Method     % Neutrophils 65.9 %    % Lymphocytes 22.2 %    % Monocytes 7.7 %    % Eosinophils 3.2 %    % Basophils 0.8 %    % Immature Granulocytes 0.2 %    Nucleated RBCs 0 0 /100    Absolute Neutrophil 3.9 1.6 - 8.3 10e9/L    Absolute Lymphocytes 1.3 0.8 - 5.3 10e9/L    Absolute Monocytes 0.5 0.0 - 1.3 10e9/L    Absolute Eosinophils 0.2 0.0 - 0.7 10e9/L    Absolute Basophils 0.1 0.0 - 0.2 10e9/L    Abs Immature Granulocytes 0.0 0 - 0.4 10e9/L    Absolute Nucleated RBC 0.0    Comprehensive metabolic panel   Result Value Ref Range    Sodium 141 133 - 144 mmol/L    Potassium 3.9 3.4 - 5.3 mmol/L    Chloride 110 (H) 94 - 109 mmol/L    Carbon Dioxide 26 20 - 32 mmol/L    Anion Gap 6 3 - 14 mmol/L    Glucose 90 70 - 99 mg/dL    Urea Nitrogen 3 (L) 7 - 30 mg/dL    Creatinine 0.78 0.52 - 1.04 mg/dL    GFR Estimate 84 >60 mL/min/1.7m2    GFR Estimate If Black >90 >60 mL/min/1.7m2    Calcium 8.7 8.5 - 10.1 mg/dL    Bilirubin Total 0.5 0.2 - 1.3 mg/dL    Albumin 3.4 3.4 - 5.0 g/dL    Protein Total 6.8 6.8 - 8.8 g/dL    Alkaline Phosphatase 137 40 - 150 U/L    ALT 24 0 - 50 U/L    AST 26 0 - 45 U/L   Lipase   Result Value Ref Range    Lipase 283 73 - 393 U/L   CRP inflammation   Result Value Ref Range    CRP Inflammation 6.7 0.0 - 8.0 mg/L   Lactic acid   Result Value Ref Range    Lactic Acid 0.8 0.7 - 2.0 mmol/L     Medications   dicyclomine (BENTYL) capsule 10 mg (10 mg Oral Given 8/18/18 1325)   naloxone (NARCAN)  injection 0.1-0.4 mg (not administered)   melatonin tablet 1 mg (not administered)   lidocaine (viscous) (XYLOCAINE) 2 % 15 mL, alum & mag hydroxide-simethicone (MYLANTA ES/MAALOX  ES) 15 mL GI Cocktail (30 mLs Oral Given 8/18/18 1258)   HYDROmorphone (PF) (DILAUDID) injection 1 mg (1 mg Intravenous Given 8/18/18 1258)   pantoprazole (PROTONIX) 40 mg IV push injection (40 mg Intravenous Given 8/18/18 1310)   0.9% sodium chloride BOLUS (2,000 mLs Intravenous New Bag 8/18/18 1359)            Labs Ordered and Resulted from Time of ED Arrival Up to the Time of Departure from the ED   CBC WITH PLATELETS DIFFERENTIAL - Abnormal; Notable for the following:        Result Value    MCH 35.1 (*)     All other components within normal limits   COMPREHENSIVE METABOLIC PANEL - Abnormal; Notable for the following:     Chloride 110 (*)     Urea Nitrogen 3 (*)     All other components within normal limits   LIPASE   CRP INFLAMMATION   LACTIC ACID WHOLE BLOOD   CLOSTRIDIUM DIFFICILE TOXIN B          Medications   dicyclomine (BENTYL) capsule 10 mg (10 mg Oral Given 8/18/18 1325)   naloxone (NARCAN) injection 0.1-0.4 mg (not administered)   melatonin tablet 1 mg (not administered)   lidocaine (viscous) (XYLOCAINE) 2 % 15 mL, alum & mag hydroxide-simethicone (MYLANTA ES/MAALOX  ES) 15 mL GI Cocktail (30 mLs Oral Given 8/18/18 1258)   HYDROmorphone (PF) (DILAUDID) injection 1 mg (1 mg Intravenous Given 8/18/18 1258)   pantoprazole (PROTONIX) 40 mg IV push injection (40 mg Intravenous Given 8/18/18 1310)   0.9% sodium chloride BOLUS (2,000 mLs Intravenous New Bag 8/18/18 1359)       Assessments & Plan (with Medical Decision Making)   38-year-old female with history of liver transplant presents for evaluation of epigastric pain.  Differential includes pancreatitis, bowel obstruction, gastritis, ulcer, gastroenteritis, other.  Initial exam revealed stable vital signs with epigastric tenderness.  Laboratories were obtained including lactic  acid, lipase, CRP, CBC and comprehensive metabolic panel all of which were normal with exception of slightly elevated chloride.  In the emergency room, patient was treated with IV normal saline, Bentyl, Dilaudid, Protonix and GI cocktail with some relief of discomfort.  Patient has had similar symptoms in the past associated with gastric ulcer.  The case was discussed at length with internal medicine who has agreed to accept the patient as an admission for further evaluation and management.    I have reviewed the nursing notes.    I have reviewed the findings, diagnosis, plan and need for follow up with the patient.    New Prescriptions    No medications on file       Final diagnoses:   Abdominal pain, epigastric   I, Kari Summers, am serving as a trained medical scribe to document services personally performed by Edgar Castano MD, based on the provider's statements to me.   I, Edgar Castano MD, was physically present and have reviewed and verified the accuracy of this note documented by Kari Summers.      8/18/2018   [facility], [place], EMERGENCY DEPARTMENT     Kamar Castano MD  08/18/18 152

## 2018-08-18 NOTE — IP AVS SNAPSHOT
[address] [address]    Phone:  [phone]                                       After Visit Summary   8/18/2018    Charito Wilcox    MRN: [id number]           After Visit Summary Signature Page     I have received my discharge instructions, and my questions have been answered. I have discussed any challenges I see with this plan with the nurse or doctor.    ..........................................................................................................................................  Patient/Patient Representative Signature      ..........................................................................................................................................  Patient Representative Print Name and Relationship to Patient    ..................................................               ................................................  Date                                            Time    ..........................................................................................................................................  Reviewed by Signature/Title    ...................................................              ..............................................  Date                                                            Time

## 2018-08-18 NOTE — ED TRIAGE NOTES
Pt arrived to the ED with c/o abdominal pain and diarrhea since Tuesday. Pt was evaluated in the ED yesterday and discharged. Per pt she has been taking the medications prescribed yesterday but symptoms have not resolved. Per pt she is unable to tolerate PO fluids. On arrival vitals stable, afebrile. Alert and oriented x4.

## 2018-08-18 NOTE — H&P
"  INTERNAL MEDICINE HISTORY AND PHYSICAL    Patient Name: Charito Wilcox  MRN# [id number]    Age: 38 year old  YOB: 1980      Date of Admission:8/18/2018  Date of Service: 8/18/2018  Admitting Team: Flaco Tovar         Chief Complaint:   Abdominal Pain         HPI:     Patient reports having pain in abdomen starting 4 days ago. It was only slight pain and intermittent then 3 days ago, pain was constant and associated with eating and drinking. Pain feels like \"burning\" and \"sharp\" associated with cramping, nausea, and nonbloody diarrhea. Most of the pain is lower abdominal though has been associated with some chest pain from heartburn. She's woken up in middle of the night the past few days with excruciating pain. She notes this feels a lot like when she had peptic ulcer disease in the past. She's been on PPI at home and received GI cocktail in ED yesterday with good relief. In ED yesterday, she was able to tolerate food and decision was made to discharge patient with Bentyl.  However upon getting home, she had resumption of pain. She's not been able to eat or drink very much over the course of the past week due to pain. She normally takes miralax daily in order to have bowel movements. She doesn't eat gluten, dairy, soy, pork in order to avoid bowel symptoms as she has long history of bowel symptoms.  She denies NSAID use.    She does report recent stress related to the 18th anniversary of her liver transplant and a party for 200 people that she had for this.  She is also training for a marathon and ran 7 miles the weekend prior to onset of her symptoms.      She reports she had peptic ulcer disease in the past diagnosed by EGD in 2016. ROS otherwise negative for fevers/chills, weight loss, weight gain, chest pain, SOB, abd pain, urinary symptoms,     ED course: Fluid resuscitation with GI cocktail    Otherwise, a complete ROS was performed and is negative unless noted in HPI       Past Medical " History:     Past Medical History:   Diagnosis Date     Acute pancreatitis 2016     Esophageal reflux      Intractable nausea and vomiting 9/15/2016     Liver replaced by transplant (H)     liver failure of unknown etiology     Moderate recurrent major depression (H) 2008     Papanicolaou smear of cervix with atypical squamous cells of undetermined significance (ASC-US)     negative HPV            Past Surgical History:     Past Surgical History:   Procedure Laterality Date     C TRANSPLANT LIVER,HETERTOPIC      had appendectomy, gallbladder out with transplant.       SECTION  2008      SECTION  3/2010     ESOPHAGOSCOPY, GASTROSCOPY, DUODENOSCOPY (EGD), COMBINED Left 2015    Procedure: COMBINED ESOPHAGOSCOPY, GASTROSCOPY, DUODENOSCOPY (EGD), BIOPSY SINGLE OR MULTIPLE;  Surgeon: Ottoniel Martin MD;  Location:  GI     ESOPHAGOSCOPY, GASTROSCOPY, DUODENOSCOPY (EGD), COMBINED N/A 2016    Procedure: COMBINED ESOPHAGOSCOPY, GASTROSCOPY, DUODENOSCOPY (EGD), BIOPSY SINGLE OR MULTIPLE;  Surgeon: Homar Adams MD;  Location:  GI     ESOPHAGOSCOPY, GASTROSCOPY, DUODENOSCOPY (EGD), COMBINED N/A 2016    Procedure: COMBINED ESOPHAGOSCOPY, GASTROSCOPY, DUODENOSCOPY (EGD), BIOPSY SINGLE OR MULTIPLE;  Surgeon: Homar Adams MD;  Location:  GI     HIP SURGERY Right 2016    Torn labrum repair      TUBAL LIGATION  3/2010            Social History:     Social History     Social History     Marital status:      Spouse name: N/A     Number of children: N/A     Years of education: N/A     Occupational History           Social History Main Topics     Smoking status: Never Smoker     Smokeless tobacco: Never Used     Alcohol use Yes      Comment: once per month     Drug use: No     Sexual activity: Yes     Partners: Male     Birth control/ protection: Female Surgical      Comment: tubal ligation     Other Topics Concern      Parent/Sibling W/ Cabg, Mi Or Angioplasty Before 65f 55m? No     Social History Narrative    How much exercise per week? 6 days, running    How much calcium per day? Leafy greens,veggies       How much caffeine per day? 2 cups coffee    How much vitamin D per day?     Do you/your family wear seatbelts?  Yes    Do you/your family use safety helmets? Yes    Do you/your family use sunscreen? Yes    Do you/your family keep firearms in the home? No    Do you/your family have a smoke detector(s)? Yes        Do you feel safe in your home? Yes    Has anyone ever touched you in an unwanted manner? No     Explain         July 31, 2014 Ashley Rod LPN                   Smoker status:  Never smoker  Alcohol use: 1/month  Drug use:  Denies  Lives with          Family History:     Family History   Problem Relation Age of Onset     Depression Maternal Grandmother      Anxiety Disorder Maternal Grandmother      Hypertension Maternal Grandfather      Cerebrovascular Disease Paternal Grandmother      Breast Cancer Paternal Grandmother      GASTROINTESTINAL DISEASE No family hx of      C.A.D. No family hx of      Cancer - colorectal No family hx of      Prostate Cancer No family hx of      Alcohol/Drug No family hx of              Allergies:     Allergies   Allergen Reactions     Gluten Meal Nausea and Vomiting     Milk Protein Extract Nausea and Vomiting            Medications:     Prior to Admission Medications   Prescriptions Last Dose Informant Patient Reported? Taking?   ALPRAZolam (XANAX) 0.5 MG tablet 8/17/2018 at PM  No Yes   Sig: Take 1 tablet (0.5 mg) by mouth 3 times daily as needed for anxiety 1 month supply   Lactobacillus (ACIDOPHILUS EXTRA STRENGTH) CAPS 8/14/2018 at AM Self Yes No   Sig: Take by mouth daily    PARoxetine (PAXIL) 40 MG tablet 8/17/2018 at PM  No Yes   Sig: TAKE 1 AND ONE-HALF TABLET BY MOUTH AT BEDTIME   alum & mag hydroxide-simethicone (MYLANTA/MAALOX) 200-200-20 MG/5ML SUSP suspension Past  Week at PRN  Yes Yes   Sig: Take 15 mLs by mouth every 4 hours as needed   azaTHIOprine (IMURAN) 50 MG tablet 8/18/2018 at AM  No Yes   Sig: Take 1 tablet (50 mg) by mouth daily   diclofenac (VOLTAREN) 1 % GEL topical gel 8/15/2018 at Unknown  Yes Yes   Sig: Apply 2 g topically 2 times daily   dicyclomine (BENTYL) 20 MG tablet 8/18/2018 at AM  No Yes   Sig: Take 1 tablet (20 mg) by mouth 2 times daily for 10 days   ibuprofen (ADVIL/MOTRIN) 200 MG tablet Past Month at Unknown time  Yes Yes   Sig: Take 400 mg by mouth daily as needed for pain   melatonin 5 MG tablet Past Month at PRN  Yes Yes   Sig: Take 5 mg by mouth nightly as needed for sleep   omeprazole (PRILOSEC) 40 MG capsule 8/18/2018 at AM  No Yes   Sig: TAKE 1 CAPSULE BY MOUTH TWICE DAILY BEFORE MEALS, TAKE 30-60 MINUTES PRIOR TO A MEAL   ondansetron (ZOFRAN ODT) 4 MG ODT tab 8/17/2018 at PM  No Yes   Sig: Take 1-2 tablets (4-8 mg) by mouth 3 times daily (before meals)   polyethylene glycol (MIRALAX) powder 8/15/2018 at AM Self Yes No   Sig: Take 17 g (1 capful) by mouth daily   tacrolimus (GENERIC EQUIVALENT) 1 MG capsule 8/18/2018 at AM  No Yes   Sig: Take 1 capsule (1 mg) by mouth every 12 hours   ursodiol (ACTIGALL) 300 MG capsule 8/18/2018 at AM  No Yes   Sig: Take 1 capsule (300 mg) by mouth 2 times daily      Facility-Administered Medications: None             Physical Exam:   /79  Temp 98.7  F (37.1  C) (Oral)  Resp 16  Wt 56 kg (123 lb 8 oz)  LMP 08/08/2018  SpO2 97%  BMI 22.59 kg/m2    General: Pt lying comfortably in bed in NAD  HEEN: NCAT, PERRL, EOMI, No scleral icterus  Neck: No LAD, No masses/abnormalities. No JVD, No carotid bruits  Chest/Resp: CTAB; NL air movement; no crackles or wheezes or rhonchi  Heart/CV: RRR, normal S1, S2 without m/r/g, peripheral pulses present,   Abdomen/GI: Soft; mildly tender to palpation x4 quadrants, non-distended, bowel sounds present, no organomegaly  MSK: Normal strength and tone, no tenderness to  palpation  Skin: No rashes   Extremities: No edema in bilateral LE; moving all extremities; strength 5/5 throughout  Neurological: AOx3. No focal neuro deficits, strength and sensation equal bilaterally    Tubes/Lines/Devices:   Peripheral IV 08/18/18 Right Lower forearm (Active)   Site Assessment WDL 8/18/2018 12:45 PM   Line Status Saline locked 8/18/2018 12:45 PM   Number of days:0            Data:     Diagnostic Studies:  BMP/CMP:  Recent Labs  Lab 08/18/18  1245 08/17/18  1123 08/15/18  1016    141 139   POTASSIUM 3.9 4.0 4.1   CHLORIDE 110* 108 106   CO2 26 27 24   ANIONGAP 6 6 8   GLC 90 76 87   BUN 3* 8 10   CR 0.78 0.78 0.83   GFRESTIMATED 84 84 78   GFRESTBLACK >90 >90 >90   CARLITA 8.7 8.8 8.9   PROTTOTAL 6.8 7.2 7.4   ALBUMIN 3.4 3.5 3.8   BILITOTAL 0.5 0.6 0.9   ALKPHOS 137 136 138   AST 26 33 29   ALT 24 25 22     CBC:  Recent Labs  Lab 08/18/18  1245 08/17/18  1123 08/15/18  1016   WBC 6.0 6.5 8.9   RBC 3.93 4.21 4.15   HGB 13.8 14.7 14.4   HCT 38.6 41.9 41.6   MCV 98 100 100   MCH 35.1* 34.9* 34.7*   MCHC 35.8 35.1 34.6   RDW 12.1 12.3 11.8    172 212     INR:  Recent Labs  Lab 08/17/18  1123   INR 1.03     ABG:No lab results found in last 7 days.  Trop:  BNP:  TSH:  CK:     UA:  Recent Labs   Lab Test  08/15/18   1027  04/11/18   1300   COLOR  Yellow  Yellow   APPEARANCE  Slightly Cloudy  Clear   URINEGLC  Negative  Negative   URINEBILI  Negative  Negative   URINEKETONE  Negative  Negative   SG  1.005  1.015   UBLD  Negative  Negative   URINEPH  6.0  8.5*   PROTEIN  Negative  Negative   UROBILINOGEN   --   1.0   NITRITE  Negative  Negative   LEUKEST  Negative  Negative   RBCU  <1  O - 2   WBCU  <1  0 - 5     Blood Cx: pending  Urine Cx: pending    Imaging:  Results for orders placed or performed in visit on 11/22/17   US Abdomen Complete    Narrative    ULTRASOUND ABDOMEN COMPLETE  11/22/2017 8:00 AM     HISTORY: Mononucleosis. History of liver transplant.    COMPARISON: CT of the abdomen  and pelvis performed 9/8/2016.    FINDINGS: There is mild intrahepatic and moderate extrahepatic biliary  dilatation, with the common duct measuring up to 1.4 cm in diameter.  The biliary tree has a similar appearance compared to 9/8/2016, given  differences in modality. The entire common duct could not be  visualized on this exam. The gallbladder is not seen, consistent with  history of prior cholecystectomy. Postoperative changes of prior liver  transplantation. The liver is otherwise unremarkable. No focal hepatic  masses are seen. The kidneys are unremarkable, without evidence for  hydronephrosis. The spleen is unremarkable, measuring 10.8 cm in  length. Pancreas is partially obscured by overlying bowel gas, but  appears normal where seen. The abdominal aorta and IVC are of normal  caliber where visualized.      Impression    IMPRESSION:   1. Mild intra- and extrahepatic biliary dilatation, with no definite  cause identified. This finding is not significantly changed since the  CT of 9/8/2016, given differences in modality.  2. Prior cholecystectomy and liver transplantation.   3. Unremarkable spleen. No evidence for splenomegaly.    TOYIN HOROWITZ MD                Assessment and Plan:   Charito Wilcox is a 38 year old female with history of liver transplant (2000), stomach ulcer, IBS, GERD, acute pancreatitis (2016), intractable nausea and vomiting who presents with abdominal pain and diarrhea.      # Abdominal pain  # Diarrhea  Patient seen in ED yesterday and today for same complaint of lower abdominal pain and epigastric abdominal pain that radiates to back.  GI cocktail has provided temporary relief. CRP normal 6.7, Lipase 283, Lactate 0.8, LFTs normal, WBC normal.  Patient's  had similar episode of shorter duration one week prior.  Hx of deep gastric ulcer in 2016 neg for H pylori or EBV.    Differential for lower and epigastric abdominal pain includes: peptic ulcer disease, perforated peptic  ulcer, celiac disease, gastric malignancy, IBS, duodenal ulcer, chronic pancreatitis, drug-induced colitis with duodenitis, pancreatitis (tacrolimus), ovarian tosion, PID, ischemic bowel, cystitis, cholangitis, cholestasis.  Recurrent peptic ulcer is most likely given pain after eating, stress-induced ulcer is a possibility.  PUD with active IBS symptoms is certainly a possibility.  Her history of immunosuppression and history of acute pancreatitis might make gastric malignancy, chronic pancreatitis and cholangitis or cholestasis more likely, however, normal lipase and LFTs do not support the latter three.    --  - CBC  - Lipase  - LFTs  - UA  - Consult GI, appreciate recommendations regarding EGD for PUD  - If diarrhea persists, will consider stool ova & parasites  - GI Cocktail TID PRN  - Carafate TID PRN before meals  - Tylenol 325 mg Q4 PRN, 2000 mg daily limit  - Regular gluten-free diet, patient can start with clears and softs and advance diet as tolerated  - If patient is unable to tolerate PO liquids, will consider IVF    Chronic Problems:    # Liver Transplant  Unknown etiology, patient follow's with Dr. Maria, who thinks this was autoimmune hepatitis.  LFTs normal in ED  - continue PTA tacrolimus, azathioprine    # GERD  - continue PTA omeprazole    # IBS  Diagnosed 6 years ago.  Patient started on Bentyl yesterday in ED.      # Intractable nausea & vomiting syndrome  Patient on Zofran TID PRN at home.  Nausea is triggered mostly by foods.      # Panic Disorder  -Holding home alprazolam, will treat PRN if necessary    CODE: FULL  Diet/IVF: Gluten-free diet  DVT ppx: SCDs  Disposition/Admission Status: Able to tolerate food, pain under control  *Meds Held: Alprazolam, Mylanta, Voltaren    This patient was discussed with Dr. Johnson who agrees with the assessment and plan.      Chepe Nuñez, DO  PGY-1   Internal Medicine

## 2018-08-19 LAB
C DIFF TOX B STL QL: NEGATIVE
SPECIMEN SOURCE: NORMAL

## 2018-08-19 PROCEDURE — 25000128 H RX IP 250 OP 636: Performed by: STUDENT IN AN ORGANIZED HEALTH CARE EDUCATION/TRAINING PROGRAM

## 2018-08-19 PROCEDURE — 25000132 ZZH RX MED GY IP 250 OP 250 PS 637: Performed by: STUDENT IN AN ORGANIZED HEALTH CARE EDUCATION/TRAINING PROGRAM

## 2018-08-19 PROCEDURE — 87493 C DIFF AMPLIFIED PROBE: CPT | Performed by: EMERGENCY MEDICINE

## 2018-08-19 PROCEDURE — 25000125 ZZHC RX 250: Performed by: STUDENT IN AN ORGANIZED HEALTH CARE EDUCATION/TRAINING PROGRAM

## 2018-08-19 PROCEDURE — 25000131 ZZH RX MED GY IP 250 OP 636 PS 637: Performed by: STUDENT IN AN ORGANIZED HEALTH CARE EDUCATION/TRAINING PROGRAM

## 2018-08-19 PROCEDURE — 99233 SBSQ HOSP IP/OBS HIGH 50: CPT | Mod: GC | Performed by: INTERNAL MEDICINE

## 2018-08-19 PROCEDURE — 12000001 ZZH R&B MED SURG/OB UMMC

## 2018-08-19 PROCEDURE — G0378 HOSPITAL OBSERVATION PER HR: HCPCS

## 2018-08-19 RX ORDER — ALPRAZOLAM 0.25 MG
0.5 TABLET ORAL 3 TIMES DAILY PRN
Status: DISCONTINUED | OUTPATIENT
Start: 2018-08-19 | End: 2018-08-20 | Stop reason: HOSPADM

## 2018-08-19 RX ADMIN — DICYCLOMINE HYDROCHLORIDE 20 MG: 20 TABLET ORAL at 08:16

## 2018-08-19 RX ADMIN — OMEPRAZOLE 40 MG: 20 CAPSULE, DELAYED RELEASE ORAL at 15:38

## 2018-08-19 RX ADMIN — Medication 1 CAPSULE: at 08:15

## 2018-08-19 RX ADMIN — ONDANSETRON HYDROCHLORIDE 4 MG: 2 INJECTION, SOLUTION INTRAMUSCULAR; INTRAVENOUS at 15:38

## 2018-08-19 RX ADMIN — LIDOCAINE HYDROCHLORIDE 30 ML: 20 SOLUTION ORAL; TOPICAL at 10:33

## 2018-08-19 RX ADMIN — SUCRALFATE 1 G: 1 TABLET ORAL at 21:54

## 2018-08-19 RX ADMIN — SUCRALFATE 1 G: 1 TABLET ORAL at 08:15

## 2018-08-19 RX ADMIN — URSODIOL 300 MG: 300 CAPSULE ORAL at 08:15

## 2018-08-19 RX ADMIN — TACROLIMUS 1 MG: 1 CAPSULE ORAL at 17:28

## 2018-08-19 RX ADMIN — LIDOCAINE HYDROCHLORIDE 30 ML: 20 SOLUTION ORAL; TOPICAL at 18:53

## 2018-08-19 RX ADMIN — DICYCLOMINE HYDROCHLORIDE 20 MG: 20 TABLET ORAL at 20:40

## 2018-08-19 RX ADMIN — AZATHIOPRINE 50 MG: 50 TABLET ORAL at 08:16

## 2018-08-19 RX ADMIN — ALPRAZOLAM 0.5 MG: 0.25 TABLET ORAL at 17:28

## 2018-08-19 RX ADMIN — PROCHLORPERAZINE MALEATE 10 MG: 5 TABLET, FILM COATED ORAL at 00:03

## 2018-08-19 RX ADMIN — SUCRALFATE 1 G: 1 TABLET ORAL at 18:49

## 2018-08-19 RX ADMIN — SUCRALFATE 1 G: 1 TABLET ORAL at 14:23

## 2018-08-19 RX ADMIN — ACETAMINOPHEN 325 MG: 325 TABLET, FILM COATED ORAL at 15:38

## 2018-08-19 RX ADMIN — TACROLIMUS 1 MG: 1 CAPSULE ORAL at 06:29

## 2018-08-19 RX ADMIN — URSODIOL 300 MG: 300 CAPSULE ORAL at 20:40

## 2018-08-19 RX ADMIN — OMEPRAZOLE 40 MG: 20 CAPSULE, DELAYED RELEASE ORAL at 08:15

## 2018-08-19 RX ADMIN — ACETAMINOPHEN 325 MG: 325 TABLET, FILM COATED ORAL at 21:54

## 2018-08-19 RX ADMIN — ALPRAZOLAM 0.5 MG: 0.5 TABLET, EXTENDED RELEASE ORAL at 00:00

## 2018-08-19 RX ADMIN — PAROXETINE HYDROCHLORIDE 60 MG: 30 TABLET, FILM COATED ORAL at 21:54

## 2018-08-19 RX ADMIN — LIDOCAINE HYDROCHLORIDE 30 ML: 20 SOLUTION ORAL; TOPICAL at 14:24

## 2018-08-19 ASSESSMENT — ACTIVITIES OF DAILY LIVING (ADL)
ADLS_ACUITY_SCORE: 12

## 2018-08-19 ASSESSMENT — PAIN DESCRIPTION - DESCRIPTORS
DESCRIPTORS: ACHING
DESCRIPTORS: SHARP

## 2018-08-19 NOTE — PLAN OF CARE
Problem: Patient Care Overview  Goal: Plan of Care/Patient Progress Review  Outcome: No Change  VSS on RA. A&OX4. Patient admitted from ED with abdominal pain and not eating/drinking past few days. Minimal appetite. Tolerated a little food and water after carafate given. Ruling out C.diff- waiting to get stool sample. Tylenol given x1 for headache and GI cocktail given for abdominal pain. Up independently in room and makes needs known. Continue to monitor and follow POC.

## 2018-08-19 NOTE — CONSULTS
GASTROENTEROLOGY CONSULTATION      Date of Admission:  8/18/2018          ASSESSMENT AND RECOMMENDATIONS:   Assessment:  38 year old female with a history of acute liver failure s/p liver transplant 18 years ago on azathioprine and tacrolimus who also has history of IBS and gastric ulcer in 2016 here for acute abdominal pain over the last 4 days. This is in the setting of sick contact ( with a gastroenteritis last week).     Overall symptoms are likely originally due to a gastroenteritis that flared her IBS. She has had some improvement since admission. However, she has a history of gastric ulcer and this possibility of recurrence is providing significant anxiety for her. Therefore, we will continue supportive cares today and overnight. If she has dramatically improved she can be discharged tomorrow. If she does not we can perform an EGD tomorrow.    Her labs are otherwise unremarkable. Ddx includes pancreatitis but lipase is normal so this is much less likely.     Recommendations:  - protonix 40 mg PO BID (take 30-60 min before breakfast and dinner)  - carafate QID (separate from other medications by 2 hours)  - GI cocktail PRN  - NPO after MN for EGD in AM (however if she is doing very well tomorrow this can be canceled)  - Stool studies for enteric pathogens and c diff    Thank you for involving us in this patient's care. Please do not hesitate to contact the GI service with any questions or concerns.     Daryl Blank MD    [facility]  Division of Gastroenterology, Hepatology and Nutrition    -------------------------------------------------------------------------------------------------------------------          Chief Complaint:   We were asked by Dr. Johnson of internal medicine to evaluate this patient with epigastric pain    History is obtained from the patient and the medical record.          History of Present Illness:   Charito Wilcox is a 38 year old  female with a history of acute liver failure s/p liver transplant 18 years ago on azathioprine and tacrolimus, gastric ulcer (biopsies unremarkable), and IBS admitted after 4 days of loose stools and epigastric pain with PO intake.    She reports her  had similar symptoms last week that lasted about 2 days. About Wednesday she started having some loose stools (no blood or melena). She then had epigastric abdominal pain that was exacerbated by PO intake. No significant nausea and no vomiting.    Symptoms worsened so that she wasn't able to tolerate PO. She came into ED on Friday and labs were unremarkable. Treated symptomatically and went home. Symptoms returned shortly thereafter and she came back and was admitted.    Today she feels her pain is improved but not gone. She was able to tolerate some food today.    She is very anxious that her gastric ulcer has returned (symptoms are similar to episode in 2016). At that time she was evaluated and had elevated lipase. Initially felt to have pancreatitis though CT did not support this. Eventually EGD performed and gastric ulcer seen. Repeat EGD 2 months later showed ulcer had improved but was still present.    She denies NSAIDs. NO significant alcohol.            Past Medical History:   Reviewed and edited as appropriate  Past Medical History:   Diagnosis Date     Acute pancreatitis 2016     Esophageal reflux      Intractable nausea and vomiting 9/15/2016     Liver replaced by transplant (H)     liver failure of unknown etiology     Moderate recurrent major depression (H) 2008     Papanicolaou smear of cervix with atypical squamous cells of undetermined significance (ASC-US)     negative HPV     PUD - as above         Past Surgical History:   Reviewed and edited as appropriate   Past Surgical History:   Procedure Laterality Date     C TRANSPLANT LIVER,HETERTOPIC      had appendectomy, gallbladder out with transplant.       SECTION   2008      SECTION  3/2010     ESOPHAGOSCOPY, GASTROSCOPY, DUODENOSCOPY (EGD), COMBINED Left 2015    Procedure: COMBINED ESOPHAGOSCOPY, GASTROSCOPY, DUODENOSCOPY (EGD), BIOPSY SINGLE OR MULTIPLE;  Surgeon: Ottoniel Martin MD;  Location:  GI     ESOPHAGOSCOPY, GASTROSCOPY, DUODENOSCOPY (EGD), COMBINED N/A 2016    Procedure: COMBINED ESOPHAGOSCOPY, GASTROSCOPY, DUODENOSCOPY (EGD), BIOPSY SINGLE OR MULTIPLE;  Surgeon: Homar Adams MD;  Location:  GI     ESOPHAGOSCOPY, GASTROSCOPY, DUODENOSCOPY (EGD), COMBINED N/A 2016    Procedure: COMBINED ESOPHAGOSCOPY, GASTROSCOPY, DUODENOSCOPY (EGD), BIOPSY SINGLE OR MULTIPLE;  Surgeon: Homar Adams MD;  Location:  GI     HIP SURGERY Right 2016    Torn labrum repair      TUBAL LIGATION  3/2010            Previous Endoscopy:   No results found for this or any previous visit.         Social History:   Reviewed and edited as appropriate  Social History     Social History     Marital status:      Spouse name: N/A     Number of children: N/A     Years of education: N/A     Occupational History           Social History Main Topics     Smoking status: Never Smoker     Smokeless tobacco: Never Used     Alcohol use Yes      Comment: once per month     Drug use: No     Sexual activity: Yes     Partners: Male     Birth control/ protection: Female Surgical      Comment: tubal ligation     Other Topics Concern     Parent/Sibling W/ Cabg, Mi Or Angioplasty Before 65f 55m? No     Social History Narrative    How much exercise per week? 6 days, running    How much calcium per day? Leafy greens,veggies       How much caffeine per day? 2 cups coffee    How much vitamin D per day?     Do you/your family wear seatbelts?  Yes    Do you/your family use safety helmets? Yes    Do you/your family use sunscreen? Yes    Do you/your family keep firearms in the home? No    Do you/your family have a smoke  detector(s)? Yes        Do you feel safe in your home? Yes    Has anyone ever touched you in an unwanted manner? No     Explain         July 31, 2014 Ashley Rod LPN                        Family History:   Reviewed and edited as appropriate  Family History   Problem Relation Age of Onset     Depression Maternal Grandmother      Anxiety Disorder Maternal Grandmother      Hypertension Maternal Grandfather      Cerebrovascular Disease Paternal Grandmother      Breast Cancer Paternal Grandmother      GASTROINTESTINAL DISEASE No family hx of      C.A.D. No family hx of      Cancer - colorectal No family hx of      Prostate Cancer No family hx of      Alcohol/Drug No family hx of            Allergies:   Reviewed and edited as appropriate     Allergies   Allergen Reactions     Gluten Meal Nausea and Vomiting     Milk Protein Extract Nausea and Vomiting            Medications:     Prescriptions Prior to Admission   Medication Sig Dispense Refill Last Dose     ALPRAZolam (XANAX) 0.5 MG tablet Take 1 tablet (0.5 mg) by mouth 3 times daily as needed for anxiety 1 month supply 30 tablet 0 8/17/2018 at PM     alum & mag hydroxide-simethicone (MYLANTA/MAALOX) 200-200-20 MG/5ML SUSP suspension Take 15 mLs by mouth every 4 hours as needed   Past Week at PRN     azaTHIOprine (IMURAN) 50 MG tablet Take 1 tablet (50 mg) by mouth daily 90 tablet 3 8/18/2018 at AM     diclofenac (VOLTAREN) 1 % GEL topical gel Apply 2 g topically 2 times daily   8/15/2018 at Unknown     dicyclomine (BENTYL) 20 MG tablet Take 1 tablet (20 mg) by mouth 2 times daily for 10 days 20 tablet 0 8/18/2018 at AM     ibuprofen (ADVIL/MOTRIN) 200 MG tablet Take 400 mg by mouth daily as needed for pain   Past Month at Unknown time     lactobacillus rhamnosus, GG, (CULTURELL) capsule Take 1 capsule by mouth daily   8/14/2018 at AM     melatonin 5 MG tablet Take 5 mg by mouth nightly as needed for sleep   Past Month at PRN     omeprazole (PRILOSEC) 40 MG capsule  "TAKE 1 CAPSULE BY MOUTH TWICE DAILY BEFORE MEALS, TAKE 30-60 MINUTES PRIOR TO A MEAL 180 capsule 1 8/18/2018 at AM     ondansetron (ZOFRAN ODT) 4 MG ODT tab Take 1-2 tablets (4-8 mg) by mouth 3 times daily (before meals) 20 tablet 0 8/17/2018 at PM     PARoxetine (PAXIL) 40 MG tablet TAKE 1 AND ONE-HALF TABLET BY MOUTH AT BEDTIME 45 tablet 5 8/17/2018 at PM     tacrolimus (GENERIC EQUIVALENT) 1 MG capsule Take 1 capsule (1 mg) by mouth every 12 hours 180 capsule 3 8/18/2018 at AM     ursodiol (ACTIGALL) 300 MG capsule Take 1 capsule (300 mg) by mouth 2 times daily 180 capsule 3 8/18/2018 at AM     polyethylene glycol (MIRALAX) powder Take 17 g (1 capful) by mouth daily 510 g 1 8/15/2018 at AM             Review of Systems:   A complete review of systems was otherwise negative.           Physical Exam:   /70 (BP Location: Right arm)  Pulse 67  Temp 97.8  F (36.6  C) (Oral)  Resp 18  Ht 1.575 m (5' 2\")  Wt 56.8 kg (125 lb 3.2 oz)  LMP 08/08/2018  SpO2 96%  BMI 22.9 kg/m2  Wt:   Wt Readings from Last 2 Encounters:   08/18/18 56.8 kg (125 lb 3.2 oz)   08/17/18 54.4 kg (119 lb 14.4 oz)      Constitutional: cooperative, pleasant, not dyspneic/diaphoretic, no acute distress  Eyes: Sclera anicteric/injected  Ears/nose/mouth/throat: Normal oropharynx without ulcers or exudate, mucus membranes moist, hearing intact  CV: No edema  Respiratory: Unlabored breathing  Abd:  Nondistended, +bs, no hepatosplenomegaly, no peritoneal signs, very mild tenderness in epigastric region  Vikas: deferred  Skin: warm, perfused, no jaundice  Neuro: AAO x 3, No asterixis  Psych: Normal affect  MSK: Normal gait         Data:   BMP  Recent Labs  Lab 08/18/18  1245 08/17/18  1123 08/15/18  1016    141 139   POTASSIUM 3.9 4.0 4.1   CHLORIDE 110* 108 106   CARLITA 8.7 8.8 8.9   CO2 26 27 24   BUN 3* 8 10   CR 0.78 0.78 0.83   GLC 90 76 87     CBC  Recent Labs  Lab 08/18/18  1245 08/17/18  1123 08/15/18  1016   WBC 6.0 6.5 8.9   RBC " 3.93 4.21 4.15   HGB 13.8 14.7 14.4   HCT 38.6 41.9 41.6   MCV 98 100 100   MCH 35.1* 34.9* 34.7*   MCHC 35.8 35.1 34.6   RDW 12.1 12.3 11.8    172 212     INR  Recent Labs  Lab 08/17/18  1123   INR 1.03     LFTs  Recent Labs  Lab 08/18/18  1245 08/17/18  1123 08/15/18  1016   ALKPHOS 137 136 138   AST 26 33 29   ALT 24 25 22   BILITOTAL 0.5 0.6 0.9   PROTTOTAL 6.8 7.2 7.4   ALBUMIN 3.4 3.5 3.8      PANC  Recent Labs  Lab 08/18/18  1245 08/17/18  1123   LIPASE 283 205       Imaging:  None new

## 2018-08-19 NOTE — PLAN OF CARE
"Problem: Patient Care Overview  Goal: Plan of Care/Patient Progress Review  Outcome: Improving  VSS. Had decreased appetite. Tolerated breakfast \"better,\" with GI cocktail and carafate given prior to meal. Still reports some pain related to eating, but states it is better than before. Declined antiemetics. Up independently in room. Calm, no Xanax needed.   Plan for NPO at midnight for possible EGD tomorrow.       "

## 2018-08-19 NOTE — PROGRESS NOTES
INTERNAL MEDICINE PROGRESS NOTE       Assessment and Plan:     Charito Wilcox is a 38 year old female with history of liver transplant (200), peptic ulcer (2016), IBS, GERD, acute pancreatitis (2016), intractable nausea and vomiting who presents with four days of abdominal pain and diarrhea that resolved after first two days.        Changes Today:   - Patient has been able to eat some liquids (ie broth) since last night  - One episode of abdominal pain last night  - Panic attack and nausea last night that resolved with Xanax and compazine   - GI has evaluated patient and will perform inpatient EGD tomorrow   - Patient's admission status changed from observation to inpatient (see below for details)         # Abdominal Pain  Epigastric pain that is worsened after eating and drinking, as well as lower abdominal pain.  Symptoms started after  had similar illness one week prior.  GI cocktail has provided temporary relief. CRP normal 6.7, Lipase 283, Lactate 0.8, LFTs normal, WBC normal.  Patient's  had similar episode of shorter duration one week prior.  Hx of deep gastric ulcer in 2016 neg for H pylori or EBV.    Differential for lower and epigastric abdominal pain includes: PUD, perforated peptic ulcer, gastroenteritis, celiac disease, gastric malignancy, IBS, duodenal ulcer, chronic pancreatitis, drug-induced colitis with duodenitis, pancreatitis (tacrolimus), ovarian tosion, PID, ischemic bowel, cystitis, cholangitis, cholestasis.  Recurrent peptic ulcer is most likely given pain after eating, stress-induced ulcer is a possibility.  PUD with active IBS symptoms is certainly a possibility.  GI service suspects that gastroenteritis triggering IBS flare is most likely.    - Continue clear liquid diet  - Carafate before meals (seperate from other meds by 2 hrs, per GI reccs)  - GI cocktail TID PRN (patient may elect to use GI cocktail before meals instead of carafate if she finds better relief with  "this)  - If diarrhea returns, will obtain enteric bacterial and viral PCR panel  - GI evaluated patient and determined that she needs EGD for evaluation of PUD, their plan is to scope her tomorrow morning 8/20/18.  Due to this, patient's hospital status was changed from observation to full admission.      Chronic Problems:     # Liver Transplant  Unknown etiology, patient follow's with Dr. Maria, who thinks this was autoimmune hepatitis.  LFTs normal in ED  - continue PTA tacrolimus, azathioprine     # GERD  - continue PTA omeprazole     # IBS  Diagnosed 6 years ago.  Patient started on Bentyl yesterday in ED.       # Intractable nausea & vomiting syndrome  Patient on Zofran TID PRN at home.  Nausea is triggered mostly by foods.       # Panic Disorder  -Holding home alprazolam, will treat PRN if necessary     CODE: FULL  Diet/IVF: Gluten-free diet  DVT ppx: SCDs  Disposition/Admission Status: Able to tolerate food, pain under control  *Meds Held: Dennis Joyce    Patient staffed with Dr. Harjit Johnson, who agrees with above plans.    Chepe Nuñez, DO  PGY-1  Internal Medicine      Interval History:     - Patient has been able to eat some liquids (ie broth) since last night  - One episode of abdominal pain last night  - Panic attack and nausea last night that resolved with Xanax and compazine     Review of Systems:   A 4 point review of systems was negative except as noted above.    Objective:     Vitals:  /76 (BP Location: Right arm)  Pulse 67  Temp 98.6  F (37  C) (Oral)  Resp 16  Ht 1.575 m (5' 2\")  Wt 56.8 kg (125 lb 3.2 oz)  LMP 08/08/2018  SpO2 98%  BMI 22.9 kg/m2  Exam:  GEN: in NAD, pleasant, cooperative   HEENT: AT/NC, PERRLA, MMM, EOMI  CV: RRR no m/r/g  LUNGS: CTAB, no wheezes, rales  ABD: +BS, soft, mildly tender to palpation   EXT: Normal muscle bulk and tone, no LE edema  SKIN: W/D/I, no rash  NEURO: A&Ox3, strength and sensation equal bilaterally, no focal neuro deficits    Data: "     All labs and imaging reviewed by me

## 2018-08-19 NOTE — PLAN OF CARE
Problem: Patient Care Overview  Goal: Plan of Care/Patient Progress Review  Outcome: Improving  Pt complaining of moderate abdominal pain at beginning of shift. She requested MD be contacted to get order for pain med before going to bed. MD contacted and instructed to try tylenol. Pt refused tylenol and said she would be fine. Called to pt room shortly after and pt stated that she was having a panic attack and was very nauseated. Pt requested her home dose of xanax be ordered. MD contacted and 1x dose of xanax ordered. When writer went back to room, pt had already taken a dose that she had from home. Pt stated that she had 1 dose in her purse. Pharmacy dose not given and sent back to pharmacy. FYI sent to MD. Compazine given for nausea and pt has appeared to be sleeping well throughout night. No further complaints noted throughout night.

## 2018-08-20 ENCOUNTER — APPOINTMENT (OUTPATIENT)
Dept: CT IMAGING | Facility: CLINIC | Age: 38
DRG: 392 | End: 2018-08-20
Payer: COMMERCIAL

## 2018-08-20 VITALS
WEIGHT: 125.2 LBS | BODY MASS INDEX: 23.04 KG/M2 | SYSTOLIC BLOOD PRESSURE: 116 MMHG | HEART RATE: 71 BPM | DIASTOLIC BLOOD PRESSURE: 75 MMHG | HEIGHT: 62 IN | RESPIRATION RATE: 16 BRPM | OXYGEN SATURATION: 99 % | TEMPERATURE: 97.9 F

## 2018-08-20 LAB
C COLI+JEJUNI+LARI FUSA STL QL NAA+PROBE: NOT DETECTED
EC STX1 GENE STL QL NAA+PROBE: NOT DETECTED
EC STX2 GENE STL QL NAA+PROBE: NOT DETECTED
ENTERIC PATHOGEN COMMENT: NORMAL
HADV DNA # SPEC NAA+PROBE: ABNORMAL COPIES/ML
HADV DNA SPEC NAA+PROBE-LOG#: ABNORMAL LOG COPIES/ML
Lab: NORMAL
NOROV GI+II ORF1-ORF2 JNC STL QL NAA+PR: NOT DETECTED
RVA NSP5 STL QL NAA+PROBE: NOT DETECTED
SALMONELLA SP RPOD STL QL NAA+PROBE: NOT DETECTED
SHIGELLA SP+EIEC IPAH STL QL NAA+PROBE: NOT DETECTED
SPECIMEN SOURCE: ABNORMAL
UPPER GI ENDOSCOPY: NORMAL
V CHOL+PARA RFBL+TRKH+TNAA STL QL NAA+PR: NOT DETECTED
Y ENTERO RECN STL QL NAA+PROBE: NOT DETECTED

## 2018-08-20 PROCEDURE — 25000132 ZZH RX MED GY IP 250 OP 250 PS 637: Performed by: STUDENT IN AN ORGANIZED HEALTH CARE EDUCATION/TRAINING PROGRAM

## 2018-08-20 PROCEDURE — 87206 SMEAR FLUORESCENT/ACID STAI: CPT | Performed by: NURSE PRACTITIONER

## 2018-08-20 PROCEDURE — G0500 MOD SEDAT ENDO SERVICE >5YRS: HCPCS | Performed by: INTERNAL MEDICINE

## 2018-08-20 PROCEDURE — 25000128 H RX IP 250 OP 636: Performed by: STUDENT IN AN ORGANIZED HEALTH CARE EDUCATION/TRAINING PROGRAM

## 2018-08-20 PROCEDURE — 88305 TISSUE EXAM BY PATHOLOGIST: CPT | Performed by: INTERNAL MEDICINE

## 2018-08-20 PROCEDURE — 87329 GIARDIA AG IA: CPT | Performed by: NURSE PRACTITIONER

## 2018-08-20 PROCEDURE — 25000131 ZZH RX MED GY IP 250 OP 636 PS 637: Performed by: STUDENT IN AN ORGANIZED HEALTH CARE EDUCATION/TRAINING PROGRAM

## 2018-08-20 PROCEDURE — 43235 EGD DIAGNOSTIC BRUSH WASH: CPT | Performed by: INTERNAL MEDICINE

## 2018-08-20 PROCEDURE — 87449 NOS EACH ORGANISM AG IA: CPT | Performed by: INTERNAL MEDICINE

## 2018-08-20 PROCEDURE — 87506 IADNA-DNA/RNA PROBE TQ 6-11: CPT | Performed by: NURSE PRACTITIONER

## 2018-08-20 PROCEDURE — 99239 HOSP IP/OBS DSCHRG MGMT >30: CPT | Mod: GC | Performed by: INTERNAL MEDICINE

## 2018-08-20 PROCEDURE — 87177 OVA AND PARASITES SMEARS: CPT | Performed by: NURSE PRACTITIONER

## 2018-08-20 PROCEDURE — 25000125 ZZHC RX 250: Performed by: INTERNAL MEDICINE

## 2018-08-20 PROCEDURE — 74177 CT ABD & PELVIS W/CONTRAST: CPT

## 2018-08-20 PROCEDURE — 25000132 ZZH RX MED GY IP 250 OP 250 PS 637: Performed by: INTERNAL MEDICINE

## 2018-08-20 PROCEDURE — 87209 SMEAR COMPLEX STAIN: CPT | Performed by: NURSE PRACTITIONER

## 2018-08-20 PROCEDURE — 0DB68ZX EXCISION OF STOMACH, VIA NATURAL OR ARTIFICIAL OPENING ENDOSCOPIC, DIAGNOSTIC: ICD-10-PCS | Performed by: INTERNAL MEDICINE

## 2018-08-20 PROCEDURE — 88342 IMHCHEM/IMCYTCHM 1ST ANTB: CPT | Performed by: INTERNAL MEDICINE

## 2018-08-20 PROCEDURE — 87207 SMEAR SPECIAL STAIN: CPT | Performed by: NURSE PRACTITIONER

## 2018-08-20 PROCEDURE — 25000128 H RX IP 250 OP 636: Performed by: INTERNAL MEDICINE

## 2018-08-20 RX ORDER — IOPAMIDOL 755 MG/ML
77 INJECTION, SOLUTION INTRAVASCULAR ONCE
Status: COMPLETED | OUTPATIENT
Start: 2018-08-20 | End: 2018-08-20

## 2018-08-20 RX ORDER — DIPHENHYDRAMINE HYDROCHLORIDE 50 MG/ML
INJECTION INTRAMUSCULAR; INTRAVENOUS PRN
Status: DISCONTINUED | OUTPATIENT
Start: 2018-08-20 | End: 2018-08-20 | Stop reason: HOSPADM

## 2018-08-20 RX ORDER — FENTANYL CITRATE 50 UG/ML
INJECTION, SOLUTION INTRAMUSCULAR; INTRAVENOUS PRN
Status: DISCONTINUED | OUTPATIENT
Start: 2018-08-20 | End: 2018-08-20 | Stop reason: HOSPADM

## 2018-08-20 RX ORDER — MAGNESIUM HYDROXIDE/ALUMINUM HYDROXICE/SIMETHICONE 120; 1200; 1200 MG/30ML; MG/30ML; MG/30ML
15 SUSPENSION ORAL EVERY 4 HOURS PRN
Qty: 769 ML | Refills: 0 | Status: SHIPPED | OUTPATIENT
Start: 2018-08-20 | End: 2019-02-26

## 2018-08-20 RX ORDER — SIMETHICONE
LIQUID (ML) MISCELLANEOUS PRN
Status: DISCONTINUED | OUTPATIENT
Start: 2018-08-20 | End: 2018-08-20 | Stop reason: HOSPADM

## 2018-08-20 RX ORDER — SUCRALFATE 1 G/1
1 TABLET ORAL
Qty: 120 TABLET | Refills: 0 | Status: SHIPPED | OUTPATIENT
Start: 2018-08-20 | End: 2019-02-21

## 2018-08-20 RX ADMIN — Medication 1 CAPSULE: at 08:11

## 2018-08-20 RX ADMIN — AZATHIOPRINE 50 MG: 50 TABLET ORAL at 08:11

## 2018-08-20 RX ADMIN — DICYCLOMINE HYDROCHLORIDE 20 MG: 20 TABLET ORAL at 08:11

## 2018-08-20 RX ADMIN — OMEPRAZOLE 40 MG: 20 CAPSULE, DELAYED RELEASE ORAL at 08:12

## 2018-08-20 RX ADMIN — URSODIOL 300 MG: 300 CAPSULE ORAL at 08:11

## 2018-08-20 RX ADMIN — SUCRALFATE 1 G: 1 TABLET ORAL at 15:46

## 2018-08-20 RX ADMIN — TACROLIMUS 1 MG: 1 CAPSULE ORAL at 08:12

## 2018-08-20 RX ADMIN — ALPRAZOLAM 0.5 MG: 0.25 TABLET ORAL at 08:11

## 2018-08-20 RX ADMIN — ACETAMINOPHEN 325 MG: 325 TABLET, FILM COATED ORAL at 08:11

## 2018-08-20 RX ADMIN — OMEPRAZOLE 40 MG: 20 CAPSULE, DELAYED RELEASE ORAL at 15:46

## 2018-08-20 RX ADMIN — SODIUM CHLORIDE, POTASSIUM CHLORIDE, SODIUM LACTATE AND CALCIUM CHLORIDE 500 ML: 600; 310; 30; 20 INJECTION, SOLUTION INTRAVENOUS at 14:43

## 2018-08-20 RX ADMIN — IOPAMIDOL 77 ML: 755 INJECTION, SOLUTION INTRAVENOUS at 15:08

## 2018-08-20 ASSESSMENT — ACTIVITIES OF DAILY LIVING (ADL)
ADLS_ACUITY_SCORE: 12

## 2018-08-20 NOTE — UTILIZATION REVIEW
"  Admission Status; Secondary Review Determination     Admission Date: 8/18/2018 12:10 PM      Under the authority of the Utilization Management Committee, the utilization review process indicated a secondary review on the above patient.  The review outcome is based on review of the medical records, discussions with staff, and applying clinical experience noted on the date of the review.        (x)      Inpatient Status Appropriate - This patient's medical care is consistent with medical management for inpatient care and reasonable inpatient medical practice.      () Observation Status Appropriate - This patient does not meet hospital inpatient criteria and is placed in observation status. If this patient's primary payer is Medicare and was admitted as an inpatient, Condition Code 44 should be used and patient status changed to \"observation\".   () Admission Status NOT Appropriate - This patient's medical care is not consistent with medical management for Inpatient or Observation Status.          RATIONALE FOR DETERMINATION   Patient is a 38-year-old female with a past medical history significant for liver transplant, irritable bowel syndrome, and gastric ulcer.  She presented to the hospital with abdominal pain.  She initially required multiple doses of IV Dilaudid for pain control.  Has continued to require multiple doses of IV Zofran and Compazine for nausea control.  She has also now required upper GI endoscopy.  Due to this patient's complex past medical history, abdominal pain with need for multiple doses of IV Dilaudid, and need for multiple doses of IV Zofran and Compazine for nausea control, it is appropriate to have this patient admitted to the hospital as an inpatient.      The severity of illness, intensity of service provided, expected LOS and risk for adverse outcome make the care complex, high risk and appropriate for hospital admission.        The information on this document is developed by the " utilization review team in order for the business office to ensure compliance.  This only denotes the appropriateness of proper admission status and does not reflect the quality of care rendered.         The definitions of Inpatient Status and Observation Status used in making the determination above are those provided in the CMS Coverage Manual, Chapter 1 and Chapter 6, section 70.4.      Sincerely,     Catalino Rodríguez D.O.  Utilization Review/ Case Management  [facility].

## 2018-08-20 NOTE — PLAN OF CARE
Problem: Patient Care Overview  Goal: Plan of Care/Patient Progress Review    Patient returned from endo- per report scope looked normal took biopsies. Team paged per patient requesting IV fluids for feeling dehydrated- 500 bolus of LR ordered. CT abd/pelvis ordered STAT with contrast per EGD recommendations- pt can eat regular diet when returns.

## 2018-08-20 NOTE — PLAN OF CARE
Problem: Patient Care Overview  Goal: Plan of Care/Patient Progress Review    Patient left at this time for EGD procedure. Patient remains A&O, pleasant and up ad magdalena reporting abdominal pain, tylenol and xanax given as needed.

## 2018-08-20 NOTE — PROCEDURES
Gastroenterology Endoscopy Suite Brief Operative Note    Procedure:  Upper endoscopy   Post-operative diagnosis:  Normal exam   Staff Physician:  Dr. Daryl Blank   Fellow/Assistant(s):  NA    Specimens:  Please see final procedure note for further details.   Findings:  1. Normal esophagus  2. Normal stomach - biopsied  3. Normal duodenum   Complications:  None.   Condition:  Stable   Recommendations  Diet:  Return to previous diet  PPI:  PPI po BID  Anti-coagulants/platelets:  N/A  Octreotide:  N/A  Discharge Planning:   Post-infectious flare of IBS remains most likely cause of pain. However, if symptoms do not improve can consider CT abd/pelvis

## 2018-08-20 NOTE — PLAN OF CARE
Problem: Patient Care Overview  Goal: Plan of Care/Patient Progress Review  Outcome: No Change  VSS on RA. A&Ox4. Independent in room. Tylenol given X1 for abdominal pain. Zofran given x1 for nausea. Xanex given x1 for anxiety. Pt tolerated 2 pieces of toast and jello without pain. No BM this evening. Voiding well. NPO @ midnight for EGD tomorrow.

## 2018-08-20 NOTE — PLAN OF CARE
Problem: Patient Care Overview  Goal: Plan of Care/Patient Progress Review  Outcome: Adequate for Discharge Date Met: 08/20/18  Pt CT shows now acute changes in abdominal/pelvis. No more diagnostics at this time. PIV removed, belongings in hand, going home with family. Pt walking down to discharge pharmacy for medications. Pt adequate for discharge.

## 2018-08-20 NOTE — OR NURSING
EGD with biopsies completed, pt tolerated procedure well.  Sedation given per MD instruction. Tachycardic in 120s near end of procedure, MD aware, 110s post-procedure.  Pt denied pain at this time.  Okay to go back to unit per MD.  Report given per pt's floor nurse.

## 2018-08-20 NOTE — DISCHARGE SUMMARY
[facility]    Internal Medicine Discharge Summary- [facility]      Date of Admission:  8/18/2018  Date of Discharge:  8/20/2018  Discharging Attending Provider: Dr. Johnson  Discharge Team: [facility] 2      Discharge Diagnoses   Abdominal Pain  S/p Liver Transplant  GERD  IBS    Follow-ups Needed After Discharge   Follow up with Dr. Maria in Hepatology at already scheduled [facility]    Hospital Course   Charito Wilcox was admitted on 8/18/2018 for abdominal pain.  The following problems were addressed during her hospitalization:    # Abdominal Pain  # IBS  Patient with recurrent abdominal pain, lower and epigastric region which has caused her inability to eat. CRP, lactate, lipase, WBC and LFTs all normal as well as Cdiff and enteric panel. GI cocktail seemed to provide relief and allowed patient to eat. Gasteroenterology was consulted to performed EGD with normal findings on 8/20. Biopsy was taken. CT A/P was also done with normal findings save for 2.4cm ovarian cyst. Patient tolerating food on discharge. It's possible this pain is related to recent increase in stress levels along with IBD. Given relief with GI cocktail, could be component of reflux though not seen on EGD.  - discharged with GI cocktail and carafate to be used PRN  - Follow up with Dr. Maria for regularly scheduled appointment      # Liver Transplant  Unknown etiology though thought to be autoimmune, patient follow's with Dr. Maria. LFTs normal in ED  - continue PTA tacrolimus, azathioprine      # GERD  - continue PTA omeprazole      # Intractable nausea & vomiting syndrome  Patient on Zofran TID PRN at home.  Nausea is triggered mostly by foods.        # Panic Disorder  - cont pta alprazolam    Consultations This Hospital Stay   MEDICATION HISTORY IP PHARMACY CONSULT  GI LUMINAL ADULT IP CONSULT     Code Status   Full Code       The patient was discussed with Dr. Elizabeth Reza MD  Internal Medicine,  PGY2  Pager: [phone]  ______________________________________________________________________    Physical Exam   Vital Signs: Temp: 97.9  F (36.6  C) Temp src: Oral BP: 116/75 Pulse: 71 Heart Rate: 81 Resp: 16 SpO2: 99 % O2 Device: None (Room air)    Weight: 125 lbs 3.2 oz    GEN: in NAD, pleasant, cooperative   HEENT: AT/NC, PERRLA, MMM, EOMI  NECK: No thyroid mass appreciated   CV: RRR no m/r/g, no JVD  LUNGS: CTAB, no wheezes, rales  ABD: +BS, soft, ND, no HSM appreciated, slight pain with palpation at epigastrium  EXT: Normal muscle bulk and tone, no LE edema  SKIN: W/D/I, no rash  NEURO: A&Ox3, no focal neuro deficits    Significant Results and Procedures   Results for orders placed or performed during the hospital encounter of 08/18/18   CT Abdomen Pelvis w Contrast    Narrative    EXAMINATION: TEMPORARY, 8/20/2018 3:16 PM    TECHNIQUE:  Helical CT images from the lung bases through the  symphysis pubis were obtained with contrast.  Coronal reformatted  images were generated at a workstation for further assessment.    CONTRAST:  76 cc Isovue 370 IV.    COMPARISON: 9/8/2016.    HISTORY: 38-year-old female status post liver transplant with  abdominal pain, evaluate for abscess versus malignancy    FINDINGS:    Abdomen and pelvis:   Postsurgical changes of liver transplantation. Intra and extrahepatic  biliary dilatation which is minimally increased from 9/8/2016. The  gallbladder is surgically absent. Unremarkable appearance of the  pancreas, spleen, adrenal glands, and kidneys. No obstructing stones  or hydronephrosis. Mildly distended bladder. Retroverted uterus,  unchanged.    There is a 2.7 cm simple cyst in the right ovary. There is a small  amount of free fluid in the pelvis and surrounding the liver.    Normal diameter of the small bowel and colon with no bowel wall  thickening. The appendix is unremarkable. Mild colonic stool  burden.Trace ascites.    Lung bases: No consolidation or pleural  effusion.    Bones and soft tissues: No suspicious osseous lesions.      Impression    IMPRESSION:   1.  No acute abnormality in the abdomen or pelvis to explain patient's  pain.  2.  Stable postsurgical changes of liver transplantation with slightly  increased intra and extrahepatic biliary dilation dating back to  9/8/2016.  3.  There is a 2.7 cm simple cyst in the right ovary.  4.  Retroverted uterus.   5.  Trace ascites.       I have personally reviewed the examination and initial interpretation  and I agree with the findings.    KAYLA CAAL MD       Pending Results   These results will be followed up by Linda Valle and Dr. Maria with hepatology  Unresulted Labs Ordered in the Past 30 Days of this Admission     Date and Time Order Name Status Description    8/20/2018 1233 Surgical pathology exam In process     8/20/2018 0904 Adenovirus antigen stool In process     8/20/2018 0845 Cryptosporidium in stool stain In process     8/20/2018 0845 Microsporidia stool In process     8/20/2018 0845 Ova and Parasite Exam Routine In process     8/20/2018 0845 Giardia antigen In process              Primary Care Physician   Linda Valle    Discharge Disposition   Discharged to home  Condition at discharge: Stable    Discharge Orders     Reason for your hospital stay   Abdominal pain     Adult [facility] Follow-up and recommended labs and tests   Follow up with Dr. Maria at regularly scheduled appointment Friday    Appointments on [place] and/or [facility] (with [facility] or [facility] provider or service). Call [phone] if you haven't heard regarding these appointments within 7 days of discharge.     Activity   Your activity upon discharge: activity as tolerated     Discharge Instructions   Please take carafate and GI Cocktails as needed prior to eating to relieve pain  Follow up with Dr. Maria at already scheduled appointment     Full Code     Diet   Follow this diet upon discharge: Orders Placed This Encounter      Regular Diet Adult       Discharge Medications   Current Discharge Medication List      START taking these medications    Details   sucralfate (CARAFATE) 1 GM tablet Take 1 tablet (1 g) by mouth 4 times daily (before meals and nightly)  Qty: 120 tablet, Refills: 0    Associated Diagnoses: Abdominal pain, epigastric         CONTINUE these medications which have CHANGED    Details   alum & mag hydroxide-simethicone (MYLANTA/MAALOX) 200-200-20 MG/5ML SUSP suspension Take 15 mLs by mouth every 4 hours as needed  Qty: 769 mL, Refills: 0    Associated Diagnoses: Abdominal pain, epigastric         CONTINUE these medications which have NOT CHANGED    Details   ALPRAZolam (XANAX) 0.5 MG tablet Take 1 tablet (0.5 mg) by mouth 3 times daily as needed for anxiety 1 month supply  Qty: 30 tablet, Refills: 0    Comments: Profile Rx: patient will contact pharmacy when needed  Associated Diagnoses: Panic disorder      azaTHIOprine (IMURAN) 50 MG tablet Take 1 tablet (50 mg) by mouth daily  Qty: 90 tablet, Refills: 3    Associated Diagnoses: Liver replaced by transplant (H)      diclofenac (VOLTAREN) 1 % GEL topical gel Apply 2 g topically 2 times daily      ibuprofen (ADVIL/MOTRIN) 200 MG tablet Take 400 mg by mouth daily as needed for pain      lactobacillus rhamnosus, GG, (CULTURELL) capsule Take 1 capsule by mouth daily      melatonin 5 MG tablet Take 5 mg by mouth nightly as needed for sleep      omeprazole (PRILOSEC) 40 MG capsule TAKE 1 CAPSULE BY MOUTH TWICE DAILY BEFORE MEALS, TAKE 30-60 MINUTES PRIOR TO A MEAL  Qty: 180 capsule, Refills: 1    Associated Diagnoses: Chronic gastric ulcer without hemorrhage and without perforation      ondansetron (ZOFRAN ODT) 4 MG ODT tab Take 1-2 tablets (4-8 mg) by mouth 3 times daily (before meals)  Qty: 20 tablet, Refills: 0    Comments: Profile Rx: patient will contact pharmacy when needed  Associated Diagnoses: Nausea; Liver replaced by transplant (H)      PARoxetine (PAXIL) 40 MG  tablet TAKE 1 AND ONE-HALF TABLET BY MOUTH AT BEDTIME  Qty: 45 tablet, Refills: 5    Associated Diagnoses: Panic disorder      tacrolimus (GENERIC EQUIVALENT) 1 MG capsule Take 1 capsule (1 mg) by mouth every 12 hours  Qty: 180 capsule, Refills: 3    Associated Diagnoses: Liver replaced by transplant (H)      ursodiol (ACTIGALL) 300 MG capsule Take 1 capsule (300 mg) by mouth 2 times daily  Qty: 180 capsule, Refills: 3    Associated Diagnoses: Liver replaced by transplant (H)      polyethylene glycol (MIRALAX) powder Take 17 g (1 capful) by mouth daily  Qty: 510 g, Refills: 1    Associated Diagnoses: Other constipation         STOP taking these medications       dicyclomine (BENTYL) 20 MG tablet Comments:   Reason for Stopping:             Allergies   Allergies   Allergen Reactions     Gluten Meal Nausea and Vomiting     Milk Protein Extract Nausea and Vomiting

## 2018-08-20 NOTE — PLAN OF CARE
Problem: Patient Care Overview  Goal: Plan of Care/Patient Progress Review  Outcome: Improving  5813-0611: A&Ox4, VSS on RA. Independent in room. C/o abd pain- declined tylenol. 1 BM. No c/p nausea overnight.  at bedside. NPO at MN for EGD today. Will continue to monitor and follow POC

## 2018-08-20 NOTE — PROGRESS NOTES
GASTROENTEROLOGY PROGRESS NOTE       Patient Summary   Charito Wilcox is a 38 year old female with a past medical history  acute liver failure s/p liver transplant 18 years ago on azathioprine and tacrolimus who also has history of IBS and gastric ulcer in 2016 here for acute abdominal pain over the last 4 days. This is in the setting of sick contact ( with a gastroenteritis last week).        ASSESSMENT AND RECOMMENDATIONS:   C. Diff came back negative. CMP, CBC, CRP lipase and lactate are unremarkable. Overall symptoms are likely originally due to a gastroenteritis that flared her IBS. However, patient is persistent that the abdominal pain being consistent with her previous abdominal pain that she had with the PUD. Plan is to proceed with EGD to rule out PUD.     Recommendations  --NPO for EGD  --Continue with protonix 40 mg PO BID (take 30-60 min before breakfast and dinner), and carafate QID (separate from other medications by 2 hours)  --Enteric pathogens, giardia, ova/parasite, crypto/microspora and adenovirus are pending   --Daily fiber to alleviate the diarrhea   --If patient complains of constipation, consifder giving Miralax     Pt care plan discussed with Dr. Blank, GI staff physician. Thank you for involving us in this patient's care. Please do not hesitate to contact the GI service with any questions or concerns.    LEE ANN Patel [facility]  Department of Gastroenterology   P: [phone]  Subjective\events within the 24 hours:   Abdominal pain is slightly better, what bothers her the most is the uncontrollable diarrhea that comes with all oral intake.       4 point ROS performed and negative unless noted above.           Medications:     Current Facility-Administered Medications   Medication     acetaminophen (TYLENOL) tablet 325 mg     ALPRAZolam (XANAX) tablet 0.5 mg     azaTHIOprine (IMURAN) tablet 50 mg     dicyclomine (BENTYL) tablet 20 mg     lactobacillus rhamnosus (GG)  "(CULTURELL) capsule 1 capsule     lidocaine (viscous) (XYLOCAINE) 2 % 15 mL, alum & mag hydroxide-simethicone (MYLANTA ES/MAALOX  ES) 15 mL GI Cocktail     melatonin tablet 5 mg     methylcellulose (CITRUCEL) tablet 500 mg     metoclopramide (REGLAN) tablet 10 mg    Or     metoclopramide (REGLAN) injection 10 mg     naloxone (NARCAN) injection 0.1-0.4 mg     omeprazole (priLOSEC) CR capsule 40 mg     ondansetron (ZOFRAN-ODT) ODT tab 4 mg    Or     ondansetron (ZOFRAN) injection 4 mg     PARoxetine (PAXIL) tablet 60 mg     polyethylene glycol (MIRALAX/GLYCOLAX) Packet 17 g     prochlorperazine (COMPAZINE) injection 10 mg    Or     prochlorperazine (COMPAZINE) tablet 10 mg    Or     prochlorperazine (COMPAZINE) Suppository 25 mg     simethicone (MYLICON) suspension     sucralfate (CARAFATE) tablet 1 g     tacrolimus (GENERIC EQUIVALENT) capsule 1 mg     ursodiol (ACTIGALL) capsule 300 mg        Physical Exam   Blood pressure 122/81, pulse 71, temperature 98  F (36.7  C), temperature source Oral, resp. rate 16, height 1.575 m (5' 2\"), weight 56.8 kg (125 lb 3.2 oz), last menstrual period 08/08/2018, SpO2 98 %.  Constitutional: cooperative, pleasant, not dyspneic/diaphoretic, no acute distress  Eyes: Sclera anicteric/injected  Ears/nose/mouth/throat: Normal oropharynx without ulcers or exudate, mucus membranes moist, hearing intact  CV: RRR. No edema in LE bilaterally   Respiratory: Unlabored breathing  Abd:  Nondistended, +bs, no hepatosplenomegaly, no peritoneal signs, very mild tenderness in epigastric region  Skin: warm, perfused, no jaundice  Neuro: AAO x 3, No asterixis  Psych: Normal affect  MSK: Normal gait    Data   Current Labs  CBC  Recent Labs  Lab 08/18/18  1245 08/17/18  1123 08/15/18  1016   WBC 6.0 6.5 8.9   RBC 3.93 4.21 4.15   HGB 13.8 14.7 14.4   HCT 38.6 41.9 41.6   MCV 98 100 100   MCH 35.1* 34.9* 34.7*   MCHC 35.8 35.1 34.6   RDW 12.1 12.3 11.8    172 212     BMP  Recent Labs  Lab " 08/18/18  1245 08/17/18  1123 08/15/18  1016    141 139   POTASSIUM 3.9 4.0 4.1   CHLORIDE 110* 108 106   CO2 26 27 24   ANIONGAP 6 6 8   GLC 90 76 87   BUN 3* 8 10   CR 0.78 0.78 0.83   GFRESTIMATED 84 84 78   GFRESTBLACK >90 >90 >90   CARLITA 8.7 8.8 8.9      INR  Recent Labs  Lab 08/17/18  1123   INR 1.03     Liver panel  Recent Labs  Lab 08/18/18  1245 08/17/18  1123 08/15/18  1016   PROTTOTAL 6.8 7.2 7.4   ALBUMIN 3.4 3.5 3.8   BILITOTAL 0.5 0.6 0.9   ALKPHOS 137 136 138   AST 26 33 29   ALT 24 25 22          History of Present Illness:   Charito Wilcox is a 38 year old female with a history of acute liver failure s/p liver transplant 18 years ago on azathioprine and tacrolimus, gastric ulcer (biopsies unremarkable), and IBS admitted after 4 days of loose stools and epigastric pain with PO intake.     She reports her  had similar symptoms last week that lasted about 2 days. About Wednesday she started having some loose stools (no blood or melena). She then had epigastric abdominal pain that was exacerbated by PO intake. No significant nausea and no vomiting.     Symptoms worsened so that she wasn't able to tolerate PO. She came into ED on Friday and labs were unremarkable. Treated symptomatically and went home. Symptoms returned shortly thereafter and she came back and was admitted.     Today she feels her pain is improved but not gone. She was able to tolerate some food today.     She is very anxious that her gastric ulcer has returned (symptoms are similar to episode in 2016). At that time she was evaluated and had elevated lipase. Initially felt to have pancreatitis though CT did not support this. Eventually EGD performed and gastric ulcer seen. Repeat EGD 2 months later showed ulcer had improved but was still present.     She denies NSAIDs. No significant alcohol.

## 2018-08-21 ENCOUNTER — PATIENT OUTREACH (OUTPATIENT)
Dept: CARE COORDINATION | Facility: CLINIC | Age: 38
End: 2018-08-21

## 2018-08-21 LAB
G LAMBLIA AG STL QL IA: NORMAL
HADV AG STL QL IA: NEGATIVE
O+P STL MICRO: NORMAL
O+P STL MICRO: NORMAL
SPECIMEN SOURCE: NORMAL
SPECIMEN SOURCE: NORMAL

## 2018-08-22 LAB
MICROSPORID STL TRI STN: NORMAL
O+P STL CONC: NORMAL
SPECIMEN SOURCE: NORMAL
SPECIMEN SOURCE: NORMAL

## 2018-08-23 ENCOUNTER — TELEPHONE (OUTPATIENT)
Dept: TRANSPLANT | Facility: CLINIC | Age: 38
End: 2018-08-23

## 2018-08-23 LAB — COPATH REPORT: NORMAL

## 2018-08-23 NOTE — TELEPHONE ENCOUNTER
Patient Call: General  Route to LPN    Reason for call: Pt left VM 8/23/18 at 10:54 am. Please return call when available.     Call back needed? Yes    Return Call Needed  Same as documented in contacts section  When to return call?: Greater than one day: Route standard priority

## 2018-08-28 ENCOUNTER — MYC MEDICAL ADVICE (OUTPATIENT)
Dept: FAMILY MEDICINE | Facility: CLINIC | Age: 38
End: 2018-08-28

## 2018-08-28 NOTE — TELEPHONE ENCOUNTER
8/23/2018, late note  Spoke at length with Charito about stomach pains that she still has after being in the hospital.  Reviewed with Charito recommendations per  who did the endoscopy, and the results of the endoscopy.  Also reviewed with her his recommendations for her stomach pain, protonix  ( she is taking prilosec), sulcrafate, GI cocktail.    Charito reports that she has the antispasmodic medication dicyclomine, was not told to take it after she got home but has tried it and it does help.   Reviewed/instructed in taking these meds.  Plan to recheck with her and to let  know of continued stomach pains.

## 2018-08-29 ENCOUNTER — MYC MEDICAL ADVICE (OUTPATIENT)
Dept: FAMILY MEDICINE | Facility: CLINIC | Age: 38
End: 2018-08-29

## 2018-08-29 DIAGNOSIS — Z94.4 LIVER REPLACED BY TRANSPLANT (H): ICD-10-CM

## 2018-08-29 DIAGNOSIS — R11.0 NAUSEA: ICD-10-CM

## 2018-08-29 DIAGNOSIS — F41.0 PANIC DISORDER: ICD-10-CM

## 2018-08-29 RX ORDER — ONDANSETRON 4 MG/1
4-8 TABLET, ORALLY DISINTEGRATING ORAL
Qty: 20 TABLET | Refills: 0 | Status: SHIPPED | OUTPATIENT
Start: 2018-08-29 | End: 2019-02-21

## 2018-08-29 RX ORDER — ALPRAZOLAM 0.5 MG
0.5 TABLET ORAL 3 TIMES DAILY PRN
Qty: 30 TABLET | Refills: 0 | Status: SHIPPED | OUTPATIENT
Start: 2018-08-29 | End: 2019-06-23

## 2018-08-29 NOTE — TELEPHONE ENCOUNTER
PN,  Please see below and advise.  Thanks,  Luz Elena Pal RN      Controlled Substance Refill Request for xanax    Last refill 3/1/18:     Last clinic visit: 5/9/18     Clinic visit frequency required: unkown  Next appt: unkown    Controlled substance agreement on file: No.    Documentation in problem list reviewed:  Yes- with other provider information listed in snapshot/problem list for benzo refills     Processing:  Fax Rx to below pharmacy    [place] reviewed:  reviewed- no concerns  [place]:  [email or web address]      Requested Prescriptions   Pending Prescriptions Disp Refills     ALPRAZolam (XANAX) 0.5 MG tablet 30 tablet 0     Sig: Take 1 tablet (0.5 mg) by mouth 3 times daily as needed for anxiety 1 month supply    There is no refill protocol information for this order        ondansetron (ZOFRAN ODT) 4 MG ODT tab 20 tablet 0     Sig: Take 1-2 tablets (4-8 mg) by mouth 3 times daily (before meals)    There is no refill protocol information for this order

## 2018-08-31 ENCOUNTER — HOSPITAL ENCOUNTER (OUTPATIENT)
Facility: AMBULATORY SURGERY CENTER | Age: 38
End: 2018-08-31
Attending: INTERNAL MEDICINE
Payer: COMMERCIAL

## 2018-08-31 ENCOUNTER — OFFICE VISIT (OUTPATIENT)
Dept: GASTROENTEROLOGY | Facility: CLINIC | Age: 38
End: 2018-08-31
Attending: INTERNAL MEDICINE
Payer: COMMERCIAL

## 2018-08-31 VITALS
SYSTOLIC BLOOD PRESSURE: 116 MMHG | HEIGHT: 62 IN | DIASTOLIC BLOOD PRESSURE: 75 MMHG | RESPIRATION RATE: 16 BRPM | BODY MASS INDEX: 22.71 KG/M2 | WEIGHT: 123.4 LBS | HEART RATE: 79 BPM

## 2018-08-31 DIAGNOSIS — Z94.4 LIVER REPLACED BY TRANSPLANT (H): ICD-10-CM

## 2018-08-31 DIAGNOSIS — K58.1 IRRITABLE BOWEL SYNDROME WITH CONSTIPATION: Primary | ICD-10-CM

## 2018-08-31 LAB
ALBUMIN SERPL-MCNC: 3.7 G/DL (ref 3.4–5)
ALP SERPL-CCNC: 157 U/L (ref 40–150)
ALT SERPL W P-5'-P-CCNC: 23 U/L (ref 0–50)
ANION GAP SERPL CALCULATED.3IONS-SCNC: 6 MMOL/L (ref 3–14)
AST SERPL W P-5'-P-CCNC: 28 U/L (ref 0–45)
BILIRUB DIRECT SERPL-MCNC: 0.1 MG/DL (ref 0–0.2)
BILIRUB SERPL-MCNC: 0.4 MG/DL (ref 0.2–1.3)
BUN SERPL-MCNC: 10 MG/DL (ref 7–30)
CALCIUM SERPL-MCNC: 8.4 MG/DL (ref 8.5–10.1)
CHLORIDE SERPL-SCNC: 106 MMOL/L (ref 94–109)
CO2 SERPL-SCNC: 25 MMOL/L (ref 20–32)
CREAT SERPL-MCNC: 0.73 MG/DL (ref 0.52–1.04)
ERYTHROCYTE [DISTWIDTH] IN BLOOD BY AUTOMATED COUNT: 11.8 % (ref 10–15)
GFR SERPL CREATININE-BSD FRML MDRD: >90 ML/MIN/1.7M2
GLUCOSE SERPL-MCNC: 88 MG/DL (ref 70–99)
HCT VFR BLD AUTO: 38 % (ref 35–47)
HGB BLD-MCNC: 13.3 G/DL (ref 11.7–15.7)
MCH RBC QN AUTO: 34.5 PG (ref 26.5–33)
MCHC RBC AUTO-ENTMCNC: 35 G/DL (ref 31.5–36.5)
MCV RBC AUTO: 98 FL (ref 78–100)
PLATELET # BLD AUTO: 223 10E9/L (ref 150–450)
POTASSIUM SERPL-SCNC: 4 MMOL/L (ref 3.4–5.3)
PROT SERPL-MCNC: 7.3 G/DL (ref 6.8–8.8)
RBC # BLD AUTO: 3.86 10E12/L (ref 3.8–5.2)
SODIUM SERPL-SCNC: 137 MMOL/L (ref 133–144)
TACROLIMUS BLD-MCNC: 3.7 UG/L (ref 5–15)
TME LAST DOSE: 2145 H
WBC # BLD AUTO: 6.9 10E9/L (ref 4–11)

## 2018-08-31 PROCEDURE — 80076 HEPATIC FUNCTION PANEL: CPT | Performed by: INTERNAL MEDICINE

## 2018-08-31 PROCEDURE — 36415 COLL VENOUS BLD VENIPUNCTURE: CPT | Performed by: INTERNAL MEDICINE

## 2018-08-31 PROCEDURE — 80048 BASIC METABOLIC PNL TOTAL CA: CPT | Performed by: INTERNAL MEDICINE

## 2018-08-31 PROCEDURE — 85027 COMPLETE CBC AUTOMATED: CPT | Performed by: INTERNAL MEDICINE

## 2018-08-31 PROCEDURE — G0463 HOSPITAL OUTPT CLINIC VISIT: HCPCS

## 2018-08-31 PROCEDURE — 80197 ASSAY OF TACROLIMUS: CPT | Performed by: INTERNAL MEDICINE

## 2018-08-31 RX ORDER — DICYCLOMINE HCL 20 MG
20 TABLET ORAL 4 TIMES DAILY PRN
Qty: 60 TABLET | Refills: 3 | Status: SHIPPED | OUTPATIENT
Start: 2018-08-31 | End: 2019-02-21

## 2018-08-31 ASSESSMENT — PAIN SCALES - GENERAL: PAINLEVEL: NO PAIN (0)

## 2018-08-31 NOTE — NURSING NOTE
"Chief Complaint   Patient presents with     RECHECK     Liver tx follow up     /75  Pulse 79  Resp 16  Ht 1.575 m (5' 2\")  Wt 56 kg (123 lb 6.4 oz)  LMP 08/08/2018  BMI 22.57 kg/m2  LUCIA LINDQUIST CMA    "

## 2018-08-31 NOTE — MR AVS SNAPSHOT
After Visit Summary   8/31/2018    Charito Wilcox    MRN: [id number]           Patient Information     Date Of Birth          1980        Visit Information        Provider Department      8/31/2018 10:15 AM Roldan Maria MD [facility] Hep[facility]        Today's Diagnoses     Irritable bowel syndrome with constipation    -  1       Follow-ups after your visit        Additional Services     GASTROENTEROLOGY ADULT REF PROCEDURE ONLY [facility] [phone]       Last Lab Result: Creatinine (mg/dL)       Date                     Value                 08/31/2018               0.73             ----------  Body mass index is 22.57 kg/(m^2).     Needed:  No  Language:  English    Patient will be contacted to schedule procedure.     Please be aware that coverage of these services is subject to the terms and limitations of your health insurance plan.  Call member services at your health plan with any benefit or coverage questions.  Any procedures must be performed at a [place] facility OR coordinated by your clinic's referral office.    Please bring the following with you to your appointment:    (1) Any X-Rays, CTs or MRIs which have been performed.  Contact the facility where they were done to arrange for  prior to your scheduled appointment.    (2) List of current medications   (3) This referral request   (4) Any documents/labs given to you for this referral                  Follow-up notes from your care team     Return in about 6 months (around 2/28/2019).      Your next 10 appointments already scheduled     Sep 12, 2018   Procedure with Colin Odell MD   [facility] ([facility])    [address] [place] [address]  5th [place] [address]   [phone]           Located in the Clinics and Surgery Center at [address] [place] [address].   parking is very convenient and highly recommended.  is a $6  flat rate fee.  Both  and self parkers should enter the main arrival plaza from [facility]; parking attendants will direct you based on your parking preference.            Oct 24, 2018   Procedure with Ottoniel Martin MD   [facility] ([facility])    [address] [place] [address]  5th Floor  [facility] [id number] [phone]           Located in the Clinics and Surgery Center at [address] [place] [address].   parking is very convenient and highly recommended.  is a $6 flat rate fee.  Both  and self parkers should enter the main arrival plaza from [facility]; parking attendants will direct you based on your parking preference.            Mar 01, 2019  9:15 AM CST   Lab with  LAB   [facility] ([facility])    [address] [place] [address]  1st Floor  [facility] [id number] [phone]            Mar 01, 2019 10:15 AM CST   (Arrive by 10:00 AM)   Return Liver Transplant with Roldan Maria MD   [facility] ([facility])    [address] [place] [address]  [address]  [facility] [id number] [phone]              Who to contact     If you have questions or need follow up information about today's clinic visit or your schedule please contact [facility] HEPATOLOGY directly at [phone].  Normal or non-critical lab and imaging results will be communicated to you by MyChart, letter or phone within 4 business days after the clinic has received the results. If you do not hear from us within 7 days, please contact the clinic through MyChart or phone. If you have a critical or abnormal lab result, we will notify you by phone as soon as possible.  Submit refill requests through [employer] or call your pharmacy and they will forward the refill request to us. Please allow 3 business days for your refill to be completed.          Additional Information About Your Visit        [facility]  "Information     [employer] gives you secure access to your electronic health record. If you see a primary care provider, you can also send messages to your care team and make appointments. If you have questions, please call your primary care clinic.  If you do not have a primary care provider, please call [phone] and they will assist you.        Care EveryWhere ID     This is your Care EveryWhere ID. This could be used by other organizations to access your [facility] medical records  [id number]        Your Vitals Were     Pulse Respirations Height Last Period BMI (Body Mass Index)       79 16 1.575 m (5' 2\") 08/08/2018 22.57 kg/m2        Blood Pressure from Last 3 Encounters:   08/31/18 116/75   08/20/18 116/75   08/17/18 107/70    Weight from Last 3 Encounters:   08/31/18 56 kg (123 lb 6.4 oz)   08/18/18 56.8 kg (125 lb 3.2 oz)   08/17/18 54.4 kg (119 lb 14.4 oz)              We Performed the Following     GASTROENTEROLOGY ADULT REF PROCEDURE ONLY [facility] [phone]          Today's Medication Changes          These changes are accurate as of 8/31/18 11:59 PM.  If you have any questions, ask your nurse or doctor.               Start taking these medicines.        Dose/Directions    dicyclomine 20 MG tablet   Commonly known as:  BENTYL   Used for:  Irritable bowel syndrome with constipation   Started by:  Roldan Maria MD        Dose:  20 mg   Take 1 tablet (20 mg) by mouth 4 times daily as needed   Quantity:  60 tablet   Refills:  3            Where to get your medicines      These medications were sent to [id number] - [place], [place] - [address] AT [facility] [place] [address]  [address], [place] [address]     Phone:  [phone]     dicyclomine 20 MG tablet                Primary Care Provider Office Phone # Fax #    Linda MARGARITO Valle [phone] [address] [address]        Equal Access to Services     " [facility]: Hadii aad ku hadyoung Sochristyali, waaxda luqadaha, qaybta kaalmada adeegyada, radha nathaliain hayaaroberth olivaresjozef cline qamar . So [place] [phone].    ATENCIÓN: Si habla español, tiene a rowell disposición servicios gratuitos de asistencia lingüística. Richardame al [phone].    We comply with applicable federal civil rights laws and [place] laws. We do not discriminate on the basis of race, color, national origin, age, disability, sex, sexual orientation, or gender identity.            Thank you!     Thank you for choosing [facility] HEPATOLOGY  for your care. Our goal is always to provide you with excellent care. Hearing back from our patients is one way we can continue to improve our services. Please take a few minutes to complete the written survey that you may receive in the mail after your visit with us. Thank you!             Your Updated Medication List - Protect others around you: Learn how to safely use, store and throw away your medicines at www.disposemymeds.org.          This list is accurate as of 8/31/18 11:59 PM.  Always use your most recent med list.                   Brand Name Dispense Instructions for use Diagnosis    ALPRAZolam 0.5 MG tablet    XANAX    30 tablet    Take 1 tablet (0.5 mg) by mouth 3 times daily as needed for anxiety 1 month supply    Panic disorder       alum & mag hydroxide-simethicone 200-200-20 MG/5ML Susp suspension    MYLANTA/MAALOX    769 mL    Take 15 mLs by mouth every 4 hours as needed    Abdominal pain, epigastric       azaTHIOprine 50 MG tablet    IMURAN    90 tablet    Take 1 tablet (50 mg) by mouth daily    Liver replaced by transplant (H)       diclofenac 1 % Gel topical gel    VOLTAREN     Apply 2 g topically 2 times daily        dicyclomine 20 MG tablet    BENTYL    60 tablet    Take 1 tablet (20 mg) by mouth 4 times daily as needed    Irritable bowel syndrome with constipation       ibuprofen 200 MG tablet    ADVIL/MOTRIN     Take 400 mg by mouth daily as  needed for pain        lactobacillus rhamnosus (GG) capsule      Take 1 capsule by mouth daily        melatonin 5 MG tablet      Take 5 mg by mouth nightly as needed for sleep        MIRALAX powder   Generic drug:  polyethylene glycol     510 g    Take 17 g (1 capful) by mouth daily    Other constipation       omeprazole 40 MG capsule    priLOSEC    180 capsule    TAKE 1 CAPSULE BY MOUTH TWICE DAILY BEFORE MEALS, TAKE 30-60 MINUTES PRIOR TO A MEAL    Chronic gastric ulcer without hemorrhage and without perforation       ondansetron 4 MG ODT tab    ZOFRAN ODT    20 tablet    Take 1-2 tablets (4-8 mg) by mouth 3 times daily (before meals)    Nausea, Liver replaced by transplant (H)       PARoxetine 40 MG tablet    PAXIL    45 tablet    TAKE 1 AND ONE-HALF TABLET BY MOUTH AT BEDTIME    Panic disorder       sucralfate 1 GM tablet    CARAFATE    120 tablet    Take 1 tablet (1 g) by mouth 4 times daily (before meals and nightly)    Abdominal pain, epigastric       tacrolimus 1 MG capsule    GENERIC EQUIVALENT    180 capsule    Take 1 capsule (1 mg) by mouth every 12 hours    Liver replaced by transplant (H)       ursodiol 300 MG capsule    ACTIGALL    180 capsule    Take 1 capsule (300 mg) by mouth 2 times daily    Liver replaced by transplant (H)

## 2018-08-31 NOTE — LETTER
8/31/2018       RE: Charito Wilcox  [address]  [facility] [address]     Dear Colleague,    Thank you for referring your patient, Charito Wilcox, to the [facility] HEPATOLOGY at [facility]. Please see a copy of my visit note below.    HISTORY OF PRESENT ILLNESS:  I had the pleasure of seeing Charito Wilcox for followup in the Liver Transplantation Clinic at the [facility] on 08/31/2017.  Ms. Wilcox returns for followup now 18 years status post liver transplantation for acute liver failure.      She is not doing particularly well at this visit.  She just was hospitalized with abdominal pain, nausea and vomiting.  She underwent a complete evaluation including upper GI endoscopy and imaging of her GI tract.  Nothing was found to any significance.  She did get better to the point where she can at least hold down some things at home, but she still feels ill with ongoing nausea, some constipation and abdominal pain and decreased eating.  Abdominal pain is mostly described as bloating.  It does not radiate to be present under the ribs and it is not associated with any fevers or chills.      She denies any cough or shortness of breath.  She states her appetite is diminished mainly because she cannot eat much and as I mentioned she is somewhat prone to constipation.  She is taking MiraLax but just once a day.     Current Outpatient Prescriptions   Medication     ALPRAZolam (XANAX) 0.5 MG tablet     alum & mag hydroxide-simethicone (MYLANTA/MAALOX) 200-200-20 MG/5ML SUSP suspension     azaTHIOprine (IMURAN) 50 MG tablet     diclofenac (VOLTAREN) 1 % GEL topical gel     dicyclomine (BENTYL) 20 MG tablet     melatonin 5 MG tablet     omeprazole (PRILOSEC) 40 MG capsule     ondansetron (ZOFRAN ODT) 4 MG ODT tab     PARoxetine (PAXIL) 40 MG tablet     polyethylene glycol (MIRALAX) powder     sucralfate (CARAFATE) 1 GM tablet     tacrolimus (GENERIC  EQUIVALENT) 1 MG capsule     ursodiol (ACTIGALL) 300 MG capsule     ibuprofen (ADVIL/MOTRIN) 200 MG tablet     lactobacillus rhamnosus, GG, (CULTURELL) capsule     No current facility-administered medications for this visit.      B/P: 116/75, T: Data Unavailable, P: 79, R: 16    In general she appears well.  HEENT exam shows no scleral icterus or temporal muscle wasting.  Her chest is clear.  Her abdominal exam shows no increase in girth.  No masses or tenderness to palpation are present.  Her liver is 10 cm in span without left lobe enlargement.  No spleen tip is palpable.  Extremity exam shows no edema.  Skin exam shows no rash or suspicious lesions.  Neurologic exam is nonfocal.     Recent Results (from the past 168 hour(s))   CBC with platelets    Collection Time: 08/31/18  9:30 AM   Result Value Ref Range    WBC 6.9 4.0 - 11.0 10e9/L    RBC Count 3.86 3.8 - 5.2 10e12/L    Hemoglobin 13.3 11.7 - 15.7 g/dL    Hematocrit 38.0 35.0 - 47.0 %    MCV 98 78 - 100 fl    MCH 34.5 (H) 26.5 - 33.0 pg    MCHC 35.0 31.5 - 36.5 g/dL    RDW 11.8 10.0 - 15.0 %    Platelet Count 223 150 - 450 10e9/L   Basic metabolic panel    Collection Time: 08/31/18  9:30 AM   Result Value Ref Range    Sodium 137 133 - 144 mmol/L    Potassium 4.0 3.4 - 5.3 mmol/L    Chloride 106 94 - 109 mmol/L    Carbon Dioxide 25 20 - 32 mmol/L    Anion Gap 6 3 - 14 mmol/L    Glucose 88 70 - 99 mg/dL    Urea Nitrogen 10 7 - 30 mg/dL    Creatinine 0.73 0.52 - 1.04 mg/dL    GFR Estimate >90 >60 mL/min/1.7m2    GFR Estimate If Black >90 >60 mL/min/1.7m2    Calcium 8.4 (L) 8.5 - 10.1 mg/dL   Hepatic panel    Collection Time: 08/31/18  9:30 AM   Result Value Ref Range    Bilirubin Direct 0.1 0.0 - 0.2 mg/dL    Bilirubin Total 0.4 0.2 - 1.3 mg/dL    Albumin 3.7 3.4 - 5.0 g/dL    Protein Total 7.3 6.8 - 8.8 g/dL    Alkaline Phosphatase 157 (H) 40 - 150 U/L    ALT 23 0 - 50 U/L    AST 28 0 - 45 U/L   Tacrolimus level    Collection Time: 08/31/18  9:31 AM   Result  Value Ref Range    Tacrolimus Last Dose 2145     Tacrolimus Level 3.7 (L) 5.0 - 15.0 ug/L      I did review all of her tests from the hospital, both imaging as well as endoscopic.      IMPRESSION:  Ms. Wilcox is 18 years status post liver transplantation.  Unfortunately, she did have this acute illness.  Exactly what made her real acute is unclear, although I do not think she really does have underlying irritable bowel syndrome.  She does report that the Bentyl has helped and I recommended that she take the MiraLax more frequently.  I will schedule for a colonoscopy and it also is probably worth testing her for celiac disease.  My plan will be to see her back in the clinic as needed.      Thank you very much for allowing me to participate in the care of this patient.  If you have any questions regarding my recommendations, please do not hesitate to contact me.       Roldan Maria MD      Professor of Medicine  [employer]      Executive Medical Director, Solid Organ Transplant Program  [facility]    Again, thank you for allowing me to participate in the care of your patient.      Sincerely,    Roldan Maria MD

## 2018-09-04 ENCOUNTER — TELEPHONE (OUTPATIENT)
Dept: GASTROENTEROLOGY | Facility: CLINIC | Age: 38
End: 2018-09-04

## 2018-09-04 NOTE — PROGRESS NOTES
HISTORY OF PRESENT ILLNESS:  I had the pleasure of seeing Charito Wilcox for followup in the Liver Transplantation Clinic at the [facility] on 08/31/2017.  Ms. Wilcox returns for followup now 18 years status post liver transplantation for acute liver failure.      She is not doing particularly well at this visit.  She just was hospitalized with abdominal pain, nausea and vomiting.  She underwent a complete evaluation including upper GI endoscopy and imaging of her GI tract.  Nothing was found to any significance.  She did get better to the point where she can at least hold down some things at home, but she still feels ill with ongoing nausea, some constipation and abdominal pain and decreased eating.  Abdominal pain is mostly described as bloating.  It does not radiate to be present under the ribs and it is not associated with any fevers or chills.      She denies any cough or shortness of breath.  She states her appetite is diminished mainly because she cannot eat much and as I mentioned she is somewhat prone to constipation.  She is taking MiraLax but just once a day.     Current Outpatient Prescriptions   Medication     ALPRAZolam (XANAX) 0.5 MG tablet     alum & mag hydroxide-simethicone (MYLANTA/MAALOX) 200-200-20 MG/5ML SUSP suspension     azaTHIOprine (IMURAN) 50 MG tablet     diclofenac (VOLTAREN) 1 % GEL topical gel     dicyclomine (BENTYL) 20 MG tablet     melatonin 5 MG tablet     omeprazole (PRILOSEC) 40 MG capsule     ondansetron (ZOFRAN ODT) 4 MG ODT tab     PARoxetine (PAXIL) 40 MG tablet     polyethylene glycol (MIRALAX) powder     sucralfate (CARAFATE) 1 GM tablet     tacrolimus (GENERIC EQUIVALENT) 1 MG capsule     ursodiol (ACTIGALL) 300 MG capsule     ibuprofen (ADVIL/MOTRIN) 200 MG tablet     lactobacillus rhamnosus, GG, (CULTURELL) capsule     No current facility-administered medications for this visit.      B/P: 116/75, T: Data Unavailable, P: 79, R: 16    In general  she appears well.  HEENT exam shows no scleral icterus or temporal muscle wasting.  Her chest is clear.  Her abdominal exam shows no increase in girth.  No masses or tenderness to palpation are present.  Her liver is 10 cm in span without left lobe enlargement.  No spleen tip is palpable.  Extremity exam shows no edema.  Skin exam shows no rash or suspicious lesions.  Neurologic exam is nonfocal.     Recent Results (from the past 168 hour(s))   CBC with platelets    Collection Time: 08/31/18  9:30 AM   Result Value Ref Range    WBC 6.9 4.0 - 11.0 10e9/L    RBC Count 3.86 3.8 - 5.2 10e12/L    Hemoglobin 13.3 11.7 - 15.7 g/dL    Hematocrit 38.0 35.0 - 47.0 %    MCV 98 78 - 100 fl    MCH 34.5 (H) 26.5 - 33.0 pg    MCHC 35.0 31.5 - 36.5 g/dL    RDW 11.8 10.0 - 15.0 %    Platelet Count 223 150 - 450 10e9/L   Basic metabolic panel    Collection Time: 08/31/18  9:30 AM   Result Value Ref Range    Sodium 137 133 - 144 mmol/L    Potassium 4.0 3.4 - 5.3 mmol/L    Chloride 106 94 - 109 mmol/L    Carbon Dioxide 25 20 - 32 mmol/L    Anion Gap 6 3 - 14 mmol/L    Glucose 88 70 - 99 mg/dL    Urea Nitrogen 10 7 - 30 mg/dL    Creatinine 0.73 0.52 - 1.04 mg/dL    GFR Estimate >90 >60 mL/min/1.7m2    GFR Estimate If Black >90 >60 mL/min/1.7m2    Calcium 8.4 (L) 8.5 - 10.1 mg/dL   Hepatic panel    Collection Time: 08/31/18  9:30 AM   Result Value Ref Range    Bilirubin Direct 0.1 0.0 - 0.2 mg/dL    Bilirubin Total 0.4 0.2 - 1.3 mg/dL    Albumin 3.7 3.4 - 5.0 g/dL    Protein Total 7.3 6.8 - 8.8 g/dL    Alkaline Phosphatase 157 (H) 40 - 150 U/L    ALT 23 0 - 50 U/L    AST 28 0 - 45 U/L   Tacrolimus level    Collection Time: 08/31/18  9:31 AM   Result Value Ref Range    Tacrolimus Last Dose 2145     Tacrolimus Level 3.7 (L) 5.0 - 15.0 ug/L      I did review all of her tests from the hospital, both imaging as well as endoscopic.      IMPRESSION:  Ms. Wilcox is 18 years status post liver transplantation.  Unfortunately, she did have this  acute illness.  Exactly what made her real acute is unclear, although I do not think she really does have underlying irritable bowel syndrome.  She does report that the Bentyl has helped and I recommended that she take the MiraLax more frequently.  I will schedule for a colonoscopy and it also is probably worth testing her for celiac disease.  My plan will be to see her back in the clinic as needed.      Thank you very much for allowing me to participate in the care of this patient.  If you have any questions regarding my recommendations, please do not hesitate to contact me.       Roldan Maria MD      Professor of Medicine  [employer]      Executive Medical Director, Solid Organ Transplant Program  [facility]

## 2018-09-05 NOTE — OR NURSING
What is your current height? 5'2    What is your current weight? 123    BMI=    Have you had a procedure in the past that was difficult to tolerate with conscious sedation? no        In the past year, have you had any heart related issues? no    Do you take nitroglycerin for chest pain? no    How often do you take nitroglycerin? no    Do you have a pacemaker or a pacemaker with a defibrillator? no    Do you have a cardiac stent that was placed 1 year or less? no    Are you currently taking any blood thinners? no    Do you have any history of post-traumatic stress syndrome or any mental health issues? anxiety      Do you currently use any of the following?    Scheduled prescription pain medication more than once daily? no    Drink alcohol daily? no    Street drugs or methadone?no

## 2018-09-06 ENCOUNTER — TELEPHONE (OUTPATIENT)
Dept: GASTROENTEROLOGY | Facility: CLINIC | Age: 38
End: 2018-09-06

## 2018-09-25 DIAGNOSIS — K25.7 CHRONIC GASTRIC ULCER WITHOUT HEMORRHAGE AND WITHOUT PERFORATION: ICD-10-CM

## 2018-09-25 RX ORDER — OMEPRAZOLE 40 MG/1
CAPSULE, DELAYED RELEASE ORAL
Qty: 180 CAPSULE | Refills: 2 | Status: SHIPPED | OUTPATIENT
Start: 2018-09-25 | End: 2019-07-02

## 2018-09-25 NOTE — TELEPHONE ENCOUNTER
Signed Prescriptions:                        Disp   Refills    omeprazole (PRILOSEC) 40 MG capsule        180 ca*2        Sig: TAKE 1 CAPSULE BY MOUTH TWICE DAILY BEFORE MEALS,           TAKE 30-60 MINUTES PRIOR TO A MEAL  Authorizing Provider: JORJE MULLINS  Ordering User: SIL BUTTS RN refilled medication per [facility] Refill Protocol.     Sil Butts RN

## 2018-10-11 DIAGNOSIS — Z94.4 LIVER REPLACED BY TRANSPLANT (H): ICD-10-CM

## 2018-10-11 LAB
ALBUMIN SERPL-MCNC: 3.5 G/DL (ref 3.4–5)
ALP SERPL-CCNC: 162 U/L (ref 40–150)
ALT SERPL W P-5'-P-CCNC: 28 U/L (ref 0–50)
ANION GAP SERPL CALCULATED.3IONS-SCNC: 7 MMOL/L (ref 3–14)
AST SERPL W P-5'-P-CCNC: 33 U/L (ref 0–45)
BILIRUB DIRECT SERPL-MCNC: 0.1 MG/DL (ref 0–0.2)
BILIRUB SERPL-MCNC: 0.3 MG/DL (ref 0.2–1.3)
BUN SERPL-MCNC: 11 MG/DL (ref 7–30)
CALCIUM SERPL-MCNC: 8.6 MG/DL (ref 8.5–10.1)
CHLORIDE SERPL-SCNC: 106 MMOL/L (ref 94–109)
CO2 SERPL-SCNC: 25 MMOL/L (ref 20–32)
CREAT SERPL-MCNC: 0.73 MG/DL (ref 0.52–1.04)
ERYTHROCYTE [DISTWIDTH] IN BLOOD BY AUTOMATED COUNT: 11.8 % (ref 10–15)
GFR SERPL CREATININE-BSD FRML MDRD: >90 ML/MIN/1.7M2
GLUCOSE SERPL-MCNC: 72 MG/DL (ref 70–99)
HCT VFR BLD AUTO: 37.9 % (ref 35–47)
HGB BLD-MCNC: 13 G/DL (ref 11.7–15.7)
MCH RBC QN AUTO: 34.3 PG (ref 26.5–33)
MCHC RBC AUTO-ENTMCNC: 34.3 G/DL (ref 31.5–36.5)
MCV RBC AUTO: 100 FL (ref 78–100)
PLATELET # BLD AUTO: 194 10E9/L (ref 150–450)
POTASSIUM SERPL-SCNC: 4.4 MMOL/L (ref 3.4–5.3)
PROT SERPL-MCNC: 7.1 G/DL (ref 6.8–8.8)
RBC # BLD AUTO: 3.79 10E12/L (ref 3.8–5.2)
SODIUM SERPL-SCNC: 138 MMOL/L (ref 133–144)
TACROLIMUS BLD-MCNC: 3.9 UG/L (ref 5–15)
TME LAST DOSE: ABNORMAL H
WBC # BLD AUTO: 5.2 10E9/L (ref 4–11)

## 2018-10-15 ENCOUNTER — TELEPHONE (OUTPATIENT)
Dept: GASTROENTEROLOGY | Facility: CLINIC | Age: 38
End: 2018-10-15

## 2018-10-16 ENCOUNTER — TELEPHONE (OUTPATIENT)
Dept: GASTROENTEROLOGY | Facility: CLINIC | Age: 38
End: 2018-10-16

## 2018-10-16 DIAGNOSIS — Z12.11 ENCOUNTER FOR SCREENING COLONOSCOPY: Primary | ICD-10-CM

## 2018-10-24 ENCOUNTER — HOSPITAL ENCOUNTER (OUTPATIENT)
Facility: AMBULATORY SURGERY CENTER | Age: 38
End: 2018-10-24
Attending: INTERNAL MEDICINE
Payer: COMMERCIAL

## 2018-10-24 ENCOUNTER — SURGERY (OUTPATIENT)
Age: 38
End: 2018-10-24

## 2018-10-24 VITALS
RESPIRATION RATE: 18 BRPM | OXYGEN SATURATION: 100 % | TEMPERATURE: 97.8 F | SYSTOLIC BLOOD PRESSURE: 104 MMHG | DIASTOLIC BLOOD PRESSURE: 68 MMHG

## 2018-10-24 LAB
COLONOSCOPY: NORMAL
HCG UR QL: NEGATIVE
INTERNAL QC OK POCT: YES

## 2018-10-24 RX ORDER — FENTANYL CITRATE 50 UG/ML
INJECTION, SOLUTION INTRAMUSCULAR; INTRAVENOUS PRN
Status: DISCONTINUED | OUTPATIENT
Start: 2018-10-24 | End: 2018-10-24 | Stop reason: HOSPADM

## 2018-10-24 RX ORDER — SIMETHICONE
LIQUID (ML) MISCELLANEOUS PRN
Status: DISCONTINUED | OUTPATIENT
Start: 2018-10-24 | End: 2018-10-24 | Stop reason: HOSPADM

## 2018-10-24 RX ORDER — ONDANSETRON 2 MG/ML
4 INJECTION INTRAMUSCULAR; INTRAVENOUS
Status: COMPLETED | OUTPATIENT
Start: 2018-10-24 | End: 2018-10-24

## 2018-10-24 RX ORDER — LIDOCAINE 40 MG/G
CREAM TOPICAL
Status: DISCONTINUED | OUTPATIENT
Start: 2018-10-24 | End: 2018-10-25 | Stop reason: HOSPADM

## 2018-10-24 RX ADMIN — FENTANYL CITRATE 25 MCG: 50 INJECTION, SOLUTION INTRAMUSCULAR; INTRAVENOUS at 08:23

## 2018-10-24 RX ADMIN — Medication 2 ML: at 07:54

## 2018-10-24 RX ADMIN — FENTANYL CITRATE 50 MCG: 50 INJECTION, SOLUTION INTRAMUSCULAR; INTRAVENOUS at 08:18

## 2018-10-24 RX ADMIN — ONDANSETRON 4 MG: 2 INJECTION INTRAMUSCULAR; INTRAVENOUS at 08:04

## 2018-11-12 ENCOUNTER — OFFICE VISIT (OUTPATIENT)
Dept: INTERNAL MEDICINE | Facility: CLINIC | Age: 38
End: 2018-11-12
Payer: COMMERCIAL

## 2018-11-12 VITALS
WEIGHT: 122.4 LBS | TEMPERATURE: 98.5 F | HEART RATE: 92 BPM | HEIGHT: 63 IN | DIASTOLIC BLOOD PRESSURE: 76 MMHG | OXYGEN SATURATION: 100 % | SYSTOLIC BLOOD PRESSURE: 110 MMHG | BODY MASS INDEX: 21.69 KG/M2

## 2018-11-12 DIAGNOSIS — R10.84 ABDOMINAL PAIN, GENERALIZED: ICD-10-CM

## 2018-11-12 DIAGNOSIS — Z94.4 LIVER REPLACED BY TRANSPLANT (H): ICD-10-CM

## 2018-11-12 DIAGNOSIS — Z76.89 ENCOUNTER TO ESTABLISH CARE WITH NEW DOCTOR: Primary | ICD-10-CM

## 2018-11-12 DIAGNOSIS — F41.9 ANXIETY: ICD-10-CM

## 2018-11-12 DIAGNOSIS — Z23 NEED FOR PROPHYLACTIC VACCINATION AND INOCULATION AGAINST INFLUENZA: ICD-10-CM

## 2018-11-12 DIAGNOSIS — K92.9 DIGESTIVE DISORDER: ICD-10-CM

## 2018-11-12 DIAGNOSIS — K58.1 IRRITABLE BOWEL SYNDROME WITH CONSTIPATION: ICD-10-CM

## 2018-11-12 DIAGNOSIS — R53.83 OTHER FATIGUE: ICD-10-CM

## 2018-11-12 LAB
ALBUMIN SERPL-MCNC: 3.8 G/DL (ref 3.4–5)
ALP SERPL-CCNC: 151 U/L (ref 40–150)
ALT SERPL W P-5'-P-CCNC: 22 U/L (ref 0–50)
ANION GAP SERPL CALCULATED.3IONS-SCNC: 6 MMOL/L (ref 3–14)
AST SERPL W P-5'-P-CCNC: 32 U/L (ref 0–45)
BILIRUB DIRECT SERPL-MCNC: 0.1 MG/DL (ref 0–0.2)
BILIRUB SERPL-MCNC: 0.4 MG/DL (ref 0.2–1.3)
BUN SERPL-MCNC: 13 MG/DL (ref 7–30)
CALCIUM SERPL-MCNC: 8.3 MG/DL (ref 8.5–10.1)
CHLORIDE SERPL-SCNC: 106 MMOL/L (ref 94–109)
CO2 SERPL-SCNC: 27 MMOL/L (ref 20–32)
CREAT SERPL-MCNC: 1.14 MG/DL (ref 0.52–1.04)
ERYTHROCYTE [DISTWIDTH] IN BLOOD BY AUTOMATED COUNT: 11.8 % (ref 10–15)
FERRITIN SERPL-MCNC: 56 NG/ML (ref 12–150)
FOLATE SERPL-MCNC: 17.5 NG/ML
GFR SERPL CREATININE-BSD FRML MDRD: 54 ML/MIN/1.7M2
GLUCOSE SERPL-MCNC: 96 MG/DL (ref 70–99)
HCT VFR BLD AUTO: 41.1 % (ref 35–47)
HGB BLD-MCNC: 14.4 G/DL (ref 11.7–15.7)
IRON SATN MFR SERPL: 26 % (ref 15–46)
IRON SERPL-MCNC: 82 UG/DL (ref 35–180)
MCH RBC QN AUTO: 34.9 PG (ref 26.5–33)
MCHC RBC AUTO-ENTMCNC: 35 G/DL (ref 31.5–36.5)
MCV RBC AUTO: 100 FL (ref 78–100)
PLATELET # BLD AUTO: 227 10E9/L (ref 150–450)
POTASSIUM SERPL-SCNC: 3.8 MMOL/L (ref 3.4–5.3)
PROT SERPL-MCNC: 7.4 G/DL (ref 6.8–8.8)
RBC # BLD AUTO: 4.13 10E12/L (ref 3.8–5.2)
SODIUM SERPL-SCNC: 138 MMOL/L (ref 133–144)
TIBC SERPL-MCNC: 317 UG/DL (ref 240–430)
TSH SERPL DL<=0.005 MIU/L-ACNC: 1.07 MU/L (ref 0.4–4)
VIT B12 SERPL-MCNC: 1202 PG/ML (ref 193–986)
WBC # BLD AUTO: 8.6 10E9/L (ref 4–11)

## 2018-11-12 ASSESSMENT — ENCOUNTER SYMPTOMS
VOMITING: 0
NECK PAIN: 1
DEPRESSION: 0
BLOOD IN STOOL: 0
HEARTBURN: 1
NIGHT SWEATS: 0
SORE THROAT: 1
WEIGHT LOSS: 0
TROUBLE SWALLOWING: 0
ALTERED TEMPERATURE REGULATION: 1
SINUS PAIN: 1
RECTAL PAIN: 0
PANIC: 0
STIFFNESS: 0
MUSCLE CRAMPS: 0
ABDOMINAL PAIN: 1
BLOATING: 1
DECREASED CONCENTRATION: 0
SMELL DISTURBANCE: 0
NAUSEA: 1
DECREASED APPETITE: 1
INCREASED ENERGY: 1
HALLUCINATIONS: 0
ARTHRALGIAS: 1
SINUS CONGESTION: 1
HOARSE VOICE: 1
CONSTIPATION: 1
JAUNDICE: 0
FATIGUE: 1
MUSCLE WEAKNESS: 0
BACK PAIN: 0
JOINT SWELLING: 0
POLYDIPSIA: 0
BOWEL INCONTINENCE: 0
INSOMNIA: 0
MYALGIAS: 0
POLYPHAGIA: 0
FEVER: 0
DIARRHEA: 1
TASTE DISTURBANCE: 0
CHILLS: 1
NERVOUS/ANXIOUS: 1
WEIGHT GAIN: 0
NECK MASS: 0

## 2018-11-12 ASSESSMENT — PAIN SCALES - GENERAL: PAINLEVEL: NO PAIN (0)

## 2018-11-12 NOTE — NURSING NOTE
Chief Complaint   Patient presents with     Establish Care     here to establish care     Fatigue     past month has been more fatigued       Eddie Swanson, EMT 1:18 PM on 11/12/2018.

## 2018-11-12 NOTE — MR AVS SNAPSHOT
After Visit Summary   11/12/2018    Charito Wilcox    MRN: [id number]           Patient Information     Date Of Birth          1980        Visit Information        Provider Department      11/12/2018 1:20 PM Marni Garica MD [facility]        Today's Diagnoses     Need for prophylactic vaccination and inoculation against influenza    -  1    Anxiety        Irritable bowel syndrome with constipation        Digestive disorder        Abdominal pain, generalized        Other fatigue          Care Instructions    Primary Care Center Medication Refill Request Information:  * Please contact your pharmacy regarding ANY request for medication refills.  ** [facility] Prescription Fax = [phone]  * Please allow 3 business days for routine medication refills.  * Please allow 5 business days for controlled substance medication refills.     Primary Care Center Test Result notification information:  *You will be notified with in 7-10 days of your appointment day regarding the results of your test.  If you are on MyChart you will be notified as soon as the provider has reviewed the results and signed off on them.    [facility]: [phone]             Follow-ups after your visit        Additional Services     GASTROENTEROLOGY ADULT REFERRAL       Digestive issues, abdominal pain    Preferred Location: [facility] [phone]      Please be aware that coverage of these services is subject to the terms and limitations of your health insurance plan.  Call member services at your health plan with any benefit or coverage questions.  Any procedures must be performed at a [facility] facility OR coordinated by your clinic's referral office.    Please bring the following with you to your appointment:    (1) Any X-Rays, CTs or MRIs which have been performed.  Contact the facility where they were done to arrange for  prior to your scheduled appointment.    (2) List of current  medications   (3) This referral request   (4) Any documents/labs given to you for this referral                  Follow-up notes from your care team     Return in about 4 weeks (around 12/10/2018) for f/u fatigue, digestive, abd pain.      Your next 10 appointments already scheduled     Nov 12, 2018  2:45 PM CST   LAB with  LAB   [facility] ([facility])    [address] [place] [address]  [address] [employer] [id number] [phone]           Please do not eat 10-12 hours before your appointment if you are coming in fasting for labs on lipids, cholesterol, or glucose (sugar). This does not apply to pregnant women. Water, hot tea and black coffee (with nothing added) are okay. Do not drink other fluids, diet soda or chew gum.            Dec 12, 2018  9:30 AM CST   (Arrive by 9:15 AM)   Return Visit with Marni Garcia MD   [facility] ([facility])    [address] [place] [address]  [address] [employer] [address]   [phone]            Feb 21, 2019  8:40 AM CST   (Arrive by 8:25 AM)   New Patient Visit with David Metz PA-C   [facility] ([facility])    [address] [place] [address]  [address] [employer] [address]   [phone]            Mar 01, 2019  9:15 AM CST   Lab with  LAB   [facility] ([facility])    [address] [place] [address]   [phone]            Mar 01, 2019 10:15 AM CST   (Arrive by 10:00 AM)   Return Liver Transplant with Roldan Maria MD   [facility] ([facility])    [address] [place] [address]  [address]  [employer] [address]   [phone]              Future tests that were ordered for you today     Open Future Orders        Priority Expected Expires Ordered    Vitamin D Deficiency Routine 11/12/2018 11/12/2019 11/12/2018    Vitamin B12 Routine 11/12/2018 11/12/2019 11/12/2018     "Folate Routine 11/12/2018 11/12/2019 11/12/2018    TSH with free T4 reflex Routine 11/12/2018 11/26/2018 11/12/2018    Methylmalonic acid Routine 11/12/2018 11/12/2019 11/12/2018    Iron and iron binding capacity Routine 11/12/2018 11/12/2019 11/12/2018    Ferritin Routine 11/12/2018 11/12/2019 11/12/2018    H Pylori antigen stool Routine 11/12/2018 11/12/2019 11/12/2018            Who to contact     Please call your clinic at [phone] to:    Ask questions about your health    Make or cancel appointments    Discuss your medicines    Learn about your test results    Speak to your doctor            Additional Information About Your Visit        JetSuite Information     JetSuite gives you secure access to your electronic health record. If you see a primary care provider, you can also send messages to your care team and make appointments. If you have questions, please call your primary care clinic.  If you do not have a primary care provider, please call [phone] and they will assist you.      [employer]Suite is an electronic gateway that provides easy, online access to your medical records. With [employer], you can request a clinic appointment, read your test results, renew a prescription or communicate with your care team.     To access your existing account, please contact your [facility] or call [phone] for assistance.        Care EveryWhere ID     This is your Care EveryWhere ID. This could be used by other organizations to access your [facility] medical records  [id number]        Your Vitals Were     Pulse Temperature Height Pulse Oximetry BMI (Body Mass Index)       92 98.5  F (36.9  C) (Oral) 1.59 m (5' 2.6\") 100% 21.96 kg/m2        Blood Pressure from Last 3 Encounters:   11/12/18 110/76   10/24/18 104/68   08/31/18 116/75    Weight from Last 3 Encounters:   11/12/18 55.5 kg (122 lb 6.4 oz)   08/31/18 56 kg (123 lb 6.4 oz)   08/18/18 56.8 kg (125 lb 3.2 oz)              We " Performed the Following     FLU VACCINE, SPLIT VIRUS, IM (QUADRIVALENT) [10158]- >3 YRS     GASTROENTEROLOGY ADULT REFERRAL        Primary Care Provider Office Phone # Fax #    Marni Garcia [phone]       [address] [place] [phone]  [facility] [address]        Equal Access to Services     NAOMI BELL : Hadii aad ku hadasho Soomaali, waaxda luqadaha, qaybta kaalmada adeegyada, waxaggie urban hayreban donnie verastefaniesara abla. So [facility] [phone].    ATENCIÓN: Si habla español, tiene a rowell disposición servicios gratuitos de asistencia lingüística. [facility] [phone].    We comply with applicable federal civil rights laws and [place] laws. We do not discriminate on the basis of race, color, national origin, age, disability, sex, sexual orientation, or gender identity.            Thank you!     Thank you for choosing [facility]  for your care. Our goal is always to provide you with excellent care. Hearing back from our patients is one way we can continue to improve our services. Please take a few minutes to complete the written survey that you may receive in the mail after your visit with us. Thank you!             Your Updated Medication List - Protect others around you: Learn how to safely use, store and throw away your medicines at www.disposemymeds.org.          This list is accurate as of 11/12/18  2:34 PM.  Always use your most recent med list.                   Brand Name Dispense Instructions for use Diagnosis    ALPRAZolam 0.5 MG tablet    XANAX    30 tablet    Take 1 tablet (0.5 mg) by mouth 3 times daily as needed for anxiety 1 month supply    Panic disorder       alum & mag hydroxide-simethicone 200-200-20 MG/5ML Susp suspension    MYLANTA/MAALOX    769 mL    Take 15 mLs by mouth every 4 hours as needed    Abdominal pain, epigastric       azaTHIOprine 50 MG tablet    IMURAN    90 tablet    Take 1 tablet (50 mg) by mouth daily    Liver replaced by transplant (H)        diclofenac 1 % Gel topical gel    VOLTAREN     Apply 2 g topically 2 times daily        dicyclomine 20 MG tablet    BENTYL    60 tablet    Take 1 tablet (20 mg) by mouth 4 times daily as needed    Irritable bowel syndrome with constipation       ibuprofen 200 MG tablet    ADVIL/MOTRIN     Take 400 mg by mouth daily as needed for pain        lactobacillus rhamnosus (GG) capsule      Take 1 capsule by mouth daily        melatonin 5 MG tablet      Take 5 mg by mouth nightly as needed for sleep        MIRALAX powder   Generic drug:  polyethylene glycol     510 g    Take 17 g (1 capful) by mouth daily    Other constipation       omeprazole 40 MG capsule    priLOSEC    180 capsule    TAKE 1 CAPSULE BY MOUTH TWICE DAILY BEFORE MEALS, TAKE 30-60 MINUTES PRIOR TO A MEAL    Chronic gastric ulcer without hemorrhage and without perforation       ondansetron 4 MG ODT tab    ZOFRAN ODT    20 tablet    Take 1-2 tablets (4-8 mg) by mouth 3 times daily (before meals)    Nausea, Liver replaced by transplant (H)       PARoxetine 40 MG tablet    PAXIL    45 tablet    TAKE 1 AND ONE-HALF TABLET BY MOUTH AT BEDTIME    Panic disorder       sucralfate 1 GM tablet    CARAFATE    120 tablet    Take 1 tablet (1 g) by mouth 4 times daily (before meals and nightly)    Abdominal pain, epigastric       tacrolimus 1 MG capsule    GENERIC EQUIVALENT    180 capsule    Take 1 capsule (1 mg) by mouth every 12 hours    Liver replaced by transplant (H)       ursodiol 300 MG capsule    ACTIGALL    180 capsule    Take 1 capsule (300 mg) by mouth 2 times daily    Liver replaced by transplant (H)

## 2018-11-12 NOTE — PROGRESS NOTES
"[facility]  Primary Care Center   Marni Garcia MD  11/12/2018      Chief Complaint:   Establish Care and Fatigue       History of Present Illness:   Charito Wilcox is a 38 year old female 18 years s/p liver transplantation with a history of panic disorder, irritable bowel syndrome, and esophageal reflux who presents alone for evaluation of care and to establish care.    Irritable bowel syndrome: This August before her marathon she was in hospital for stomach pains and they  did a scope and found nothing. The diagnosis was found to be a virus which theoretcially led into an episode of IBS. She then went to Dr. Maria and had a a colonoscopy completed 3 weeks ago which was unremarkable. She is on the FODMAP diet and notes she is unable to eat many things including fiber, gluten, dairy, corn, and soy. She notes that she has been trying to reintroduce foods but reports it is difficult as she is very sensitive to most types of food. She is very frustrated with her condition. She has seen a nutritionist in the past. She would like to know if there is something more that is causing her stomach pains. Of note, she was found to have a stomach ulcer in an \"odd space\" within her gastrointestinal tract a couple of years ago.      Gastroesophageal reflux disease: She notes that while running the [place] marathon a couple of months ago, she had painful hiccups. Since this time, her reflux has been acting up more frequently. She notes that this typically occurs in episodes for her. She reports she has not been eating as well and has been \"going back to the basics\" in regards to her diet. She also notes being on Omeprazole for the past three years and notes that reflux is not a huge issue for her.     Fatigue: For the last month she has been feeling very fatigued since running her marathon. Of note, she had mono last year. She has not had her vitamin levels checked recently. She is wondering if she is getting all the " nutrients she needs from her limited diet and notes she eats more sugars from carbohydrates than she would like. Overall she notes exercising regularly, getting enough sleep, and eating a good diet.     Anxiety: She has a phobia of vomiting and has been prescribed Zofran to prevent her from having panic attacks if she ever develops nausea.    Other concerns discussed:  1. Annual pap smears   2. Has had bad reactions to antibiotics in the past (stomach)  3. H. Pylori stool test   4. Blood work   5. Flu shot - received today      Review of Systems:   Pertinent items are noted in HPI or as in patient entered ROS below, remainder of complete ROS is negative.  Answers for HPI/ROS submitted by the patient on 11/12/2018   General Symptoms: Yes  Skin Symptoms: No  HENT Symptoms: Yes  EYE SYMPTOMS: No  HEART SYMPTOMS: No  LUNG SYMPTOMS: No  INTESTINAL SYMPTOMS: Yes  URINARY SYMPTOMS: No  GYNECOLOGIC SYMPTOMS: No  BREAST SYMPTOMS: No  SKELETAL SYMPTOMS: Yes  BLOOD SYMPTOMS: No  NERVOUS SYSTEM SYMPTOMS: No  MENTAL HEALTH SYMPTOMS: Yes  Fever: No  Loss of appetite: Yes  Weight loss: No  Weight gain: No  Fatigue: Yes  Night sweats: No  Chills: Yes  Increased stress: Yes  Excessive hunger: No  Excessive thirst: No  Feeling hot or cold when others believe the temperature is normal: Yes  Loss of height: No  Post-operative complications: No  Surgical site pain: No  Hallucinations: No  Change in or Loss of Energy: Yes  Hyperactivity: No  Confusion: No  Ear pain: No  Ear discharge: No  Hearing loss: No  Tinnitus: No  Nosebleeds: No  Congestion: Yes  Sinus pain: Yes  Trouble swallowing: No   Voice hoarseness: Yes  Mouth sores: No  Sore throat: Yes  Tooth pain: No  Gum tenderness: No  Bleeding gums: No  Change in taste: No  Change in sense of smell: No  Dry mouth: No  Hearing aid used: No  Neck lump: No  Heart burn or indigestion: Yes  Nausea: Yes  Vomiting: No  Abdominal pain: Yes  Bloating: Yes  Constipation: Yes  Diarrhea: Yes  Blood  in stool: No  Black stools: No  Rectal or Anal pain: No  Fecal incontinence: No  Yellowing of skin or eyes: No  Vomit with blood: No  Change in stools: No  Back pain: No  Muscle aches: No  Neck pain: Yes  Swollen joints: No  Joint pain: Yes  Bone pain: No  Muscle cramps: No  Muscle weakness: No  Joint stiffness: No  Bone fracture: No  Nervous or Anxious: Yes  Depression: No  Trouble sleeping: No  Trouble thinking or concentrating: No  Mood changes: No  Panic attacks: No       Active Medications:      ALPRAZolam (XANAX) 0.5 MG tablet, Take 1 tablet (0.5 mg) by mouth 3 times daily as needed for anxiety 1 month supply, Disp: 30 tablet, Rfl: 0     alum & mag hydroxide-simethicone (MYLANTA/MAALOX) 200-200-20 MG/5ML SUSP suspension, Take 15 mLs by mouth every 4 hours as needed, Disp: 769 mL, Rfl: 0     azaTHIOprine (IMURAN) 50 MG tablet, Take 1 tablet (50 mg) by mouth daily, Disp: 90 tablet, Rfl: 3     diclofenac (VOLTAREN) 1 % GEL topical gel, Apply 2 g topically 2 times daily, Disp: , Rfl:      dicyclomine (BENTYL) 20 MG tablet, Take 1 tablet (20 mg) by mouth 4 times daily as needed, Disp: 60 tablet, Rfl: 3     ibuprofen (ADVIL/MOTRIN) 200 MG tablet, Take 400 mg by mouth daily as needed for pain, Disp: , Rfl:      lactobacillus rhamnosus, GG, (CULTURELL) capsule, Take 1 capsule by mouth daily, Disp: , Rfl:      melatonin 5 MG tablet, Take 5 mg by mouth nightly as needed for sleep, Disp: , Rfl:      omeprazole (PRILOSEC) 40 MG capsule, TAKE 1 CAPSULE BY MOUTH TWICE DAILY BEFORE MEALS, TAKE 30-60 MINUTES PRIOR TO A MEAL, Disp: 180 capsule, Rfl: 2     ondansetron (ZOFRAN ODT) 4 MG ODT tab, Take 1-2 tablets (4-8 mg) by mouth 3 times daily (before meals), Disp: 20 tablet, Rfl: 0     PARoxetine (PAXIL) 40 MG tablet, TAKE 1 AND ONE-HALF TABLET BY MOUTH AT BEDTIME, Disp: 45 tablet, Rfl: 5     polyethylene glycol (MIRALAX) powder, Take 17 g (1 capful) by mouth daily, Disp: 510 g, Rfl: 1     sucralfate (CARAFATE) 1 GM tablet,  "Take 1 tablet (1 g) by mouth 4 times daily (before meals and nightly), Disp: 120 tablet, Rfl: 0     tacrolimus (GENERIC EQUIVALENT) 1 MG capsule, Take 1 capsule (1 mg) by mouth every 12 hours, Disp: 180 capsule, Rfl: 3     ursodiol (ACTIGALL) 300 MG capsule, Take 1 capsule (300 mg) by mouth 2 times daily, Disp: 180 capsule, Rfl: 3      Allergies:   Gluten meal  Milk protein extract      Past Medical History:  Acute pancreatitis  Esophageal reflux   Papanicolaou smear of cervix with atypical squamous cells of undetermined significance   Panic disorder  Emetophobia (phobia of vomiting)  Anxiety  Constipation  Irritable bowel syndrome  Immunosuppressed status  Premenstrual syndrome  Labral tear of hip, degenerative  Right hip impingement syndrome  Fatigue  Localized superficial swelling, mass, or lump     Past Surgical History:  Transplant liver, heterotopic -    section (x2) - 3/2010  Esophagoscopy, gastroscopy, duodenoscopy (EGD), combined (x4) - 2018  Hip surgery, torn labrum repair, right - 2016  Tubal ligation - 3/2010  Cholecystectomy     Family History:   Depression - maternal grandmother  Anxiety disorder - maternal grandmother  Hypertension - maternal grandfather  Cerebrovascular disease - paternal grandmother  Breast cancer - paternal grandmother   Hyperlipidemia - mother, maternal aunt  Congestive heart failure - maternal grandmother ()  Osteoporosis - mother   Arthritis - mother      Social History:   The patient is  with two children (ages 10 and 8), a nonsmoker, and does not consume alcohol aside from one glass of wine once a month. She works part time at a [employer]. She enjoys running marathons and cross country skiing.      Physical Exam:   /76 (BP Location: Right arm, Patient Position: Sitting, Cuff Size: Adult Regular)  Pulse 92  Temp 98.5  F (36.9  C) (Oral)  Ht 1.59 m (5' 2.6\")  Wt 55.5 kg (122 lb 6.4 oz)  SpO2 100%  BMI 21.96 kg/m2   Physical " Examination:  General:  Pleasant, alert, no acute distress  Eyes:  Pupils 2-3 mm, sclera white, EOM's full.    Ears:  TM's normal, EAC's patent, clear of cerumen  Nose:  Nasal passages clear, turbinates not swollen, no polyps visualized.  Throat/Mouth:  No pharyngeal erythema or exudate, oral mucosa and tongue moist, no suspicious oral lesions  Neck:  Full AROM, supple, thyroid smooth, symmetric, not enlarge, no nodules  Lungs:  Clear to auscultation throughout, no wheezes, rhonchi or rales.  C/V:  Regular rate and rhythm, no murmurs, rubs or gallops.  No JVD, no carotid bruits.  No peripheral edema.    Abdomen:  Not distended.  Bowel sounds active.  No hepatosplenomegaly or masses.  No CVA tenderness or masses. Tenderness in epigastric region.   Lymph:  No cervical, axillary or inguinal lymph nodes.  Neuro:   Face symmetric. No tremor.  Gait steady.   M/S:  No joint deformities noted.  No joint swelling.  Skin:  No suspicious lesions.  No rashes or jaundice.  Normal moisture, good skin turgor.   Psych:  Broad range affect.  Not psychomotor slowed.  No signs of anxiety or agitation.      Assessment and Plan:    Establish care with a new MD    Digestive disorder, Abdominal pain, generalized, Irritable bowel syndrome with constipation  She is 18 years s/p liver transplant. She has been having stomach pains which she has been told is irritable bowel syndrome. She exercises regularly and follows the FODMAP diet. She has been followed by Dr. Maria whom she likes very much but would like to see another provider to get a second opinion. She would like to know if there is another cause for her pains. Blood work ordered to be completed today for further evaluation. Pending results will follow up as needed.   - GASTROENTEROLOGY ADULT REFERRAL  - Vitamin D Deficiency  - Vitamin B12  - Folate  - Methylmalonic acid  - Iron and iron binding capacity  - Ferritin  - H Pylori antigen stool    Need for prophylactic vaccination and  inoculation against influenza  - FLU VACCINE, SPLIT VIRUS, IM (QUADRIVALENT) [41817]- >3 YRS    Anxiety  She has a phobia of vomiting for which she has been prescribed Zofran to prevent panic attacks in case she becomes nauseous.       Other fatigue  She has been feeling more fatigued than usual since running a marathon a month ago. Will check thyroid functioning and pending results will follow up as needed.   - TSH with free T4 reflex       Follow-up: Return in about 4 weeks (around 12/10/2018) for f/u fatigue, digestive, abd pain.        Scribe Disclosure:   I, Randa Golden, am serving as a scribe to document services personally performed by Marni Garcia MD at this visit, based upon the provider's statements to me. All documentation has been reviewed by the aforementioned provider prior to being entered into the official medical record.     Portions of this medical record were completed by a scribe. UPON MY REVIEW AND AUTHENTICATION BY ELECTRONIC SIGNATURE, this confirms (a) I performed the applicable clinical services, and (b) the record is accurate.     Marni Garcia M.D.  Internal Medicine  Primary Care Center   pager [phone]

## 2018-11-12 NOTE — NURSING NOTE
Charito Wilcox      1.  Has the patient received the information for the influenza vaccine? YES    2.  Does the patient have any of the following contraindications?     Allergy to eggs? No     Allergic reaction to previous influenza vaccines? No     Any other problems to previous influenza vaccines? No     Paralyzed by Guillain-Kenneth syndrome? No     Currently pregnant? NO     Current moderate or severe illness? No     Allergy to contact lens solution? No    3.  The vaccine has been administered in the usual fashion and the patient was instructed to wait 20 minutes before leaving the building in the event of an allergic reaction: YES    Recorded by Eddie Swanson

## 2018-11-12 NOTE — PATIENT INSTRUCTIONS
[facility] Medication Refill Request Information:  * Please contact your pharmacy regarding ANY request for medication refills.  ** [facility] Prescription Fax = [phone]  * Please allow 3 business days for routine medication refills.  * Please allow 5 business days for controlled substance medication refills.     [facility] Test Result notification information:  *You will be notified with in 7-10 days of your appointment day regarding the results of your test.  If you are on MyChart you will be notified as soon as the provider has reviewed the results and signed off on them.    [facility]: [phone]

## 2018-11-13 LAB — DEPRECATED CALCIDIOL+CALCIFEROL SERPL-MC: 38 UG/L (ref 20–75)

## 2018-11-15 LAB — METHYLMALONATE SERPL-SCNC: 0.21 UMOL/L (ref 0–0.4)

## 2018-11-19 DIAGNOSIS — K92.9 DIGESTIVE DISORDER: ICD-10-CM

## 2018-11-19 DIAGNOSIS — R10.84 ABDOMINAL PAIN, GENERALIZED: ICD-10-CM

## 2018-11-19 PROCEDURE — 87338 HPYLORI STOOL AG IA: CPT | Performed by: INTERNAL MEDICINE

## 2018-11-20 LAB
H PYLORI AG STL QL IA: NORMAL
SPECIMEN SOURCE: NORMAL

## 2018-12-12 ENCOUNTER — OFFICE VISIT (OUTPATIENT)
Dept: INTERNAL MEDICINE | Facility: CLINIC | Age: 38
End: 2018-12-12
Payer: COMMERCIAL

## 2018-12-12 VITALS
HEART RATE: 80 BPM | SYSTOLIC BLOOD PRESSURE: 107 MMHG | DIASTOLIC BLOOD PRESSURE: 73 MMHG | WEIGHT: 122 LBS | OXYGEN SATURATION: 98 % | BODY MASS INDEX: 21.62 KG/M2 | HEIGHT: 63 IN

## 2018-12-12 DIAGNOSIS — M54.2 NECK PAIN: Primary | ICD-10-CM

## 2018-12-12 DIAGNOSIS — M25.562 CHRONIC PAIN OF LEFT KNEE: ICD-10-CM

## 2018-12-12 DIAGNOSIS — R10.84 ABDOMINAL PAIN, GENERALIZED: ICD-10-CM

## 2018-12-12 DIAGNOSIS — G89.29 CHRONIC PAIN OF LEFT KNEE: ICD-10-CM

## 2018-12-12 ASSESSMENT — MIFFLIN-ST. JEOR: SCORE: 1206.13

## 2018-12-12 ASSESSMENT — PAIN SCALES - GENERAL: PAINLEVEL: MILD PAIN (3)

## 2018-12-12 NOTE — PROGRESS NOTES
[facility]  Primary Care Center   Marni Garcia MD  12/12/2018      Chief Complaint:   Pain  Refill Request        History of Present Illness:   Charito Wilcox is an immunosuppressed 38 year old female s/p liver transplant with a history of panic disorder, IBS, and esophageal reflux. She presents alone for evaluation of multiple areas of pain as well as a refill request.    Abdominal pain: The patient established care in the clinic in November 2018. At that time, she was experiencing abdominal pain and expressed concerns regarding her chronic IBS as well as fatigue. Laboratory studies were completed for further evaluation, results as below:     Component    Latest Ref Rng & Units 11/12/2018   Iron    35 - 180 ug/dL 82   Iron Binding Cap    240 - 430 ug/dL 317   Iron Saturation Index    15 - 46 % 26   Vitamin D Deficiency screening    20 - 75 ug/L 38   Vitamin B12    193 - 986 pg/mL 1,202 (H)   Folate    >5.4 ng/mL 17.5   TSH    0.40 - 4.00 mU/L 1.07   Methylmalonic Acid    0.00 - 0.40 umol/L 0.21   Ferritin    12 - 150 ng/mL 56     Since her marathon, she has been attempting to decrease her strenuous exercise activity. She has also started a multivitamin and increase her protein intake. Overall, she is feeling much improved in regard to her energy level though she notes that her fatigue is coming and going. She is still looking to determine how much her history of transplant contributes to her fatigue.     Stress: She reports internalizing her stress with work and her social life and feels that this could possibly be exacerbating her fatigue and IBS symptoms. She does do yoga and meditation to help her relax.     Knee pain: Since March 2018, the patient has experienced sharp coming and going, nontraumatic left knee pain and burning. She was initially evaluated for her knee swelling with bursitis by a walk-in ortho provider in March. She was started on prednisone which she did not tolerate well. She also tried  physical therapy routines in addition to resting, icing, and strengthening the knee throughout her marathon running regimens. She has also been foam rolling her IT band, quadriceps, calf, and inner thigh as well as stretching. Despite these interventions, she is still experiencing significant pain and is looking to have imaging studies completed for further evaluation. The patient finds that sitting exacerbates her pain as well as occasional running and swimming. Straightening out her leg can help relieve some of the discomfort. She denies recent swelling and instability of her knees. Of notes, she did just buy new tennis shoes a couple of days ago.     Neck pain: The patient experiences tightness in bilateral sides of her neck. She also develops headaches about 5 out of 7 days of the week which radiate down into her jaw. Due to her liver history, she only minimally uses Ibuprofen and Tylenol. She has used acupuncture in the past with relief. The patient has recently started lifting weights and is finding that this is worsening her symptoms. This is concerning to her she feels that her overall physical fitness level should be able to tolerate this better.     Overall, physical activity is important to the patient. She is looking at protecting her body long-term to ensure that she is not further damaging her joints.     Review of Systems:   Pertinent items are noted in HPI, remainder of complete ROS is negative.      Active Medications:      ALPRAZolam (XANAX) 0.5 MG tablet, Take 1 tablet (0.5 mg) by mouth 3 times daily as needed for anxiety 1 month supply, Disp: 30 tablet, Rfl: 0     diclofenac (VOLTAREN) 1 % GEL topical gel, Apply 2 g topically 2 times daily, Disp: , Rfl:      ibuprofen (ADVIL/MOTRIN) 200 MG tablet, Take 400 mg by mouth daily as needed for pain, Disp: , Rfl:      lactobacillus rhamnosus, GG, (CULTURELL) capsule, Take 1 capsule by mouth daily, Disp: , Rfl:      melatonin 5 MG tablet, Take 5 mg by  mouth nightly as needed for sleep, Disp: , Rfl:      omeprazole (PRILOSEC) 40 MG capsule, TAKE 1 CAPSULE BY MOUTH TWICE DAILY BEFORE MEALS, TAKE 30-60 MINUTES PRIOR TO A MEAL, Disp: 180 capsule, Rfl: 2     ondansetron (ZOFRAN ODT) 4 MG ODT tab, Take 1-2 tablets (4-8 mg) by mouth 3 times daily (before meals), Disp: 20 tablet, Rfl: 0     PARoxetine (PAXIL) 40 MG tablet, TAKE 1 AND ONE-HALF TABLET BY MOUTH AT BEDTIME, Disp: 45 tablet, Rfl: 5     polyethylene glycol (MIRALAX) powder, Take 17 g (1 capful) by mouth daily, Disp: 510 g, Rfl: 1     tacrolimus (GENERIC EQUIVALENT) 1 MG capsule, Take 1 capsule (1 mg) by mouth every 12 hours, Disp: 180 capsule, Rfl: 3     ursodiol (ACTIGALL) 300 MG capsule, Take 1 capsule (300 mg) by mouth 2 times daily, Disp: 180 capsule, Rfl: 3     alum & mag hydroxide-simethicone (MYLANTA/MAALOX) 200-200-20 MG/5ML SUSP suspension, Take 15 mLs by mouth every 4 hours as needed (Patient not taking: Reported on 11/12/2018), Disp: 769 mL, Rfl: 0     azaTHIOprine (IMURAN) 50 MG tablet, Take 1 tablet (50 mg) by mouth daily (Patient not taking: Reported on 11/12/2018), Disp: 90 tablet, Rfl: 3     dicyclomine (BENTYL) 20 MG tablet, Take 1 tablet (20 mg) by mouth 4 times daily as needed (Patient not taking: Reported on 11/12/2018), Disp: 60 tablet, Rfl: 3     sucralfate (CARAFATE) 1 GM tablet, Take 1 tablet (1 g) by mouth 4 times daily (before meals and nightly) (Patient not taking: Reported on 11/12/2018), Disp: 120 tablet, Rfl: 0      Allergies:   Gluten meal   Milk protein extract      Past Medical History:  Acute pancreatitis   Anxiety   Esophageal reflux   Gastrointestinal ulcer   Irritable bowel syndrome (IBS)   Liver replaced by transplant   Papanicolaou smear of cervix with a typical squamous cells of undetermined significance   Cardiovascular screening; LDL < 160  Panic disorder   Immunosuppressed status   Premenstrual syndrome   Labral tear of hip, degenerative   Right hip impingement  "syndrome   Fatigue   Localized superficial swelling, mass, or lump   Abdominal pain      Past Surgical History:  Heterotopic liver transplant   Appendectomy   Cholecystectomy    section x2  Hip surgery   Tubal ligation     Family History:   CAD (Mother)   Hyperlipidemia   Osteoporosis   Arthritis   Depression   Anxiety disorder   Heart failure   Hypertension   Cerebrovascular disease   Breast cancer      Social History:   The patient is , a nonsmoker, and does consume alcohol.     Physical Exam:   /73 (BP Location: Right arm, Patient Position: Sitting)   Pulse 80   Ht 1.59 m (5' 2.6\")   Wt 55.3 kg (122 lb)   LMP 2018 (Approximate)   SpO2 98%   Breastfeeding? No   BMI 21.89 kg/m        General:  Pleasant, alert, no acute distress  Knee:  Tenderness at the patellar insertion on femur. No joint line tenderness. Lateral hamstring tightness. ROM intact, no crepitus. No evidence of ACL/PCL compromise. Has MCL pain with valgus stress, no pain with maneuvers with testing LCL and bilateral meniscus. Tenderness over anserine bursa. No swelling, redness, warmth, and no Baker's cyst.   Neck/M/S: Full AROM in neck. Paracervical muscle tenderness. Bilateral occipital ridge tenderness extending throughout the ridge. Bilateral tenderness along the mandibular ramus and mastoids. Right greater than left SCM tenderness. Upper and lower rhomboid tenderness. Bilateral serratus tenderness. Good ROM of bilateral shoulders. No spinal tenderness. No joint deformities noted.  No joint swelling.  Skin:   No rashes or jaundice.  Normal moisture, good skin turgor.  Neuro:  Alert and oriented, face symmetric. No tremor. Gait steady. General hyperreflexic in upper and lower extremities. Good upper extremity strength.  Psych:  Broad range affect.  Not psychomotor slowed.  No signs of anxiety or agitation.     Assessment and Plan:  1. Chronic pain of left knee and neck   Patient has about a year history of neck and " left knee pain. Physical therapy, resting, stretching, and icing have only provided some relief. Physical examination is significant for soft tissue tightness, anserine bursa, and evidence of tendonitis. Sports orthopedic referral was provided for further evaluation possibly including imaging and treatment as physical activity is very important to the patient. Discussed topical pain control as well as massage for further intervention. Acupuncture referral provided for further management.   - ORTHOPEDICS ADULT REFERRAL  - ACUPUNCTURE REFERRAL    2. Stress   Patient internalizes her stress and attempts to manage with yoga and mediatation as well as acupuncture. Discussed mindfulness-based stress reduction for further stress management.     3.  Abdominal pain, significantly improved since marathon completed.    Follow-up: Patient should follow up with sports orthopedics for further evaluation and treatment. She should return here to clinic with new or worsening concerns.      Total time spent 25 minutes.  More than 50% of the time spent with Ms. Wilcox on counseling / coordinating her care           Scribe Disclosure:  I, Stacy Muro, am serving as a scribe to document services personally performed by Marni Garcia MD at this visit, based upon the provider's statements to me. All documentation has been reviewed by the aforementioned provider prior to being entered into the official medical record.     Portions of this medical record were completed by a scribe. UPON MY REVIEW AND AUTHENTICATION BY ELECTRONIC SIGNATURE, this confirms (a) I performed the applicable clinical services, and (b) the record is accurate.     Marni Garcia M.D.  Internal Medicine  Primary Care Center   pager [phone]

## 2018-12-12 NOTE — NURSING NOTE
Chief Complaint   Patient presents with     Refill Request     patient is here to follow up on labs and fatigue from last visit      Pain     neck pain and left knee, ongoing pain for 7 months      Bruna Cordon at 9:35 AM on 12/12/2018.

## 2018-12-12 NOTE — PATIENT INSTRUCTIONS
[facility] Medication Refill Request Information:  * Please contact your pharmacy regarding ANY request for medication refills.  ** [facility] Prescription Fax = [phone]  * Please allow 3 business days for routine medication refills.  * Please allow 5 business days for controlled substance medication refills.     [facility] Test Result notification information:  *You will be notified with in 7-10 days of your appointment day regarding the results of your test.  If you are on MyChart you will be notified as soon as the provider has reviewed the results and signed off on them.    [facility]): [phone]

## 2018-12-19 NOTE — TELEPHONE ENCOUNTER
FUTURE VISIT INFORMATION      FUTURE VISIT INFORMATION:    Date: 12/20/18    Time:     Location: [facility]  REFERRAL INFORMATION:    Referring provider:  self    Referring providers clinic:      Reason for visit/diagnosis  Left knee pain. No Surgery. No imaginag and No outside records.  Last visit was 11/5/2014. Appointment per patient.    RECORDS REQUESTED FROM:       Clinic name Comments Records Status Imaging Status     internal internal

## 2018-12-21 ENCOUNTER — PRE VISIT (OUTPATIENT)
Dept: ORTHOPEDICS | Facility: CLINIC | Age: 38
End: 2018-12-21

## 2018-12-21 ENCOUNTER — OFFICE VISIT (OUTPATIENT)
Dept: ORTHOPEDICS | Facility: CLINIC | Age: 38
End: 2018-12-21
Payer: COMMERCIAL

## 2018-12-21 ENCOUNTER — ANCILLARY PROCEDURE (OUTPATIENT)
Dept: GENERAL RADIOLOGY | Facility: CLINIC | Age: 38
End: 2018-12-21
Payer: COMMERCIAL

## 2018-12-21 VITALS — RESPIRATION RATE: 16 BRPM | BODY MASS INDEX: 21.62 KG/M2 | WEIGHT: 122 LBS | HEIGHT: 63 IN

## 2018-12-21 DIAGNOSIS — M70.52 PES ANSERINUS BURSITIS OF LEFT KNEE: Primary | ICD-10-CM

## 2018-12-21 DIAGNOSIS — M25.562 CHRONIC PAIN OF LEFT KNEE: ICD-10-CM

## 2018-12-21 DIAGNOSIS — G89.29 CHRONIC PAIN OF LEFT KNEE: ICD-10-CM

## 2018-12-21 DIAGNOSIS — G89.29 CHRONIC PAIN OF LEFT KNEE: Primary | ICD-10-CM

## 2018-12-21 DIAGNOSIS — M25.562 CHRONIC PAIN OF LEFT KNEE: Primary | ICD-10-CM

## 2018-12-21 RX ADMIN — LIDOCAINE HYDROCHLORIDE 2 ML: 10 INJECTION, SOLUTION EPIDURAL; INFILTRATION; INTRACAUDAL; PERINEURAL at 09:05

## 2018-12-21 RX ADMIN — TRIAMCINOLONE ACETONIDE 40 MG: 40 INJECTION, SUSPENSION INTRA-ARTICULAR; INTRAMUSCULAR at 09:05

## 2018-12-21 ASSESSMENT — MIFFLIN-ST. JEOR: SCORE: 1206.17

## 2018-12-21 NOTE — PROGRESS NOTES
Subjective:   Charito Wilcox is a 38 year old female who presents with right knee pain. She notes that her knee swelled up for unknown reason. She likes to run, she has been cross training with swimming.     Onset 2018. She had been running and noted it bothering her more. After one incident of kneeling swelling significantly. UC said bursitis- got prednisone.     flutuates week to week. Foam roll, stretch. Hurts after running more than during running. Bracing helps occasionally.     Background:   Date of injury: NA   Duration of symptoms: 9 months  Mechanism of Injury: Insidious Onset; Unknown   Aggravating factors: Running, kneeling  Relieving Factors: prednisone   Prior Evaluation: Prior Physician Evalutation: UC 3/18, PT    PAST MEDICAL, SOCIAL, SURGICAL AND FAMILY HISTORY: She  has a past medical history of Acute pancreatitis (2016), Anxiety, Esophageal reflux, Gastrointestinal ulcer, Irritable bowel syndrome, Liver replaced by transplant (H) (), and Papanicolaou smear of cervix with atypical squamous cells of undetermined significance (ASC-US). She also has no past medical history of Arthritis, Cerebral infarction (H), Congestive heart failure, unspecified, COPD (chronic obstructive pulmonary disease) (H), Diabetes (H), Heart disease, History of blood transfusion, Hypertension, Thyroid disease, or Uncomplicated asthma.  She  has a past surgical history that includes TRANSPLANT LIVER,HETERTOPIC (); tubal ligation (3/2010); Esophagoscopy, gastroscopy, duodenoscopy (EGD), combined (Left, 2015);  section (2008);  section (3/2010); hip surgery (Right, 2016); Esophagoscopy, gastroscopy, duodenoscopy (EGD), combined (N/A, 2016); Esophagoscopy, gastroscopy, duodenoscopy (EGD), combined (N/A, 2016); Esophagoscopy, gastroscopy, duodenoscopy (EGD), combined (N/A, 2018); Colonoscopy (N/A, 10/24/2018); and Cholecystectomy ().  Her family history includes  "Anxiety Disorder in her maternal grandmother; Arthritis in her mother; Breast Cancer in her paternal grandmother; Cerebrovascular Disease in her paternal grandmother; Coronary Artery Disease in her mother; Depression in her maternal grandmother; Heart Failure in her maternal grandmother; Hyperlipidemia in her mother; Hypertension in her maternal grandfather; Osteoporosis in her mother.  She reports that  has never smoked. she has never used smokeless tobacco. She reports that she drinks alcohol. She reports that she does not use drugs.    ALLERGIES: She is allergic to gluten meal and milk protein extract.    CURRENT MEDICATIONS: She has a current medication list which includes the following prescription(s): alprazolam, diclofenac, ibuprofen, lactobacillus rhamnosus (gg), melatonin, omeprazole, ondansetron, paroxetine, polyethylene glycol, tacrolimus, ursodiol, alum & mag hydroxide-simethicone, azathioprine, dicyclomine, and sucralfate.     REVIEW OF SYSTEMS: 6 point review of systems is negative except as noted above.     Exam:   Resp 16   Ht 1.59 m (5' 2.6\")   Wt 55.3 kg (122 lb)   LMP 11/25/2018 (Approximate)   BMI 21.89 kg/m             CONSTITUTIONAL: healthy, alert and no distress  HEAD: Normocephalic. No masses, lesions, tenderness or abnormalities  SKIN: no suspicious lesions or rashes  GAIT: normal  NEUROLOGIC: Non-focal  PSYCHIATRIC: affect normal/bright and mentation appears normal.    MUSCULOSKELETAL: ttp at pes anserine and medial hamstring/adductor tendons and medial joint line, no ttp at MCL, patellar facets or lateral joint line. + patellar grind test. 5/5 strength with leg extension, 4+/5 strength with hamstrings and some cramping induced. 5-/5 strength biceps femoris. Groin pain with straight leg adduction    Right leg minimal ttp at pes ansering. + patellar grind. 5/5 strength leg extension. 4+/5 medial and 5-/5 strengt      PROCEDURE:            Pes anserine bursitis injection   The patient " was apprised of the risks and the benefits of the procedure and consented and a written consent was signed.   The patient s  LEFT PES ANSERINE BURSA was sterilely prepped with chloraprep.   40 mg of triamcinolone suspension was drawn up into a 5 mL syringe with 2 mL of 1% lidocaine  The patient was injected with a 1.5-inch 25-gauge needle at the point of maximal tenderness at pes anserine  There were no complications. The patient tolerated the procedure well. There was minimal bleeding.   The patient was instructed to go to the emergency room with any usual pain, swelling, or redness occurred in the injected area.   Large Joint Injection/Arthocentesis: L anserine bursa  Date/Time: 12/21/2018 9:05 AM  Performed by: Ezra Cesar MD  Authorized by: Zulma Bennett MD     Indications:  Pain  Needle Size:  25 G  Guidance: landmark guided    Approach:  Medial  Location:  Knee  Site:  L anserine bursa  Medications:  40 mg triamcinolone 40 MG/ML; 2 mL lidocaine (PF) 1 %  Procedure discussed: discussed risks, benefits, and alternatives    Consent Given by:  Patient  Timeout: timeout called immediately prior to procedure    Prep: patient was prepped and draped in usual sterile fashion     28ml of 1% lidocaine was wasted per MD          Assessment/Plan:       ICD-10-CM    1. Pes anserinus bursitis of left knee M70.52 PHYSICAL THERAPY REFERRAL (Internal)        X-RAY INTERPRETATION:   X-Ray of the Left Knee: 3-view, Perez, lateral, sunrise   ordered and interpreted in the office today was negative for fracture, subluxation or joint space abnormality.    injection as above today    Has some weakness with adduction and hip/glute muscles as well    PT is the key treatment for hamstrings/pes anserine  Running eval after strengthen.     Patient seen and discussed with Dr. Zulma Bennett.     Ezra Cesar MD  Sports Medicine Fellow  12/21/2018 9:13 AM

## 2018-12-21 NOTE — LETTER
2018      RE: Charito Wilcox  [address]  [facility] [address]        Subjective:   Charito Wilcox is a 38 year old female who presents with right knee pain. She notes that her knee swelled up for unknown reason. She likes to run, she has been cross training with swimming.     Onset 2018. She had been running and noted it bothering her more. After one incident of kneeling swelling significantly. UC said bursitis- got prednisone.     flutuates week to week. Foam roll, stretch. Hurts after running more than during running. Bracing helps occasionally.     Background:   Date of injury: NA   Duration of symptoms: 9 months  Mechanism of Injury: Insidious Onset; Unknown   Aggravating factors: Running, kneeling  Relieving Factors: prednisone   Prior Evaluation: Prior Physician Evalutation: UC 3/18, PT    PAST MEDICAL, SOCIAL, SURGICAL AND FAMILY HISTORY: She  has a past medical history of Acute pancreatitis (2016), Anxiety, Esophageal reflux, Gastrointestinal ulcer, Irritable bowel syndrome, Liver replaced by transplant (H) (), and Papanicolaou smear of cervix with atypical squamous cells of undetermined significance (ASC-US). She also has no past medical history of Arthritis, Cerebral infarction (H), Congestive heart failure, unspecified, COPD (chronic obstructive pulmonary disease) (H), Diabetes (H), Heart disease, History of blood transfusion, Hypertension, Thyroid disease, or Uncomplicated asthma.  She  has a past surgical history that includes TRANSPLANT LIVER,HETERTOPIC (); tubal ligation (3/2010); Esophagoscopy, gastroscopy, duodenoscopy (EGD), combined (Left, 2015);  section (2008);  section (3/2010); hip surgery (Right, 2016); Esophagoscopy, gastroscopy, duodenoscopy (EGD), combined (N/A, 2016); Esophagoscopy, gastroscopy, duodenoscopy (EGD), combined (N/A, 2016); Esophagoscopy, gastroscopy, duodenoscopy (EGD), combined (N/A,  "8/20/2018); Colonoscopy (N/A, 10/24/2018); and Cholecystectomy (2000).  Her family history includes Anxiety Disorder in her maternal grandmother; Arthritis in her mother; Breast Cancer in her paternal grandmother; Cerebrovascular Disease in her paternal grandmother; Coronary Artery Disease in her mother; Depression in her maternal grandmother; Heart Failure in her maternal grandmother; Hyperlipidemia in her mother; Hypertension in her maternal grandfather; Osteoporosis in her mother.  She reports that  has never smoked. she has never used smokeless tobacco. She reports that she drinks alcohol. She reports that she does not use drugs.    ALLERGIES: She is allergic to gluten meal and milk protein extract.    CURRENT MEDICATIONS: She has a current medication list which includes the following prescription(s): alprazolam, diclofenac, ibuprofen, lactobacillus rhamnosus (gg), melatonin, omeprazole, ondansetron, paroxetine, polyethylene glycol, tacrolimus, ursodiol, alum & mag hydroxide-simethicone, azathioprine, dicyclomine, and sucralfate.     REVIEW OF SYSTEMS: 6 point review of systems is negative except as noted above.     Exam:   Resp 16   Ht 1.59 m (5' 2.6\")   Wt 55.3 kg (122 lb)   LMP 11/25/2018 (Approximate)   BMI 21.89 kg/m              CONSTITUTIONAL: healthy, alert and no distress  HEAD: Normocephalic. No masses, lesions, tenderness or abnormalities  SKIN: no suspicious lesions or rashes  GAIT: normal  NEUROLOGIC: Non-focal  PSYCHIATRIC: affect normal/bright and mentation appears normal.    MUSCULOSKELETAL: ttp at pes anserine and medial hamstring/adductor tendons and medial joint line, no ttp at MCL, patellar facets or lateral joint line. + patellar grind test. 5/5 strength with leg extension, 4+/5 strength with hamstrings and some cramping induced. 5-/5 strength biceps femoris. Groin pain with straight leg adduction    Right leg minimal ttp at pes ansering. + patellar grind. 5/5 strength leg extension. " 4+/5 medial and 5-/5 strengt      PROCEDURE:            Pes anserine bursitis injection   The patient was apprised of the risks and the benefits of the procedure and consented and a written consent was signed.   The patient s  LEFT PES ANSERINE BURSA was sterilely prepped with chloraprep.   40 mg of triamcinolone suspension was drawn up into a 5 mL syringe with 2 mL of 1% lidocaine  The patient was injected with a 1.5-inch 25-gauge needle at the point of maximal tenderness at pes anserine  There were no complications. The patient tolerated the procedure well. There was minimal bleeding.   The patient was instructed to go to the emergency room with any usual pain, swelling, or redness occurred in the injected area.   Large Joint Injection/Arthocentesis: L anserine bursa  Date/Time: 12/21/2018 9:05 AM  Performed by: Ezra Cesar MD  Authorized by: Zulma Bennett MD     Indications:  Pain  Needle Size:  25 G  Guidance: landmark guided    Approach:  Medial  Location:  Knee  Site:  L anserine bursa  Medications:  40 mg triamcinolone 40 MG/ML; 2 mL lidocaine (PF) 1 %  Procedure discussed: discussed risks, benefits, and alternatives    Consent Given by:  Patient  Timeout: timeout called immediately prior to procedure    Prep: patient was prepped and draped in usual sterile fashion     28ml of 1% lidocaine was wasted per MD          Assessment/Plan:       ICD-10-CM    1. Pes anserinus bursitis of left knee M70.52 PHYSICAL THERAPY REFERRAL (Internal)        X-RAY INTERPRETATION:   X-Ray of the Left Knee: 3-view, Perez, lateral, sunrise   ordered and interpreted in the office today was negative for fracture, subluxation or joint space abnormality.    injection as above today    Has some weakness with adduction and hip/glute muscles as well    PT is the key treatment for hamstrings/pes anserine  Running eval after strengthen.     Patient seen and discussed with Dr. Zulma Bennett.     Ezra Cesar  MD  Sports Medicine Fellow  12/21/2018 9:13 AM        Attending Note:   I have personally examined this patient and have reviewed the clinical presentation and progress note with the fellow. I agree with the treatment plan as outlined. The plan was formulated with the fellow on the day of the patient's visit. I have reviewed all imaging with the fellow and agree with the findings in the documentation. I have supervised the pes anserine injection.     Zulma Bennett MD, CAQ, CCD  [facility]  Sports Medicine and Bone Health

## 2018-12-28 ENCOUNTER — THERAPY VISIT (OUTPATIENT)
Dept: PHYSICAL THERAPY | Facility: CLINIC | Age: 38
End: 2018-12-28
Payer: COMMERCIAL

## 2018-12-28 DIAGNOSIS — M25.562 LEFT KNEE PAIN, UNSPECIFIED CHRONICITY: Primary | ICD-10-CM

## 2018-12-28 PROCEDURE — 97161 PT EVAL LOW COMPLEX 20 MIN: CPT | Mod: GP | Performed by: PHYSICAL THERAPIST

## 2018-12-28 PROCEDURE — 97110 THERAPEUTIC EXERCISES: CPT | Mod: GP | Performed by: PHYSICAL THERAPIST

## 2018-12-28 PROCEDURE — 97530 THERAPEUTIC ACTIVITIES: CPT | Mod: GP | Performed by: PHYSICAL THERAPIST

## 2018-12-28 ASSESSMENT — ACTIVITIES OF DAILY LIVING (ADL)
LIMPING: I DO NOT HAVE THE SYMPTOM
KNEE_ACTIVITY_OF_DAILY_LIVING_SUM: 45
WALK: ACTIVITY IS SOMEWHAT DIFFICULT
GO DOWN STAIRS: ACTIVITY IS SOMEWHAT DIFFICULT
SQUAT: ACTIVITY IS SOMEWHAT DIFFICULT
RAW_SCORE: 45
GIVING WAY, BUCKLING OR SHIFTING OF KNEE: I DO NOT HAVE THE SYMPTOM
KNEEL ON THE FRONT OF YOUR KNEE: I AM UNABLE TO DO THE ACTIVITY
GO UP STAIRS: ACTIVITY IS SOMEWHAT DIFFICULT
AS_A_RESULT_OF_YOUR_KNEE_INJURY,_HOW_WOULD_YOU_RATE_YOUR_CURRENT_LEVEL_OF_DAILY_ACTIVITY?: ABNORMAL
HOW_WOULD_YOU_RATE_THE_OVERALL_FUNCTION_OF_YOUR_KNEE_DURING_YOUR_USUAL_DAILY_ACTIVITIES?: ABNORMAL
RISE FROM A CHAIR: ACTIVITY IS MINIMALLY DIFFICULT
STIFFNESS: I DO NOT HAVE THE SYMPTOM
HOW_WOULD_YOU_RATE_THE_CURRENT_FUNCTION_OF_YOUR_KNEE_DURING_YOUR_USUAL_DAILY_ACTIVITIES_ON_A_SCALE_FROM_0_TO_100_WITH_100_BEING_YOUR_LEVEL_OF_KNEE_FUNCTION_PRIOR_TO_YOUR_INJURY_AND_0_BEING_THE_INABILITY_TO_PERFORM_ANY_OF_YOUR_USUAL_DAILY_ACTIVITIES?: 60
WEAKNESS: THE SYMPTOM AFFECTS MY ACTIVITY MODERATELY
KNEE_ACTIVITY_OF_DAILY_LIVING_SCORE: 64.29
SIT WITH YOUR KNEE BENT: ACTIVITY IS SOMEWHAT DIFFICULT
PAIN: THE SYMPTOM AFFECTS MY ACTIVITY MODERATELY
SWELLING: THE SYMPTOM AFFECTS MY ACTIVITY SLIGHTLY
STAND: ACTIVITY IS MINIMALLY DIFFICULT

## 2018-12-28 NOTE — PROGRESS NOTES
KEY PT FINDINGS:  1) TTP over medial tibial, pes anserine  2) Hip extension weakness, pain with resisted hip internal rotation  3) Full motion - end feel difference with external rotation at the hip    PLAN:   Ankle assessment, tool assisted to distal quadriceps, hip mob for posterior capsule, thoracic rotation mobility, nerve tension testing    Physical Therapy Initial Evaluation: Subjective History     Injury/Condition Details:  Presenting Complaint Left knee pain    Onset Timing/Date March 2018 (intermittent)    Mechanism Was cleaning the floor on her knees and had swelling at the knee. Was worried that she tore something.   Did a round of PT last spring - did a lot of things that she already knee. The knee got better. Was able to train for the marathon.   The knee is currently unpredictable. Will have some pain at night, will have some pain with running. There has been intermittent swelling.      Symptom Behavior Details    Primary Symptoms Sporadic symptoms; Activity/position dependent, pain (Location: Posterior left glut into the hamstrings, initial knee pain was a wrap around pain of the medial knee, ankle pain - lateral calf, anterior ankle , Quality: Aching/Throbbing), swelling   Worst Pain Varies/10 (with See below)   Symptom Provocators [place] stretching, bringing her left leg over her right leg (twisting)   Sitting for a long time (medial knee pain) (it feels the best when it is straightening)   Best Pain 0/10    Symptom Relievers Foam rolling, stretching   Time of day dependent? No   Recent symptom change? no change in symptoms     Prior Testing/Intervention for current condition:  Prior Tests  x-ray - MRI if no improvements   Prior Treatment Kinesiotaping, stretching, foam rolling, Injection to the bursa by Dr. Bennett     Lifestyle & General Medical History:  Employment [employer] at [employer]   Usual physical activities  (within past year) Alternates: foam rolls 30 minutes per night, stretching  30 minutes per night   Orthopaedic history See Epic Chart   Notable medical history See Epic Chart   Patient Reported Health excellent     Lower Extremity Physical Therapy Examination    Dynamic Movement Screen:  2 leg stance: Equal weight bearing, appropriate arch height, neutral alignment noted at the limbs  2 leg squat:Excessive posterior hip excursion (reduced anterior knee excursion) and pain reported throughout the motion, not worse after squatting  Spine rotation in stance: restricted spine rotation mobility noted at the thoracic spine (to the right more than left), minimal foot motion with rotation    1 leg stance:   Right: proprioceptive challenge and excessive lateral trunk lean over stance limb  Left: proprioceptive challenge and excessive contralateral pelvic drop    1 leg squat:   Right: proprioceptive challenge, excessive anterior knee excursion and hip hiking preserved  Left: proprioceptive challenge, excessive anterior knee excursion and hip hiking preserved    Gait: Normal    Knee Joint ROM: full motion, at end range flexion - symptoms at distal quadriceps     Hip Joint ROM: all motions are symmetrical, end range difference noted at external rotation    Lower Extremity Muscle Strength (x/5)   Hip IR Hip ER Knee Ext Hip ABD Hip Ext Knee Flex   Right  5-/5 5-/5 5/5 5/5 4/5 4+/5   Left PAIN/5 in buttocks 5-/5 5/5 5/5 4/5 4/5     Basic Muscle Activation:  Transversus Abdominus: Mild deficits in core control noted with DL bridge testing (No differences in SI levels with long sitting test).  Quadriceps: Right: Normal, Left: Normal - improves pain at the knee    Lower Extremity Flexibility Screen:  Hamstrings (Supine SLR): Right: -; Left: -  Gastroc (Supine Active DF): Right: +; Left: +  Quadriceps (Prone KF): Right: +; Left: +    Palpation:   Tender to palpation at the following structures: Medial knee, tibial (medial), anterior ankle (TCJ joint line)  NOT tender to palpation at the following structures:  lateral knee, fat pad    Assessment/Plan:  Patient is a 38 year old female with left side knee complaints.    Patient has the following significant findings with corresponding treatment plan.                Diagnosis 1:  Left knee pain - Pes Anserine Bursitis  Pain -  hot/cold therapy, manual therapy, STS, splint/taping/bracing/orthotics, self management and education  Decreased ROM/flexibility - manual therapy, therapeutic exercise and home program  Decreased joint mobility - manual therapy, therapeutic exercise and home program  Decreased strength - therapeutic exercise, therapeutic activities and home program  Impaired balance - neuro re-education, therapeutic activities and home program  Decreased proprioception - neuro re-education, therapeutic activities and home program  Impaired muscle performance - neuro re-education and home program  Decreased function - therapeutic activities and home program    Therapy Evaluation Codes:   1) History comprised of:   Personal factors that impact the plan of care:      None.    Comorbidity factors that impact the plan of care are:      None.     Medications impacting care: None.  2) Examination of Body Systems comprised of:   Body structures and functions that impact the plan of care:      Ankle, Hip and Knee.   Activity limitations that impact the plan of care are:      Running, Sitting, Sports and Squatting/kneeling.  3) Clinical presentation characteristics are:   Stable/Uncomplicated.  4) Decision-Making    Low complexity using standardized patient assessment instrument and/or measureable assessment of functional outcome.  Cumulative Therapy Evaluation is: Low complexity.    Previous and current functional limitations:  (See Goal Flow Sheet for this information)    Short term and Long term goals: (See Goal Flow Sheet for this information)     Communication ability:  Patient appears to be able to clearly communicate and understand verbal and written communication and  follow directions correctly.  Treatment Explanation - The following has been discussed with the patient:   RX ordered/plan of care  Anticipated outcomes  Possible risks and side effects  This patient would benefit from PT intervention to resume normal activities.   Rehab potential is good.    Frequency:  1 X week, once daily  Duration:  for 8 weeks  Discharge Plan:  Achieve all LTG.  Independent in home treatment program.  Reach maximal therapeutic benefit.    Please refer to the daily flowsheet for treatment today, total treatment time and time spent performing 1:1 timed codes.

## 2018-12-29 RX ORDER — TRIAMCINOLONE ACETONIDE 40 MG/ML
40 INJECTION, SUSPENSION INTRA-ARTICULAR; INTRAMUSCULAR
Status: DISCONTINUED | OUTPATIENT
Start: 2018-12-21 | End: 2019-02-26

## 2018-12-29 RX ORDER — LIDOCAINE HYDROCHLORIDE 10 MG/ML
2 INJECTION, SOLUTION EPIDURAL; INFILTRATION; INTRACAUDAL; PERINEURAL
Status: DISCONTINUED | OUTPATIENT
Start: 2018-12-21 | End: 2019-02-26

## 2018-12-30 NOTE — PROGRESS NOTES
Attending Note:   I have personally examined this patient and have reviewed the clinical presentation and progress note with the fellow. I agree with the treatment plan as outlined. The plan was formulated with the fellow on the day of the patient's visit. I have reviewed all imaging with the fellow and agree with the findings in the documentation. I have supervised the pes anserine injection.     Zulma Bennett MD, CAQ, CCD  [facility]  Sports Medicine and Bone Health

## 2019-01-09 ENCOUNTER — THERAPY VISIT (OUTPATIENT)
Dept: PHYSICAL THERAPY | Facility: CLINIC | Age: 39
End: 2019-01-09
Payer: COMMERCIAL

## 2019-01-09 DIAGNOSIS — M25.562 LEFT KNEE PAIN, UNSPECIFIED CHRONICITY: Primary | ICD-10-CM

## 2019-01-09 PROCEDURE — 97140 MANUAL THERAPY 1/> REGIONS: CPT | Mod: GP | Performed by: PHYSICAL THERAPIST

## 2019-01-09 PROCEDURE — 97110 THERAPEUTIC EXERCISES: CPT | Mod: GP | Performed by: PHYSICAL THERAPIST

## 2019-01-09 PROCEDURE — 97530 THERAPEUTIC ACTIVITIES: CPT | Mod: GP | Performed by: PHYSICAL THERAPIST

## 2019-01-16 ENCOUNTER — THERAPY VISIT (OUTPATIENT)
Dept: PHYSICAL THERAPY | Facility: CLINIC | Age: 39
End: 2019-01-16
Payer: COMMERCIAL

## 2019-01-16 DIAGNOSIS — M25.562 LEFT KNEE PAIN, UNSPECIFIED CHRONICITY: Primary | ICD-10-CM

## 2019-01-16 PROCEDURE — 97530 THERAPEUTIC ACTIVITIES: CPT | Mod: GP | Performed by: PHYSICAL THERAPIST

## 2019-01-16 PROCEDURE — 97140 MANUAL THERAPY 1/> REGIONS: CPT | Mod: GP | Performed by: PHYSICAL THERAPIST

## 2019-01-23 ENCOUNTER — THERAPY VISIT (OUTPATIENT)
Dept: PHYSICAL THERAPY | Facility: CLINIC | Age: 39
End: 2019-01-23
Payer: COMMERCIAL

## 2019-01-23 DIAGNOSIS — M25.562 LEFT KNEE PAIN, UNSPECIFIED CHRONICITY: Primary | ICD-10-CM

## 2019-01-23 PROCEDURE — 97140 MANUAL THERAPY 1/> REGIONS: CPT | Mod: GP | Performed by: PHYSICAL THERAPIST

## 2019-01-23 PROCEDURE — 97110 THERAPEUTIC EXERCISES: CPT | Mod: GP | Performed by: PHYSICAL THERAPIST

## 2019-01-27 ENCOUNTER — APPOINTMENT (OUTPATIENT)
Dept: ULTRASOUND IMAGING | Facility: CLINIC | Age: 39
End: 2019-01-27
Payer: COMMERCIAL

## 2019-01-27 ENCOUNTER — HOSPITAL ENCOUNTER (EMERGENCY)
Facility: CLINIC | Age: 39
Discharge: HOME | End: 2019-01-27
Attending: EMERGENCY MEDICINE | Admitting: EMERGENCY MEDICINE
Payer: COMMERCIAL

## 2019-01-27 VITALS
DIASTOLIC BLOOD PRESSURE: 81 MMHG | WEIGHT: 121.7 LBS | RESPIRATION RATE: 14 BRPM | OXYGEN SATURATION: 99 % | TEMPERATURE: 98.6 F | HEART RATE: 71 BPM | SYSTOLIC BLOOD PRESSURE: 124 MMHG | BODY MASS INDEX: 22.39 KG/M2 | HEIGHT: 62 IN

## 2019-01-27 DIAGNOSIS — N93.8 OTHER SPECIFIED ABNORMAL UTERINE AND VAGINAL BLEEDING: ICD-10-CM

## 2019-01-27 LAB
ALBUMIN SERPL-MCNC: 3.7 G/DL (ref 3.4–5)
ALBUMIN UR-MCNC: NEGATIVE MG/DL
ALP SERPL-CCNC: 132 U/L (ref 40–150)
ALT SERPL W P-5'-P-CCNC: 23 U/L (ref 0–50)
ANION GAP SERPL CALCULATED.3IONS-SCNC: 8 MMOL/L (ref 3–14)
APPEARANCE UR: CLEAR
APTT PPP: 29 SEC (ref 22–37)
AST SERPL W P-5'-P-CCNC: 26 U/L (ref 0–45)
BASOPHILS # BLD AUTO: 0.1 10E9/L (ref 0–0.2)
BASOPHILS NFR BLD AUTO: 1 %
BILIRUB DIRECT SERPL-MCNC: 0.1 MG/DL (ref 0–0.2)
BILIRUB SERPL-MCNC: 0.4 MG/DL (ref 0.2–1.3)
BILIRUB UR QL STRIP: NEGATIVE
BUN SERPL-MCNC: 11 MG/DL (ref 7–30)
CALCIUM SERPL-MCNC: 8.5 MG/DL (ref 8.5–10.1)
CHLORIDE SERPL-SCNC: 108 MMOL/L (ref 94–109)
CO2 SERPL-SCNC: 26 MMOL/L (ref 20–32)
COLOR UR AUTO: ABNORMAL
CREAT SERPL-MCNC: 0.8 MG/DL (ref 0.52–1.04)
DIFFERENTIAL METHOD BLD: ABNORMAL
EOSINOPHIL # BLD AUTO: 0.1 10E9/L (ref 0–0.7)
EOSINOPHIL NFR BLD AUTO: 2.3 %
ERYTHROCYTE [DISTWIDTH] IN BLOOD BY AUTOMATED COUNT: 12.5 % (ref 10–15)
GFR SERPL CREATININE-BSD FRML MDRD: >90 ML/MIN/{1.73_M2}
GLUCOSE SERPL-MCNC: 87 MG/DL (ref 70–99)
GLUCOSE UR STRIP-MCNC: NEGATIVE MG/DL
HCG UR QL: NEGATIVE
HCT VFR BLD AUTO: 39.3 % (ref 35–47)
HGB BLD-MCNC: 14 G/DL (ref 11.7–15.7)
HGB UR QL STRIP: NEGATIVE
IMM GRANULOCYTES # BLD: 0 10E9/L (ref 0–0.4)
IMM GRANULOCYTES NFR BLD: 0.4 %
INR PPP: 0.95 (ref 0.86–1.14)
KETONES UR STRIP-MCNC: NEGATIVE MG/DL
LEUKOCYTE ESTERASE UR QL STRIP: NEGATIVE
LYMPHOCYTES # BLD AUTO: 1.8 10E9/L (ref 0.8–5.3)
LYMPHOCYTES NFR BLD AUTO: 36.6 %
MCH RBC QN AUTO: 36.3 PG (ref 26.5–33)
MCHC RBC AUTO-ENTMCNC: 35.6 G/DL (ref 31.5–36.5)
MCV RBC AUTO: 102 FL (ref 78–100)
MONOCYTES # BLD AUTO: 0.6 10E9/L (ref 0–1.3)
MONOCYTES NFR BLD AUTO: 12.1 %
MUCOUS THREADS #/AREA URNS LPF: PRESENT /LPF
NEUTROPHILS # BLD AUTO: 2.3 10E9/L (ref 1.6–8.3)
NEUTROPHILS NFR BLD AUTO: 47.6 %
NITRATE UR QL: NEGATIVE
NRBC # BLD AUTO: 0 10*3/UL
NRBC BLD AUTO-RTO: 0 /100
PH UR STRIP: 7 PH (ref 5–7)
PLATELET # BLD AUTO: 212 10E9/L (ref 150–450)
POTASSIUM SERPL-SCNC: 3.6 MMOL/L (ref 3.4–5.3)
PROT SERPL-MCNC: 7.2 G/DL (ref 6.8–8.8)
RBC # BLD AUTO: 3.86 10E12/L (ref 3.8–5.2)
RBC #/AREA URNS AUTO: <1 /HPF (ref 0–2)
SODIUM SERPL-SCNC: 143 MMOL/L (ref 133–144)
SOURCE: ABNORMAL
SP GR UR STRIP: 1 (ref 1–1.03)
SPECIMEN SOURCE: NORMAL
SQUAMOUS #/AREA URNS AUTO: <1 /HPF (ref 0–1)
TSH SERPL DL<=0.005 MIU/L-ACNC: 0.96 MU/L (ref 0.4–4)
UROBILINOGEN UR STRIP-MCNC: NORMAL MG/DL (ref 0–2)
WBC # BLD AUTO: 4.9 10E9/L (ref 4–11)
WBC #/AREA URNS AUTO: 0 /HPF (ref 0–5)
WET PREP SPEC: NORMAL

## 2019-01-27 PROCEDURE — 87591 N.GONORRHOEAE DNA AMP PROB: CPT | Performed by: EMERGENCY MEDICINE

## 2019-01-27 PROCEDURE — 85610 PROTHROMBIN TIME: CPT | Performed by: EMERGENCY MEDICINE

## 2019-01-27 PROCEDURE — 80048 BASIC METABOLIC PNL TOTAL CA: CPT | Performed by: EMERGENCY MEDICINE

## 2019-01-27 PROCEDURE — 80076 HEPATIC FUNCTION PANEL: CPT | Performed by: EMERGENCY MEDICINE

## 2019-01-27 PROCEDURE — 96360 HYDRATION IV INFUSION INIT: CPT

## 2019-01-27 PROCEDURE — 87210 SMEAR WET MOUNT SALINE/INK: CPT | Performed by: EMERGENCY MEDICINE

## 2019-01-27 PROCEDURE — 87491 CHLMYD TRACH DNA AMP PROBE: CPT | Performed by: EMERGENCY MEDICINE

## 2019-01-27 PROCEDURE — 25000128 H RX IP 250 OP 636: Performed by: EMERGENCY MEDICINE

## 2019-01-27 PROCEDURE — 93976 VASCULAR STUDY: CPT

## 2019-01-27 PROCEDURE — 84443 ASSAY THYROID STIM HORMONE: CPT | Performed by: EMERGENCY MEDICINE

## 2019-01-27 PROCEDURE — 81025 URINE PREGNANCY TEST: CPT | Performed by: EMERGENCY MEDICINE

## 2019-01-27 PROCEDURE — 85025 COMPLETE CBC W/AUTO DIFF WBC: CPT | Performed by: EMERGENCY MEDICINE

## 2019-01-27 PROCEDURE — 99284 EMERGENCY DEPT VISIT MOD MDM: CPT | Mod: 25

## 2019-01-27 PROCEDURE — 99284 EMERGENCY DEPT VISIT MOD MDM: CPT | Mod: Z6 | Performed by: EMERGENCY MEDICINE

## 2019-01-27 PROCEDURE — 81001 URINALYSIS AUTO W/SCOPE: CPT | Performed by: EMERGENCY MEDICINE

## 2019-01-27 PROCEDURE — 85730 THROMBOPLASTIN TIME PARTIAL: CPT | Performed by: EMERGENCY MEDICINE

## 2019-01-27 RX ORDER — SODIUM CHLORIDE 9 MG/ML
1000 INJECTION, SOLUTION INTRAVENOUS CONTINUOUS
Status: DISCONTINUED | OUTPATIENT
Start: 2019-01-27 | End: 2019-01-27 | Stop reason: HOSPADM

## 2019-01-27 RX ADMIN — SODIUM CHLORIDE 1000 ML: 9 INJECTION, SOLUTION INTRAVENOUS at 15:40

## 2019-01-27 ASSESSMENT — ENCOUNTER SYMPTOMS
DIARRHEA: 0
LIGHT-HEADEDNESS: 1
SHORTNESS OF BREATH: 0
HEMATURIA: 0
DIZZINESS: 1
FLANK PAIN: 1
DYSURIA: 0
COUGH: 0
ABDOMINAL PAIN: 0
FATIGUE: 0
FEVER: 0
NAUSEA: 0
VOMITING: 0

## 2019-01-27 ASSESSMENT — MIFFLIN-ST. JEOR: SCORE: 1195.28

## 2019-01-27 NOTE — ED TRIAGE NOTES
Pt presents ambulatory to triage with c/o vaginal bleeding that began the 18th as brown discharge prior to her period beginning a couple days after. States she is normally regular, but usually her period last 3 days. States today she has already had to change her tampon 3 times and its been heavy with clots. Pt also reports feeling weak.

## 2019-01-27 NOTE — ED PROVIDER NOTES
[place] EMERGENCY DEPARTMENT ([facility])  2019    History     Chief Complaint   Patient presents with     Vaginal Bleeding     HPI  Charito Wilcox is a 38 year old female with history notable for liver txp, IBS, ovarian cyst,  x2, tubal ligation, and cholecystectomy who presents to the ED with 10 days of ongoing vaginal bleeding.  Patient states that she had her menses earlier this month and on 19 she started having vaginal bleeding again.  She states that she has never had abnormal vaginal bleeding before and normally has 3 days of bleeding during her period.  She states that she has been going through one tampon an hour and also complains of cramping pelvic pain.  She has also noted blood clots and complains of feeling dizzy.  She was initially seen at urgent care, but they did not do labs and advised her to come to the ED for further evaluation and treatment.  Patient is unsure about chance of pregnancy.  She otherwise currently denies any previous history of fibroids. She is not taking anticoagulants. Denies other bleeding. Denies vaginal discharge, vaginal pain, recent changes in medications, hot flashes, fatigue, nausea, vomiting, diarrhea, hyperactivity, dysuria, flank pain, or previous family history of  cancer.    Per chart review, patient had a CT scan of her abdomen pelvis on 18 which revealed a 2.7 cm simple cyst in the right ovary.    PAST MEDICAL HISTORY  Past Medical History:   Diagnosis Date     Acute pancreatitis 2016     Anxiety     anxiety over vomiting, nausea is main trigger     Esophageal reflux      Gastrointestinal ulcer      Irritable bowel syndrome      Liver replaced by transplant (H)     liver failure of unknown etiology     Papanicolaou smear of cervix with atypical squamous cells of undetermined significance (ASC-US)     negative HPV     PAST SURGICAL HISTORY  Past Surgical History:   Procedure Laterality Date     C TRANSPLANT  LIVER,HETERTOPIC      had appendectomy, gallbladder out with transplant.       SECTION  2008      SECTION  3/2010     CHOLECYSTECTOMY      with liver transplant     COLONOSCOPY N/A 10/24/2018    Procedure: Colonoscopy;  Surgeon: Ottoniel Martin MD;  [facility]     ESOPHAGOSCOPY, GASTROSCOPY, DUODENOSCOPY (EGD), COMBINED Left 2015    Procedure: COMBINED ESOPHAGOSCOPY, GASTROSCOPY, DUODENOSCOPY (EGD), BIOPSY SINGLE OR MULTIPLE;  Surgeon: Ottoniel Martin MD;  Location: [facility]     ESOPHAGOSCOPY, GASTROSCOPY, DUODENOSCOPY (EGD), COMBINED N/A 2016    Procedure: COMBINED ESOPHAGOSCOPY, GASTROSCOPY, DUODENOSCOPY (EGD), BIOPSY SINGLE OR MULTIPLE;  Surgeon: Homar Adams MD;  [facility]     ESOPHAGOSCOPY, GASTROSCOPY, DUODENOSCOPY (EGD), COMBINED N/A 2016    Procedure: COMBINED ESOPHAGOSCOPY, GASTROSCOPY, DUODENOSCOPY (EGD), BIOPSY SINGLE OR MULTIPLE;  Surgeon: Homar Adams MD;  [facility]     ESOPHAGOSCOPY, GASTROSCOPY, DUODENOSCOPY (EGD), COMBINED N/A 2018    Procedure: COMBINED ESOPHAGOSCOPY, GASTROSCOPY, DUODENOSCOPY (EGD);;  Surgeon: Daryl Blank MD;  Location:  GI     HIP SURGERY Right 2016    Torn labrum repair      TUBAL LIGATION  3/2010     FAMILY HISTORY  Family History   Problem Relation Age of Onset     Coronary Artery Disease Mother      Hyperlipidemia Mother      Osteoporosis Mother      Arthritis Mother      Depression Maternal Grandmother      Anxiety Disorder Maternal Grandmother      Heart Failure Maternal Grandmother      Hypertension Maternal Grandfather      Cerebrovascular Disease Paternal Grandmother      Breast Cancer Paternal Grandmother      Gastrointestinal Disease No family hx of      C.A.D. No family hx of      Cancer - colorectal No family hx of      Prostate Cancer No family hx of      Alcohol/Drug No family hx of      SOCIAL HISTORY  Social History     Tobacco Use      Smoking status: Never Smoker     Smokeless tobacco: Never Used   Substance Use Topics     Alcohol use: Yes     Comment: glass of wine up to once a month     MEDICATIONS  Current Facility-Administered Medications   Medication     lidocaine (PF) (XYLOCAINE) 1 % injection 2 mL     triamcinolone (KENALOG-40) injection 40 mg     Current Outpatient Medications   Medication     azaTHIOprine (IMURAN) 50 MG tablet     omeprazole (PRILOSEC) 40 MG capsule     tacrolimus (GENERIC EQUIVALENT) 1 MG capsule     ursodiol (ACTIGALL) 300 MG capsule     ALPRAZolam (XANAX) 0.5 MG tablet     alum & mag hydroxide-simethicone (MYLANTA/MAALOX) 200-200-20 MG/5ML SUSP suspension     diclofenac (VOLTAREN) 1 % GEL topical gel     dicyclomine (BENTYL) 20 MG tablet     ibuprofen (ADVIL/MOTRIN) 200 MG tablet     lactobacillus rhamnosus, GG, (CULTURELL) capsule     melatonin 5 MG tablet     ondansetron (ZOFRAN ODT) 4 MG ODT tab     PARoxetine (PAXIL) 40 MG tablet     polyethylene glycol (MIRALAX) powder     sucralfate (CARAFATE) 1 GM tablet     ALLERGIES  Allergies   Allergen Reactions     Gluten Meal Nausea and Vomiting     Milk Protein Extract Nausea and Vomiting       I have reviewed the Medications, Allergies, Past Medical and Surgical History, and Social History in the Epic system.    Review of Systems   Constitutional: Negative for fatigue and fever.   HENT: Negative for nosebleeds.    Respiratory: Negative for cough and shortness of breath.    Cardiovascular: Negative for chest pain.   Gastrointestinal: Negative for abdominal pain, diarrhea, nausea and vomiting.   Genitourinary: Positive for flank pain (cramps), pelvic pain and vaginal bleeding. Negative for dysuria, hematuria, vaginal discharge and vaginal pain.   Neurological: Positive for dizziness and light-headedness. Negative for syncope.   All other systems reviewed and are negative.      Physical Exam   BP: 130/87  Pulse: 71  Heart Rate: 72  Temp: 98.6  F (37  C)  Resp: 14  Height:  "157.5 cm (5' 2\")  Weight: 55.2 kg (121 lb 11.2 oz)  SpO2: 100 %      Physical Exam   Constitutional: She is oriented to person, place, and time. She appears well-developed and well-nourished.   HENT:   Head: Normocephalic and atraumatic.   Nose: Nose normal.   Eyes: Conjunctivae are normal. Pupils are equal, round, and reactive to light. No scleral icterus.   Neck: Normal range of motion. Neck supple.   Cardiovascular: Normal rate, regular rhythm and normal heart sounds.   No murmur heard.  Pulmonary/Chest: Effort normal and breath sounds normal. No stridor. No respiratory distress. She has no wheezes. She has no rales.   Abdominal: Soft. Bowel sounds are normal. She exhibits no distension and no mass. There is no hepatosplenomegaly. There is tenderness in the right lower quadrant, suprapubic area and left lower quadrant. There is no rigidity, no rebound, no guarding and no tenderness at McBurney's point.   Genitourinary: Pelvic exam was performed with patient supine. There is no rash or lesion on the right labia. There is no rash or lesion on the left labia. Uterus is deviated. Cervix exhibits motion tenderness and discharge. Cervix exhibits no friability. Right adnexum displays no mass. Left adnexum displays no mass. There is tenderness and bleeding in the vagina. No erythema in the vagina. No foreign body in the vagina.   Genitourinary Comments: Trace blood and mucoid discharge from cervical os. +CMT   Musculoskeletal: Normal range of motion. She exhibits no tenderness or deformity.   Neurological: She is alert and oriented to person, place, and time. She exhibits normal muscle tone.   Skin: Skin is warm and dry. No pallor.   Psychiatric: She has a normal mood and affect. Her behavior is normal.   Nursing note and vitals reviewed.      ED Course        Procedures   2:46 PM  The patient was seen and examined by Dr. Rodrigues in Room 6.                Critical Care time:  none             Labs Ordered and Resulted " from Time of ED Arrival Up to the Time of Departure from the ED   CBC WITH PLATELETS DIFFERENTIAL - Abnormal; Notable for the following components:       Result Value     (*)     MCH 36.3 (*)     All other components within normal limits   ROUTINE UA WITH MICROSCOPIC REFLEX TO CULTURE - Abnormal; Notable for the following components:    Specific Gravity Urine 1.002 (*)     Mucous Urine Present (*)     All other components within normal limits   BASIC METABOLIC PANEL   HCG QUALITATIVE URINE   TSH WITH FREE T4 REFLEX   PARTIAL THROMBOPLASTIN TIME   INR   HEPATIC PANEL   PERIPHERAL IV CATHETER   PREP FOR PROCEDURE   WET PREP            Assessments & Plan (with Medical Decision Making)   Charito Wilcox is a 38 year old female with history notable for liver txp, IBS, ovarian cyst,  x2, tubal ligation, and cholecystectomy who presents to the ED with 10 days of ongoing vaginal bleeding.    Ddx: ectopic preg, SAB, abnormal menstrual bleeding, vaginitis, fibroid uterus, endometrial polyp/malignancy, coagulapathy, anemia    Hg nl. Abd minimally tender in lower quadrants. UPT neg. UA clean. LFTs wnl.     Patient denies c/f STI as she is monogamous with one male partner. +CMT and vaginal tenderness on pelvic exam. Minimal vaginal bleeding and no purulent discharge. Discussed empiric treatment for gonorrhea/chlamydia with patient who declined due to lack of risk factors for exposure. Signed out to Dr. Bender awaiting wet prep and TVUS results. Treat for vaginitis, as indicated. Follow up with gyn. Return for worsening sxs. Patient declined pain meds.    I have reviewed the nursing notes.    I have reviewed the findings, diagnosis, plan and need for follow up with the patient.       Medication List      There are no discharge medications for this visit.         Final diagnoses:   Other specified abnormal uterine and vaginal bleeding     I, Aldo Johnson, am serving as a trained medical scribe to document  services personally performed by Yudy Rodrigues MD, based on the provider's statements to me.      I, Yudy Rodrigues MD, was physically present and have reviewed and verified the accuracy of this note documented by Aldo Johnson.    1/27/2019   [facility], [place], EMERGENCY DEPARTMENT     Yudy Rodrigues MD  01/30/19 1114

## 2019-01-27 NOTE — ED AVS SNAPSHOT
[facility], [place], Emergency Department  [address] [place] [id number]  Phone:  [phone]                                    Charito Wilcox   MRN: [id number]    Department:  [facility], Emergency Department   Date of Visit:  1/27/2019           After Visit Summary Signature Page    I have received my discharge instructions, and my questions have been answered. I have discussed any challenges I see with this plan with the nurse or doctor.    ..........................................................................................................................................  Patient/Patient Representative Signature      ..........................................................................................................................................  Patient Representative Print Name and Relationship to Patient    ..................................................               ................................................  Date                                   Time    ..........................................................................................................................................  Reviewed by Signature/Title    ...................................................              ..............................................  Date                                               Time          22EPIC Rev 08/18

## 2019-01-28 ENCOUNTER — MYC MEDICAL ADVICE (OUTPATIENT)
Dept: INTERNAL MEDICINE | Facility: CLINIC | Age: 39
End: 2019-01-28

## 2019-01-28 LAB
C TRACH DNA SPEC QL NAA+PROBE: NEGATIVE
N GONORRHOEA DNA SPEC QL NAA+PROBE: NEGATIVE
SPECIMEN SOURCE: NORMAL
SPECIMEN SOURCE: NORMAL

## 2019-01-28 NOTE — DISCHARGE INSTRUCTIONS
Please make an appointment to follow up with Your Primary Care Provider, OB/Gyn--[facility] (phone: [phone]), OB/Gyn--[facility] (phone: [phone]) and OB/Gyn--[facility] (phone: [phone]) in 3-5 days as needed.    Return for worsening pain, bleeding, fainting, fever.

## 2019-01-30 ENCOUNTER — THERAPY VISIT (OUTPATIENT)
Dept: PHYSICAL THERAPY | Facility: CLINIC | Age: 39
End: 2019-01-30
Payer: COMMERCIAL

## 2019-01-30 DIAGNOSIS — M25.562 LEFT KNEE PAIN, UNSPECIFIED CHRONICITY: ICD-10-CM

## 2019-01-30 PROCEDURE — 97110 THERAPEUTIC EXERCISES: CPT | Mod: GP | Performed by: PHYSICAL THERAPIST

## 2019-01-30 PROCEDURE — 97140 MANUAL THERAPY 1/> REGIONS: CPT | Mod: GP | Performed by: PHYSICAL THERAPIST

## 2019-02-06 ENCOUNTER — THERAPY VISIT (OUTPATIENT)
Dept: PHYSICAL THERAPY | Facility: CLINIC | Age: 39
End: 2019-02-06
Payer: COMMERCIAL

## 2019-02-06 DIAGNOSIS — M25.562 LEFT KNEE PAIN, UNSPECIFIED CHRONICITY: ICD-10-CM

## 2019-02-06 PROCEDURE — 97110 THERAPEUTIC EXERCISES: CPT | Mod: GP | Performed by: PHYSICAL THERAPIST

## 2019-02-06 PROCEDURE — 97140 MANUAL THERAPY 1/> REGIONS: CPT | Mod: GP | Performed by: PHYSICAL THERAPIST

## 2019-02-08 ENCOUNTER — OFFICE VISIT (OUTPATIENT)
Dept: INTERNAL MEDICINE | Facility: CLINIC | Age: 39
End: 2019-02-08
Payer: COMMERCIAL

## 2019-02-08 VITALS
BODY MASS INDEX: 22.83 KG/M2 | WEIGHT: 124.8 LBS | DIASTOLIC BLOOD PRESSURE: 80 MMHG | HEART RATE: 78 BPM | OXYGEN SATURATION: 100 % | SYSTOLIC BLOOD PRESSURE: 118 MMHG

## 2019-02-08 DIAGNOSIS — Z94.4 HISTORY OF LIVER TRANSPLANT (H): ICD-10-CM

## 2019-02-08 DIAGNOSIS — N93.9 ABNORMAL UTERINE BLEEDING: ICD-10-CM

## 2019-02-08 DIAGNOSIS — Z94.4 LIVER REPLACED BY TRANSPLANT (H): ICD-10-CM

## 2019-02-08 DIAGNOSIS — N93.9 ABNORMAL UTERINE BLEEDING: Primary | ICD-10-CM

## 2019-02-08 LAB
ALBUMIN SERPL-MCNC: 3.7 G/DL (ref 3.4–5)
ALP SERPL-CCNC: 141 U/L (ref 40–150)
ALT SERPL W P-5'-P-CCNC: 21 U/L (ref 0–50)
ANION GAP SERPL CALCULATED.3IONS-SCNC: 6 MMOL/L (ref 3–14)
APTT PPP: 28 SEC (ref 22–37)
AST SERPL W P-5'-P-CCNC: 29 U/L (ref 0–45)
BASOPHILS # BLD AUTO: 0.1 10E9/L (ref 0–0.2)
BASOPHILS NFR BLD AUTO: 2 %
BILIRUB DIRECT SERPL-MCNC: 0.2 MG/DL (ref 0–0.2)
BILIRUB SERPL-MCNC: 0.5 MG/DL (ref 0.2–1.3)
BUN SERPL-MCNC: 12 MG/DL (ref 7–30)
CALCIUM SERPL-MCNC: 8.6 MG/DL (ref 8.5–10.1)
CHLORIDE SERPL-SCNC: 107 MMOL/L (ref 94–109)
CO2 SERPL-SCNC: 28 MMOL/L (ref 20–32)
CREAT SERPL-MCNC: 0.73 MG/DL (ref 0.52–1.04)
DIFFERENTIAL METHOD BLD: ABNORMAL
EOSINOPHIL # BLD AUTO: 0.2 10E9/L (ref 0–0.7)
EOSINOPHIL NFR BLD AUTO: 3.7 %
ERYTHROCYTE [DISTWIDTH] IN BLOOD BY AUTOMATED COUNT: 12.2 % (ref 10–15)
GFR SERPL CREATININE-BSD FRML MDRD: >90 ML/MIN/{1.73_M2}
GLUCOSE SERPL-MCNC: 55 MG/DL (ref 70–99)
HCT VFR BLD AUTO: 40.6 % (ref 35–47)
HGB BLD-MCNC: 14.2 G/DL (ref 11.7–15.7)
IMM GRANULOCYTES # BLD: 0 10E9/L (ref 0–0.4)
IMM GRANULOCYTES NFR BLD: 0 %
INR PPP: 1.01 (ref 0.86–1.14)
LYMPHOCYTES # BLD AUTO: 1.5 10E9/L (ref 0.8–5.3)
LYMPHOCYTES NFR BLD AUTO: 32.6 %
MCH RBC QN AUTO: 35.8 PG (ref 26.5–33)
MCHC RBC AUTO-ENTMCNC: 35 G/DL (ref 31.5–36.5)
MCV RBC AUTO: 102 FL (ref 78–100)
MONOCYTES # BLD AUTO: 0.6 10E9/L (ref 0–1.3)
MONOCYTES NFR BLD AUTO: 13 %
NEUTROPHILS # BLD AUTO: 2.2 10E9/L (ref 1.6–8.3)
NEUTROPHILS NFR BLD AUTO: 48.7 %
NRBC # BLD AUTO: 0 10*3/UL
NRBC BLD AUTO-RTO: 0 /100
PLATELET # BLD AUTO: 194 10E9/L (ref 150–450)
POTASSIUM SERPL-SCNC: 4.2 MMOL/L (ref 3.4–5.3)
PROT SERPL-MCNC: 7.4 G/DL (ref 6.8–8.8)
RBC # BLD AUTO: 3.97 10E12/L (ref 3.8–5.2)
SODIUM SERPL-SCNC: 141 MMOL/L (ref 133–144)
WBC # BLD AUTO: 4.6 10E9/L (ref 4–11)

## 2019-02-08 RX ORDER — NORETHINDRONE ACETATE AND ETHINYL ESTRADIOL .02; 1 MG/1; MG/1
1 TABLET ORAL DAILY
Qty: 28 TABLET | Refills: 0 | Status: SHIPPED | OUTPATIENT
Start: 2019-02-08 | End: 2019-02-26

## 2019-02-08 ASSESSMENT — PAIN SCALES - GENERAL: PAINLEVEL: MODERATE PAIN (4)

## 2019-02-08 NOTE — PROGRESS NOTES
[facility]  Primary Care Center   LEE ANN Ferraro CNP  02/08/2019      Chief Complaint:   Hospital follow up     History of Present Illness:   Charito Wilcox is a 38 year old female with a history of liver transplant, irritable bowel syndrome, ovarian cyst, who presents for hospital follow up.     On 01/27/2019 Charito was seen in the ED for evaluation of 10 days of ongoing vaginal bleeding. 01/20/2019 she started to notice brown discharge which is normal before she experiences her period. She never had abnormal vaginal bleeding before and her periods would normally last about 3 days. She had been going through one tampon an hour, was experiencing cramping pelvic pain, had blood clots and was dizzy. She was unsure about her chances of being pregnant. She denied a history of fibroids or taking anticoagulants. Additionally, she denied other bleeding, vaginal discharge, vaginal pain, recent changes of medication, hot flashes, fatigue, nausea, vomiting, diarrhea, hyperactivity, dysuria, flank pain or previous history of  cancer.   She had a CT scan of her abdomen pelvis on 08/18/2018 which revealed a 2.7 cm cyst in the right ovary.   Differential diagnosis was ectopic pregnancy, SAB, abnormal menstrual bleeding, vagitis, fibroid uterus, endometrial polyp/malignancy, coagulapathy and anemia. Hg was normal. Abdomen minimally tender in lower quadrants, urine pregnancy test negative, urinalysis negative and liver function test normal. She denied possibility of STI as she was monogamous with one male partner. Positive CMT and vaginal tenderness on pelvic exam. Minimal vaginal bleeding and no purulent discharge. It was discussed empiric treatment for gonorrhea/chlamydia with patient, however, she denied due to lack of risk factors for exposure. She had a pap smear and TVUS performed with results included below. She was treated for vaginitis.   She started to bleed again on 02/05/2019, changing her tampons every 2-3  hours. Her pain is more noticeable in her right side.     Notably, She has had her blood sugars were checked in the past and were normal. However, she gets the feeling that her blood sugars are low. She exercises and eats a very balanced diet.     Review of Systems:   Pertinent items are noted in HPI, remainder of complete ROS is negative.      Active Medications:      ALPRAZolam (XANAX) 0.5 MG tablet, Take 1 tablet (0.5 mg) by mouth 3 times daily as needed for anxiety 1 month supply, Disp: 30 tablet, Rfl: 0     alum & mag hydroxide-simethicone (MYLANTA/MAALOX) 200-200-20 MG/5ML SUSP suspension, Take 15 mLs by mouth every 4 hours as needed (Patient not taking: Reported on 11/12/2018), Disp: 769 mL, Rfl: 0     azaTHIOprine (IMURAN) 50 MG tablet, Take 1 tablet (50 mg) by mouth daily, Disp: 90 tablet, Rfl: 3     diclofenac (VOLTAREN) 1 % GEL topical gel, Apply 2 g topically 2 times daily, Disp: , Rfl:      dicyclomine (BENTYL) 20 MG tablet, Take 1 tablet (20 mg) by mouth 4 times daily as needed (Patient not taking: Reported on 11/12/2018), Disp: 60 tablet, Rfl: 3     ibuprofen (ADVIL/MOTRIN) 200 MG tablet, Take 400 mg by mouth daily as needed for pain, Disp: , Rfl:      lactobacillus rhamnosus, GG, (CULTURELL) capsule, Take 1 capsule by mouth daily, Disp: , Rfl:      melatonin 5 MG tablet, Take 5 mg by mouth nightly as needed for sleep, Disp: , Rfl:      omeprazole (PRILOSEC) 40 MG capsule, TAKE 1 CAPSULE BY MOUTH TWICE DAILY BEFORE MEALS, TAKE 30-60 MINUTES PRIOR TO A MEAL, Disp: 180 capsule, Rfl: 2     ondansetron (ZOFRAN ODT) 4 MG ODT tab, Take 1-2 tablets (4-8 mg) by mouth 3 times daily (before meals), Disp: 20 tablet, Rfl: 0     PARoxetine (PAXIL) 40 MG tablet, TAKE 1 AND ONE-HALF TABLET BY MOUTH AT BEDTIME, Disp: 45 tablet, Rfl: 5     polyethylene glycol (MIRALAX) powder, Take 17 g (1 capful) by mouth daily, Disp: 510 g, Rfl: 1     sucralfate (CARAFATE) 1 GM tablet, Take 1 tablet (1 g) by mouth 4 times daily  (before meals and nightly) (Patient not taking: Reported on 2018), Disp: 120 tablet, Rfl: 0     tacrolimus (GENERIC EQUIVALENT) 1 MG capsule, Take 1 capsule (1 mg) by mouth every 12 hours, Disp: 180 capsule, Rfl: 3     ursodiol (ACTIGALL) 300 MG capsule, Take 1 capsule (300 mg) by mouth 2 times daily, Disp: 180 capsule, Rfl: 3    Current Facility-Administered Medications:      lidocaine (PF) (XYLOCAINE) 1 % injection 2 mL, 2 mL, , , Zulma Bennett MD, 2 mL at 18 09     triamcinolone (KENALOG-40) injection 40 mg, 40 mg, , , Zulma Bennett MD, 40 mg at 18 09      Allergies:   Gluten meal and Milk protein extract      Past Medical History:  Acute pancreatitis   Liver transplant   Immunosuppressed status   Atypical squamous cells on pap smear   Esophageal reflux   Gastrointestinal ulcer   Irritable bowel syndrome   Premenstrual syndrome   Right hip impingement syndrome   Anxiety   Panic disorder      Past Surgical History:  Liver transplant   Appendectomy   section x2   Cholecystectomy   Colonoscopy   EGD x4   Torn labrum repair   Tubal ligation     Family History:   Coronary artery disease   Hyperlipidemia  Osteoporosis   Arthritis   Depression   Anxiety disorder   Heart failure   Hypertension   Cerebrovascular disease   Breast cancer       Social History:   The patient was alone  Smoking Status: never   Smokeless Tobacco: never    Alcohol Use: yes        Physical Exam:   /80   Pulse 78   Wt 56.6 kg (124 lb 12.8 oz)   LMP 2019   SpO2 100%   BMI 22.83 kg/m     Constitutional: no distress, comfortable, pleasant, well-groomed  Eyes: anicteric, conjunctiva pink, normal extra-ocular movements   Ears, Nose and Throat: tympanic membranes pearly gray with positive light reflex, EACs clear bilaterally, nose clear and free of lesions, throat clear, mucosa pink and moist.   Neck: supple with full range of motion, no thyromegaly.   Cardiovascular:  regular rate and rhythm, normal S1 and S2, no murmurs, rubs or gallops, peripheral pulses full and symmetric, cap refill <2 sec  Respiratory: clear to auscultation with good air movement bilaterally, no wheezes or crackles, non-labored  Breast Exam: Without visible skin changes. No dimpling or lesions seen.   Breasts supple, non-tender with palpation, no dominant mass, nodularity, or nipple discharge noted bilaterally. Axillary nodes negative.    Gastrointestinal: positive bowel sounds, nontender, no hepatosplenomegaly, no masses   :  Periurethral, vulvar, perineal, perianal areas are without rash or suspicious lesions.  Vaginal mucosa pink and moist, rugae with physiologic white odorless secretions, no lesions noted. Cervix is pink, moist, closed and without lesion or CMT.  Bimanual exam reveals no uterine or adnexal tenderness.  Uterus and ovaries not enlarged, no masses appreciated.  Musculoskeletal: full range of motion, strength 5/5, no edema   Skin: no concerning lesions or rash, no jaundice, temp normal   Neurological: cranial nerves intact, normal strength and sensation, 2+ bicep and patellar reflexes, gait is steady with intact balance, speech is clear, no tremor   Psychological: appropriate mood, demonstrates intact judgment and logical thought process  LYMPH: no axillary, cervical,  supraclavicular, or infraclavicular nodes    Recent labs:   Component      Latest Ref Rng & Units 1/27/2019 1/27/2019           5:49 PM  5:49 PM   Specimen Description       Swab Genital    Wet Prep       No motile Trichomonas seen No yeast seen     Component      Latest Ref Rng & Units 1/27/2019 1/27/2019           5:49 PM  5:49 PM   Wet Prep       No PMNs seen No clue cells seen     Recent imaging:   EXAMINATION: US PELVIS COMPLETE W TRANSVAGINAL AND DOPPLER LIMITED  1/27/2019 5:28 PM     IMPRESSION:  1.  There is a simple appearing left ovarian cyst with internal  debris, likely hemorrhagic cyst., measuring approximately  5.3 x 3.1 x2.1 cm.   2.  Normal ovarian blood flow and low resistance waveforms bilaterally.  3.  Normal uterus and right ovary.     JESSI ESPINO MD       Assessment and Plan:  Abnormal uterine bleeding  We discussed that she could try hormones for one cycle to get estrogen levels normal to slow down bleeding. However, she recalls that her transplant doctor did not want to have her take estrogen as he said it would not metabolize well in her liver. Later, she tried going on the progesterone pill due to fatigue and other symptoms she would experience with her periods, however, she did not like how it made her feel. [facility] and a repeat ultrasound.   - OB/GYN REFERRAL  - Comprehensive metabolic panel    History of liver transplant (H)  She has a history of liver transplant so she will have routine labs. Additionally, she will have labs performed to make sure her hemoglobin levels are normal due to her abnormal bleeding.   - CBC with platelets differential  - INR  - Partial thromboplastin time         Follow-up: Data Unavailable         Scribe Disclosure:   I, Gabbi David, am serving as a scribe to document services personally performed by LEE ANN Ferraro CNP at this visit, based upon the provider's statements to me. All documentation has been reviewed by the aforementioned provider prior to being entered into the official medical record.     Portions of this medical record were completed by a scribe. UPON MY REVIEW AND AUTHENTICATION BY ELECTRONIC SIGNATURE, this confirms (a) I performed the applicable clinical services, and (b) the record is accurate.

## 2019-02-08 NOTE — PATIENT INSTRUCTIONS
Primary Care Center Phone Number [phone]  Primary Care Center Medication Refill Request Information:  * Please contact your pharmacy regarding ANY request for medication refills.  ** [facility] Prescription Fax = [phone]  * Please allow 3 business days for routine medication refills.  * Please allow 5 business days for controlled substance medication refills.     Primary Care Center Test Result notification information:  *You will be notified with in 7-10 days of your appointment day regarding the results of your test.  If you are on MyChart you will be notified as soon as the provider has reviewed the results and signed off on them.

## 2019-02-08 NOTE — PROGRESS NOTES
[facility]   LEE ANN Ferraro CNP  02/08/2019      Chief Complaint:   Hospital follow up     History of Present Illness:   Charito Wilcox is a 38 year old female with a history of liver transplant (2000), irritable bowel syndrome, ovarian cyst, who presents for hospital follow up.     On 01/27/2019 Charito was seen in the ED for evaluation of 10 days of ongoing vaginal bleeding. 01/20/2019 she started to notice brown discharge which is normal before she experiences her period. She never had abnormal vaginal bleeding before and her periods would normally last about 3 days. She had been going through one tampon an hour, was experiencing cramping pelvic pain, had blood clots and was dizzy. She was unsure about her chances of being pregnant. She denied a history of fibroids or taking anticoagulants. Additionally, she denied other bleeding, vaginal discharge, vaginal pain, recent changes of medication, hot flashes, fatigue, nausea, vomiting, diarrhea, hyperactivity, dysuria, flank pain or previous history of  cancer.    Minimal vaginal bleeding and no purulent discharge. It was discussed empiric treatment for gonorrhea/chlamydia with patient, however, she denied due to lack of risk factors for exposure. She had a pap smear and TVUS performed with results included below.     She started to bleed again on 02/05/2019, changing her tampons every 2-3 hours. Her pain is more noticeable in her right side. Her pap smear is up to date, no hx of abnormal pap.  Notably, when they did her pelvic exam at the ED it was very painful which is not normal for her. Last time she had intercourse was 6 days ago and it was not painful.     She had a CT scan of her abdomen pelvis on 08/18/2018 which revealed a 2.7 cm cyst in the right ovary. She does have intermittent R-sided pelvic pain and wonders if this ovarian cyst was still noted on her pelvic US.     Notably, she has had her blood sugars were checked in the  past and were normal. However, she gets the feeling that her blood sugars are low. She exercises and eats a very balanced diet.   She denies hx of migraine headaches. No history of blood clot disorder or family history of DVT/PE.    Review of Systems:   Pertinent items are noted in HPI, remainder of complete ROS is negative.      Active Medications:      ALPRAZolam (XANAX) 0.5 MG tablet, Take 1 tablet (0.5 mg) by mouth 3 times daily as needed for anxiety 1 month supply, Disp: 30 tablet, Rfl: 0     alum & mag hydroxide-simethicone (MYLANTA/MAALOX) 200-200-20 MG/5ML SUSP suspension, Take 15 mLs by mouth every 4 hours as needed (Patient not taking: Reported on 11/12/2018), Disp: 769 mL, Rfl: 0     azaTHIOprine (IMURAN) 50 MG tablet, Take 1 tablet (50 mg) by mouth daily, Disp: 90 tablet, Rfl: 3     diclofenac (VOLTAREN) 1 % GEL topical gel, Apply 2 g topically 2 times daily, Disp: , Rfl:      dicyclomine (BENTYL) 20 MG tablet, Take 1 tablet (20 mg) by mouth 4 times daily as needed (Patient not taking: Reported on 11/12/2018), Disp: 60 tablet, Rfl: 3     ibuprofen (ADVIL/MOTRIN) 200 MG tablet, Take 400 mg by mouth daily as needed for pain, Disp: , Rfl:      lactobacillus rhamnosus, GG, (CULTURELL) capsule, Take 1 capsule by mouth daily, Disp: , Rfl:      melatonin 5 MG tablet, Take 5 mg by mouth nightly as needed for sleep, Disp: , Rfl:      omeprazole (PRILOSEC) 40 MG capsule, TAKE 1 CAPSULE BY MOUTH TWICE DAILY BEFORE MEALS, TAKE 30-60 MINUTES PRIOR TO A MEAL, Disp: 180 capsule, Rfl: 2     ondansetron (ZOFRAN ODT) 4 MG ODT tab, Take 1-2 tablets (4-8 mg) by mouth 3 times daily (before meals), Disp: 20 tablet, Rfl: 0     PARoxetine (PAXIL) 40 MG tablet, TAKE 1 AND ONE-HALF TABLET BY MOUTH AT BEDTIME, Disp: 45 tablet, Rfl: 5     polyethylene glycol (MIRALAX) powder, Take 17 g (1 capful) by mouth daily, Disp: 510 g, Rfl: 1     sucralfate (CARAFATE) 1 GM tablet, Take 1 tablet (1 g) by mouth 4 times daily (before meals and  nightly) (Patient not taking: Reported on 2018), Disp: 120 tablet, Rfl: 0     tacrolimus (GENERIC EQUIVALENT) 1 MG capsule, Take 1 capsule (1 mg) by mouth every 12 hours, Disp: 180 capsule, Rfl: 3     ursodiol (ACTIGALL) 300 MG capsule, Take 1 capsule (300 mg) by mouth 2 times daily, Disp: 180 capsule, Rfl: 3    Current Facility-Administered Medications:      lidocaine (PF) (XYLOCAINE) 1 % injection 2 mL, 2 mL, , , Zulma Bennett MD, 2 mL at 18 09     triamcinolone (KENALOG-40) injection 40 mg, 40 mg, , , Zulma Bennett MD, 40 mg at 18 09      Allergies:   Gluten meal and Milk protein extract      Past Medical History:  Acute pancreatitis   Liver transplant   Immunosuppressed status   Atypical squamous cells on pap smear   Esophageal reflux   Gastrointestinal ulcer   Irritable bowel syndrome   Premenstrual syndrome   Right hip impingement syndrome   Anxiety   Panic disorder      Past Surgical History:  Liver transplant   Appendectomy   section x2   Cholecystectomy   Colonoscopy   EGD x4   Torn labrum repair   Tubal ligation     Family History:   Coronary artery disease   Hyperlipidemia  Osteoporosis   Arthritis   Depression   Anxiety disorder   Heart failure   Hypertension   Cerebrovascular disease   Breast cancer       Social History:   The patient was alone  Smoking Status: never   Smokeless Tobacco: never    Alcohol Use: yes      Physical Exam:   /80   Pulse 78   Wt 56.6 kg (124 lb 12.8 oz)   LMP 2019   SpO2 100%   BMI 22.83 kg/m     Constitutional: no distress, comfortable, pleasant, well-groomed  Eyes: anicteric, conjunctiva pink, normal extra-ocular movements   Neck: supple with full range of motion, no thyromegaly.   Cardiovascular: regular rate and rhythm, normal S1 and S2, no murmurs, rubs or gallops, peripheral pulses full and symmetric  Respiratory: clear to auscultation with good air movement bilaterally, no wheezes or  crackles, non-labored  Gastrointestinal: positive bowel sounds, nontender, no hepatosplenomegaly, no masses   :  Periurethral, vulvar, perineal, perianal areas are without rash or suspicious lesions.  There is significant pain with speculum insertion and pelvic exam. Vaginal mucosa pink and moist, rugae with physiologic white odorless secretions, no lesions noted. Cervix is pink, moist, closed and without lesion, +CMT.  Bimanual exam reveals mild right sided adnexal tenderness. Uterus and ovaries not enlarged, no masses appreciated.  Psychological: appropriate mood, demonstrates intact judgment and logical thought process    Recent labs:   Component      Latest Ref Rng & Units 1/27/2019 1/27/2019           5:49 PM  5:49 PM   Specimen Description       Swab Genital    Wet Prep       No motile Trichomonas seen No yeast seen     Component      Latest Ref Rng & Units 1/27/2019 1/27/2019           5:49 PM  5:49 PM   Wet Prep       No PMNs seen No clue cells seen     Recent imaging:   EXAMINATION: US PELVIS COMPLETE W TRANSVAGINAL AND DOPPLER LIMITED  1/27/2019 5:28 PM     IMPRESSION:  1.  There is a simple appearing left ovarian cyst with internal  debris, likely hemorrhagic cyst., measuring approximately 5.3 x 3.1 x2.1 cm.   2.  Normal ovarian blood flow and low resistance waveforms bilaterally.  3.  Normal uterus and right ovary.  JESSI ESPINO MD      Assessment and Plan:  Abnormal uterine bleeding  We discussed first-line treatment for abnormal bleeding with COCs. Discussed her case with Dr. Charles in Gynecology and Dr. Maria in hepatology. Will do one single pill pack of low-dose DEBI and have her follow-up with Gynecology for further evaluation and a repeat ultrasound.   - OB/GYN REFERRAL  - Comprehensive metabolic panel    History of liver transplant (H)  Given return of bleeding and gradual increase in flow and clotting over the past week, will recheck CBC and clotting factors.   - CBC with platelets  differential  - INR  - Partial thromboplastin time    Follow-up: if symptoms worsen or do not improve     Scribe Disclosure:   I, Gabbi Frankie, am serving as a scribe to document services personally performed by LEE ANN Ferraro CNP at this visit, based upon the provider's statements to me. All documentation has been reviewed by the aforementioned provider prior to being entered into the official medical record.     Portions of this medical record were completed by a scribe. UPON MY REVIEW AND AUTHENTICATION BY ELECTRONIC SIGNATURE, this confirms (a) I performed the applicable clinical services, and (b) the record is accurate.     LEE ANN Ferraro CNP

## 2019-02-12 ENCOUNTER — TELEPHONE (OUTPATIENT)
Dept: INTERNAL MEDICINE | Facility: CLINIC | Age: 39
End: 2019-02-12

## 2019-02-12 NOTE — TELEPHONE ENCOUNTER
[facility] Call Center    Phone Message    May a detailed message be left on voicemail: yes    Reason for Call: Medication Question or concern regarding medication   Prescription Clarification  Name of Medication: norethindrone-ethinyl estradiol (MICROGESTIN 1/20) 1-20 MG-MCG tablet    Prescribing Provider: Dr Albright   Pharmacy: [facility] [id number] [place] [address]   What on the order needs clarification? Mariana from [facility] pharmacy wanted to speak to the care team about medication interaction birth control shouldnt be used for patient with liver transplants please contact mariana @ [facility] pharmacy to address concerns           Action Taken: Message routed to:  [facility] pcc

## 2019-02-12 NOTE — TELEPHONE ENCOUNTER
Spoke with Mariana at [facility] pharmacy, discussed this one-time short course OCP was approved by Dr. Maria of the transplant team.  LEE ANN Ferraro CNP

## 2019-02-13 ENCOUNTER — THERAPY VISIT (OUTPATIENT)
Dept: PHYSICAL THERAPY | Facility: CLINIC | Age: 39
End: 2019-02-13
Payer: COMMERCIAL

## 2019-02-13 DIAGNOSIS — M25.562 LEFT KNEE PAIN, UNSPECIFIED CHRONICITY: ICD-10-CM

## 2019-02-13 PROCEDURE — 97140 MANUAL THERAPY 1/> REGIONS: CPT | Mod: GP | Performed by: PHYSICAL THERAPY ASSISTANT

## 2019-02-13 PROCEDURE — 97110 THERAPEUTIC EXERCISES: CPT | Mod: GP | Performed by: PHYSICAL THERAPY ASSISTANT

## 2019-02-14 ENCOUNTER — OFFICE VISIT (OUTPATIENT)
Dept: URGENT CARE | Facility: URGENT CARE | Age: 39
End: 2019-02-14
Payer: COMMERCIAL

## 2019-02-14 VITALS
HEART RATE: 79 BPM | OXYGEN SATURATION: 100 % | BODY MASS INDEX: 22.31 KG/M2 | WEIGHT: 122 LBS | TEMPERATURE: 98.6 F | SYSTOLIC BLOOD PRESSURE: 127 MMHG | DIASTOLIC BLOOD PRESSURE: 87 MMHG

## 2019-02-14 DIAGNOSIS — Z94.4 LIVER REPLACED BY TRANSPLANT (H): ICD-10-CM

## 2019-02-14 DIAGNOSIS — D84.9 IMMUNOSUPPRESSED STATUS (H): ICD-10-CM

## 2019-02-14 DIAGNOSIS — R30.0 DYSURIA: ICD-10-CM

## 2019-02-14 DIAGNOSIS — B37.31 YEAST INFECTION OF THE VAGINA: Primary | ICD-10-CM

## 2019-02-14 DIAGNOSIS — N89.8 VAGINAL DISCHARGE: ICD-10-CM

## 2019-02-14 LAB
ALBUMIN UR-MCNC: NEGATIVE MG/DL
APPEARANCE UR: CLEAR
BACTERIA #/AREA URNS HPF: ABNORMAL /HPF
BILIRUB UR QL STRIP: NEGATIVE
COLOR UR AUTO: YELLOW
GLUCOSE UR STRIP-MCNC: NEGATIVE MG/DL
HGB UR QL STRIP: NEGATIVE
KETONES UR STRIP-MCNC: NEGATIVE MG/DL
LEUKOCYTE ESTERASE UR QL STRIP: NEGATIVE
NITRATE UR QL: NEGATIVE
NON-SQ EPI CELLS #/AREA URNS LPF: ABNORMAL /LPF
PH UR STRIP: 8 PH (ref 5–7)
RBC #/AREA URNS AUTO: ABNORMAL /HPF
SOURCE: ABNORMAL
SP GR UR STRIP: 1.01 (ref 1–1.03)
SPECIMEN SOURCE: ABNORMAL
UROBILINOGEN UR STRIP-ACNC: 0.2 EU/DL (ref 0.2–1)
WBC #/AREA URNS AUTO: ABNORMAL /HPF
WET PREP SPEC: ABNORMAL

## 2019-02-14 PROCEDURE — 87210 SMEAR WET MOUNT SALINE/INK: CPT | Performed by: PHYSICIAN ASSISTANT

## 2019-02-14 PROCEDURE — 99214 OFFICE O/P EST MOD 30 MIN: CPT | Performed by: PHYSICIAN ASSISTANT

## 2019-02-14 PROCEDURE — 81001 URINALYSIS AUTO W/SCOPE: CPT | Performed by: PHYSICIAN ASSISTANT

## 2019-02-14 RX ORDER — FLUCONAZOLE 150 MG/1
150 TABLET ORAL ONCE
Qty: 1 TABLET | Refills: 0 | Status: SHIPPED | OUTPATIENT
Start: 2019-02-14 | End: 2019-02-21

## 2019-02-14 ASSESSMENT — ENCOUNTER SYMPTOMS
HEADACHES: 0
NAUSEA: 0
MUSCULOSKELETAL NEGATIVE: 1
ENDOCRINE NEGATIVE: 1
MYALGIAS: 0
DIARRHEA: 0
VOMITING: 0
WEAKNESS: 0
CARDIOVASCULAR NEGATIVE: 1
LIGHT-HEADEDNESS: 0
ACTIVITY CHANGE: 0
DYSURIA: 1
FEVER: 0
SORE THROAT: 0
RESPIRATORY NEGATIVE: 1
COUGH: 0
HEMATURIA: 0
CONSTITUTIONAL NEGATIVE: 1
FLANK PAIN: 0
CHILLS: 0
ABDOMINAL PAIN: 0
NECK PAIN: 0
POLYDIPSIA: 0
PALPITATIONS: 0
GASTROINTESTINAL NEGATIVE: 1
FATIGUE: 0
ADENOPATHY: 0
RHINORRHEA: 0
DIZZINESS: 0
SHORTNESS OF BREATH: 0
FREQUENCY: 1
NECK STIFFNESS: 0
NEUROLOGICAL NEGATIVE: 1

## 2019-02-15 ENCOUNTER — TELEPHONE (OUTPATIENT)
Dept: TRANSPLANT | Facility: CLINIC | Age: 39
End: 2019-02-15

## 2019-02-15 DIAGNOSIS — Z94.4 LIVER REPLACED BY TRANSPLANT (H): ICD-10-CM

## 2019-02-15 RX ORDER — TACROLIMUS 1 MG/1
1 CAPSULE ORAL EVERY 12 HOURS
Qty: 180 CAPSULE | Refills: 3 | Status: SHIPPED | OUTPATIENT
Start: 2019-02-15 | End: 2020-02-03

## 2019-02-15 RX ORDER — URSODIOL 300 MG/1
300 CAPSULE ORAL 2 TIMES DAILY
Qty: 180 CAPSULE | Refills: 3 | Status: SHIPPED | OUTPATIENT
Start: 2019-02-15 | End: 2020-02-03

## 2019-02-15 NOTE — PROGRESS NOTES
Chief Complaint:    Chief Complaint   Patient presents with     Urinary Problem     Patient complains of burning, pain  and urgency        HPI:  Charito Wilcox is a 38 year old female who has symptoms of urinary dysuria, urgency, vaginal odor and frequency for 2 day(s).  she denies suprapubic pain and pressure, back pain, nausea, vomiting, fever and chills, flank pain, vaginal discharge.    Patient has a complicated medical Hx including liver transplant in 2000 with ongoing immunosuppressed status    ROS:      Review of Systems   Constitutional: Negative.  Negative for activity change, chills, fatigue and fever.   HENT: Negative for congestion, ear pain, rhinorrhea and sore throat.    Respiratory: Negative.  Negative for cough and shortness of breath.    Cardiovascular: Negative.  Negative for chest pain and palpitations.   Gastrointestinal: Negative.  Negative for abdominal pain, diarrhea, nausea and vomiting.   Endocrine: Negative.  Negative for polydipsia and polyuria.   Genitourinary: Positive for dysuria, frequency and urgency. Negative for flank pain, hematuria, pelvic pain, vaginal discharge and vaginal pain.   Musculoskeletal: Negative.  Negative for myalgias, neck pain and neck stiffness.   Allergic/Immunologic: Negative for immunocompromised state.   Neurological: Negative.  Negative for dizziness, weakness, light-headedness and headaches.   Hematological: Negative for adenopathy.       Family History   Family History   Problem Relation Age of Onset     Coronary Artery Disease Mother      Hyperlipidemia Mother      Osteoporosis Mother      Arthritis Mother      Depression Maternal Grandmother      Anxiety Disorder Maternal Grandmother      Heart Failure Maternal Grandmother      Hypertension Maternal Grandfather      Cerebrovascular Disease Paternal Grandmother      Breast Cancer Paternal Grandmother      Gastrointestinal Disease No family hx of      C.A.D. No family hx of      Cancer - colorectal No  family hx of      Prostate Cancer No family hx of      Alcohol/Drug No family hx of         Problem history  Patient Active Problem List   Diagnosis     Liver replaced by transplant (H)     Esophageal reflux     CARDIOVASCULAR SCREENING; LDL GOAL LESS THAN 160     Panic disorder     IBS (irritable bowel syndrome)     Immunosuppressed status (H)     PMS (premenstrual syndrome)     Labral tear of hip, degenerative     Right hip impingement syndrome     Fatigue, unspecified type     Localized superficial swelling, mass, or lump     Abdominal pain     Anxiety     Irritable bowel syndrome     Left knee pain, unspecified chronicity        Allergies  Allergies   Allergen Reactions     Gluten Meal Nausea and Vomiting     Milk Protein Extract Nausea and Vomiting        Social History  Social History     Socioeconomic History     Marital status:      Spouse name: Not on file     Number of children: Not on file     Years of education: Not on file     Highest education level: Not on file   Social Needs     Financial resource strain: Not on file     Food insecurity - worry: Not on file     Food insecurity - inability: Not on file     Transportation needs - medical: Not on file     Transportation needs - non-medical: Not on file   Occupational History     Occupation:    Tobacco Use     Smoking status: Never Smoker     Smokeless tobacco: Never Used   Substance and Sexual Activity     Alcohol use: Yes     Comment: glass of wine up to once a month     Drug use: No     Sexual activity: Yes     Partners: Male     Birth control/protection: Female Surgical     Comment: tubal ligation   Other Topics Concern     Parent/sibling w/ CABG, MI or angioplasty before 65F 55M? No   Social History Narrative    Marathons, cross country skis, exercises 5 times a week. Two children (10 and 8 years old).  (male). Part time at [employer].        Current Meds    Current Outpatient Medications:      ALPRAZolam (XANAX) 0.5 MG  tablet, Take 1 tablet (0.5 mg) by mouth 3 times daily as needed for anxiety 1 month supply, Disp: 30 tablet, Rfl: 0     azaTHIOprine (IMURAN) 50 MG tablet, Take 1 tablet (50 mg) by mouth daily, Disp: 90 tablet, Rfl: 3     diclofenac (VOLTAREN) 1 % GEL topical gel, Apply 2 g topically 2 times daily, Disp: , Rfl:      fluconazole (DIFLUCAN) 150 MG tablet, Take 1 tablet (150 mg) by mouth once for 1 dose, Disp: 1 tablet, Rfl: 0     ibuprofen (ADVIL/MOTRIN) 200 MG tablet, Take 400 mg by mouth daily as needed for pain, Disp: , Rfl:      lactobacillus rhamnosus, GG, (CULTURELL) capsule, Take 1 capsule by mouth daily, Disp: , Rfl:      melatonin 5 MG tablet, Take 5 mg by mouth nightly as needed for sleep, Disp: , Rfl:      norethindrone-ethinyl estradiol (MICROGESTIN 1/20) 1-20 MG-MCG tablet, Take 1 tablet by mouth daily for 28 days, Disp: 28 tablet, Rfl: 0     omeprazole (PRILOSEC) 40 MG capsule, TAKE 1 CAPSULE BY MOUTH TWICE DAILY BEFORE MEALS, TAKE 30-60 MINUTES PRIOR TO A MEAL, Disp: 180 capsule, Rfl: 2     ondansetron (ZOFRAN ODT) 4 MG ODT tab, Take 1-2 tablets (4-8 mg) by mouth 3 times daily (before meals), Disp: 20 tablet, Rfl: 0     PARoxetine (PAXIL) 40 MG tablet, TAKE 1 AND ONE-HALF TABLET BY MOUTH AT BEDTIME, Disp: 45 tablet, Rfl: 5     polyethylene glycol (MIRALAX) powder, Take 17 g (1 capful) by mouth daily, Disp: 510 g, Rfl: 1     tacrolimus (GENERIC EQUIVALENT) 1 MG capsule, Take 1 capsule (1 mg) by mouth every 12 hours, Disp: 180 capsule, Rfl: 3     ursodiol (ACTIGALL) 300 MG capsule, Take 1 capsule (300 mg) by mouth 2 times daily, Disp: 180 capsule, Rfl: 3     alum & mag hydroxide-simethicone (MYLANTA/MAALOX) 200-200-20 MG/5ML SUSP suspension, Take 15 mLs by mouth every 4 hours as needed (Patient not taking: Reported on 11/12/2018), Disp: 769 mL, Rfl: 0     dicyclomine (BENTYL) 20 MG tablet, Take 1 tablet (20 mg) by mouth 4 times daily as needed (Patient not taking: Reported on 11/12/2018), Disp: 60 tablet,  Rfl: 3     sucralfate (CARAFATE) 1 GM tablet, Take 1 tablet (1 g) by mouth 4 times daily (before meals and nightly) (Patient not taking: Reported on 11/12/2018), Disp: 120 tablet, Rfl: 0    Current Facility-Administered Medications:      lidocaine (PF) (XYLOCAINE) 1 % injection 2 mL, 2 mL, , , Zulma Bennett MD, 2 mL at 12/21/18 0905     triamcinolone (KENALOG-40) injection 40 mg, 40 mg, , , Zulma Bennett MD, 40 mg at 12/21/18 0905     OBJECTIVE     Vital signs noted and reviewed by Nato Gómez  /87 (BP Location: Left arm, Patient Position: Chair, Cuff Size: Adult Regular)   Pulse 79   Temp 98.6  F (37  C) (Oral)   Wt 55.3 kg (122 lb)   LMP 01/18/2019   SpO2 100%   BMI 22.31 kg/m       Physical Exam   Constitutional: She is oriented to person, place, and time. She appears well-developed and well-nourished. She is cooperative.  Non-toxic appearance. She does not have a sickly appearance. She does not appear ill. No distress.   HENT:   Head: Normocephalic and atraumatic.   Right Ear: Tympanic membrane and external ear normal.   Left Ear: Tympanic membrane and external ear normal.   Mouth/Throat: Oropharynx is clear and moist.   Eyes: EOM are normal. Pupils are equal, round, and reactive to light.   Neck: Normal range of motion. Neck supple.   Cardiovascular: Normal rate, regular rhythm, normal heart sounds and intact distal pulses. Exam reveals no gallop and no friction rub.   No murmur heard.  Pulmonary/Chest: Effort normal and breath sounds normal. No respiratory distress. She has no wheezes. She has no rales. She exhibits no tenderness.   Abdominal: Soft. Normal appearance and bowel sounds are normal. She exhibits no distension and no mass. There is no hepatosplenomegaly. There is no tenderness. There is no rigidity, no rebound, no guarding, no CVA tenderness, no tenderness at McBurney's point and negative Sun's sign.   Lymphadenopathy:     She has no cervical  adenopathy.   Neurological: She is alert and oriented to person, place, and time. She has normal reflexes. No cranial nerve deficit.   Skin: Skin is warm and dry. She is not diaphoretic.   Psychiatric: She has a normal mood and affect. Her behavior is normal. Judgment and thought content normal.   Nursing note and vitals reviewed.            Labs:     Results for orders placed or performed in visit on 02/14/19   UA with Microscopic reflex to Culture   Result Value Ref Range    Color Urine Yellow     Appearance Urine Clear     Glucose Urine Negative NEG^Negative mg/dL    Bilirubin Urine Negative NEG^Negative    Ketones Urine Negative NEG^Negative mg/dL    Specific Gravity Urine 1.010 1.003 - 1.035    pH Urine 8.0 (H) 5.0 - 7.0 pH    Protein Albumin Urine Negative NEG^Negative mg/dL    Urobilinogen Urine 0.2 0.2 - 1.0 EU/dL    Nitrite Urine Negative NEG^Negative    Blood Urine Negative NEG^Negative    Leukocyte Esterase Urine Negative NEG^Negative    Source Midstream Urine     WBC Urine 0 - 5 OTO5^0 - 5 /HPF    RBC Urine O - 2 OTO2^O - 2 /HPF    Squamous Epithelial /LPF Urine Few FEW^Few /LPF    Bacteria Urine Few (A) NEG^Negative /HPF   Wet prep   Result Value Ref Range    Specimen Description Vagina     Wet Prep No Trichomonas seen     Wet Prep No clue cells seen     Wet Prep Yeast seen (A)           ASSESSMENT    (B37.3) Yeast infection of the vagina  Plan: fluconazole (DIFLUCAN) 150 MG tablet     (R30.0) Dysuria  (primary encounter diagnosis)  Plan: UA with Microscopic reflex to Culture    (D89.9) Immunosuppressed status (H)    (Z94.4) Liver replaced by transplant (H)    (N89.8) Vaginal discharge  Plan: Wet prep         PLAN    Urinalysis discussed with patient.  This was unremarkable for UTI.  Wet prep was positive for yeast  Rx for diflucan today.  Patient has had this medication in the past with no problems or interactions with her rejection medication.  Treatment currentguidelines - also push fluids.   Follow  up with PCP in 2-3 days if symptoms are not improving.  Worrisome symptoms discussed with instructions to go to the ED.  Patient verbalized understanding and agreed with this plan.          Nato Gómez  2/14/2019, 7:41 PM

## 2019-02-15 NOTE — TELEPHONE ENCOUNTER
Charito is only taking a 1 time dose of fluconazole so I told her nothing is needed. I said if she was to go back on this for longer then 3 to 5 days then we would need to check her tacrolimus level. She understands

## 2019-02-15 NOTE — TELEPHONE ENCOUNTER
Patient Call: General    Reason for call:patient called has a question regarding  Fluconazole 150 mg 1 daily for a Yeast infection if it will affect her prograf medication?    Call back needed? Yes    Return Call Needed  Same as documented in contacts section  When to return call?: Same day: [facility]

## 2019-02-18 ENCOUNTER — MYC MEDICAL ADVICE (OUTPATIENT)
Dept: INTERNAL MEDICINE | Facility: CLINIC | Age: 39
End: 2019-02-18

## 2019-02-18 NOTE — TELEPHONE ENCOUNTER
Needs to be seen for complaints.  SB  Appointment to be made. Message sen to pt to make a future appt to discuss symptoms.  Belkis Mo RN 6:25 AM on 2/19/2019.

## 2019-02-18 NOTE — TELEPHONE ENCOUNTER
FUTURE VISIT INFORMATION      FUTURE VISIT INFORMATION:    Date: 2/21/19    Time: 8:40AM    Location: [facility]  REFERRAL INFORMATION:    Referring provider:  Dr. Marni Garcia    Referring providers clinic:  [facility]    Reason for visit/diagnosis:  Abdominal Pain    NOTES STATUS DETAILS   OFFICE NOTE from referring provider Internal 11/12/18   OFFICE NOTE from other specialist Internal 1/30/15, 1/26/15, 1/16/12, 6/20/11, 5/25/05   DISCHARGE SUMMARY from hospital Internal 8/18/18, 9/15/16   OPERATIVE REPORT Internal    MEDICATION LIST Internal         ENDOSCOPY  Internal 8/18/18, 11/17/16, 9/15/16, 2/6/15   COLONOSCOPY Internal 10/24/18   ERCP N/A    EUS N/A    STOOL TESTING Internal    PERTINENT LABS Internal    PATHOLOGY REPORTS (RELATED) Internal    IMAGING (CT, MRI, EGD) Internal

## 2019-02-19 DIAGNOSIS — F41.0 PANIC DISORDER: ICD-10-CM

## 2019-02-20 ENCOUNTER — TELEPHONE (OUTPATIENT)
Dept: GASTROENTEROLOGY | Facility: CLINIC | Age: 39
End: 2019-02-20

## 2019-02-20 NOTE — TELEPHONE ENCOUNTER
"Routing to new PCP to advise on refill  Agatha CAMPOS RN    Requested Prescriptions   Pending Prescriptions Disp Refills     PARoxetine (PAXIL) 40 MG tablet 45 tablet 5    SSRIs Protocol Passed - 2/19/2019 10:17 AM       Passed - Recent (12 mo) or future (30 days) visit within the authorizing provider's specialty    Patient had office visit in the last 12 months or has a visit in the next 30 days with authorizing provider or within the authorizing provider's specialty.  See \"Patient Info\" tab in inbasket, or \"Choose Columns\" in Meds & Orders section of the refill encounter.             Passed - Medication is active on med list       Passed - Patient is age 18 or older       Passed - No active pregnancy on record       Passed - No positive pregnancy test in last 12 months            "

## 2019-02-20 NOTE — TELEPHONE ENCOUNTER
Spoke to patient and reminded of appointment for 2/21/19 @0840 arrive 15 minutes early for check in.     JHOANA GarciaA

## 2019-02-21 ENCOUNTER — TELEPHONE (OUTPATIENT)
Dept: ENDOCRINOLOGY | Facility: CLINIC | Age: 39
End: 2019-02-21

## 2019-02-21 ENCOUNTER — OFFICE VISIT (OUTPATIENT)
Dept: INTERNAL MEDICINE | Facility: CLINIC | Age: 39
End: 2019-02-21
Payer: COMMERCIAL

## 2019-02-21 ENCOUNTER — OFFICE VISIT (OUTPATIENT)
Dept: GASTROENTEROLOGY | Facility: CLINIC | Age: 39
End: 2019-02-21
Payer: COMMERCIAL

## 2019-02-21 ENCOUNTER — PRE VISIT (OUTPATIENT)
Dept: GASTROENTEROLOGY | Facility: CLINIC | Age: 39
End: 2019-02-21

## 2019-02-21 VITALS
BODY MASS INDEX: 22.34 KG/M2 | HEIGHT: 62 IN | DIASTOLIC BLOOD PRESSURE: 75 MMHG | WEIGHT: 121.4 LBS | SYSTOLIC BLOOD PRESSURE: 113 MMHG | HEART RATE: 90 BPM | OXYGEN SATURATION: 100 %

## 2019-02-21 VITALS
SYSTOLIC BLOOD PRESSURE: 113 MMHG | OXYGEN SATURATION: 100 % | HEART RATE: 90 BPM | BODY MASS INDEX: 22.24 KG/M2 | WEIGHT: 121.6 LBS | DIASTOLIC BLOOD PRESSURE: 75 MMHG | RESPIRATION RATE: 16 BRPM

## 2019-02-21 DIAGNOSIS — R11.0 NAUSEA: ICD-10-CM

## 2019-02-21 DIAGNOSIS — N89.8 VAGINAL ITCHING: Primary | ICD-10-CM

## 2019-02-21 DIAGNOSIS — Z94.4 LIVER REPLACED BY TRANSPLANT (H): ICD-10-CM

## 2019-02-21 DIAGNOSIS — N76.0 BACTERIAL VAGINOSIS: ICD-10-CM

## 2019-02-21 DIAGNOSIS — F41.0 PANIC DISORDER: ICD-10-CM

## 2019-02-21 DIAGNOSIS — N89.8 VAGINAL ITCHING: ICD-10-CM

## 2019-02-21 DIAGNOSIS — E16.2 HYPOGLYCEMIA: ICD-10-CM

## 2019-02-21 DIAGNOSIS — B96.89 BACTERIAL VAGINOSIS: ICD-10-CM

## 2019-02-21 DIAGNOSIS — R10.13 ABDOMINAL PAIN, EPIGASTRIC: Primary | ICD-10-CM

## 2019-02-21 LAB
ALBUMIN UR-MCNC: NEGATIVE MG/DL
APPEARANCE UR: CLEAR
BACTERIA #/AREA URNS HPF: ABNORMAL /HPF
BILIRUB UR QL STRIP: NEGATIVE
COLOR UR AUTO: YELLOW
GLUCOSE UR STRIP-MCNC: NEGATIVE MG/DL
HGB UR QL STRIP: NEGATIVE
KETONES UR STRIP-MCNC: NEGATIVE MG/DL
LEUKOCYTE ESTERASE UR QL STRIP: NEGATIVE
MUCOUS THREADS #/AREA URNS LPF: PRESENT /LPF
NITRATE UR QL: NEGATIVE
PH UR STRIP: 8 PH (ref 5–7)
RBC #/AREA URNS AUTO: <1 /HPF (ref 0–2)
SOURCE: ABNORMAL
SP GR UR STRIP: 1.01 (ref 1–1.03)
SPECIMEN SOURCE: ABNORMAL
SQUAMOUS #/AREA URNS AUTO: <1 /HPF (ref 0–1)
UROBILINOGEN UR STRIP-MCNC: 0 MG/DL (ref 0–2)
WBC #/AREA URNS AUTO: <1 /HPF (ref 0–5)
WET PREP SPEC: ABNORMAL

## 2019-02-21 RX ORDER — CLINDAMYCIN HCL 300 MG
300 CAPSULE ORAL 2 TIMES DAILY
Qty: 14 CAPSULE | Refills: 0 | Status: SHIPPED | OUTPATIENT
Start: 2019-02-21 | End: 2019-04-27

## 2019-02-21 RX ORDER — PAROXETINE 40 MG/1
TABLET, FILM COATED ORAL
Qty: 45 TABLET | Refills: 3 | Status: SHIPPED | OUTPATIENT
Start: 2019-02-21 | End: 2019-05-15

## 2019-02-21 RX ORDER — PAROXETINE 40 MG/1
TABLET, FILM COATED ORAL
Qty: 45 TABLET | Refills: 5 | Status: SHIPPED | OUTPATIENT
Start: 2019-02-21 | End: 2019-02-21

## 2019-02-21 RX ORDER — ONDANSETRON 4 MG/1
4-8 TABLET, ORALLY DISINTEGRATING ORAL 3 TIMES DAILY PRN
Qty: 20 TABLET | Refills: 0 | Status: SHIPPED | OUTPATIENT
Start: 2019-02-21 | End: 2020-02-06

## 2019-02-21 ASSESSMENT — PAIN SCALES - GENERAL
PAINLEVEL: NO PAIN (0)
PAINLEVEL: NO PAIN (0)

## 2019-02-21 ASSESSMENT — ENCOUNTER SYMPTOMS
HEARTBURN: 1
DECREASED LIBIDO: 0
FATIGUE: 1
DECREASED APPETITE: 0
VOMITING: 0
FEVER: 0
ABDOMINAL PAIN: 1
INCREASED ENERGY: 1
BLOATING: 1
JAUNDICE: 0
POLYPHAGIA: 1
NAUSEA: 1
HALLUCINATIONS: 0
BLOOD IN STOOL: 0
WEIGHT GAIN: 0
HOT FLASHES: 1
CHILLS: 1
POLYDIPSIA: 0
DIARRHEA: 0
RECTAL PAIN: 0
NIGHT SWEATS: 1
CONSTIPATION: 1
BOWEL INCONTINENCE: 0
ALTERED TEMPERATURE REGULATION: 1
WEIGHT LOSS: 0

## 2019-02-21 ASSESSMENT — MIFFLIN-ST. JEOR: SCORE: 1193.92

## 2019-02-21 NOTE — PATIENT INSTRUCTIONS
It was a pleasure taking care of you today.  I've included a brief summary of our discussion and care plan from today's visit below.  Please review this information with your primary care provider.  ______________________________________________________________________    My recommendations are summarized as follows:    -- Gastric emptying study to be scheduled  -- Continue your bowel regimen with Miralax twice a day  -- Stay hydrated  -- Continue FODMAP diet    David has ordered a Gastric Emptying Study to be done.  This can be scheduled at the  today, if you wish, or please call radiology at [phone] to schedule this.  A Gastric Emptying Study checks the time it takes for your stomach to empty.  You will eat oatmeal or scrambled eggs that contain radioactive material.  A set of pictures will then be taken for the next 1 1/2 to 4 hours to obtain emptying results.  Please allow 2-5 hours for this exam.      You are scheduled for the gastric empytying study on Monday 2/25/19, 8AM at the [facility]. No eating or drinking 4 hours prior to test.    Return to GI Clinic in 8 weeks to review your progress.    ______________________________________________________________________    Who do I call with any questions after my visit?  Please be in touch if there are any further questions that arise following today's visit.  There are multiple ways to contact your gastroenterology care team.        During business hours, you may reach a Gastroenterology nurse at [phone], option 3.       To schedule or reschedule an appointment, please call [phone].       You can always send a secure message through [employer].  [facility] [employer] messages are answered by your nurse or doctor typically within 24 hours.  Please allow extra time on weekends and holidays.        For urgent/emergent questions after business hours, you may reach the on-call GI Fellow by contacting the [facility]  at [phone].      In order for your refill to be processed in a timely fashion, it is your responsibility to ensure you follow the recommendations from your provider regarding your laboratory studies and follow up appointments.       How will I get the results of any tests ordered?    You will receive all of your results.  If you have signed up for [facility] [employer], any tests ordered at your visit will be available to you after your physician reviews them.  Typically this takes 1-2 weeks.  If there are urgent results that require a change in your care plan, your physician or nurse will call you to discuss the next steps.      What is [employer]?  [employer] is a secure way for you to access all of your healthcare records from the [facility].  It is a web based computer program, so you can sign on to it from any location.  It also allows you to send secure messages to your care team.  I recommend signing up for Amber[employer] access if you have not already done so and are comfortable with using a computer.      How to I schedule a follow-up visit?  If you did not schedule a follow-up visit today, please call [phone] to schedule a follow-up office visit.        Sincerely,    David Metz PA-C  [facility]  Division of Gastroenterology

## 2019-02-21 NOTE — TELEPHONE ENCOUNTER
[facility] Call Center    Phone Message    May a detailed message be left on voicemail: yes    Reason for Call: Other: Pt called would like to schedule in Endo/[facility] clinic. Pt has a referral and her diagnosis is Hypoglycemia. Pt stated she is not diabetic as well. Please call back pt. Thanks.     Action Taken: Message routed to:  [facility] ([facility] Endo/[facility]

## 2019-02-21 NOTE — NURSING NOTE
"Chief Complaint   Patient presents with     New Patient     New patient consult, abdominal pain       Vitals:    02/21/19 0841   BP: 113/75   Pulse: 90   SpO2: 100%   Weight: 55.1 kg (121 lb 6.4 oz)   Height: 1.575 m (5' 2\")       Body mass index is 22.2 kg/m .    Marilyn Lieberman CMA    "

## 2019-02-21 NOTE — LETTER
2019       RE: Charito Wilcox  [address]  [facility] [address]     Dear Colleague,    Thank you for referring your patient, Charito Wilcox, to the [facility] GASTROENTEROLOGY AND IBD CLINIC at [facility]. Please see a copy of my visit note below.    error      GI CLINIC VISIT    CC/REFERRING MD:  Marni Garcia  REASON FOR CONSULTATION: change in bowel pattern and abdominal pain    ASSESSMENT/PLAN:  38-year-old female with history of idiopathic liver failure status post liver transplantation 18 years ago (on azathioprine and tacrolimus), status post 2  sections, status post cholecystectomy, irritable bowel syndrome w/ constipation, GERD (on omeprazole 40 mg BID), hx of gastric ulcer (bx unremarkable) with repeat EGD showing complete resolution who presents to the GI clinic for consultation regarding abdominal discomfort and irregular bowel pattern.    1.  Epigastric discomfort: Most recent endoscopic evaluation has been unremarkable (EGD 2018, normal biopsies).  She has a history of a gastric ulcer, however endoscopic healing has been documented.  Her significant limitation in her diet to include low FODMAP, no dairy or gluten is suspicious for slow gastric emptying that may be contributing to symptoms, if low-fat and low fiber seem to keep symptoms at bay.  She has had a gastric emptying study in  that was within normal limits.  Although management may not significantly changed, this may allow for some liberalization of her diet if gastroparesis is contributing and a diet specific to gastroparesis can be initiated with purpose.   --Gastric emptying study to be scheduled    2.  Change in bowel pattern: Most recent endoscopic evaluation was unremarkable (colonoscopy 2018).  Symptoms have been been present for some time, with changes in her bowel pattern requiring dietary changes and initiation of MiraLAX for several years.  With  a liver transplantation, certainly she may be predisposed to diarrhea with both medications and cholecystectomy.  Her bowel pattern is acceptable to patient, however she wonders if there could be an underlying problem requiring her to use MiraLAX and excessive bloating.  This is allowed for improved management, although not complete resolution.  For now we will continue her bowel regimen with MiraLAX 17 g twice a day.  Other consideration would be pelvic floor dysfunction and if constipation is primary component, may require pelvic floor evaluation and determination for biofeedback therapy for optimal management.  Given her surgical history with a liver transplantation, we certainly could consider small intestinal bacterial overgrowth and treat with rifaximin if the above is not successful.  --Continue daily bowel regimen with MiraLAX 17 g twice a day  --Adequate hydration  --Consideration for pelvic floor evaluation pending the above workup  --If the above is not successful, consider treatment with rifaximin for possible small intestinal bacterial overgrowth    3.  GERD without evidence of esophagitis: Well-controlled on omeprazole 40 mg twice a day.  This is maximal medical management for PPI therapy.  Patient has tried to wean down on dose, however this prompts return of symptoms despite a slow wean.  We will continue omeprazole 40 mg twice a day for now.  Patient follows strict lifestyle modifications regarding management of GERD which she will also continue.  Laboratory studies in the recent past do not show any vitamin or mineral deficiencies.    4. Colorectal cancer screening: no family history of CRC. Last colonoscopy 11/2018 within normal limits. Would not require additional screening until the age of 45-50 unless symptomatic sooner.    RTC 8 weeks    Thank you for this consultation.  60 minutes was spent with the patient, more than 50% of the time was spent face to face with the patient in counseling and  "coordinating care discussing the above issues.  It was a pleasure to participate in the care of this patient; please contact us with any further questions.      David Metz PA-C  Division of Gastroenterology, Hepatology and Nutrition  [facility]        HPI  38-year-old female with history of idiopathic liver failure status post liver transplantation 18 years ago (on azathioprine and tacrolimus), status post 2  sections, status post cholecystectomy, irritable bowel syndrome w/ constipation, GERD (on omeprazole 40 mg BID), hx of gastric ulcer (bx unremarkable) with repeat EGD showing complete resolution who presents to the GI clinic for consultation regarding abdominal discomfort and irregular bowel pattern.    Patient reports that she has had stomach issues ever since her transplant.  She then remembers having an antibiotic 6-7 years ago that caused a \"bad reaction\" which she describes as starting her troubles with constipation.  She describes this was a very stressful time.  Which was also contributing to symptoms.  Over time she has tried a variety of things to help with symptoms, primarily dietary restriction.  She follows a low FODMAP diet closely, and if she deviates from this this will cause excessive bloating.  She describes \"the more digested food which already is, the better\".  She also avoids gluten and dairy.  Gluten causes nausea for days (she has a phobia of vomiting), and has Zofran as needed which helps prevent the actual vomiting episode.  She also avoids dairy which causes stomachaches.  Just prior to her menstrual cycle, she feels even more restricted with her diet as symptoms seem to be more pronounced.  She exercises 5 days a week and is a .    Abdominal pain, primarily epigastric workup:  She has had evidence of a gastric ulcer in 2016 at which time she was having epigastric pain worse with eating, leading to complete n.p.o. due to the pain.  2 months after " this episode a repeat upper endoscopy showed resolution of the ulcer.  She has also had a gastric emptying study in 2012 that was within normal limits.  She was also admitted in August 2018 due to epigastric pain, and worried that her gastric ulcer had returned.  She ultimately underwent a repeat EGD that was normal during this hospitalization (August 2018).    In regards to her bowel pattern, she drinks juice (made within a juice or), water, coffee and MiraLAX 17 g in the morning as well as 17 g in the evening.  This usually allows for 1 bowel movement daily to every other day, [place] 4 without blood.  Bowel movements do require her to be in a relaxed state, and cannot be rushed.  She does not require significant straining and denies any fecal incontinence or leakage.      ROS:    No fevers or chills  No weight loss  No blurry vision, double vision or change in vision  No sore throat  No lymphadenopathy  No headache, paraesthesias, or weakness in a limb  No shortness of breath or wheezing  No chest pain or pressure  No arthralgias or myalgias  No rashes or skin changes  No odynophagia or dysphagia  No BRBPR, hematochezia, melena  No dysuria, frequency or urgency  No hot/cold intolerance or polyria  No anxiety or depression    PROBLEM LIST  Patient Active Problem List    Diagnosis Date Noted     Left knee pain, unspecified chronicity 12/28/2018     Priority: Medium     Anxiety      Priority: Medium     anxiety over vomiting, nausea is main trigger       Irritable bowel syndrome      Priority: Medium     Abdominal pain 08/18/2018     Priority: Medium     Localized superficial swelling, mass, or lump 03/01/2018     Priority: Medium     right forearm, stable, likely lipoma, can't rule out sebaceous cyst but less likely         Fatigue, unspecified type 04/10/2017     Priority: Medium     Right hip impingement syndrome 03/01/2016     Priority: Medium     Labral tear of hip, degenerative 11/04/2014     Priority: Medium      PMS (premenstrual syndrome) 2014     Priority: Medium     Immunosuppressed status (H) 2012     Priority: Medium     IBS (irritable bowel syndrome) 2012     Priority: Medium     Panic disorder 2010     Priority: Medium     Lifelong  Emesis phobia  Has seen therapy on going  Very sparing use of alprazolam  Patient is followed by JORJE MULLINS for ongoing prescription of benzodiazepines.  All refills should be approved by this provider, or covering partner.    Medication(s): alprazolam, 0.5 mg.   Maximum quantity per month: 20  Clinic visit frequency required: Q 6  months     Controlled substance agreement on file: No  Benzodiazepine use reviewed by psychiatry:  No    Last [facility] website verification:  done on 11-, 10/27/17   [email or web address]           CARDIOVASCULAR SCREENING; LDL GOAL LESS THAN 160 2010     Priority: Medium     Esophageal reflux      Priority: Medium     Liver replaced by transplant (H) 2005     Priority: Medium     In , due to liver failure of unknown etiology, possible autoimmune hepatitis  Dr. Maria - at [facility]  Follows annually with lab work q6 months         PERTINENT PAST MEDICAL HISTORY:  Past Medical History:   Diagnosis Date     Acute pancreatitis 2016     Anxiety     anxiety over vomiting, nausea is main trigger     Esophageal reflux      Gastrointestinal ulcer      Irritable bowel syndrome      Liver replaced by transplant (H)     liver failure of unknown etiology     Papanicolaou smear of cervix with atypical squamous cells of undetermined significance (ASC-US)     negative HPV       PREVIOUS SURGERIES:  Past Surgical History:   Procedure Laterality Date     C TRANSPLANT LIVER,HETERTOPIC      had appendectomy, gallbladder out with transplant.       SECTION  2008      SECTION  3/2010     CHOLECYSTECTOMY      with liver transplant     COLONOSCOPY N/A 10/24/2018    Procedure: Colonoscopy;   Surgeon: Ottoniel Martin MD;  Location: [facility] OR     ESOPHAGOSCOPY, GASTROSCOPY, DUODENOSCOPY (EGD), COMBINED Left 2/6/2015    Procedure: COMBINED ESOPHAGOSCOPY, GASTROSCOPY, DUODENOSCOPY (EGD), BIOPSY SINGLE OR MULTIPLE;  Surgeon: Ottoniel Martin MD;  Location: [facility] GI     ESOPHAGOSCOPY, GASTROSCOPY, DUODENOSCOPY (EGD), COMBINED N/A 9/16/2016    Procedure: COMBINED ESOPHAGOSCOPY, GASTROSCOPY, DUODENOSCOPY (EGD), BIOPSY SINGLE OR MULTIPLE;  Surgeon: Homar Adams MD;  Location: [facility] GI     ESOPHAGOSCOPY, GASTROSCOPY, DUODENOSCOPY (EGD), COMBINED N/A 11/17/2016    Procedure: COMBINED ESOPHAGOSCOPY, GASTROSCOPY, DUODENOSCOPY (EGD), BIOPSY SINGLE OR MULTIPLE;  Surgeon: Homar Adams MD;  Location: [facility] GI     ESOPHAGOSCOPY, GASTROSCOPY, DUODENOSCOPY (EGD), COMBINED N/A 8/20/2018    Procedure: COMBINED ESOPHAGOSCOPY, GASTROSCOPY, DUODENOSCOPY (EGD);;  Surgeon: Daryl Blank MD;  Location:  GI     HIP SURGERY Right 2/2016    Torn labrum repair      TUBAL LIGATION  3/2010     ALLERGIES:     Allergies   Allergen Reactions     Gluten Meal Nausea and Vomiting     Milk Protein Extract Nausea and Vomiting       PERTINENT MEDICATIONS:    Current Outpatient Medications:      ALPRAZolam (XANAX) 0.5 MG tablet, Take 1 tablet (0.5 mg) by mouth 3 times daily as needed for anxiety 1 month supply, Disp: 30 tablet, Rfl: 0     azaTHIOprine (IMURAN) 50 MG tablet, Take 1 tablet (50 mg) by mouth daily, Disp: 90 tablet, Rfl: 3     lactobacillus rhamnosus, GG, (CULTURELL) capsule, Take 1 capsule by mouth daily, Disp: , Rfl:      norethindrone-ethinyl estradiol (MICROGESTIN 1/20) 1-20 MG-MCG tablet, Take 1 tablet by mouth daily for 28 days, Disp: 28 tablet, Rfl: 0     omeprazole (PRILOSEC) 40 MG capsule, TAKE 1 CAPSULE BY MOUTH TWICE DAILY BEFORE MEALS, TAKE 30-60 MINUTES PRIOR TO A MEAL, Disp: 180 capsule, Rfl: 2     ondansetron (ZOFRAN ODT) 4 MG ODT tab, Take 1-2 tablets (4-8 mg) by mouth 3  times daily (before meals), Disp: 20 tablet, Rfl: 0     PARoxetine (PAXIL) 40 MG tablet, TAKE 1 AND ONE-HALF TABLET BY MOUTH AT BEDTIME, Disp: 45 tablet, Rfl: 5     polyethylene glycol (MIRALAX) powder, Take 17 g (1 capful) by mouth daily, Disp: 510 g, Rfl: 1     tacrolimus (GENERIC EQUIVALENT) 1 MG capsule, Take 1 capsule (1 mg) by mouth every 12 hours, Disp: 180 capsule, Rfl: 3     ursodiol (ACTIGALL) 300 MG capsule, Take 1 capsule (300 mg) by mouth 2 times daily, Disp: 180 capsule, Rfl: 3     alum & mag hydroxide-simethicone (MYLANTA/MAALOX) 200-200-20 MG/5ML SUSP suspension, Take 15 mLs by mouth every 4 hours as needed (Patient not taking: Reported on 11/12/2018), Disp: 769 mL, Rfl: 0     diclofenac (VOLTAREN) 1 % GEL topical gel, Apply 2 g topically 2 times daily, Disp: , Rfl:      dicyclomine (BENTYL) 20 MG tablet, Take 1 tablet (20 mg) by mouth 4 times daily as needed (Patient not taking: Reported on 11/12/2018), Disp: 60 tablet, Rfl: 3     ibuprofen (ADVIL/MOTRIN) 200 MG tablet, Take 400 mg by mouth daily as needed for pain, Disp: , Rfl:      melatonin 5 MG tablet, Take 5 mg by mouth nightly as needed for sleep, Disp: , Rfl:      sucralfate (CARAFATE) 1 GM tablet, Take 1 tablet (1 g) by mouth 4 times daily (before meals and nightly) (Patient not taking: Reported on 11/12/2018), Disp: 120 tablet, Rfl: 0    Current Facility-Administered Medications:      lidocaine (PF) (XYLOCAINE) 1 % injection 2 mL, 2 mL, , , Zulma Bennett MD, 2 mL at 12/21/18 0905     triamcinolone (KENALOG-40) injection 40 mg, 40 mg, , , Zulma Bennett MD, 40 mg at 12/21/18 0905    SOCIAL HISTORY:  Social History     Socioeconomic History     Marital status:      Spouse name: Not on file     Number of children: Not on file     Years of education: Not on file     Highest education level: Not on file   Occupational History     Occupation:    Social Needs     Financial resource strain:  Not on file     Food insecurity:     Worry: Not on file     Inability: Not on file     Transportation needs:     Medical: Not on file     Non-medical: Not on file   Tobacco Use     Smoking status: Never Smoker     Smokeless tobacco: Never Used   Substance and Sexual Activity     Alcohol use: Yes     Comment: glass of wine up to once a month     Drug use: No     Sexual activity: Yes     Partners: Male     Birth control/protection: Female Surgical     Comment: tubal ligation   Lifestyle     Physical activity:     Days per week: Not on file     Minutes per session: Not on file     Stress: Not on file   Relationships     Social connections:     Talks on phone: Not on file     Gets together: Not on file     Attends Orthodox service: Not on file     Active member of club or organization: Not on file     Attends meetings of clubs or organizations: Not on file     Relationship status: Not on file     Intimate partner violence:     Fear of current or ex partner: Not on file     Emotionally abused: Not on file     Physically abused: Not on file     Forced sexual activity: Not on file   Other Topics Concern     Parent/sibling w/ CABG, MI or angioplasty before 65F 55M? No   Social History Narrative    Marathons, cross country skis, exercises 5 times a week. Two children (10 and 8 years old).  (male). Part time at [employer].       FAMILY HISTORY:  FH of CRC: None  FH of IBD: None  Family History   Problem Relation Age of Onset     Coronary Artery Disease Mother      Hyperlipidemia Mother      Osteoporosis Mother      Arthritis Mother      Depression Maternal Grandmother      Anxiety Disorder Maternal Grandmother      Heart Failure Maternal Grandmother      Hypertension Maternal Grandfather      Cerebrovascular Disease Paternal Grandmother      Breast Cancer Paternal Grandmother      Gastrointestinal Disease No family hx of      C.A.D. No family hx of      Cancer - colorectal No family hx of      Prostate Cancer No family hx  "of      Alcohol/Drug No family hx of        Past/family/social history reviewed and no changes    PHYSICAL EXAMINATION:  Constitutional: aaox3, cooperative, pleasant, not dyspneic/diaphoretic, no acute distress  Vitals reviewed: /75   Pulse 90   Ht 1.575 m (5' 2\")   Wt 55.1 kg (121 lb 6.4 oz)   SpO2 100%   BMI 22.20 kg/m     Wt:   Wt Readings from Last 2 Encounters:   02/21/19 55.1 kg (121 lb 6.4 oz)   02/14/19 55.3 kg (122 lb)      Eyes: Sclera anicteric/injected  Ears/nose/mouth/throat: Normal oropharynx without ulcers or exudate, mucus membranes moist, hearing intact  Neck: supple, thyroid normal size  CV: No edema  Respiratory: Unlabored breathing  Lymph: No axillary, submandibular, supraclavicular or inguinal lymphadenopathy  Abd: large abdominal scar from transplantation well healed, Nondistended, +bs, no hepatosplenomegaly, mild tenderness to lower quadrants, no peritoneal signs  Skin: warm, perfused, no jaundice  Psych: Normal affect  MSK: Normal gait      PERTINENT STUDIES:    Office Visit on 02/14/2019   Component Date Value Ref Range Status     Color Urine 02/14/2019 Yellow   Final     Appearance Urine 02/14/2019 Clear   Final     Glucose Urine 02/14/2019 Negative  NEG^Negative mg/dL Final     Bilirubin Urine 02/14/2019 Negative  NEG^Negative Final     Ketones Urine 02/14/2019 Negative  NEG^Negative mg/dL Final     Specific Gravity Urine 02/14/2019 1.010  1.003 - 1.035 Final     pH Urine 02/14/2019 8.0* 5.0 - 7.0 pH Final     Protein Albumin Urine 02/14/2019 Negative  NEG^Negative mg/dL Final     Urobilinogen Urine 02/14/2019 0.2  0.2 - 1.0 EU/dL Final     Nitrite Urine 02/14/2019 Negative  NEG^Negative Final     Blood Urine 02/14/2019 Negative  NEG^Negative Final     Leukocyte Esterase Urine 02/14/2019 Negative  NEG^Negative Final     Source 02/14/2019 Midstream Urine   Final     WBC Urine 02/14/2019 0 - 5  OTO5^0 - 5 /HPF Final     RBC Urine 02/14/2019 O - 2  OTO2^O - 2 /HPF Final     " Squamous Epithelial /LPF Urine 02/14/2019 Few  FEW^Few /LPF Final     Bacteria Urine 02/14/2019 Few* NEG^Negative /HPF Final     Specimen Description 02/14/2019 Vagina   Final     Wet Prep 02/14/2019 No Trichomonas seen   Final     Wet Prep 02/14/2019 No clue cells seen   Final     Wet Prep 02/14/2019 Yeast seen*  Final         Answers for HPI/ROS submitted by the patient on 2/21/2019   General Symptoms: Yes  Skin Symptoms: No  HENT Symptoms: No  EYE SYMPTOMS: No  HEART SYMPTOMS: No  LUNG SYMPTOMS: No  INTESTINAL SYMPTOMS: Yes  URINARY SYMPTOMS: No  GYNECOLOGIC SYMPTOMS: Yes  BREAST SYMPTOMS: No  SKELETAL SYMPTOMS: No  BLOOD SYMPTOMS: No  NERVOUS SYSTEM SYMPTOMS: No  MENTAL HEALTH SYMPTOMS: No  Fever: No  Loss of appetite: No  Weight loss: No  Weight gain: No  Fatigue: Yes  Night sweats: Yes  Chills: Yes  Increased stress: No  Excessive hunger: Yes  Excessive thirst: No  Feeling hot or cold when others believe the temperature is normal: Yes  Loss of height: No  Post-operative complications: No  Surgical site pain: No  Hallucinations: No  Change in or Loss of Energy: Yes  Hyperactivity: No  Confusion: No  Heart burn or indigestion: Yes  Nausea: Yes  Vomiting: No  Abdominal pain: Yes  Bloating: Yes  Constipation: Yes  Diarrhea: No  Blood in stool: No  Black stools: No  Rectal or Anal pain: No  Fecal incontinence: No  Yellowing of skin or eyes: No  Vomit with blood: No  Change in stools: No  Bleeding or spotting between periods: Yes  Heavy or painful periods: No  Irregular periods: Yes  Vaginal discharge: Yes  Hot flashes: Yes  Vaginal dryness: Yes  Genital ulcers: No  Reduced libido: No  Painful intercourse: No  Difficulty with sexual arousal: No  Post-menopausal bleeding: No      Again, thank you for allowing me to participate in the care of your patient.      Sincerely,    David Metz PA-C

## 2019-02-21 NOTE — PATIENT INSTRUCTIONS
It was a pleasure seeing you in clinic. Today we discussed:     1) Vaginal itching   This may be due to somewhat untreated yeast infection.  We have repeated a wet prep today.  In addition we obtained a urinalysis.  I will give you a call with the results to let you know whether a repeat course of treatment would be appropriate.  Regardless, I would recommend 1% hydrocortisone cream to apply over your labia twice daily for 3 days maximum in order to decrease the irritation around your labia.    2) Uterine Bleeding   I am glad your bleeding is somewhat improved - I would recommend continuing to see the GYN to discuss this further     3) Hypoglycemic symptoms   We discussed an endocrinology referral - prior to going ot this appointment please measure you sugars twice daily.    Other etiologies that can cause your symptoms include dumping syndrome - you are to undergo a gastric emptying study. In order to mitigate your symptoms please try to eat high protein high fat, small portions frequently. This may help     Sincerely,  Gail Cintron     Please follow up with me as needed           Primary Care Center Phone Number [phone]  Primary Care Center Medication Refill Request Information:  * Please contact your pharmacy regarding ANY request for medication refills.  ** [facility] Prescription Fax = [phone]  * Please allow 3 business days for routine medication refills.  * Please allow 5 business days for controlled substance medication refills.     Primary Care Center Test Result notification information:  *You will be notified with in 7-10 days of your appointment day regarding the results of your test.  If you are on MyChart you will be notified as soon as the provider has reviewed the results and signed off on them.               [facility]         Internal Medicine Resident                   Continuity Clinic    Who We Are    Resident Continuity Clinic is a part of the [facility]  [facility].  Resident physicians see patients independently and establish a relationship with them over the course of their three-year residency program.  As with the Primary Care Clinic, our Resident Continuity Clinic models a group practice.  If your doctor is not available, you will be able to see another resident physician.  At the end of a resident s training, patients will be transitioned to a new resident physician for ongoing care.     We treat patients with a wide array of medical needs from routine physicals, to acute illnesses, to diabetes and blood pressure management, to complex medical illness.  What is a Resident Physician?    Resident physicians hold medical degrees and are doctors. They are training to become specialists in Internal Medicine. They work under the supervision of board-certified faculty physicians.  Expectations for Your Care    We strive to provide accessible, quality care at all times.    In order to provide this care, it is best to see your primary care resident doctor consistently rather switching between providers.  In the event you do see another physician, you should schedule a follow-up visit with your usual primary care doctor.    If you are transitioning your care from another clinic, it is helpful to have your records available for your doctor to review.    We do not prescribe controlled substances, such as ADD medications or narcotic pain medications, on your first visit.  We will review your health records and concerns prior to devising a treatment plan with you in order to provide the best care.      Clinic Services     Extended clinic hours; patient  to help navigate your visit;  parking; laboratory and imaging services with evening and weekend hours    Multiple medical and surgical specialties in one [facility]    Complementary services, including Nutrition, Integrative Medicine, Pharmacy consultations, Mental and Behavioral Health, Sports Medicine and Physical  Therapy    Thank You    We would like to thank you for choosing the [facility] Internal Medicine Resident Continuity Clinic for your primary care. You are making a priceless contribution to the training of the next generation of health care practitioners.     Contact us at [phone] for appointments or questions.    Resident Clinic Hours are Tuesdays and Thursdays, 7:30am-5:00pm    Residents   Pal Cueto MD  (Male)   Serena Matthew MD (Female)  Eulalia Camejo MD   (Female)   Tiana Lafleur MD   (Female)   Harjeet Sales MD  (Male)   Rashad Harmon MD  (Male)   Manuel Craft MD    (Female)   Chepe Nuñez MD  (Male)   Dale Bland MD  (Male)    Veronica Paniagua MD  (Female)   Renan Jordan MD  (Male)   Juan Glover MD  (Female)   Donnell Yo MD   (Female)   Rubin Louis MD  (Male)   Jaylen Uriostegui MD  (Male)   Rashad Camacho MD (Male)   Abrahan Zapata MD  (Male)   Niki Richards MD (Female)    Kari Ceja MD (Female)   Milton Gates MD  (Male)   Gail Cintron MD    (Female)   Mickie Cuevas MD  (Female)    Supervising Physicians   MD Yudy Lin MD Briar Duffy, MD James Langland, MD Mary Logeais, MD Tanya Melnik, MD Charles Moldow, MD Heather Thompson Buum, MD Kathleen Watson, MD

## 2019-02-21 NOTE — PROGRESS NOTES
PRIMARY CARE CENTER       SUBJECTIVE:  Charito Wiclox is a 38 year old female with a PMHx of liver transplant in 2000, irritable bowel syndrome, ovarian cyst, who presents for abnormal uterine bleeding, vaginal itching, subjective hypoglycemia-like symptoms.     1) Uterine Bleeding, Itching around Labia and Intra-vaginally  She states that she had 10 days of abnormal vaginal bleeding at the end of January.  At the time, she was seen in the ED on 01/27/19, underwent a pelvic exam as well as a pelvic ultrasound which was grossly normal.  She then stopped bleeding on 01/29/19 however, uterine bleeding then recommenced on 02/5/19.  This is a normal timing of her menstrual cycle this initially, she was unconnected however it persisted that she saw Claudette Albright on 02/8/19 at which point,  they had discussed with Dr. Maria of hepatology as well as OB/GYN and discussed starting norethindrone oral contraceptive, 1 pack, low-dose and follow-up with gynecology for further evaluation as well as a repeat ultrasound.  Coagulation studies as well as CBC were also obtained at the time, and are normal with hemoglobin at 14.2, INR at 1.01.    She then started bleeding again on 02/10/19 and had, pain around her vagina as well as around her labia.  She describes this as burning pain.  Given this, she sought urgent care on 02/14/19.  A pelvic exam was completed as well as a wet prep which showed yeast.  She was started on a 1 dose of Diflucan as well as intravaginal Monistat.  She intermittently used the Monistat however did take the Diflucan however did not feel significant improvement.  She then started bleeding again on 0 2/18.  Given all of her symptoms, she called the clinic and was recommended to make an appointment.    Today, she tells me that she began taking the oral contraceptive pill on 02/18/19.  She feels as though her symptoms of itching have not resolved however her uterine bleeding has improved  "since 02/18/19 after starting the oral contraceptive.  She does have an OB/GYN visit scheduled for 02/26/19 at which point she intends to discuss all of the above.    2) Hypoglycemic symptoms, Shaking.   States that ever since her bleeding, she has been significantly lightheaded, shaky and feels as if she is hypoglycemic.  Has occurred prior to abnormal uterine bleeding as well however is much more noticeable over the past month.  Since the symptoms became more predominant, she has started measuring her blood sugar.  She states last night, her blood sugar was 107 and this morning her blood sugars around 105.  She feels as though because she gets a significantly shaky and feels as if she has low blood sugars, she has started to drink juice boxes every 2-3 hours.  She does eat 3 meals a day, however keeps to a 5 maps diet given her epigastric discomfort as well as abdominal pain.  She recently saw gastroenterology for these complaints and is scheduled to undergo a gastric emptying study for her abdominal pain and epigastric fullness.  She denies any significant diarrhea, blood in her stool, constipation, flushing.  She has not had a syncopal episode due to her \"low\" blood sugars.     Medications and allergies reviewed by me today.     ROS:   Constitutional, neuro, ENT, endocrine, pulmonary, cardiac, gastrointestinal, genitourinary, musculoskeletal, integument and psychiatric systems are negative, except as otherwise noted.    OBJECTIVE:    /75   Pulse 90   Resp 16   Wt 55.2 kg (121 lb 9.6 oz)   SpO2 100%   BMI 22.24 kg/m     Wt Readings from Last 1 Encounters:   02/21/19 55.2 kg (121 lb 9.6 oz)       GENERAL APPEARANCE: healthy, alert and no distress     EYES: EOMI, PERRL     HENT: ear canals and TM's normal and nose and mouth without ulcers or lesions     NECK: no adenopathy, no asymmetry, masses, or scars and thyroid normal to palpation     RESP: lungs clear to auscultation - no rales, rhonchi or " wheezes     CV: regular rates and rhythm, normal S1 S2, no S3 or S4 and no murmur, click or rub     ABDOMEN:  soft, nontender, no HSM or masses and bowel sounds normal     GU_female: On vaginal exam, labia appear slightly irritated with erythma around BL labia. She has creamy white dicharge appreciable over cervical os as well s throughout the vaginal canal.      MS: extremities normal- no gross deformities noted, no evidence of inflammation in joints, FROM in all extremities.     PSYCH: mentation appears normal. and affect normal/bright     LYMPHATICS: No cervical adenopathy     ASSESSMENT/PLAN:    Charito Wilcox is a 38 year old female with a PMHx of liver transplant in 2000, irritable bowel syndrome, ovarian cyst, who presents for abnormal uterine bleeding, vaginal itching, subjective hypoglycemia-like symptoms.     Diagnoses and all orders for this visit:    Vaginal itching  Etiologies include yeast infx that was refractory to treatment vs uti, vs BV or trichomoniasis. unlikley to be sti (one partner, tested previously, pt does not feel this is likley and has no concerns re: infidelity).   Vaginal exam showed mild erythema surrounding labia which also could cause itching - inflammation 2/2 prior yeast infx.   - Hydrocortisone 1% to areas of labia BID for Max 3 days.   -      UA with Micro reflex to Culture; Future  -      Wet prep  -  I will call her with results and discuss treatment pending wet prep results.    Uterine Bleeding, Irregular   Has ceased after starting low dose OCP.   - Encouraged her to keep OBGYN appt on 2/26 to discuss further. No changes at this time.     Nausea  Liver replaced by transplant (H)  She states she has significant trouble with the feeling of emesis, has a phobia with respect ith vomiting thus uses the zofran to prevent panic attacks. Uses it about twice per month.   -      ondansetron (ZOFRAN ODT) 4 MG ODT tab; Take 1-2 tablets (4-8 mg) by mouth 3 times daily as needed for  nausea. 20 tabs provided with no refills.     Panic disorder  Refill provided; patient ran out a few days prior.  -      PARoxetine (PAXIL) 40 MG tablet; TAKE 1 AND ONE-HALF TABLET BY MOUTH AT BEDTIME    Subjective Hypoglycemia-like symptoms   States she feels shakey, muscles begin to shake. Feels extremely lightheaded. Has measured BS x2 and have been > 100 however did have one measures BS at 55 which was after PO intake (this BS was a serum measurement).   Other etiologies for her sxcould include dumping syndrom given hx of liver tx, concommitant epigastric pain.  Her diet primary consists of simple sugars (follows FODMAPS diet).   -      ENDOCRINOLOGY ADULT REFERRAL - provded for patient per request. DIscussed that she should measure her BS twice per day prior to endocrine appt to allow for objective BS measurements.   - Encouraged high protein, high fat small portioned and frequent meals.   - May consider nutrition referral if patient amenable later on - OR pending Gastric emptying study     Pt should return to clinic for f/u PRN    Gail Cintron MD  Feb 21, 2019    Pt was seen and examined by me; I was present throughout the entire pelvic exam.  I agree with the detailed A/P documentation above    Marquita Oliver MD

## 2019-02-21 NOTE — PROGRESS NOTES
GI CLINIC VISIT    CC/REFERRING MD:  Marni Garcia  REASON FOR CONSULTATION: change in bowel pattern and abdominal pain    ASSESSMENT/PLAN:  38-year-old female with history of idiopathic liver failure status post liver transplantation 18 years ago (on azathioprine and tacrolimus), status post 2  sections, status post cholecystectomy, irritable bowel syndrome w/ constipation, GERD (on omeprazole 40 mg BID), hx of gastric ulcer (bx unremarkable) with repeat EGD showing complete resolution who presents to the GI clinic for consultation regarding abdominal discomfort and irregular bowel pattern.    1.  Epigastric discomfort: Most recent endoscopic evaluation has been unremarkable (EGD 2018, normal biopsies).  She has a history of a gastric ulcer, however endoscopic healing has been documented.  Her significant limitation in her diet to include low FODMAP, no dairy or gluten is suspicious for slow gastric emptying that may be contributing to symptoms, if low-fat and low fiber seem to keep symptoms at bay.  She has had a gastric emptying study in  that was within normal limits.  Although management may not significantly changed, this may allow for some liberalization of her diet if gastroparesis is contributing and a diet specific to gastroparesis can be initiated with purpose.   --Gastric emptying study to be scheduled    2.  Change in bowel pattern: Most recent endoscopic evaluation was unremarkable (colonoscopy 2018).  Symptoms have been been present for some time, with changes in her bowel pattern requiring dietary changes and initiation of MiraLAX for several years.  With a liver transplantation, certainly she may be predisposed to diarrhea with both medications and cholecystectomy.  Her bowel pattern is acceptable to patient, however she wonders if there could be an underlying problem requiring her to use MiraLAX and excessive bloating.  This is allowed for improved management,  although not complete resolution.  For now we will continue her bowel regimen with MiraLAX 17 g twice a day.  Other consideration would be pelvic floor dysfunction and if constipation is primary component, may require pelvic floor evaluation and determination for biofeedback therapy for optimal management.  Given her surgical history with a liver transplantation, we certainly could consider small intestinal bacterial overgrowth and treat with rifaximin if the above is not successful.  --Continue daily bowel regimen with MiraLAX 17 g twice a day  --Adequate hydration  --Consideration for pelvic floor evaluation pending the above workup  --If the above is not successful, consider treatment with rifaximin for possible small intestinal bacterial overgrowth    3.  GERD without evidence of esophagitis: Well-controlled on omeprazole 40 mg twice a day.  This is maximal medical management for PPI therapy.  Patient has tried to wean down on dose, however this prompts return of symptoms despite a slow wean.  We will continue omeprazole 40 mg twice a day for now.  Patient follows strict lifestyle modifications regarding management of GERD which she will also continue.  Laboratory studies in the recent past do not show any vitamin or mineral deficiencies.    4. Colorectal cancer screening: no family history of CRC. Last colonoscopy 11/2018 within normal limits. Would not require additional screening until the age of 45-50 unless symptomatic sooner.    RTC 8 weeks    Thank you for this consultation.  60 minutes was spent with the patient, more than 50% of the time was spent face to face with the patient in counseling and coordinating care discussing the above issues.  It was a pleasure to participate in the care of this patient; please contact us with any further questions.      David Metz PA-C  Division of Gastroenterology, Hepatology and Nutrition  [facility]        HPI  38-year-old female with history of  "idiopathic liver failure status post liver transplantation 18 years ago (on azathioprine and tacrolimus), status post 2  sections, status post cholecystectomy, irritable bowel syndrome w/ constipation, GERD (on omeprazole 40 mg BID), hx of gastric ulcer (bx unremarkable) with repeat EGD showing complete resolution who presents to the GI clinic for consultation regarding abdominal discomfort and irregular bowel pattern.    Patient reports that she has had stomach issues ever since her transplant.  She then remembers having an antibiotic 6-7 years ago that caused a \"bad reaction\" which she describes as starting her troubles with constipation.  She describes this was a very stressful time.  Which was also contributing to symptoms.  Over time she has tried a variety of things to help with symptoms, primarily dietary restriction.  She follows a low FODMAP diet closely, and if she deviates from this this will cause excessive bloating.  She describes \"the more digested food which already is, the better\".  She also avoids gluten and dairy.  Gluten causes nausea for days (she has a phobia of vomiting), and has Zofran as needed which helps prevent the actual vomiting episode.  She also avoids dairy which causes stomachaches.  Just prior to her menstrual cycle, she feels even more restricted with her diet as symptoms seem to be more pronounced.  She exercises 5 days a week and is a .    Abdominal pain, primarily epigastric workup:  She has had evidence of a gastric ulcer in  at which time she was having epigastric pain worse with eating, leading to complete n.p.o. due to the pain.  2 months after this episode a repeat upper endoscopy showed resolution of the ulcer.  She has also had a gastric emptying study in  that was within normal limits.  She was also admitted in 2018 due to epigastric pain, and worried that her gastric ulcer had returned.  She ultimately underwent a repeat EGD that " was normal during this hospitalization (August 2018).    In regards to her bowel pattern, she drinks juice (made within a juice or), water, coffee and MiraLAX 17 g in the morning as well as 17 g in the evening.  This usually allows for 1 bowel movement daily to every other day, [place] 4 without blood.  Bowel movements do require her to be in a relaxed state, and cannot be rushed.  She does not require significant straining and denies any fecal incontinence or leakage.      ROS:    No fevers or chills  No weight loss  No blurry vision, double vision or change in vision  No sore throat  No lymphadenopathy  No headache, paraesthesias, or weakness in a limb  No shortness of breath or wheezing  No chest pain or pressure  No arthralgias or myalgias  No rashes or skin changes  No odynophagia or dysphagia  No BRBPR, hematochezia, melena  No dysuria, frequency or urgency  No hot/cold intolerance or polyria  No anxiety or depression    PROBLEM LIST  Patient Active Problem List    Diagnosis Date Noted     Left knee pain, unspecified chronicity 12/28/2018     Priority: Medium     Anxiety      Priority: Medium     anxiety over vomiting, nausea is main trigger       Irritable bowel syndrome      Priority: Medium     Abdominal pain 08/18/2018     Priority: Medium     Localized superficial swelling, mass, or lump 03/01/2018     Priority: Medium     right forearm, stable, likely lipoma, can't rule out sebaceous cyst but less likely         Fatigue, unspecified type 04/10/2017     Priority: Medium     Right hip impingement syndrome 03/01/2016     Priority: Medium     Labral tear of hip, degenerative 11/04/2014     Priority: Medium     PMS (premenstrual syndrome) 07/31/2014     Priority: Medium     Immunosuppressed status (H) 12/11/2012     Priority: Medium     IBS (irritable bowel syndrome) 07/03/2012     Priority: Medium     Panic disorder 08/06/2010     Priority: Medium     Lifelong  Emesis phobia  Has seen therapy on going  Very  sparing use of alprazolam  Patient is followed by JORJE MULLINS for ongoing prescription of benzodiazepines.  All refills should be approved by this provider, or covering partner.    Medication(s): alprazolam, 0.5 mg.   Maximum quantity per month: 20  Clinic visit frequency required: Q 6  months     Controlled substance agreement on file: No  Benzodiazepine use reviewed by psychiatry:  No    Last [facility] website verification:  done on 11-, 10/27/17   [email or web address]           CARDIOVASCULAR SCREENING; LDL GOAL LESS THAN 160 2010     Priority: Medium     Esophageal reflux      Priority: Medium     Liver replaced by transplant (H) 2005     Priority: Medium     In , due to liver failure of unknown etiology, possible autoimmune hepatitis  Dr. Maria - at [facility]  Follows annually with lab work q6 months         PERTINENT PAST MEDICAL HISTORY:  Past Medical History:   Diagnosis Date     Acute pancreatitis 2016     Anxiety     anxiety over vomiting, nausea is main trigger     Esophageal reflux      Gastrointestinal ulcer      Irritable bowel syndrome      Liver replaced by transplant (H)     liver failure of unknown etiology     Papanicolaou smear of cervix with atypical squamous cells of undetermined significance (ASC-US)     negative HPV       PREVIOUS SURGERIES:  Past Surgical History:   Procedure Laterality Date     C TRANSPLANT LIVER,HETERTOPIC      had appendectomy, gallbladder out with transplant.       SECTION  2008      SECTION  3/2010     CHOLECYSTECTOMY      with liver transplant     COLONOSCOPY N/A 10/24/2018    Procedure: Colonoscopy;  Surgeon: Ottoniel Martin MD;  Location:  OR     ESOPHAGOSCOPY, GASTROSCOPY, DUODENOSCOPY (EGD), COMBINED Left 2015    Procedure: COMBINED ESOPHAGOSCOPY, GASTROSCOPY, DUODENOSCOPY (EGD), BIOPSY SINGLE OR MULTIPLE;  Surgeon: Ottoniel Martin MD;  Location:  GI     ESOPHAGOSCOPY, GASTROSCOPY,  DUODENOSCOPY (EGD), COMBINED N/A 9/16/2016    Procedure: COMBINED ESOPHAGOSCOPY, GASTROSCOPY, DUODENOSCOPY (EGD), BIOPSY SINGLE OR MULTIPLE;  Surgeon: Homar Adams MD;  Location:  GI     ESOPHAGOSCOPY, GASTROSCOPY, DUODENOSCOPY (EGD), COMBINED N/A 11/17/2016    Procedure: COMBINED ESOPHAGOSCOPY, GASTROSCOPY, DUODENOSCOPY (EGD), BIOPSY SINGLE OR MULTIPLE;  Surgeon: Homar Adams MD;  Location:  GI     ESOPHAGOSCOPY, GASTROSCOPY, DUODENOSCOPY (EGD), COMBINED N/A 8/20/2018    Procedure: COMBINED ESOPHAGOSCOPY, GASTROSCOPY, DUODENOSCOPY (EGD);;  Surgeon: Daryl Blank MD;  Location:  GI     HIP SURGERY Right 2/2016    Torn labrum repair      TUBAL LIGATION  3/2010     ALLERGIES:     Allergies   Allergen Reactions     Gluten Meal Nausea and Vomiting     Milk Protein Extract Nausea and Vomiting       PERTINENT MEDICATIONS:    Current Outpatient Medications:      ALPRAZolam (XANAX) 0.5 MG tablet, Take 1 tablet (0.5 mg) by mouth 3 times daily as needed for anxiety 1 month supply, Disp: 30 tablet, Rfl: 0     azaTHIOprine (IMURAN) 50 MG tablet, Take 1 tablet (50 mg) by mouth daily, Disp: 90 tablet, Rfl: 3     lactobacillus rhamnosus, GG, (CULTURELL) capsule, Take 1 capsule by mouth daily, Disp: , Rfl:      norethindrone-ethinyl estradiol (MICROGESTIN 1/20) 1-20 MG-MCG tablet, Take 1 tablet by mouth daily for 28 days, Disp: 28 tablet, Rfl: 0     omeprazole (PRILOSEC) 40 MG capsule, TAKE 1 CAPSULE BY MOUTH TWICE DAILY BEFORE MEALS, TAKE 30-60 MINUTES PRIOR TO A MEAL, Disp: 180 capsule, Rfl: 2     ondansetron (ZOFRAN ODT) 4 MG ODT tab, Take 1-2 tablets (4-8 mg) by mouth 3 times daily (before meals), Disp: 20 tablet, Rfl: 0     PARoxetine (PAXIL) 40 MG tablet, TAKE 1 AND ONE-HALF TABLET BY MOUTH AT BEDTIME, Disp: 45 tablet, Rfl: 5     polyethylene glycol (MIRALAX) powder, Take 17 g (1 capful) by mouth daily, Disp: 510 g, Rfl: 1     tacrolimus (GENERIC EQUIVALENT) 1 MG capsule,  Take 1 capsule (1 mg) by mouth every 12 hours, Disp: 180 capsule, Rfl: 3     ursodiol (ACTIGALL) 300 MG capsule, Take 1 capsule (300 mg) by mouth 2 times daily, Disp: 180 capsule, Rfl: 3     alum & mag hydroxide-simethicone (MYLANTA/MAALOX) 200-200-20 MG/5ML SUSP suspension, Take 15 mLs by mouth every 4 hours as needed (Patient not taking: Reported on 11/12/2018), Disp: 769 mL, Rfl: 0     diclofenac (VOLTAREN) 1 % GEL topical gel, Apply 2 g topically 2 times daily, Disp: , Rfl:      dicyclomine (BENTYL) 20 MG tablet, Take 1 tablet (20 mg) by mouth 4 times daily as needed (Patient not taking: Reported on 11/12/2018), Disp: 60 tablet, Rfl: 3     ibuprofen (ADVIL/MOTRIN) 200 MG tablet, Take 400 mg by mouth daily as needed for pain, Disp: , Rfl:      melatonin 5 MG tablet, Take 5 mg by mouth nightly as needed for sleep, Disp: , Rfl:      sucralfate (CARAFATE) 1 GM tablet, Take 1 tablet (1 g) by mouth 4 times daily (before meals and nightly) (Patient not taking: Reported on 11/12/2018), Disp: 120 tablet, Rfl: 0    Current Facility-Administered Medications:      lidocaine (PF) (XYLOCAINE) 1 % injection 2 mL, 2 mL, , , Zulma Bennett MD, 2 mL at 12/21/18 0905     triamcinolone (KENALOG-40) injection 40 mg, 40 mg, , , Zulma Bennett MD, 40 mg at 12/21/18 0905    SOCIAL HISTORY:  Social History     Socioeconomic History     Marital status:      Spouse name: Not on file     Number of children: Not on file     Years of education: Not on file     Highest education level: Not on file   Occupational History     Occupation:    Social Needs     Financial resource strain: Not on file     Food insecurity:     Worry: Not on file     Inability: Not on file     Transportation needs:     Medical: Not on file     Non-medical: Not on file   Tobacco Use     Smoking status: Never Smoker     Smokeless tobacco: Never Used   Substance and Sexual Activity     Alcohol use: Yes     Comment:  "glass of wine up to once a month     Drug use: No     Sexual activity: Yes     Partners: Male     Birth control/protection: Female Surgical     Comment: tubal ligation   Lifestyle     Physical activity:     Days per week: Not on file     Minutes per session: Not on file     Stress: Not on file   Relationships     Social connections:     Talks on phone: Not on file     Gets together: Not on file     Attends [employer] service: Not on file     Active member of club or organization: Not on file     Attends meetings of clubs or organizations: Not on file     Relationship status: Not on file     Intimate partner violence:     Fear of current or ex partner: Not on file     Emotionally abused: Not on file     Physically abused: Not on file     Forced sexual activity: Not on file   Other Topics Concern     Parent/sibling w/ CABG, MI or angioplasty before 65F 55M? No   Social History Narrative    Marathons, cross country skis, exercises 5 times a week. Two children (10 and 8 years old).  (male). Part time at [employer].       FAMILY HISTORY:  FH of CRC: None  FH of IBD: None  Family History   Problem Relation Age of Onset     Coronary Artery Disease Mother      Hyperlipidemia Mother      Osteoporosis Mother      Arthritis Mother      Depression Maternal Grandmother      Anxiety Disorder Maternal Grandmother      Heart Failure Maternal Grandmother      Hypertension Maternal Grandfather      Cerebrovascular Disease Paternal Grandmother      Breast Cancer Paternal Grandmother      Gastrointestinal Disease No family hx of      C.A.D. No family hx of      Cancer - colorectal No family hx of      Prostate Cancer No family hx of      Alcohol/Drug No family hx of        Past/family/social history reviewed and no changes    PHYSICAL EXAMINATION:  Constitutional: aaox3, cooperative, pleasant, not dyspneic/diaphoretic, no acute distress  Vitals reviewed: /75   Pulse 90   Ht 1.575 m (5' 2\")   Wt 55.1 kg (121 lb 6.4 oz)   " SpO2 100%   BMI 22.20 kg/m    Wt:   Wt Readings from Last 2 Encounters:   02/21/19 55.1 kg (121 lb 6.4 oz)   02/14/19 55.3 kg (122 lb)      Eyes: Sclera anicteric/injected  Ears/nose/mouth/throat: Normal oropharynx without ulcers or exudate, mucus membranes moist, hearing intact  Neck: supple, thyroid normal size  CV: No edema  Respiratory: Unlabored breathing  Lymph: No axillary, submandibular, supraclavicular or inguinal lymphadenopathy  Abd: large abdominal scar from transplantation well healed, Nondistended, +bs, no hepatosplenomegaly, mild tenderness to lower quadrants, no peritoneal signs  Skin: warm, perfused, no jaundice  Psych: Normal affect  MSK: Normal gait      PERTINENT STUDIES:    Office Visit on 02/14/2019   Component Date Value Ref Range Status     Color Urine 02/14/2019 Yellow   Final     Appearance Urine 02/14/2019 Clear   Final     Glucose Urine 02/14/2019 Negative  NEG^Negative mg/dL Final     Bilirubin Urine 02/14/2019 Negative  NEG^Negative Final     Ketones Urine 02/14/2019 Negative  NEG^Negative mg/dL Final     Specific Gravity Urine 02/14/2019 1.010  1.003 - 1.035 Final     pH Urine 02/14/2019 8.0* 5.0 - 7.0 pH Final     Protein Albumin Urine 02/14/2019 Negative  NEG^Negative mg/dL Final     Urobilinogen Urine 02/14/2019 0.2  0.2 - 1.0 EU/dL Final     Nitrite Urine 02/14/2019 Negative  NEG^Negative Final     Blood Urine 02/14/2019 Negative  NEG^Negative Final     Leukocyte Esterase Urine 02/14/2019 Negative  NEG^Negative Final     Source 02/14/2019 Midstream Urine   Final     WBC Urine 02/14/2019 0 - 5  OTO5^0 - 5 /HPF Final     RBC Urine 02/14/2019 O - 2  OTO2^O - 2 /HPF Final     Squamous Epithelial /LPF Urine 02/14/2019 Few  FEW^Few /LPF Final     Bacteria Urine 02/14/2019 Few* NEG^Negative /HPF Final     Specimen Description 02/14/2019 Vagina   Final     Wet Prep 02/14/2019 No Trichomonas seen   Final     Wet Prep 02/14/2019 No clue cells seen   Final     Wet Prep 02/14/2019 Yeast seen*   Final         Answers for HPI/ROS submitted by the patient on 2/21/2019   General Symptoms: Yes  Skin Symptoms: No  HENT Symptoms: No  EYE SYMPTOMS: No  HEART SYMPTOMS: No  LUNG SYMPTOMS: No  INTESTINAL SYMPTOMS: Yes  URINARY SYMPTOMS: No  GYNECOLOGIC SYMPTOMS: Yes  BREAST SYMPTOMS: No  SKELETAL SYMPTOMS: No  BLOOD SYMPTOMS: No  NERVOUS SYSTEM SYMPTOMS: No  MENTAL HEALTH SYMPTOMS: No  Fever: No  Loss of appetite: No  Weight loss: No  Weight gain: No  Fatigue: Yes  Night sweats: Yes  Chills: Yes  Increased stress: No  Excessive hunger: Yes  Excessive thirst: No  Feeling hot or cold when others believe the temperature is normal: Yes  Loss of height: No  Post-operative complications: No  Surgical site pain: No  Hallucinations: No  Change in or Loss of Energy: Yes  Hyperactivity: No  Confusion: No  Heart burn or indigestion: Yes  Nausea: Yes  Vomiting: No  Abdominal pain: Yes  Bloating: Yes  Constipation: Yes  Diarrhea: No  Blood in stool: No  Black stools: No  Rectal or Anal pain: No  Fecal incontinence: No  Yellowing of skin or eyes: No  Vomit with blood: No  Change in stools: No  Bleeding or spotting between periods: Yes  Heavy or painful periods: No  Irregular periods: Yes  Vaginal discharge: Yes  Hot flashes: Yes  Vaginal dryness: Yes  Genital ulcers: No  Reduced libido: No  Painful intercourse: No  Difficulty with sexual arousal: No  Post-menopausal bleeding: No

## 2019-02-21 NOTE — NURSING NOTE
Chief Complaint   Patient presents with     Vaginal Bleeding     pt is here to follow up on abnormal vaginal bleeding        Eulalia Nunez CMA at 10:12 AM on 2/21/2019

## 2019-02-24 NOTE — PROGRESS NOTES
[facility]  Gynecology Visit    Reason for Consult: Abnormal uterine bleeding  Consulting Provider: LEE ANN Vaz CNP    HPI:    Charito Wilcox is a 38 year old , here for abnormal uterine bleeding.  The patient reports previously had regular menses every 30 days with one heavy day of bleeding and then 2-3 more days of light spotting. Since early January has had heavier menses, breakthrough bleeding and irregular bleeding.  She started a low dose OCP prescribed by her PCP about a week ago and says the bleeding has stopped. She also feels some of her symptoms of fatigue have also improved on the pill.      As for vaginal itching she feels that this started about two weeks ago and remains very bothersome. Pain and itching mostly on the outside and feels is related to if she is not drinking enough fluids.  Reports she is currently just using monistat cream on the area. She is also currently taking clindamycin.     - seen in clinic,  vaginal itching and prescribed hydrocortisone cream 1% and clindamycin 7 day course, wet prep positive for few clue cells   - seen in clinic with vaginal itching and urinary symptoms, wet prep positive for yeast s/p diflucan, normal ua   - hgb 14.2, plt 194, PTT, PT/INR nl   - neg GC/chlam, neg wet prep, PTT, PT/INR nl, nl TSH, nl UA, neg HCG, hgb 14.0    19 pelvic US - ut 8.0 x 5 x 5.9 cm, EMS 4.0 mm, no myometrial masses, R ovary 2.0 x 1.5 x 2.1 cm, L ovary 5.6 x 3.8 x2.3 cm with 5.3 cm simple cyst without internal vascularity      GYN History  - Menses: LMP 19,  Currently irregular  - Pap Smears: NIL, HPV negative 2018, not due today     Lab Results   Component Value Date    PAP NIL 2018    PAP NIL 2017    PAP NIL 2016     - Contraception: s/p tubal ligation  - Sexual Activity/Concerns: no pain with intercourse  - Hx STIs/UTIs: history of gonorrhea, no concerns for STIs today, recent negative testing    OBHx  Obstetric  History       T0      L2     SAB0   TAB0   Ectopic0   Multiple0   Live Births0       # Outcome Date GA Lbr Mathieu/2nd Weight Sex Delivery Anes PTL Lv   2  03/27/10 35w5d    CS-LTranv         Name: Shayne Calvert  08 34w6d 12:00  F CS-LTranv         Name: Gisselle          PMHx: Acute pancreatitis (), anxiety, GERD, GI ulcer, unknown cause of liver failure, IBS  PSHx: History of liver transplant with cholecystectomy (),  section x2, tubal ligation, hip surgery (2016)  Meds: Paroxetine, clindamycin, tacrolimus, ursodiol, microgestin, omeprazole, xanax, azathioprine  Allergies:  Gluten (nausea), milk (nausea)    SocHx: No tobacco use, rare alcohol use, no other drug use    ROS: 10-Point ROS negative except as noted in HPI    Physical Exam  /72   Pulse 77   Wt 54.4 kg (120 lb)   LMP 2019   Breastfeeding? No   BMI 21.95 kg/m    Body mass index is 21.95 kg/m .  Gen: Well-appearing, NAD  HEENT: Normocephalic, atraumatic  CV:  Regular rate  Pulm: Unlabored breathing  Abd: Soft    Pelvic:  Normal appearing external female genitalia. Mild erythema and tenderness to palpation of vestibule.  No vaginal lesions. Scant white discharge. Cervix multiparous, no lesions.     Assessment/Plan:  Charito Wilcox is a 38 year old  female here for abnormal uterine bleeding and vulvar pruritis.    AUB  - unlikely to be structural given normal pelvic ultrasound  - TSH nl, ordered FSH and prolactin today  - improved now on low dose OCP which patient would like to take in a continuous fashion, refill ordered, pt to discuss with her liver transplant doctor re continued combined OCP  - if abnormal bleeding continues, would consider an EMB  - hemoglobin stable despite subjective heavy bleeding  - return to care in 2-3 months to reassess bleeding    Vulvar pruritis vs vestibulitis  - pain specifically at vestibule though reports previously some generalized pruritis  - given recent  yeast infection will try one week of lotrisone cream applied topically twice daily  - if no improvement, would then consider vaginal estrogen as patient reports some of symptoms seem to be related to dryness  - wet prep negative for clue cells, yeast or trichomonas today in clinic    Discussed with Dr. Owusu.    Amy Schumer, MD  Obstetrics and Gynecology PGY-2    S Staff Note:  Patient was seen by Dr. Schumer in Continuity of Care Clinic.  I reviewed the history & exam.  The patient's assessment and plan were made jointly.    Nicole Owusu MD  2/26/19

## 2019-02-25 ENCOUNTER — HOSPITAL ENCOUNTER (OUTPATIENT)
Dept: NUCLEAR MEDICINE | Facility: CLINIC | Age: 39
Setting detail: NUCLEAR MEDICINE
Discharge: HOME | End: 2019-02-25
Attending: PHYSICIAN ASSISTANT | Admitting: PHYSICIAN ASSISTANT
Payer: COMMERCIAL

## 2019-02-25 DIAGNOSIS — R10.13 ABDOMINAL PAIN, EPIGASTRIC: ICD-10-CM

## 2019-02-25 PROCEDURE — 34300033 ZZH RX 343: Performed by: PHYSICIAN ASSISTANT

## 2019-02-25 PROCEDURE — A9541 TC99M SULFUR COLLOID: HCPCS | Performed by: PHYSICIAN ASSISTANT

## 2019-02-25 PROCEDURE — 78264 GASTRIC EMPTYING IMG STUDY: CPT

## 2019-02-25 RX ADMIN — Medication 2 MCI.: at 11:48

## 2019-02-26 ENCOUNTER — OFFICE VISIT (OUTPATIENT)
Dept: OBGYN | Facility: CLINIC | Age: 39
End: 2019-02-26
Attending: NURSE PRACTITIONER
Payer: COMMERCIAL

## 2019-02-26 VITALS
SYSTOLIC BLOOD PRESSURE: 111 MMHG | HEART RATE: 77 BPM | DIASTOLIC BLOOD PRESSURE: 72 MMHG | BODY MASS INDEX: 21.95 KG/M2 | WEIGHT: 120 LBS

## 2019-02-26 DIAGNOSIS — L29.2 VULVAR PRURITUS: ICD-10-CM

## 2019-02-26 DIAGNOSIS — N93.9 ABNORMAL UTERINE BLEEDING: ICD-10-CM

## 2019-02-26 DIAGNOSIS — N93.9 ABNORMAL UTERINE BLEEDING (AUB): Primary | ICD-10-CM

## 2019-02-26 RX ORDER — CLOTRIMAZOLE AND BETAMETHASONE DIPROPIONATE 10; .64 MG/G; MG/G
CREAM TOPICAL 2 TIMES DAILY
Qty: 15 G | Refills: 0 | Status: SHIPPED | OUTPATIENT
Start: 2019-02-26 | End: 2019-04-27

## 2019-02-26 RX ORDER — NORETHINDRONE ACETATE AND ETHINYL ESTRADIOL .02; 1 MG/1; MG/1
1 TABLET ORAL DAILY
Qty: 84 TABLET | Refills: 3 | Status: SHIPPED | OUTPATIENT
Start: 2019-02-26 | End: 2019-07-12

## 2019-02-26 ASSESSMENT — PAIN SCALES - GENERAL: PAINLEVEL: SEVERE PAIN (6)

## 2019-03-01 ENCOUNTER — THERAPY VISIT (OUTPATIENT)
Dept: PHYSICAL THERAPY | Facility: CLINIC | Age: 39
End: 2019-03-01
Payer: COMMERCIAL

## 2019-03-01 ENCOUNTER — OFFICE VISIT (OUTPATIENT)
Dept: GASTROENTEROLOGY | Facility: CLINIC | Age: 39
End: 2019-03-01
Attending: INTERNAL MEDICINE
Payer: COMMERCIAL

## 2019-03-01 VITALS
DIASTOLIC BLOOD PRESSURE: 80 MMHG | SYSTOLIC BLOOD PRESSURE: 122 MMHG | HEART RATE: 68 BPM | TEMPERATURE: 98.6 F | OXYGEN SATURATION: 100 %

## 2019-03-01 DIAGNOSIS — M25.562 LEFT KNEE PAIN, UNSPECIFIED CHRONICITY: ICD-10-CM

## 2019-03-01 DIAGNOSIS — Z94.4 LIVER REPLACED BY TRANSPLANT (H): ICD-10-CM

## 2019-03-01 DIAGNOSIS — Z94.4 STATUS POST LIVER TRANSPLANT (H): Primary | ICD-10-CM

## 2019-03-01 DIAGNOSIS — N93.9 ABNORMAL UTERINE BLEEDING (AUB): ICD-10-CM

## 2019-03-01 DIAGNOSIS — L29.2 VULVAR PRURITUS: ICD-10-CM

## 2019-03-01 DIAGNOSIS — N93.9 ABNORMAL UTERINE BLEEDING: ICD-10-CM

## 2019-03-01 LAB
ALBUMIN SERPL-MCNC: 3.7 G/DL (ref 3.4–5)
ALP SERPL-CCNC: 102 U/L (ref 40–150)
ALT SERPL W P-5'-P-CCNC: 20 U/L (ref 0–50)
ANION GAP SERPL CALCULATED.3IONS-SCNC: 6 MMOL/L (ref 3–14)
AST SERPL W P-5'-P-CCNC: 31 U/L (ref 0–45)
BILIRUB DIRECT SERPL-MCNC: 0.2 MG/DL (ref 0–0.2)
BILIRUB SERPL-MCNC: 0.8 MG/DL (ref 0.2–1.3)
BUN SERPL-MCNC: 9 MG/DL (ref 7–30)
CALCIUM SERPL-MCNC: 8.6 MG/DL (ref 8.5–10.1)
CHLORIDE SERPL-SCNC: 106 MMOL/L (ref 94–109)
CO2 SERPL-SCNC: 26 MMOL/L (ref 20–32)
CREAT SERPL-MCNC: 0.68 MG/DL (ref 0.52–1.04)
ERYTHROCYTE [DISTWIDTH] IN BLOOD BY AUTOMATED COUNT: 11.9 % (ref 10–15)
FSH SERPL-ACNC: 3.6 IU/L
GFR SERPL CREATININE-BSD FRML MDRD: >90 ML/MIN/{1.73_M2}
GLUCOSE SERPL-MCNC: 94 MG/DL (ref 70–99)
HCT VFR BLD AUTO: 40.6 % (ref 35–47)
HGB BLD-MCNC: 14.6 G/DL (ref 11.7–15.7)
MCH RBC QN AUTO: 36.2 PG (ref 26.5–33)
MCHC RBC AUTO-ENTMCNC: 36 G/DL (ref 31.5–36.5)
MCV RBC AUTO: 101 FL (ref 78–100)
PLATELET # BLD AUTO: 218 10E9/L (ref 150–450)
POTASSIUM SERPL-SCNC: 3.9 MMOL/L (ref 3.4–5.3)
PROLACTIN SERPL-MCNC: 16 UG/L (ref 3–27)
PROT SERPL-MCNC: 7.6 G/DL (ref 6.8–8.8)
RBC # BLD AUTO: 4.03 10E12/L (ref 3.8–5.2)
SODIUM SERPL-SCNC: 139 MMOL/L (ref 133–144)
TACROLIMUS BLD-MCNC: 3.6 UG/L (ref 5–15)
TME LAST DOSE: 2200 H
WBC # BLD AUTO: 4.3 10E9/L (ref 4–11)

## 2019-03-01 PROCEDURE — 80197 ASSAY OF TACROLIMUS: CPT | Performed by: INTERNAL MEDICINE

## 2019-03-01 PROCEDURE — 83001 ASSAY OF GONADOTROPIN (FSH): CPT | Performed by: INTERNAL MEDICINE

## 2019-03-01 PROCEDURE — 80076 HEPATIC FUNCTION PANEL: CPT | Performed by: INTERNAL MEDICINE

## 2019-03-01 PROCEDURE — 97530 THERAPEUTIC ACTIVITIES: CPT | Mod: GP | Performed by: PHYSICAL THERAPIST

## 2019-03-01 PROCEDURE — 84146 ASSAY OF PROLACTIN: CPT | Performed by: INTERNAL MEDICINE

## 2019-03-01 PROCEDURE — 97110 THERAPEUTIC EXERCISES: CPT | Mod: GP | Performed by: PHYSICAL THERAPIST

## 2019-03-01 PROCEDURE — 80048 BASIC METABOLIC PNL TOTAL CA: CPT | Performed by: INTERNAL MEDICINE

## 2019-03-01 PROCEDURE — 85027 COMPLETE CBC AUTOMATED: CPT | Performed by: INTERNAL MEDICINE

## 2019-03-01 PROCEDURE — 36415 COLL VENOUS BLD VENIPUNCTURE: CPT | Performed by: INTERNAL MEDICINE

## 2019-03-01 ASSESSMENT — ACTIVITIES OF DAILY LIVING (ADL)
HOW_WOULD_YOU_RATE_THE_CURRENT_FUNCTION_OF_YOUR_KNEE_DURING_YOUR_USUAL_DAILY_ACTIVITIES_ON_A_SCALE_FROM_0_TO_100_WITH_100_BEING_YOUR_LEVEL_OF_KNEE_FUNCTION_PRIOR_TO_YOUR_INJURY_AND_0_BEING_THE_INABILITY_TO_PERFORM_ANY_OF_YOUR_USUAL_DAILY_ACTIVITIES?: 80
STIFFNESS: I DO NOT HAVE THE SYMPTOM
KNEE_ACTIVITY_OF_DAILY_LIVING_SCORE: 77.14
SWELLING: I DO NOT HAVE THE SYMPTOM
SQUAT: ACTIVITY IS NOT DIFFICULT
PAIN: THE SYMPTOM AFFECTS MY ACTIVITY MODERATELY
STAND: ACTIVITY IS SOMEWHAT DIFFICULT
SIT WITH YOUR KNEE BENT: ACTIVITY IS MINIMALLY DIFFICULT
WEAKNESS: THE SYMPTOM AFFECTS MY ACTIVITY SLIGHTLY
KNEE_ACTIVITY_OF_DAILY_LIVING_SUM: 54
LIMPING: I DO NOT HAVE THE SYMPTOM
GO DOWN STAIRS: ACTIVITY IS MINIMALLY DIFFICULT
KNEEL ON THE FRONT OF YOUR KNEE: ACTIVITY IS VERY DIFFICULT
GO UP STAIRS: ACTIVITY IS MINIMALLY DIFFICULT
RAW_SCORE: 54
RISE FROM A CHAIR: ACTIVITY IS NOT DIFFICULT
WALK: ACTIVITY IS NOT DIFFICULT
GIVING WAY, BUCKLING OR SHIFTING OF KNEE: THE SYMPTOM AFFECTS MY ACTIVITY SLIGHTLY

## 2019-03-01 NOTE — PROGRESS NOTES
2D Video Running Gait Analysis   Reproduction of  [facility] 2D Video Analysis    Cesar García PT, PhD 2015     SAGITTAL PLANE OBSERVATIONS  Variable Right Left   Foot Strike Pattern Heel strike Heel strike   IC Tibial Inclination Angle (within 5  of vertical) Mild inclination Mild inclination   IC KF Angle (~20 ) Appropriate Appropriate   MS KF Angle (~40 ) Appropriate Appropriate   MS Ankle DF Angle   (knee over toes) Appropriate Appropriate   Push-off Hip Ext Angle (0-5 ) Appropriate Appropriate   Anterior Pelvic Tilt (5-10 ) Increased Increased   Lumbar Lordosis Appropriate Appropriate   COM Excursion (6-8 cm) Appropriate Appropriate     Forward Trunk Lean (5-10  forward) Appropriate       FRONTAL PLANE OBSERVATIONS  Variable Right Left   Trunk Side Bend (vertical) Appropriate Mild ipsilateral   Lateral Pelvic Drop   (males 3-5 , females 4-7 ) Appropriate Appropriate   Knee Center Position (midline) Appropriate Mild medial   Knee Separation   (slight separation) Appropriate Appropriate   Foot-COM Position   (speed dependent) Appropriate Mild cross-over   Rearfoot Position   (neutral or mild pronation) Appropriate Appropriate   Forefoot Position   (neutral or mild abduction) Appropriate Appropriate     Other Observations  Trunk Rotation Lacking thoracic rotation with running mechanics, compensates with lumbar movement   Arm Swing Appropriate   Heel Height (symmetrical) Appropriate     Dolores: 174

## 2019-03-01 NOTE — PROGRESS NOTES
[facility] Hepatology Note      Encounter Date: Mar 1, 2019    CC:  Chief Complaint   Patient presents with     RECHECK     6 months f/u         History of Present Illness:  Ms. Charito Wilcox is a 38 year old female who presents as a follow-up for status post liver transplantation. The patient has a History of idiopathic liver failure status post liver transplantation 18 years ago (on azathioprine and tacrolimus), status post 2  sections, status post cholecystectomy, irritable bowel syndrome w/ constipation, GERD (on omeprazole 40 mg BID), hx of gastric ulcer (bx unremarkable) with repeat EGD     Patient recently seen David Metz due to abdominal discomfort and irregular bowel patterns.  At today's visit the patient was wondering if she is going through menopause. She states that she is having hot flashes and had her menstrual cycle for a full month. The last couple of months the patient has been experiencing hand tremor and light headed. She states that she feels sick after eating. She states that she is having a hard time digesting fiber and fats. The patient states that she thinks that she has low blood sugar as well and was wondering what she needs to due for it. Patient thyroid has been tested due to her irregularly menstrual cycle and everything came back normal. She continues to run and she does not feel like that has been effecting her menstrual cycle. The patient was wondering if it is okay to continue taking birthcontrol. She is worried about the estrogen levels due to her liver transplantation.     Past Medical History:   Patient Active Problem List   Diagnosis     Liver replaced by transplant (H)     Esophageal reflux     CARDIOVASCULAR SCREENING; LDL GOAL LESS THAN 160     Panic disorder     IBS (irritable bowel syndrome)     Immunosuppressed status (H)     PMS (premenstrual syndrome)     Labral tear of hip, degenerative     Right hip impingement syndrome     Fatigue,  unspecified type     Localized superficial swelling, mass, or lump     Abdominal pain     Anxiety     Irritable bowel syndrome     Left knee pain, unspecified chronicity     Past Medical History:   Diagnosis Date     Acute pancreatitis 2016     Anxiety     anxiety over vomiting, nausea is main trigger     Esophageal reflux      Gastrointestinal ulcer      Irritable bowel syndrome      Liver replaced by transplant (H)     liver failure of unknown etiology     Papanicolaou smear of cervix with atypical squamous cells of undetermined significance (ASC-US)     negative HPV     Past Surgical History:   Procedure Laterality Date     C TRANSPLANT LIVER,HETERTOPIC      had appendectomy, gallbladder out with transplant.       SECTION  2008      SECTION  3/2010     CHOLECYSTECTOMY      with liver transplant     COLONOSCOPY N/A 10/24/2018    Procedure: Colonoscopy;  Surgeon: Ottoniel Martin MD;  Location:  OR     ESOPHAGOSCOPY, GASTROSCOPY, DUODENOSCOPY (EGD), COMBINED Left 2015    Procedure: COMBINED ESOPHAGOSCOPY, GASTROSCOPY, DUODENOSCOPY (EGD), BIOPSY SINGLE OR MULTIPLE;  Surgeon: Ottoniel Martin MD;  Location:  GI     ESOPHAGOSCOPY, GASTROSCOPY, DUODENOSCOPY (EGD), COMBINED N/A 2016    Procedure: COMBINED ESOPHAGOSCOPY, GASTROSCOPY, DUODENOSCOPY (EGD), BIOPSY SINGLE OR MULTIPLE;  Surgeon: Homar Adams MD;  Location:  GI     ESOPHAGOSCOPY, GASTROSCOPY, DUODENOSCOPY (EGD), COMBINED N/A 2016    Procedure: COMBINED ESOPHAGOSCOPY, GASTROSCOPY, DUODENOSCOPY (EGD), BIOPSY SINGLE OR MULTIPLE;  Surgeon: Homar Adams MD;  Location:  GI     ESOPHAGOSCOPY, GASTROSCOPY, DUODENOSCOPY (EGD), COMBINED N/A 2018    Procedure: COMBINED ESOPHAGOSCOPY, GASTROSCOPY, DUODENOSCOPY (EGD);;  Surgeon: Daryl Blank MD;  Location:  GI     HIP SURGERY Right 2016    Torn labrum repair      TUBAL LIGATION  3/2010        Medications:  Current Outpatient Medications   Medication Sig Dispense Refill     ALPRAZolam (XANAX) 0.5 MG tablet Take 1 tablet (0.5 mg) by mouth 3 times daily as needed for anxiety 1 month supply 30 tablet 0     azaTHIOprine (IMURAN) 50 MG tablet Take 1 tablet (50 mg) by mouth daily 90 tablet 3     clotrimazole-betamethasone (LOTRISONE) 1-0.05 % external cream Apply topically 2 times daily for 7 days Apply to affected area on the skin on outside of vagina. 15 g 0     lactobacillus rhamnosus, GG, (CULTURELL) capsule Take 1 capsule by mouth daily       norethindrone-ethinyl estradiol (MICROGESTIN 1/20) 1-20 MG-MCG tablet Take 1 tablet by mouth daily 84 tablet 3     omeprazole (PRILOSEC) 40 MG capsule TAKE 1 CAPSULE BY MOUTH TWICE DAILY BEFORE MEALS, TAKE 30-60 MINUTES PRIOR TO A MEAL 180 capsule 2     ondansetron (ZOFRAN ODT) 4 MG ODT tab Take 1-2 tablets (4-8 mg) by mouth 3 times daily as needed for nausea 20 tablet 0     PARoxetine (PAXIL) 40 MG tablet TAKE 1 AND ONE-HALF TABLET BY MOUTH AT BEDTIME 45 tablet 3     polyethylene glycol (MIRALAX) powder Take 17 g (1 capful) by mouth daily 510 g 1     tacrolimus (GENERIC EQUIVALENT) 1 MG capsule Take 1 capsule (1 mg) by mouth every 12 hours 180 capsule 3     ursodiol (ACTIGALL) 300 MG capsule Take 1 capsule (300 mg) by mouth 2 times daily 180 capsule 3       Allergies   Allergen Reactions     Gluten Meal Nausea and Vomiting     Milk Protein Extract Nausea and Vomiting       /80   Pulse 68   Temp 98.6  F (37  C)   LMP 02/18/2019   SpO2 100%     Physical Exam:  In general she looks well.  HEENT exam shows no scleral icterus or temporal muscle wasting.  Her chest is clear.  Her abdominal exam shows no increase in girth.  No masses or tenderness to palpation are present.  Her liver is 10 cm in span without left lobe enlargement.  No spleen tip is palpable, and extremity exam shows no edema.  Skin exam shows no stigmata of chronic liver disease.  Neurologic  exam shows no asterixis.     Recent Results (from the past 168 hour(s))   Hepatic panel    Collection Time: 03/01/19  9:24 AM   Result Value Ref Range    Bilirubin Direct 0.2 0.0 - 0.2 mg/dL    Bilirubin Total 0.8 0.2 - 1.3 mg/dL    Albumin 3.7 3.4 - 5.0 g/dL    Protein Total 7.6 6.8 - 8.8 g/dL    Alkaline Phosphatase 102 40 - 150 U/L    ALT 20 0 - 50 U/L    AST 31 0 - 45 U/L   Basic metabolic panel    Collection Time: 03/01/19  9:24 AM   Result Value Ref Range    Sodium 139 133 - 144 mmol/L    Potassium 3.9 3.4 - 5.3 mmol/L    Chloride 106 94 - 109 mmol/L    Carbon Dioxide 26 20 - 32 mmol/L    Anion Gap 6 3 - 14 mmol/L    Glucose 94 70 - 99 mg/dL    Urea Nitrogen 9 7 - 30 mg/dL    Creatinine 0.68 0.52 - 1.04 mg/dL    GFR Estimate >90 >60 mL/min/[1.73_m2]    GFR Estimate If Black >90 >60 mL/min/[1.73_m2]    Calcium 8.6 8.5 - 10.1 mg/dL   CBC with platelets    Collection Time: 03/01/19  9:24 AM   Result Value Ref Range    WBC 4.3 4.0 - 11.0 10e9/L    RBC Count 4.03 3.8 - 5.2 10e12/L    Hemoglobin 14.6 11.7 - 15.7 g/dL    Hematocrit 40.6 35.0 - 47.0 %     (H) 78 - 100 fl    MCH 36.2 (H) 26.5 - 33.0 pg    MCHC 36.0 31.5 - 36.5 g/dL    RDW 11.9 10.0 - 15.0 %    Platelet Count 218 150 - 450 10e9/L   Follicle stimulating hormone    Collection Time: 03/01/19  9:24 AM   Result Value Ref Range    FSH 3.6 IU/L   Prolactin    Collection Time: 03/01/19  9:24 AM   Result Value Ref Range    Prolactin 16 3 - 27 ug/L   Tacrolimus level    Collection Time: 03/01/19  9:25 AM   Result Value Ref Range    Tacrolimus Last Dose 2,200     Tacrolimus Level 3.6 (L) 5.0 - 15.0 ug/L      Impression/Plan:  1. Status post liver transplantation     Liver and kidney functions look excellent     On prograf and prednisone     2. Heavy menstrual periods     Continue low dosage estrogen OCP    3. Tremor    Probably tacrolimus related     Patient does not want to discontinue tacrolimus     4. Health Care Maintenance     Up to date on Vaccine      Up to date on cancer screening     No further intervention required. Patient to report changes.     Follow-up in 1 year.         Staff Involved:  Staff/Scribe    Scribe Disclosure:   I, Nathan Garcia, am serving as a scribe to document services personally performed by Dr. Roldan Maria, based on data collection and the provider's statements to me.        Roldan Maria MD      Professor of Medicine  [employer]      Executive Medical Director, Solid Organ Transplant Program  [facility]

## 2019-03-01 NOTE — LETTER
3/1/2019       RE: Charito Wilcox  [address]  [facility] [facility] [address]     Dear Colleague,    Thank you for referring your patient, Charito Wilcox, to the [facility] HEPATOLOGY at [facility]. Please see a copy of my visit note below.    Chief Complaint   Patient presents with     RECHECK     6 months f/u     Blood pressure 122/80, pulse 68, temperature 98.6  F (37  C), last menstrual period 2019, SpO2 100 %, not currently breastfeeding.    Tatum Peter, [facility] Hepatology Note      Encounter Date: Mar 1, 2019    CC:  Chief Complaint   Patient presents with     RECHECK     6 months f/u         History of Present Illness:  Ms. Charito Wilcox is a 38 year old female who presents as a follow-up for status post liver transplantation. The patient has a History of idiopathic liver failure status post liver transplantation 18 years ago (on azathioprine and tacrolimus), status post 2  sections, status post cholecystectomy, irritable bowel syndrome w/ constipation, GERD (on omeprazole 40 mg BID), hx of gastric ulcer (bx unremarkable) with repeat EGD     Patient recently seen David Metz due to abdominal discomfort and irregular bowel patterns.  At today's visit the patient was wondering if she is going through menopause. She states that she is having hot flashes and had her menstrual cycle for a full month. The last couple of months the patient has been experiencing hand tremor and light headed. She states that she feels sick after eating. She states that she is having a hard time digesting fiber and fats. The patient states that she thinks that she has low blood sugar as well and was wondering what she needs to due for it. Patient thyroid has been tested due to her irregularly menstrual cycle and everything came back normal. She continues to run and she does not feel like that has been effecting her menstrual cycle. The  patient was wondering if it is okay to continue taking birthcontrol. She is worried about the estrogen levels due to her liver transplantation.     Past Medical History:   Patient Active Problem List   Diagnosis     Liver replaced by transplant (H)     Esophageal reflux     CARDIOVASCULAR SCREENING; LDL GOAL LESS THAN 160     Panic disorder     IBS (irritable bowel syndrome)     Immunosuppressed status (H)     PMS (premenstrual syndrome)     Labral tear of hip, degenerative     Right hip impingement syndrome     Fatigue, unspecified type     Localized superficial swelling, mass, or lump     Abdominal pain     Anxiety     Irritable bowel syndrome     Left knee pain, unspecified chronicity     Past Medical History:   Diagnosis Date     Acute pancreatitis 2016     Anxiety     anxiety over vomiting, nausea is main trigger     Esophageal reflux      Gastrointestinal ulcer      Irritable bowel syndrome      Liver replaced by transplant (H)     liver failure of unknown etiology     Papanicolaou smear of cervix with atypical squamous cells of undetermined significance (ASC-US)     negative HPV     Past Surgical History:   Procedure Laterality Date     C TRANSPLANT LIVER,HETERTOPIC      had appendectomy, gallbladder out with transplant.       SECTION  2008      SECTION  3/2010     CHOLECYSTECTOMY      with liver transplant     COLONOSCOPY N/A 10/24/2018    Procedure: Colonoscopy;  Surgeon: Ottoniel Martin MD;  Location:  OR     ESOPHAGOSCOPY, GASTROSCOPY, DUODENOSCOPY (EGD), COMBINED Left 2015    Procedure: COMBINED ESOPHAGOSCOPY, GASTROSCOPY, DUODENOSCOPY (EGD), BIOPSY SINGLE OR MULTIPLE;  Surgeon: Ottoniel Martin MD;  Location: [facility]     ESOPHAGOSCOPY, GASTROSCOPY, DUODENOSCOPY (EGD), COMBINED N/A 2016    Procedure: COMBINED ESOPHAGOSCOPY, GASTROSCOPY, DUODENOSCOPY (EGD), BIOPSY SINGLE OR MULTIPLE;  Surgeon: Homar Adams MD;  Location:  GI      ESOPHAGOSCOPY, GASTROSCOPY, DUODENOSCOPY (EGD), COMBINED N/A 11/17/2016    Procedure: COMBINED ESOPHAGOSCOPY, GASTROSCOPY, DUODENOSCOPY (EGD), BIOPSY SINGLE OR MULTIPLE;  Surgeon: Homar Adams MD;  Location:  GI     ESOPHAGOSCOPY, GASTROSCOPY, DUODENOSCOPY (EGD), COMBINED N/A 8/20/2018    Procedure: COMBINED ESOPHAGOSCOPY, GASTROSCOPY, DUODENOSCOPY (EGD);;  Surgeon: Daryl Blank MD;  Location:  GI     HIP SURGERY Right 2/2016    Torn labrum repair      TUBAL LIGATION  3/2010       Medications:  Current Outpatient Medications   Medication Sig Dispense Refill     ALPRAZolam (XANAX) 0.5 MG tablet Take 1 tablet (0.5 mg) by mouth 3 times daily as needed for anxiety 1 month supply 30 tablet 0     azaTHIOprine (IMURAN) 50 MG tablet Take 1 tablet (50 mg) by mouth daily 90 tablet 3     clotrimazole-betamethasone (LOTRISONE) 1-0.05 % external cream Apply topically 2 times daily for 7 days Apply to affected area on the skin on outside of vagina. 15 g 0     lactobacillus rhamnosus, GG, (CULTURELL) capsule Take 1 capsule by mouth daily       norethindrone-ethinyl estradiol (MICROGESTIN 1/20) 1-20 MG-MCG tablet Take 1 tablet by mouth daily 84 tablet 3     omeprazole (PRILOSEC) 40 MG capsule TAKE 1 CAPSULE BY MOUTH TWICE DAILY BEFORE MEALS, TAKE 30-60 MINUTES PRIOR TO A MEAL 180 capsule 2     ondansetron (ZOFRAN ODT) 4 MG ODT tab Take 1-2 tablets (4-8 mg) by mouth 3 times daily as needed for nausea 20 tablet 0     PARoxetine (PAXIL) 40 MG tablet TAKE 1 AND ONE-HALF TABLET BY MOUTH AT BEDTIME 45 tablet 3     polyethylene glycol (MIRALAX) powder Take 17 g (1 capful) by mouth daily 510 g 1     tacrolimus (GENERIC EQUIVALENT) 1 MG capsule Take 1 capsule (1 mg) by mouth every 12 hours 180 capsule 3     ursodiol (ACTIGALL) 300 MG capsule Take 1 capsule (300 mg) by mouth 2 times daily 180 capsule 3       Allergies   Allergen Reactions     Gluten Meal Nausea and Vomiting     Milk Protein Extract Nausea  and Vomiting       /80   Pulse 68   Temp 98.6  F (37  C)   LMP 02/18/2019   SpO2 100%     Physical Exam:  In general she looks well.  HEENT exam shows no scleral icterus or temporal muscle wasting.  Her chest is clear.  Her abdominal exam shows no increase in girth.  No masses or tenderness to palpation are present.  Her liver is 10 cm in span without left lobe enlargement.  No spleen tip is palpable, and extremity exam shows no edema.  Skin exam shows no stigmata of chronic liver disease.  Neurologic exam shows no asterixis.     Recent Results (from the past 168 hour(s))   Hepatic panel    Collection Time: 03/01/19  9:24 AM   Result Value Ref Range    Bilirubin Direct 0.2 0.0 - 0.2 mg/dL    Bilirubin Total 0.8 0.2 - 1.3 mg/dL    Albumin 3.7 3.4 - 5.0 g/dL    Protein Total 7.6 6.8 - 8.8 g/dL    Alkaline Phosphatase 102 40 - 150 U/L    ALT 20 0 - 50 U/L    AST 31 0 - 45 U/L   Basic metabolic panel    Collection Time: 03/01/19  9:24 AM   Result Value Ref Range    Sodium 139 133 - 144 mmol/L    Potassium 3.9 3.4 - 5.3 mmol/L    Chloride 106 94 - 109 mmol/L    Carbon Dioxide 26 20 - 32 mmol/L    Anion Gap 6 3 - 14 mmol/L    Glucose 94 70 - 99 mg/dL    Urea Nitrogen 9 7 - 30 mg/dL    Creatinine 0.68 0.52 - 1.04 mg/dL    GFR Estimate >90 >60 mL/min/[1.73_m2]    GFR Estimate If Black >90 >60 mL/min/[1.73_m2]    Calcium 8.6 8.5 - 10.1 mg/dL   CBC with platelets    Collection Time: 03/01/19  9:24 AM   Result Value Ref Range    WBC 4.3 4.0 - 11.0 10e9/L    RBC Count 4.03 3.8 - 5.2 10e12/L    Hemoglobin 14.6 11.7 - 15.7 g/dL    Hematocrit 40.6 35.0 - 47.0 %     (H) 78 - 100 fl    MCH 36.2 (H) 26.5 - 33.0 pg    MCHC 36.0 31.5 - 36.5 g/dL    RDW 11.9 10.0 - 15.0 %    Platelet Count 218 150 - 450 10e9/L   Follicle stimulating hormone    Collection Time: 03/01/19  9:24 AM   Result Value Ref Range    FSH 3.6 IU/L   Prolactin    Collection Time: 03/01/19  9:24 AM   Result Value Ref Range    Prolactin 16 3 - 27 ug/L    Tacrolimus level    Collection Time: 03/01/19  9:25 AM   Result Value Ref Range    Tacrolimus Last Dose 2,200     Tacrolimus Level 3.6 (L) 5.0 - 15.0 ug/L      Impression/Plan:  1. Status post liver transplantation     Liver and kidney functions look excellent     On prograf and prednisone     2. Heavy menstrual periods     Continue low dosage estrogen OCP    3. Tremor    Probably tacrolimus related     Patient does not want to discontinue tacrolimus     4. Health Care Maintenance     Up to date on Vaccine     Up to date on cancer screening     No further intervention required. Patient to report changes.     Follow-up in 1 year.         Staff Involved:  Staff/Scribe    Scribe Disclosure:   Nathan RESENDIZ, am serving as a scribe to document services personally performed by Dr. Roldan Maria, based on data collection and the provider's statements to me.        Roldan Maria MD

## 2019-03-01 NOTE — PROGRESS NOTES
Chief Complaint   Patient presents with     RECHECK     6 months f/u     Blood pressure 122/80, pulse 68, temperature 98.6  F (37  C), last menstrual period 02/18/2019, SpO2 100 %, not currently breastfeeding.    Tatum Peter, CMA

## 2019-03-11 DIAGNOSIS — Z94.4 LIVER REPLACED BY TRANSPLANT (H): ICD-10-CM

## 2019-03-11 RX ORDER — AZATHIOPRINE 50 MG/1
50 TABLET ORAL DAILY
Qty: 90 TABLET | Refills: 3 | Status: SHIPPED | OUTPATIENT
Start: 2019-03-11 | End: 2019-06-17

## 2019-03-14 ENCOUNTER — TELEPHONE (OUTPATIENT)
Dept: OBGYN | Facility: CLINIC | Age: 39
End: 2019-03-14

## 2019-03-14 NOTE — TELEPHONE ENCOUNTER
Spoke to Charito who stopped her BC because she felt restless,and hungry a lot and really not wanting to take BC(tubes tied) d/t her significant hx.    She would like to see an OB/GYn(no residents) to discuss,hoping before appt scheduled. Transferred to call center,Pt indicated understanding and agreed with plan.

## 2019-03-22 NOTE — TELEPHONE ENCOUNTER
RECORDS RECEIVED FROM: [facility]   DATE RECEIVED: 3/26/19   NOTES (FOR ALL VISITS) STATUS DETAILS   OFFICE NOTE from referring provider Internal 2/21/19   OFFICE NOTE from other specialist N/A    OPERATIVE REPORT (SURGICAL/PATH REPORTS) N/A    MEDICATION LIST Internal    IMAGING (FOR ALL VISITS)     DEXA SCAN Internal DX Hip/Pelvis/Spine 9/16/14   MRI (BRAIN) Internal MRI Brain 9/2/16   CT (HEAD/NECK) Internal CT Abdomen 11/22/17   ULTRASOUND (HEAD/NECK) Internal US Abdomen 11/22/17   LABS     DIABETES: HBGA1C, CREATININE, FASTING LIPIDS, MICROALBUMIN URINE, POTASSIUM, TSH,T4    THYROID: TSH, T4, CBC, THYROGLONULIN, TOTAL T3, FREE T4, CALCITONIN, CEA Internal 3/1/19

## 2019-03-26 ENCOUNTER — PRE VISIT (OUTPATIENT)
Dept: ENDOCRINOLOGY | Facility: CLINIC | Age: 39
End: 2019-03-26

## 2019-04-03 ENCOUNTER — THERAPY VISIT (OUTPATIENT)
Dept: PHYSICAL THERAPY | Facility: CLINIC | Age: 39
End: 2019-04-03
Payer: COMMERCIAL

## 2019-04-03 DIAGNOSIS — M25.562 LEFT KNEE PAIN, UNSPECIFIED CHRONICITY: ICD-10-CM

## 2019-04-03 PROCEDURE — 97530 THERAPEUTIC ACTIVITIES: CPT | Mod: GP | Performed by: PHYSICAL THERAPIST

## 2019-04-03 PROCEDURE — 97110 THERAPEUTIC EXERCISES: CPT | Mod: GP | Performed by: PHYSICAL THERAPIST

## 2019-04-04 ENCOUNTER — OFFICE VISIT (OUTPATIENT)
Dept: OBGYN | Facility: CLINIC | Age: 39
End: 2019-04-04
Attending: OBSTETRICS & GYNECOLOGY
Payer: COMMERCIAL

## 2019-04-04 VITALS
BODY MASS INDEX: 22.43 KG/M2 | HEIGHT: 62 IN | HEART RATE: 77 BPM | SYSTOLIC BLOOD PRESSURE: 104 MMHG | WEIGHT: 121.9 LBS | DIASTOLIC BLOOD PRESSURE: 72 MMHG

## 2019-04-04 DIAGNOSIS — N83.202 LEFT OVARIAN CYST: ICD-10-CM

## 2019-04-04 DIAGNOSIS — N93.9 ABNORMAL UTERINE BLEEDING: Primary | ICD-10-CM

## 2019-04-04 PROCEDURE — G0463 HOSPITAL OUTPT CLINIC VISIT: HCPCS | Mod: ZF

## 2019-04-04 ASSESSMENT — MIFFLIN-ST. JEOR: SCORE: 1196.18

## 2019-04-04 NOTE — PROGRESS NOTES
[facility]  Gynecology Visit    Reason for Consult: Abnormal uterine bleeding    S:  Charito Wilcox is a 38 year old , here for follow up of abnormal uterine bleeding and to establish care. Patient states previous she had very regular menses with light bleeding for 5 days. She then had on going bleeding for 1 month. This was heavy to the point she was evaluated in the ED for filling 1 tampon per hour. She saw her primary care physician and was started on OCPs, she then followed up in our clinic and it was recommended to continue OCPs for 3 months. Patient expresses great concern about this as she is a long term liver transplant patient and has been advised against taking estrogen. Additionally, she had a tubal ligation 8 years ago, so does not have a need for contraception, thus does not want to take any more medications than she already has to take (her anti-rejection medications). She states she felt very shaky, and abnormal while taking the OCPs. She stopped taking them last week and has not had any bleeding since then. She is wondering about the possibility of an endometrial ablation vs. D&C.     19- Seen in clinic with Dr. Schumer, bleeding reduced with OCP use, TSH nl, FSH 3.6, Prolactin 16. Vulvar itching- negative wet prep, given 1 week of lotrisone cream.  19- Seen in clinic,  vaginal itching and prescribed hydrocortisone cream 1% and clindamycin 7 day course, wet prep positive for few clue cells  19- Seen in clinic with vaginal itching and urinary symptoms, wet prep positive for yeast s/p diflucan, normal ua  19- Hgb 14.2, plt 194, PTT, PT/INR nl  19- Neg GC/chlam, neg wet prep, PTT, PT/INR nl, nl TSH, nl UA, neg HCG, hgb 14.0  19- Pelvic US - ut 8.0 x 5 x 5.9 cm, EMS 4.0 mm, no myometrial masses, R ovary 2.0 x 1.5 x 2.1 cm, L ovary 5.6 x 3.8 x2.3 cm with 5.3 cm simple cyst without internal vascularity    GYN History  - Menses: LMP 19,  Currently  "irregular  - Pap Smears: NIL, HPV negative 2018, not due today  - Contraception: s/p tubal ligation    OBHx  Obstetric History       T0      L2     SAB0   TAB0   Ectopic0   Multiple0   Live Births0       # Outcome Date GA Lbr Mathieu/2nd Weight Sex Delivery Anes PTL Lv   2  03/27/10 35w5d    CS-LTranv         Name: Shayne Calvert  08 34w6d 12:00  F CS-LTranv         Name: Gisselle        Physical Exam  /72   Pulse 77   Ht 1.575 m (5' 2\")   Wt 55.3 kg (121 lb 14.4 oz)   BMI 22.30 kg/m    Body mass index is 22.3 kg/m .  Gen: Well-appearing, NAD  HEENT: Normocephalic, atraumatic  CV: Regular rate  Pulm: Unlabored breathing  Pelvic: Deferred    UPT: Negative    Assessment/Plan:  Charito Wilcox is a 38 year old  female here for follow-up of abnormal uterine bleeding and to establish care.    #AUB  - Patient finished 1 month of OCP last week and is currently not having any bleeding  - Discussed that she may return to her normal menses now, or she could continue to have irregular bleeding. If she continues to have irregular bleeding, patient may contact via MyChart or schedule an appointment to discuss possibility of endometrial ablation. It is reasonable given her medical history of liver transplant and desire to avoid medical intervention, and tubal ligation to manage her bleeding with an ablation.  - There was a 5.3 x 3.1 x 2.1 left ovarian cyst seen on previous US, not likely to be related to bleeding, but should be followed-up. Recommended a repeat ultrasound (ordered and patient to schedule).    Discussed and staffed with Dr. Raquel Vinson MD  Obstetrics and Gynecology PGY-1  19 10:01 AM    Staff MD Note    I appreciate the note by Dr. Vinson.  Any necessary changes have been made by me.  I evaluated the patient with the resident and agree with the assessment and plan.    Rachel García MD        "

## 2019-04-04 NOTE — LETTER
2019       RE: Charito Wilcox  [address]  [facility] [address]     Dear Colleague,    Thank you for referring your patient, Charito Wilcox, to the [facility]. Please see a copy of my visit note below.    [facility]  Gynecology Visit    Reason for Consult: Abnormal uterine bleeding    S:  Charito Wilcox is a 38 year old , here for follow up of abnormal uterine bleeding and to establish care. Patient states previous she had very regular menses with light bleeding for 5 days. She then had on going bleeding for 1 month. This was heavy to the point she was evaluated in the ED for filling 1 tampon per hour. She saw her primary care physician and was started on OCPs, she then followed up in our clinic and it was recommended to continue OCPs for 3 months. Patient expresses great concern about this as she is a long term liver transplant patient and has been advised against taking estrogen. Additionally, she had a tubal ligation 8 years ago, so does not have a need for contraception, thus does not want to take any more medications than she already has to take (her anti-rejection medications). She states she felt very shaky, and abnormal while taking the OCPs. She stopped taking them last week and has not had any bleeding since then. She is wondering about the possibility of an endometrial ablation vs. D&C.     19- Seen in clinic with Dr. Schumer, bleeding reduced with OCP use, TSH nl, FSH 3.6, Prolactin 16. Vulvar itching- negative wet prep, given 1 week of lotrisone cream.  19- Seen in clinic,  vaginal itching and prescribed hydrocortisone cream 1% and clindamycin 7 day course, wet prep positive for few clue cells  19- Seen in clinic with vaginal itching and urinary symptoms, wet prep positive for yeast s/p diflucan, normal ua  19- Hgb 14.2, plt 194, PTT, PT/INR nl  19- Neg GC/chlam, neg wet  "prep, PTT, PT/INR nl, nl TSH, nl UA, neg HCG, hgb 14.0  19- Pelvic US - ut 8.0 x 5 x 5.9 cm, EMS 4.0 mm, no myometrial masses, R ovary 2.0 x 1.5 x 2.1 cm, L ovary 5.6 x 3.8 x2.3 cm with 5.3 cm simple cyst without internal vascularity    GYN History  - Menses: LMP 19,  Currently irregular  - Pap Smears: NIL, HPV negative 2018, not due today  - Contraception: s/p tubal ligation    OBHx  Obstetric History       T0      L2     SAB0   TAB0   Ectopic0   Multiple0   Live Births0       # Outcome Date GA Lbr Mathieu/2nd Weight Sex Delivery Anes PTL Lv   2  03/27/10 35w5d    CS-LTranv         Name: Shayne Calvert  08 34w6d 12:00  F CS-LTranv         Name: Gisselle        Physical Exam  /72   Pulse 77   Ht 1.575 m (5' 2\")   Wt 55.3 kg (121 lb 14.4 oz)   BMI 22.30 kg/m     Body mass index is 22.3 kg/m .  Gen: Well-appearing, NAD  HEENT: Normocephalic, atraumatic  CV: Regular rate  Pulm: Unlabored breathing  Pelvic: Deferred    UPT: Negative    Assessment/Plan:  Charito Wilcox is a 38 year old  female here for follow-up of abnormal uterine bleeding and to establish care.    #AUB  - Patient finished 1 month of OCP last week and is currently not having any bleeding  - Discussed that she may return to her normal menses now, or she could continue to have irregular bleeding. If she continues to have irregular bleeding, patient may contact via [employer] or schedule an appointment to discuss possibility of endometrial ablation. It is reasonable given her medical history of liver transplant and desire to avoid medical intervention, and tubal ligation to manage her bleeding with an ablation.  - There was a 5.3 x 3.1 x 2.1 left ovarian cyst seen on previous US, not likely to be related to bleeding, but should be followed-up. Recommended a repeat ultrasound (ordered and patient to schedule).    Discussed and staffed with Dr. Raquel Vinson MD  Obstetrics and Gynecology " PGY-1  04/04/19 10:01 AM    Staff MD Note    I appreciate the note by Dr. Vinson.  Any necessary changes have been made by me.  I evaluated the patient with the resident and agree with the assessment and plan.    Rachel García MD

## 2019-04-12 ENCOUNTER — ANCILLARY PROCEDURE (OUTPATIENT)
Dept: ULTRASOUND IMAGING | Facility: CLINIC | Age: 39
End: 2019-04-12
Attending: OBSTETRICS & GYNECOLOGY
Payer: COMMERCIAL

## 2019-04-12 DIAGNOSIS — N83.202 LEFT OVARIAN CYST: ICD-10-CM

## 2019-04-12 PROCEDURE — 76830 TRANSVAGINAL US NON-OB: CPT

## 2019-04-27 ENCOUNTER — OFFICE VISIT (OUTPATIENT)
Dept: URGENT CARE | Facility: URGENT CARE | Age: 39
End: 2019-04-27
Payer: COMMERCIAL

## 2019-04-27 VITALS
DIASTOLIC BLOOD PRESSURE: 71 MMHG | SYSTOLIC BLOOD PRESSURE: 123 MMHG | TEMPERATURE: 98.6 F | RESPIRATION RATE: 16 BRPM | OXYGEN SATURATION: 100 % | WEIGHT: 118 LBS | BODY MASS INDEX: 21.58 KG/M2 | HEART RATE: 66 BPM

## 2019-04-27 DIAGNOSIS — Z94.4 S/P LIVER TRANSPLANT (H): ICD-10-CM

## 2019-04-27 DIAGNOSIS — N39.0 URINARY TRACT INFECTION WITHOUT HEMATURIA, SITE UNSPECIFIED: Primary | ICD-10-CM

## 2019-04-27 DIAGNOSIS — R82.90 NONSPECIFIC FINDING ON EXAMINATION OF URINE: ICD-10-CM

## 2019-04-27 DIAGNOSIS — R30.0 DYSURIA: ICD-10-CM

## 2019-04-27 LAB
ALBUMIN UR-MCNC: NEGATIVE MG/DL
APPEARANCE UR: ABNORMAL
BACTERIA #/AREA URNS HPF: ABNORMAL /HPF
BILIRUB UR QL STRIP: NEGATIVE
COLOR UR AUTO: YELLOW
GLUCOSE UR STRIP-MCNC: NEGATIVE MG/DL
HCG UR QL: NEGATIVE
HGB UR QL STRIP: ABNORMAL
KETONES UR STRIP-MCNC: NEGATIVE MG/DL
LEUKOCYTE ESTERASE UR QL STRIP: ABNORMAL
NITRATE UR QL: NEGATIVE
NON-SQ EPI CELLS #/AREA URNS LPF: ABNORMAL /LPF
PH UR STRIP: 6.5 PH (ref 5–7)
RBC #/AREA URNS AUTO: ABNORMAL /HPF
SOURCE: ABNORMAL
SP GR UR STRIP: 1.01 (ref 1–1.03)
SPECIMEN SOURCE: NORMAL
UROBILINOGEN UR STRIP-ACNC: 0.2 EU/DL (ref 0.2–1)
WBC #/AREA URNS AUTO: ABNORMAL /HPF
WBC CLUMPS #/AREA URNS HPF: PRESENT /HPF
WET PREP SPEC: NORMAL

## 2019-04-27 PROCEDURE — 81001 URINALYSIS AUTO W/SCOPE: CPT | Performed by: PHYSICIAN ASSISTANT

## 2019-04-27 PROCEDURE — 87210 SMEAR WET MOUNT SALINE/INK: CPT | Performed by: PHYSICIAN ASSISTANT

## 2019-04-27 PROCEDURE — 81025 URINE PREGNANCY TEST: CPT | Performed by: PHYSICIAN ASSISTANT

## 2019-04-27 PROCEDURE — 99214 OFFICE O/P EST MOD 30 MIN: CPT | Performed by: PHYSICIAN ASSISTANT

## 2019-04-27 PROCEDURE — 87086 URINE CULTURE/COLONY COUNT: CPT | Performed by: PHYSICIAN ASSISTANT

## 2019-04-27 RX ORDER — CEFDINIR 300 MG/1
300 CAPSULE ORAL 2 TIMES DAILY
Qty: 14 CAPSULE | Refills: 0 | Status: SHIPPED | OUTPATIENT
Start: 2019-04-27 | End: 2019-07-12

## 2019-04-27 ASSESSMENT — PAIN SCALES - GENERAL: PAINLEVEL: MODERATE PAIN (4)

## 2019-04-27 NOTE — PROGRESS NOTES
S: 37 yo female is here for urinary discomfort and odorous discharge for the last couple of days.  Her menses have been somewhat irregular.  Has had some cramping and is due to get her period in a week.  Some right thoracic pain but she feels it may be muscular in nature.  Some chills.  No fever.  No vomiting or diarrhea.  No rash.    S/p liver transplant. Normal liver function in March 2019    Allergies   Allergen Reactions     Gluten Meal Nausea and Vomiting     Milk Protein Extract Nausea and Vomiting       Past Medical History:   Diagnosis Date     Acute pancreatitis 9/9/2016     Anxiety     anxiety over vomiting, nausea is main trigger     Esophageal reflux      Gastrointestinal ulcer      Irritable bowel syndrome      Liver replaced by transplant (H) 2000    liver failure of unknown etiology     Papanicolaou smear of cervix with atypical squamous cells of undetermined significance (ASC-US)     negative HPV         Current Outpatient Medications on File Prior to Visit:  ALPRAZolam (XANAX) 0.5 MG tablet Take 1 tablet (0.5 mg) by mouth 3 times daily as needed for anxiety 1 month supply   azaTHIOprine (IMURAN) 50 MG tablet Take 1 tablet (50 mg) by mouth daily   lactobacillus rhamnosus, GG, (CULTURELL) capsule Take 1 capsule by mouth daily   omeprazole (PRILOSEC) 40 MG capsule TAKE 1 CAPSULE BY MOUTH TWICE DAILY BEFORE MEALS, TAKE 30-60 MINUTES PRIOR TO A MEAL   ondansetron (ZOFRAN ODT) 4 MG ODT tab Take 1-2 tablets (4-8 mg) by mouth 3 times daily as needed for nausea   PARoxetine (PAXIL) 40 MG tablet TAKE 1 AND ONE-HALF TABLET BY MOUTH AT BEDTIME   polyethylene glycol (MIRALAX) powder Take 17 g (1 capful) by mouth daily   tacrolimus (GENERIC EQUIVALENT) 1 MG capsule Take 1 capsule (1 mg) by mouth every 12 hours   ursodiol (ACTIGALL) 300 MG capsule Take 1 capsule (300 mg) by mouth 2 times daily   norethindrone-ethinyl estradiol (MICROGESTIN 1/20) 1-20 MG-MCG tablet Take 1 tablet by mouth daily (Patient not taking:  Reported on 4/4/2019)     No current facility-administered medications on file prior to visit.     Social History     Tobacco Use     Smoking status: Never Smoker     Smokeless tobacco: Never Used   Substance Use Topics     Alcohol use: Yes     Comment: glass of wine up to once a month     Drug use: No       ROS:  General: negative for fever  ABD: cramping  : as above  EYES: Negative for eye problems.  ENT: neg.  RESP: neg.  CV: Negative for chest pains.  GI: Negative for vomiting.  MUSCULOSKELETAL:  As above  NEUROLOGIC: Negative for headaches.  SKIN: Negative for rash.  Psych- nl mentation        OBJECTIVE:  /71 (BP Location: Left arm, Patient Position: Sitting, Cuff Size: Adult Regular)   Pulse 66   Temp 98.6  F (37  C) (Oral)   Resp 16   Wt 53.5 kg (118 lb)   LMP 04/09/2019   SpO2 100%   BMI 21.58 kg/m     General:   awake, alert, and cooperative.  NAD.   Head: Normocephalic, atraumatic.  Eyes: Conjunctiva clear, non icteric.   ABD: soft, no tenderness to palpation , no rigidity, guarding or rebound . No CVAT  Neuro: Alert and oriented - normal speech.   Resp- breathing comfortably  CV- Heart RRR  Skin- no rashes    Results for orders placed or performed in visit on 04/27/19   *UA reflex to Microscopic and Culture ([facility] (except [place] and [place])   Result Value Ref Range    Color Urine Yellow     Appearance Urine Slightly Cloudy     Glucose Urine Negative NEG^Negative mg/dL    Bilirubin Urine Negative NEG^Negative    Ketones Urine Negative NEG^Negative mg/dL    Specific Gravity Urine 1.010 1.003 - 1.035    Blood Urine Trace (A) NEG^Negative    pH Urine 6.5 5.0 - 7.0 pH    Protein Albumin Urine Negative NEG^Negative mg/dL    Urobilinogen Urine 0.2 0.2 - 1.0 EU/dL    Nitrite Urine Negative NEG^Negative    Leukocyte Esterase Urine Small (A) NEG^Negative    Source Midstream Urine    HCG Qual, Urine ([id number])   Result Value Ref Range    HCG Qual Urine Negative NEG^Negative    Urine Microscopic   Result Value Ref Range    WBC Urine  (A) OTO5^0 - 5 /HPF    RBC Urine O - 2 OTO2^O - 2 /HPF    WBC Clumps Present (A) NEG^Negative /HPF    Squamous Epithelial /LPF Urine Few FEW^Few /LPF    Bacteria Urine Moderate (A) NEG^Negative /HPF   Wet prep   Result Value Ref Range    Specimen Description Vagina     Wet Prep No Trichomonas seen     Wet Prep No clue cells seen     Wet Prep No yeast seen     Wet Prep Few WBCs seen        ASSESSMENT:    ICD-10-CM    1. Urinary tract infection without hematuria, site unspecified N39.0 cefdinir (OMNICEF) 300 MG capsule   2. Dysuria R30.0 Wet prep     *UA reflex to Microscopic and Culture ([facility] (except [place] and [place])     HCG Qual, Urine ([id number])     Urine Microscopic   3. Nonspecific finding on examination of urine R82.90 Urine Culture Aerobic Bacterial   4. S/P liver transplant (H) Z94.4            PLAN: With chills and r thoracic pain I want to use Cefdinir for better coverage. UC pending.F/U PCP 3 days if not better, sooner as needed.  As per ordered above.  Drink plenty of fluids.  Prevention and treatment of UTI's discussed. Follow up with primary care physician if not improving.  Advised about symptoms which might herald more serious problems.      Carole Bowers PA-C

## 2019-04-28 ENCOUNTER — MYC MEDICAL ADVICE (OUTPATIENT)
Dept: INTERNAL MEDICINE | Facility: CLINIC | Age: 39
End: 2019-04-28

## 2019-04-28 LAB
BACTERIA SPEC CULT: NO GROWTH
SPECIMEN SOURCE: NORMAL

## 2019-04-29 ENCOUNTER — TELEPHONE (OUTPATIENT)
Dept: INTERNAL MEDICINE | Facility: CLINIC | Age: 39
End: 2019-04-29

## 2019-04-29 NOTE — TELEPHONE ENCOUNTER
Duplicate. [employer] message sent to pt with recommendations. Eulalia Nunez CMA at 10:17 AM on 4/29/2019

## 2019-04-29 NOTE — TELEPHONE ENCOUNTER
[facility] Call Center    Phone Message    May a detailed message be left on voicemail: yes    Reason for Call: Symptoms or Concerns     If patient has red-flag symptoms, warm transfer to triage line    Current symptom or concern: Not feeling well, Pt had gone into Urgent care on 4/27/2019. Pt stated has a lot of white blood cells in urine.     Symptoms have been present for:  3 day(s)    Has patient previously been seen for this? No    By VERN LINDA    Date: 4/29/2019    Are there any new or worsening symptoms? No      Action Taken: Message routed to:  [facility] ([facility] guillermo

## 2019-04-30 ENCOUNTER — OFFICE VISIT (OUTPATIENT)
Dept: INTERNAL MEDICINE | Facility: CLINIC | Age: 39
End: 2019-04-30
Payer: COMMERCIAL

## 2019-04-30 ENCOUNTER — TELEPHONE (OUTPATIENT)
Dept: INTERNAL MEDICINE | Facility: CLINIC | Age: 39
End: 2019-04-30

## 2019-04-30 VITALS
RESPIRATION RATE: 18 BRPM | HEART RATE: 84 BPM | SYSTOLIC BLOOD PRESSURE: 117 MMHG | TEMPERATURE: 99 F | DIASTOLIC BLOOD PRESSURE: 78 MMHG | WEIGHT: 118 LBS | BODY MASS INDEX: 21.58 KG/M2

## 2019-04-30 DIAGNOSIS — R82.998 URINE WHITE BLOOD CELLS INCREASED: ICD-10-CM

## 2019-04-30 DIAGNOSIS — R82.998 URINE WHITE BLOOD CELLS INCREASED: Primary | ICD-10-CM

## 2019-04-30 LAB
ALBUMIN UR-MCNC: NEGATIVE MG/DL
APPEARANCE UR: CLEAR
BILIRUB UR QL STRIP: NEGATIVE
COLOR UR AUTO: YELLOW
ERYTHROCYTE [DISTWIDTH] IN BLOOD BY AUTOMATED COUNT: 11.1 % (ref 10–15)
GLUCOSE UR STRIP-MCNC: NEGATIVE MG/DL
HCT VFR BLD AUTO: 41.1 % (ref 35–47)
HGB BLD-MCNC: 14.4 G/DL (ref 11.7–15.7)
HGB UR QL STRIP: NEGATIVE
KETONES UR STRIP-MCNC: NEGATIVE MG/DL
LEUKOCYTE ESTERASE UR QL STRIP: NEGATIVE
MCH RBC QN AUTO: 35.3 PG (ref 26.5–33)
MCHC RBC AUTO-ENTMCNC: 35 G/DL (ref 31.5–36.5)
MCV RBC AUTO: 101 FL (ref 78–100)
MUCOUS THREADS #/AREA URNS LPF: PRESENT /LPF
NITRATE UR QL: NEGATIVE
PH UR STRIP: 7 PH (ref 5–7)
PLATELET # BLD AUTO: 208 10E9/L (ref 150–450)
RBC # BLD AUTO: 4.08 10E12/L (ref 3.8–5.2)
RBC #/AREA URNS AUTO: 1 /HPF (ref 0–2)
SOURCE: ABNORMAL
SP GR UR STRIP: 1.01 (ref 1–1.03)
UROBILINOGEN UR STRIP-MCNC: 0 MG/DL (ref 0–2)
WBC # BLD AUTO: 5 10E9/L (ref 4–11)
WBC #/AREA URNS AUTO: <1 /HPF (ref 0–5)

## 2019-04-30 ASSESSMENT — PAIN SCALES - GENERAL: PAINLEVEL: NO PAIN (0)

## 2019-04-30 NOTE — PROGRESS NOTES
"[facility]   Tiffany Paredes, APRN CNP  04/30/2019      Chief Complaint:   Urgent Care follow-up for pyuria     History of Present Illness:   Charito Wilcox is a 38 year old female with a history of IBS, liver transplant, tubal ligation, and multiple Caesarean sections who presents for follow-up of urgent care results.      Results   Patient noticed an \"off\" odor of her urine on 4/24 or 4/25, and had a burning sensation during urination on 4/26. She was seen on 4/27 in urgent care and had an elevated WBC in her urine, but had no culture growth (see below). Patient is concerned due to the history of liver transplant 19 years ago, as well as having irregular menstrual cycles (for which she is seeing Dr. García in Gynecology). She has chills, flank pain, and is fatigued, but these symptoms are concurrent with the onset of her period as well. Denies taking any antibiotics prior to the urine test on 4/27, but is now taking an antibiotic, as prescribed to her at urgent care. She is sexually active and denies any abnormal vaginal discharge. Also denies a fever, however, she has not had a high grade fever since the transplant 19 years ago.   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      Review of Systems:   Pertinent items are noted in HPI, remainder of complete ROS is negative.      Active Medications:      ALPRAZolam (XANAX) 0.5 MG tablet, Take 1 tablet (0.5 mg) by mouth 3 times daily as needed for anxiety 1 month supply, Disp: 30 tablet, Rfl: 0     azaTHIOprine (IMURAN) 50 MG tablet, Take 1 tablet (50 mg) by mouth daily, Disp: 90 tablet, Rfl: 3     cefdinir (OMNICEF) 300 MG capsule, Take 1 capsule (300 mg) by mouth 2 times daily for 7 days, Disp: 14 capsule, Rfl: 0     lactobacillus rhamnosus, GG, (CULTURELL) capsule, Take 1 capsule by mouth daily, Disp: , Rfl:      omeprazole (PRILOSEC) 40 MG capsule, TAKE 1 CAPSULE BY MOUTH TWICE DAILY BEFORE MEALS, TAKE 30-60 MINUTES PRIOR TO A MEAL, " Disp: 180 capsule, Rfl: 2     ondansetron (ZOFRAN ODT) 4 MG ODT tab, Take 1-2 tablets (4-8 mg) by mouth 3 times daily as needed for nausea, Disp: 20 tablet, Rfl: 0     PARoxetine (PAXIL) 40 MG tablet, TAKE 1 AND ONE-HALF TABLET BY MOUTH AT BEDTIME, Disp: 45 tablet, Rfl: 3     polyethylene glycol (MIRALAX) powder, Take 17 g (1 capful) by mouth daily, Disp: 510 g, Rfl: 1     tacrolimus (GENERIC EQUIVALENT) 1 MG capsule, Take 1 capsule (1 mg) by mouth every 12 hours, Disp: 180 capsule, Rfl: 3     ursodiol (ACTIGALL) 300 MG capsule, Take 1 capsule (300 mg) by mouth 2 times daily, Disp: 180 capsule, Rfl: 3     Allergies:   Gluten meal and Milk protein extract      Past Medical History:  Acute pancreatitis   Panic disorder  Esophageal reflux   Gastrointestinal ulcer   IBS  Liver transplant    PMS  Labral tear of hip, degenerative   Right hip impingement syndrome   Left knee pain      Past Surgical History:  Transplant liver,   Appendectomy,   Cholecystectomy,   , ,   Colonoscopy, 2018   Esophagoscopy, gastroscopy, duodenoscopy, combined x4, 2015, 2016 x2, 2018  Hip surgery, right, 2016  Tubal ligation, 2010    Family History:   Mother - coronary artery disease, hyperlipidemia, osteoporosis, arthritis   Maternal grandmother - depression, anxiety, heart failure  Maternal grandfather - hypertension  Paternal grandmother - cerebrovascular disease , breast cancer      Immunizations:  Influenza (IIV3) PF, 10/30/2000, 10/26/10, 11  Influenza Vaccine IM 3 yrs+ 4 valent IIV4, 14, 10/01/15, 16, 17, 18  Mantoux Tuberculin Skin Test, 12  Pneumococcal 23 valent, 5/10/18  TD (ADULT 7+), 96  TDAP (Adacel), , 18     Social History:   The patient was alone.   Smoking Status: Never smoker    Smokeless Tobacco: Never used   Alcohol Use: Yes    Physical Exam:   /78 (BP Location: Right arm, Patient Position: Sitting, Cuff Size: Adult Regular)   Pulse 84    Temp 99  F (37.2  C) (Oral)   Resp 18   Wt 53.5 kg (118 lb)   LMP 04/09/2019   Breastfeeding? No   BMI 21.58 kg/m       Wt Readings from Last 1 Encounters:   04/27/19 53.5 kg (118 lb)     Constitutional: no distress, comfortable, pleasant    Cardiovascular:  See vs  Respiratory: no distress  Skin: no concerning lesions, no jaundice, temp normal   Neurological: normal speech, no tremor. A and O x 3, good historian.  Psychological: appropriate mood, good eye contact, normal affect     Recent Labs    From labs done on 4/27  Component      Latest Ref Rng & Units 4/27/2019 4/27/2019           4:11 PM  4:11 PM   Color Urine       Yellow    Appearance Urine       Slightly Cloudy    Glucose Urine      NEG:Negative mg/dL Negative    Bilirubin Urine      NEG:Negative Negative    Ketones Urine      NEG:Negative mg/dL Negative    Specific Gravity Urine      1.003 - 1.035 1.010    Blood Urine      NEG:Negative Trace (A)    pH Urine      5.0 - 7.0 pH 6.5    Protein Albumin Urine      NEG:Negative mg/dL Negative    Urobilinogen Urine      0.2 - 1.0 EU/dL 0.2    Nitrite Urine      NEG:Negative Negative    Leukocyte Esterase Urine      NEG:Negative Small (A)    Source       Midstream Urine    Specimen Description       Midstream Urine Vagina   Wet Prep        No Trichomonas seen   WBC Urine      OTO5:0 - 5 /HPF  (A)    RBC Urine      OTO2:O - 2 /HPF O - 2    WBC Clumps      NEG:Negative /HPF Present (A)    Squamous Epithelial /LPF Urine      FEW:Few /LPF Few    Bacteria Urine      NEG:Negative /HPF Moderate (A)    Culture Micro       No growth    HCG Qual Urine      NEG:Negative Negative      Component      Latest Ref Rng & Units 4/27/2019 4/27/2019           4:11 PM  4:11 PM   Color Urine           Appearance Urine           Glucose Urine      NEG:Negative mg/dL     Bilirubin Urine      NEG:Negative     Ketones Urine      NEG:Negative mg/dL     Specific Gravity Urine      1.003 - 1.035     Blood Urine      NEG:Negative      pH Urine      5.0 - 7.0 pH     Protein Albumin Urine      NEG:Negative mg/dL     Urobilinogen Urine      0.2 - 1.0 EU/dL     Nitrite Urine      NEG:Negative     Leukocyte Esterase Urine      NEG:Negative     Source           Specimen Description           Wet Prep       No clue cells seen No yeast seen   WBC Urine      OTO5:0 - 5 /HPF     RBC Urine      OTO2:O - 2 /HPF     WBC Clumps      NEG:Negative /HPF     Squamous Epithelial /LPF Urine      FEW:Few /LPF     Bacteria Urine      NEG:Negative /HPF     Culture Micro           HCG Qual Urine      NEG:Negative       Component      Latest Ref Rng & Units 4/27/2019           4:11 PM   Color Urine          Appearance Urine          Glucose Urine      NEG:Negative mg/dL    Bilirubin Urine      NEG:Negative    Ketones Urine      NEG:Negative mg/dL    Specific Gravity Urine      1.003 - 1.035    Blood Urine      NEG:Negative    pH Urine      5.0 - 7.0 pH    Protein Albumin Urine      NEG:Negative mg/dL    Urobilinogen Urine      0.2 - 1.0 EU/dL    Nitrite Urine      NEG:Negative    Leukocyte Esterase Urine      NEG:Negative    Source          Specimen Description          Wet Prep       Few WBCs seen   WBC Urine      OTO5:0 - 5 /HPF    RBC Urine      OTO2:O - 2 /HPF    WBC Clumps      NEG:Negative /HPF    Squamous Epithelial /LPF Urine      FEW:Few /LPF    Bacteria Urine      NEG:Negative /HPF    Culture Micro          HCG Qual Urine      NEG:Negative         Assessment and Plan:  Urine white blood cells increased  Patient with a history of a liver transplant had an elevated WBC count in her urine, but no culture growth when she visited an urgent care for symptoms of a UTI. Patient is on antibiotics currently, so recheck UA and culture today in the clinic. Additionally, CBC blood work ordered to check for signs of infection in bladder or kidneys.   - UA with Micro (No Culture)  - CBC with platelets  - Culture, AFB Urine ([facility])-cancelled.     Scribe Disclosure:  I  Priscilla Lopez, am serving as a scribe to document services personally performed by LEE ANN White CNP at this visit, based upon the provider's statements to me. All documentation has been reviewed by the aforementioned provider prior to being entered into the official medical record.     Portions of this medical record were completed by a scribe. UPON MY REVIEW AND AUTHENTICATION BY ELECTRONIC SIGNATURE, this confirms (a) I performed the applicable clinical services, and (b) the record is accurate.     Total time spent 25 minutes.  More than 50% of the time spent with Ms. Wilcox on counseling / coordinating her care.    Tiffany NANCE, CNP

## 2019-04-30 NOTE — NURSING NOTE
Chief Complaint   Patient presents with     Urgent Care     Patient is here for UTI urgent care follow up; medication prescribed is not helping much and pt is still having symptoms       Rustam Lieberman CMA ([facility]) at 10:28 AM on 4/30/2019

## 2019-04-30 NOTE — TELEPHONE ENCOUNTER
[facility] Call Center    Phone Message    May a detailed message be left on voicemail: yes    Reason for Call: Other: Pt requesting call back. Pt stated she has some f/u questions after her appt today. Pt wanting to know if her test results have come back yet     Action Taken: Message routed to:  [facility] JOSÉ MIGUEL

## 2019-05-01 ENCOUNTER — MYC MEDICAL ADVICE (OUTPATIENT)
Dept: INTERNAL MEDICINE | Facility: CLINIC | Age: 39
End: 2019-05-01

## 2019-05-02 LAB
BACTERIA SPEC CULT: NO GROWTH
Lab: NORMAL
SPECIMEN SOURCE: NORMAL

## 2019-05-06 ENCOUNTER — TELEPHONE (OUTPATIENT)
Dept: TRANSPLANT | Facility: CLINIC | Age: 39
End: 2019-05-06

## 2019-05-06 DIAGNOSIS — Z94.4 LIVER REPLACED BY TRANSPLANT (H): Primary | ICD-10-CM

## 2019-05-06 DIAGNOSIS — Z79.60 LONG-TERM USE OF IMMUNOSUPPRESSANT MEDICATION: ICD-10-CM

## 2019-05-06 DIAGNOSIS — Z94.4 LIVER REPLACED BY TRANSPLANT (H): ICD-10-CM

## 2019-05-06 LAB
ALBUMIN UR-MCNC: NEGATIVE MG/DL
APPEARANCE UR: CLEAR
BACTERIA #/AREA URNS HPF: ABNORMAL /HPF
BILIRUB UR QL STRIP: NEGATIVE
COLOR UR AUTO: YELLOW
GLUCOSE UR STRIP-MCNC: NEGATIVE MG/DL
HGB UR QL STRIP: NEGATIVE
KETONES UR STRIP-MCNC: NEGATIVE MG/DL
LEUKOCYTE ESTERASE UR QL STRIP: NEGATIVE
NITRATE UR QL: NEGATIVE
NON-SQ EPI CELLS #/AREA URNS LPF: ABNORMAL /LPF
PH UR STRIP: 7 PH (ref 5–7)
RBC #/AREA URNS AUTO: ABNORMAL /HPF
SOURCE: ABNORMAL
SP GR UR STRIP: 1.01 (ref 1–1.03)
UROBILINOGEN UR STRIP-ACNC: 1 EU/DL (ref 0.2–1)
WBC #/AREA URNS AUTO: ABNORMAL /HPF

## 2019-05-06 PROCEDURE — 81001 URINALYSIS AUTO W/SCOPE: CPT | Performed by: FAMILY MEDICINE

## 2019-05-06 NOTE — TELEPHONE ENCOUNTER
Spoke with Charito who is requesting a recheck of U/A, microscopic, she has finished antibiotics, and has the same symptoms.   Orders placed.

## 2019-05-06 NOTE — TELEPHONE ENCOUNTER
Patient Call: Transplant Illness    Duration of illness: 3 days   Transplanted organ? liver  Illness: Other Possible Urine Infection        Please call Charito, # [phone]

## 2019-05-15 DIAGNOSIS — F41.0 PANIC DISORDER: ICD-10-CM

## 2019-05-16 RX ORDER — PAROXETINE 40 MG/1
TABLET, FILM COATED ORAL
Qty: 45 TABLET | Refills: 3 | Status: SHIPPED | OUTPATIENT
Start: 2019-05-16 | End: 2019-11-12

## 2019-05-19 ENCOUNTER — OFFICE VISIT (OUTPATIENT)
Dept: URGENT CARE | Facility: URGENT CARE | Age: 39
End: 2019-05-19
Payer: COMMERCIAL

## 2019-05-19 VITALS
BODY MASS INDEX: 22.13 KG/M2 | OXYGEN SATURATION: 96 % | DIASTOLIC BLOOD PRESSURE: 74 MMHG | SYSTOLIC BLOOD PRESSURE: 112 MMHG | WEIGHT: 121 LBS | TEMPERATURE: 98.2 F | HEART RATE: 77 BPM | RESPIRATION RATE: 18 BRPM

## 2019-05-19 DIAGNOSIS — R21 RASH: Primary | ICD-10-CM

## 2019-05-19 DIAGNOSIS — B35.3 TINEA PEDIS OF LEFT FOOT: ICD-10-CM

## 2019-05-19 PROCEDURE — 99213 OFFICE O/P EST LOW 20 MIN: CPT | Performed by: PHYSICIAN ASSISTANT

## 2019-05-19 RX ORDER — CLOTRIMAZOLE AND BETAMETHASONE DIPROPIONATE 10; .64 MG/G; MG/G
CREAM TOPICAL 2 TIMES DAILY
Qty: 15 G | Refills: 0 | Status: SHIPPED | OUTPATIENT
Start: 2019-05-19 | End: 2019-07-12

## 2019-05-19 NOTE — PROGRESS NOTES
S: 38-year-old female presents for patch of itchy red bumps right inner heel/lower ankle area.  Intensely pruritic.  Has tried over-the-counter hydrocortisone cream without relief.  She has not been in any poison ivy that she knows of.  No recent travel.  No family members with similar rash.  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      Allergies   Allergen Reactions     Gluten Meal Nausea and Vomiting     Milk Protein Extract Nausea and Vomiting       Past Medical History:   Diagnosis Date     Acute pancreatitis 2016     Anxiety     anxiety over vomiting, nausea is main trigger     Esophageal reflux      Gastrointestinal ulcer      Irritable bowel syndrome      Liver replaced by transplant (H)     liver failure of unknown etiology     Papanicolaou smear of cervix with atypical squamous cells of undetermined significance (ASC-US)     negative HPV         Current Outpatient Medications on File Prior to Visit:  ALPRAZolam (XANAX) 0.5 MG tablet Take 1 tablet (0.5 mg) by mouth 3 times daily as needed for anxiety 1 month supply   azaTHIOprine (IMURAN) 50 MG tablet Take 1 tablet (50 mg) by mouth daily   lactobacillus rhamnosus, GG, (CULTURELL) capsule Take 1 capsule by mouth daily   omeprazole (PRILOSEC) 40 MG capsule TAKE 1 CAPSULE BY MOUTH TWICE DAILY BEFORE MEALS, TAKE 30-60 MINUTES PRIOR TO A MEAL   ondansetron (ZOFRAN ODT) 4 MG ODT tab Take 1-2 tablets (4-8 mg) by mouth 3 times daily as needed for nausea   PARoxetine (PAXIL) 40 MG tablet TAKE 1 AND ONE-HALF TABLET BY MOUTH AT BEDTIME   polyethylene glycol (MIRALAX) powder Take 17 g (1 capful) by mouth daily   tacrolimus (GENERIC EQUIVALENT) 1 MG capsule Take 1 capsule (1 mg) by mouth every 12 hours   ursodiol (ACTIGALL) 300 MG capsule Take 1 capsule (300 mg) by mouth 2 times daily   [] cefdinir (OMNICEF) 300 MG capsule Take 1 capsule (300 mg) by mouth 2 times daily for 7 days   norethindrone-ethinyl estradiol (MICROGESTIN ) -20  MG-MCG tablet Take 1 tablet by mouth daily (Patient not taking: Reported on 4/4/2019)     No current facility-administered medications on file prior to visit.     Social History     Tobacco Use     Smoking status: Never Smoker     Smokeless tobacco: Never Used   Substance Use Topics     Alcohol use: Yes     Comment: glass of wine up to once a month     Drug use: No       ROS:  General: negative for fever  SKIN: + as above    Physcial Exam:  /74 (BP Location: Left arm, Patient Position: Chair, Cuff Size: Adult Regular)   Pulse 77   Temp 98.2  F (36.8  C) (Oral)   Resp 18   Wt 54.9 kg (121 lb)   SpO2 96%   BMI 22.13 kg/m      GENERAL: alert, no acute distress  EYES: conjunctival clear  RESP: Regular breathing rate  NEURO: awake .  SKIN: Right inner heel just below the medial ankle is with a patch of red papules some almost blister like.  The patch is a size of a quarter.      ASSESSMENT:    ICD-10-CM    1. Rash R21 clotrimazole-betamethasone (LOTRISONE) 1-0.05 % external cream   2. Tinea pedis of left foot B35.3 clotrimazole-betamethasone (LOTRISONE) 1-0.05 % external cream       PLAN: Likely tinea pedis.  Also could be some sort of contact dermatitis.  Combo cream given.  See today's orders.  Follow-up with primary clinic if not improving.  Advised about symptoms which might herald more serious problems.    Carole Bowers PA-C

## 2019-05-30 PROBLEM — M25.562 LEFT KNEE PAIN, UNSPECIFIED CHRONICITY: Status: RESOLVED | Noted: 2018-12-28 | Resolved: 2019-05-30

## 2019-06-05 ENCOUNTER — ANCILLARY PROCEDURE (OUTPATIENT)
Dept: GENERAL RADIOLOGY | Facility: CLINIC | Age: 39
End: 2019-06-05
Attending: FAMILY MEDICINE
Payer: COMMERCIAL

## 2019-06-05 ENCOUNTER — OFFICE VISIT (OUTPATIENT)
Dept: ORTHOPEDICS | Facility: CLINIC | Age: 39
End: 2019-06-05
Payer: COMMERCIAL

## 2019-06-05 DIAGNOSIS — M54.2 NECK PAIN: Primary | ICD-10-CM

## 2019-06-05 DIAGNOSIS — M54.2 NECK PAIN: ICD-10-CM

## 2019-06-05 NOTE — LETTER
2019      RE: Charito Wilcox  [address]  [facility] [address]       Attending Note:   I have personally examined this patient and have reviewed the clinical presentation and progress note with the fellow. I agree with the treatment plan as outlined. The plan was formulated with the fellow on the day of the patient's visit. I have reviewed all imaging with the fellow and agree with the findings in the documentation.     Zulma Bennett MD, CAQ, CCD  [facility]  Sports Medicine and Bone Health           Subjective:   Charito Wilcox is a 38 year old female here with R shoulder and neck pain x several months.    She notes most of her symptoms flare with running or shoulder raises (lateral or frontal). Does get some headaches as well. Has used acupuncture causes short term relief. Chiropractor 2x/wk x 1 month with minimal improvement. She stopped running x 6 weeks to help with this but it is still bothering her. Some lateral shoulder numbness and tingling. No numbness or tingling in her hand.     PAST MEDICAL, SOCIAL, SURGICAL AND FAMILY HISTORY: She  has a past medical history of Acute pancreatitis (2016), Anxiety, Esophageal reflux, Gastrointestinal ulcer, Irritable bowel syndrome, Liver replaced by transplant (H) (), and Papanicolaou smear of cervix with atypical squamous cells of undetermined significance (ASC-US). She also has no past medical history of Arthritis, Cerebral infarction (H), Congestive heart failure, unspecified, COPD (chronic obstructive pulmonary disease) (H), Diabetes (H), Heart disease, History of blood transfusion, Hypertension, Thyroid disease, or Uncomplicated asthma.  She  has a past surgical history that includes TRANSPLANT LIVER,HETERTOPIC (); tubal ligation (3/2010); Esophagoscopy, gastroscopy, duodenoscopy (EGD), combined (Left, 2015);  section (2008);  section (3/2010); hip surgery (Right, 2016); Esophagoscopy,  gastroscopy, duodenoscopy (EGD), combined (N/A, 9/16/2016); Esophagoscopy, gastroscopy, duodenoscopy (EGD), combined (N/A, 11/17/2016); Esophagoscopy, gastroscopy, duodenoscopy (EGD), combined (N/A, 8/20/2018); Colonoscopy (N/A, 10/24/2018); and Cholecystectomy (2000).  Her family history includes Anxiety Disorder in her maternal grandmother; Arthritis in her mother; Breast Cancer in her paternal grandmother; Cerebrovascular Disease in her paternal grandmother; Coronary Artery Disease in her mother; Depression in her maternal grandmother; Heart Failure in her maternal grandmother; Hyperlipidemia in her mother; Hypertension in her maternal grandfather; Osteoporosis in her mother.  She reports that she has never smoked. She has never used smokeless tobacco. She reports that she drinks alcohol. She reports that she does not use drugs.    ALLERGIES: She is allergic to gluten meal and milk protein extract.    CURRENT MEDICATIONS: She has a current medication list which includes the following prescription(s): alprazolam, azathioprine, lactobacillus rhamnosus (gg), omeprazole, ondansetron, paroxetine, polyethylene glycol, tacrolimus, ursodiol, and norethindrone-ethinyl estradiol.     REVIEW OF SYSTEMS: 6 point review of systems is negative except as noted above.     Exam:   There were no vitals taken for this visit.     CONSTITUTIONAL: healthy, alert and no distress  HEAD: Normocephalic. No masses, lesions, tenderness or abnormalities  SKIN: area of hypopigmentation with loss of subcutaneous 4cm (wide) x 3cm (top to bottom) over left pes anserine bursa  GAIT: normal  NEUROLOGIC: Non-focal  PSYCHIATRIC: affect normal/bright and mentation appears normal.    MUSCULOSKELETAL:    C-spine: flexion causes pain, extension feels ok. Pain with lateral flexion and rotation. spurlings causes pain but no radicular symptoms    R shoulder: full flexion, abduction, internal and external rotation  - jobes: pain but no weakness  - o'briens:  pain with thumb up and down  - pain with resisted external and internal rotation  - pain on top of shoulder with speeds test  - negative yergasons  - negative neers and posadas  - pain with crossbody adduction     Assessment/Plan:   1. Cervical spine pain with some radicular symptoms  2. Right shoulder pain - does not seem to be shoulder etiology (rotator cuff is strong), may be coming from shoulder    -C-spine XR today on way out  -refer to PT for manual therapy (motioncare)  -use heat and lacrosse ball for rolling out the area  -can try lidocaine patch  -follow up in 4-6 weeks, can discuss other options for treatment (trigger point injections, prednisone burst)    Patient seen and discussed with Dr. Zulma Bennett.     Chhaya Garcia MD  Primary Care Sports Medicine Fellow      Zulma Bennett MD

## 2019-06-05 NOTE — PROGRESS NOTES
Attending Note:   I have personally examined this patient and have reviewed the clinical presentation and progress note with the fellow. I agree with the treatment plan as outlined. The plan was formulated with the fellow on the day of the patient's visit. I have reviewed all imaging with the fellow and agree with the findings in the documentation.     Zulma Bennett MD, CAQ, CCD  [facility]  Sports Medicine and Bone Health           Subjective:   Charito Wilcox is a 38 year old female here with R shoulder and neck pain x several months.    She notes most of her symptoms flare with running or shoulder raises (lateral or frontal). Does get some headaches as well. Has used acupuncture causes short term relief. Chiropractor 2x/wk x 1 month with minimal improvement. She stopped running x 6 weeks to help with this but it is still bothering her. Some lateral shoulder numbness and tingling. No numbness or tingling in her hand.     PAST MEDICAL, SOCIAL, SURGICAL AND FAMILY HISTORY: She  has a past medical history of Acute pancreatitis (2016), Anxiety, Esophageal reflux, Gastrointestinal ulcer, Irritable bowel syndrome, Liver replaced by transplant (H) (), and Papanicolaou smear of cervix with atypical squamous cells of undetermined significance (ASC-US). She also has no past medical history of Arthritis, Cerebral infarction (H), Congestive heart failure, unspecified, COPD (chronic obstructive pulmonary disease) (H), Diabetes (H), Heart disease, History of blood transfusion, Hypertension, Thyroid disease, or Uncomplicated asthma.  She  has a past surgical history that includes TRANSPLANT LIVER,HETERTOPIC (); tubal ligation (3/2010); Esophagoscopy, gastroscopy, duodenoscopy (EGD), combined (Left, 2015);  section (2008);  section (3/2010); hip surgery (Right, 2016); Esophagoscopy, gastroscopy, duodenoscopy (EGD), combined (N/A, 2016); Esophagoscopy, gastroscopy,  duodenoscopy (EGD), combined (N/A, 11/17/2016); Esophagoscopy, gastroscopy, duodenoscopy (EGD), combined (N/A, 8/20/2018); Colonoscopy (N/A, 10/24/2018); and Cholecystectomy (2000).  Her family history includes Anxiety Disorder in her maternal grandmother; Arthritis in her mother; Breast Cancer in her paternal grandmother; Cerebrovascular Disease in her paternal grandmother; Coronary Artery Disease in her mother; Depression in her maternal grandmother; Heart Failure in her maternal grandmother; Hyperlipidemia in her mother; Hypertension in her maternal grandfather; Osteoporosis in her mother.  She reports that she has never smoked. She has never used smokeless tobacco. She reports that she drinks alcohol. She reports that she does not use drugs.    ALLERGIES: She is allergic to gluten meal and milk protein extract.    CURRENT MEDICATIONS: She has a current medication list which includes the following prescription(s): alprazolam, azathioprine, lactobacillus rhamnosus (gg), omeprazole, ondansetron, paroxetine, polyethylene glycol, tacrolimus, ursodiol, and norethindrone-ethinyl estradiol.     REVIEW OF SYSTEMS: 6 point review of systems is negative except as noted above.     Exam:   There were no vitals taken for this visit.     CONSTITUTIONAL: healthy, alert and no distress  HEAD: Normocephalic. No masses, lesions, tenderness or abnormalities  SKIN: area of hypopigmentation with loss of subcutaneous 4cm (wide) x 3cm (top to bottom) over left pes anserine bursa  GAIT: normal  NEUROLOGIC: Non-focal  PSYCHIATRIC: affect normal/bright and mentation appears normal.    MUSCULOSKELETAL:    C-spine: flexion causes pain, extension feels ok. Pain with lateral flexion and rotation. spurlings causes pain but no radicular symptoms    R shoulder: full flexion, abduction, internal and external rotation  - jobes: pain but no weakness  - o'briens: pain with thumb up and down  - pain with resisted external and internal rotation  -  pain on top of shoulder with speeds test  - negative artem  - negative neers and posadas  - pain with crossbody adduction     Assessment/Plan:   1. Cervical spine pain with some radicular symptoms  2. Right shoulder pain - does not seem to be shoulder etiology (rotator cuff is strong), may be coming from shoulder    -C-spine XR today on way out  -refer to PT for manual therapy (motioncare)  -use heat and lacrosse ball for rolling out the area  -can try lidocaine patch  -follow up in 4-6 weeks, can discuss other options for treatment (trigger point injections, prednisone burst)    Patient seen and discussed with Dr. Zulma Bennett.     Chhaya Garcia MD  Primary Care Sports Medicine Fellow

## 2019-06-12 ENCOUNTER — THERAPY VISIT (OUTPATIENT)
Dept: PHYSICAL THERAPY | Facility: CLINIC | Age: 39
End: 2019-06-12
Attending: FAMILY MEDICINE
Payer: COMMERCIAL

## 2019-06-12 DIAGNOSIS — M54.2 NECK PAIN: ICD-10-CM

## 2019-06-12 PROCEDURE — 97161 PT EVAL LOW COMPLEX 20 MIN: CPT | Mod: GP | Performed by: PHYSICAL THERAPIST

## 2019-06-12 PROCEDURE — 97530 THERAPEUTIC ACTIVITIES: CPT | Mod: GP | Performed by: PHYSICAL THERAPIST

## 2019-06-12 PROCEDURE — 97140 MANUAL THERAPY 1/> REGIONS: CPT | Mod: GP | Performed by: PHYSICAL THERAPIST

## 2019-06-13 ENCOUNTER — MYC MEDICAL ADVICE (OUTPATIENT)
Dept: INTERNAL MEDICINE | Facility: CLINIC | Age: 39
End: 2019-06-13

## 2019-06-13 PROBLEM — M54.2 NECK PAIN: Status: ACTIVE | Noted: 2019-06-13

## 2019-06-13 NOTE — TELEPHONE ENCOUNTER
Responded to the pt via [facility]. I have provided contact information to a [facility] clinic in her city Jessica Franco on 6/13/2019 at 10:15 AM

## 2019-06-17 DIAGNOSIS — Z94.4 LIVER REPLACED BY TRANSPLANT (H): ICD-10-CM

## 2019-06-17 RX ORDER — AZATHIOPRINE 50 MG/1
50 TABLET ORAL DAILY
Qty: 90 TABLET | Refills: 3 | Status: SHIPPED | OUTPATIENT
Start: 2019-06-17 | End: 2020-06-02

## 2019-06-17 NOTE — TELEPHONE ENCOUNTER
Patient Call: Medication Refill  Route to LPN  Instruct the patient to first contact their pharmacy. If they have called their pharmacy and require further assistance, route to LPN.    Pharmacy Name:   [facility] [id number] [place] [address] [phone] (Phone)  [phone] (Fax)       Name of Medication: azaTHIOprine (IMURAN) 50 MG tablet  When will the patient be out of this medication?: Less than 24 hours ([facility] LPN, then page if no answer)     Pts last does was yesterday 6/16/19    Pt has not had today's dose      Please refill as soon as possible

## 2019-06-19 ENCOUNTER — THERAPY VISIT (OUTPATIENT)
Dept: PHYSICAL THERAPY | Facility: CLINIC | Age: 39
End: 2019-06-19
Payer: COMMERCIAL

## 2019-06-19 DIAGNOSIS — M54.2 NECK PAIN: ICD-10-CM

## 2019-06-19 PROCEDURE — 97112 NEUROMUSCULAR REEDUCATION: CPT | Mod: GP | Performed by: PHYSICAL THERAPIST

## 2019-06-19 PROCEDURE — 97140 MANUAL THERAPY 1/> REGIONS: CPT | Mod: GP | Performed by: PHYSICAL THERAPIST

## 2019-06-19 PROCEDURE — 97535 SELF CARE MNGMENT TRAINING: CPT | Mod: GP | Performed by: PHYSICAL THERAPIST

## 2019-06-23 ENCOUNTER — MYC REFILL (OUTPATIENT)
Dept: FAMILY MEDICINE | Facility: CLINIC | Age: 39
End: 2019-06-23

## 2019-06-23 DIAGNOSIS — F41.0 PANIC DISORDER: ICD-10-CM

## 2019-06-25 ENCOUNTER — MYC MEDICAL ADVICE (OUTPATIENT)
Dept: INTERNAL MEDICINE | Facility: CLINIC | Age: 39
End: 2019-06-25

## 2019-06-25 RX ORDER — ALPRAZOLAM 0.5 MG
0.5 TABLET ORAL 3 TIMES DAILY PRN
Qty: 30 TABLET | Refills: 0 | Status: SHIPPED | OUTPATIENT
Start: 2019-06-25 | End: 2020-03-24

## 2019-06-25 NOTE — TELEPHONE ENCOUNTER
PN  Please see [employer] message below.  Patient last saw you 5/9/18  Per 8/28/18 Mychart patient was going to establish care with new PCP at [facility].    Last Rx you wrote was 8/28/18 for #30.    Please advise.  Thanks,  Va Reddy RN

## 2019-06-26 ENCOUNTER — THERAPY VISIT (OUTPATIENT)
Dept: PHYSICAL THERAPY | Facility: CLINIC | Age: 39
End: 2019-06-26
Payer: COMMERCIAL

## 2019-06-26 DIAGNOSIS — M54.2 NECK PAIN: ICD-10-CM

## 2019-06-26 PROCEDURE — 97110 THERAPEUTIC EXERCISES: CPT | Mod: GP | Performed by: PHYSICAL THERAPIST

## 2019-06-26 PROCEDURE — 97140 MANUAL THERAPY 1/> REGIONS: CPT | Mod: GP | Performed by: PHYSICAL THERAPIST

## 2019-06-26 PROCEDURE — 97112 NEUROMUSCULAR REEDUCATION: CPT | Mod: GP | Performed by: PHYSICAL THERAPIST

## 2019-06-28 NOTE — PROGRESS NOTES
"  SUBJECTIVE:   Charito Wilcox is a 37 year old female who presents to clinic today for the following health issues:      Chief Complaint   Patient presents with     RECHECK     1 month follow up         {additional problems for provider to add:954143}    Problem list and histories reviewed & adjusted, as indicated.  Additional history: {NONE - AS DOCUMENTED:228943::\"as documented\"}    {HIST REVIEW/ LINKS 2:359562}    Reviewed and updated as needed this visit by clinical staff  Allergies  Meds       Reviewed and updated as needed this visit by Provider         {PROVIDER CHARTING PREFERENCE:705813}    " none

## 2019-07-02 ENCOUNTER — THERAPY VISIT (OUTPATIENT)
Dept: PHYSICAL THERAPY | Facility: CLINIC | Age: 39
End: 2019-07-02
Payer: COMMERCIAL

## 2019-07-02 DIAGNOSIS — M54.2 NECK PAIN: ICD-10-CM

## 2019-07-02 DIAGNOSIS — K25.7 CHRONIC GASTRIC ULCER WITHOUT HEMORRHAGE AND WITHOUT PERFORATION: ICD-10-CM

## 2019-07-02 PROCEDURE — 97112 NEUROMUSCULAR REEDUCATION: CPT | Mod: GP | Performed by: PHYSICAL THERAPIST

## 2019-07-02 PROCEDURE — 97140 MANUAL THERAPY 1/> REGIONS: CPT | Mod: GP | Performed by: PHYSICAL THERAPIST

## 2019-07-02 PROCEDURE — 97110 THERAPEUTIC EXERCISES: CPT | Mod: GP | Performed by: PHYSICAL THERAPIST

## 2019-07-02 RX ORDER — OMEPRAZOLE 40 MG/1
CAPSULE, DELAYED RELEASE ORAL
Qty: 60 CAPSULE | Refills: 0 | Status: SHIPPED | OUTPATIENT
Start: 2019-07-02 | End: 2019-07-26

## 2019-07-11 ENCOUNTER — THERAPY VISIT (OUTPATIENT)
Dept: PHYSICAL THERAPY | Facility: CLINIC | Age: 39
End: 2019-07-11
Payer: COMMERCIAL

## 2019-07-11 DIAGNOSIS — M54.2 NECK PAIN: ICD-10-CM

## 2019-07-11 PROCEDURE — 97140 MANUAL THERAPY 1/> REGIONS: CPT | Mod: GP | Performed by: PHYSICAL THERAPY ASSISTANT

## 2019-07-12 ENCOUNTER — OFFICE VISIT (OUTPATIENT)
Dept: FAMILY MEDICINE | Facility: CLINIC | Age: 39
End: 2019-07-12
Payer: COMMERCIAL

## 2019-07-12 VITALS
DIASTOLIC BLOOD PRESSURE: 68 MMHG | HEIGHT: 62 IN | BODY MASS INDEX: 21.79 KG/M2 | HEART RATE: 83 BPM | OXYGEN SATURATION: 100 % | TEMPERATURE: 98.5 F | SYSTOLIC BLOOD PRESSURE: 108 MMHG | WEIGHT: 118.4 LBS

## 2019-07-12 DIAGNOSIS — F41.0 PANIC DISORDER: ICD-10-CM

## 2019-07-12 DIAGNOSIS — B35.3 TINEA PEDIS OF LEFT FOOT: ICD-10-CM

## 2019-07-12 DIAGNOSIS — D84.9 IMMUNOSUPPRESSED STATUS (H): Primary | ICD-10-CM

## 2019-07-12 PROBLEM — R53.83 FATIGUE, UNSPECIFIED TYPE: Status: RESOLVED | Noted: 2017-04-10 | Resolved: 2019-07-12

## 2019-07-12 PROCEDURE — 99214 OFFICE O/P EST MOD 30 MIN: CPT | Performed by: FAMILY MEDICINE

## 2019-07-12 RX ORDER — ALPRAZOLAM 0.5 MG
0.5 TABLET ORAL 3 TIMES DAILY PRN
Qty: 30 TABLET | Refills: 0 | Status: CANCELLED | OUTPATIENT
Start: 2019-07-26

## 2019-07-12 RX ORDER — CLOTRIMAZOLE AND BETAMETHASONE DIPROPIONATE 10; .64 MG/G; MG/G
CREAM TOPICAL
Qty: 45 G | Refills: 3 | Status: SHIPPED | OUTPATIENT
Start: 2019-07-12 | End: 2020-01-20

## 2019-07-12 ASSESSMENT — ANXIETY QUESTIONNAIRES
2. NOT BEING ABLE TO STOP OR CONTROL WORRYING: NOT AT ALL
GAD7 TOTAL SCORE: 4
5. BEING SO RESTLESS THAT IT IS HARD TO SIT STILL: SEVERAL DAYS
7. FEELING AFRAID AS IF SOMETHING AWFUL MIGHT HAPPEN: NOT AT ALL
IF YOU CHECKED OFF ANY PROBLEMS ON THIS QUESTIONNAIRE, HOW DIFFICULT HAVE THESE PROBLEMS MADE IT FOR YOU TO DO YOUR WORK, TAKE CARE OF THINGS AT HOME, OR GET ALONG WITH OTHER PEOPLE: SOMEWHAT DIFFICULT
6. BECOMING EASILY ANNOYED OR IRRITABLE: SEVERAL DAYS
1. FEELING NERVOUS, ANXIOUS, OR ON EDGE: SEVERAL DAYS
3. WORRYING TOO MUCH ABOUT DIFFERENT THINGS: NOT AT ALL

## 2019-07-12 ASSESSMENT — MIFFLIN-ST. JEOR: SCORE: 1180.31

## 2019-07-12 ASSESSMENT — PATIENT HEALTH QUESTIONNAIRE - PHQ9: 5. POOR APPETITE OR OVEREATING: SEVERAL DAYS

## 2019-07-12 NOTE — PATIENT INSTRUCTIONS
[facility]     Discharged by : Jolene Glasgow CMA    If you have any questions regarding your visit please contact your care team:     Team [facility]              Clinic Hours Telephone Number     Dr. Lencho Lopez, CNP   7am-7pm  Monday - Thursday   7am-5pm  Fridays  [phone]   (Appointment scheduling available 24/7)     RN Line  [phone] option 2     Urgent Care - [place] [facility] [place] [facility] - 11am-9pm Monday-Friday Saturday-Sunday- 9am-5pm     [place] -   5pm-9pm Monday-Friday Saturday-Sunday- 9am-5pm    [phone] - [place]    [phone] - [place]     For a Price Quote for your services, please call our [employer] Price Line at [phone].     What options do I have for visits at the clinic other than the traditional office visit?     To expand how we care for you, many of our providers are utilizing electronic visits (e-visits) and telephone visits, when medically appropriate, for interactions with their patients rather than a visit in the clinic. We also offer nurse visits for many medical concerns. Just like any other service, we will bill your insurance company for this type of visit based on time spent on the phone with your provider. Not all insurance companies cover these visits. Please check with your medical insurance if this type of visit is covered. You will be responsible for any charges that are not paid by your insurance.     E-visits via [employer]: generally incur a $45.00 fee.     Telephone visits:  Time spent on the phone: *charged based on time that is spent on the phone in increments of 10 minutes. Estimated cost:   5-10 mins $30.00   11-20 mins. $59.00   21-30 mins. $85.00       Use [employer] (secure email communication and access to your chart) to send your primary care provider a message or make an appointment. Ask someone on your Team how to sign up for [employer].     As always, Thank you for trusting  us with your health care needs!      [facility]:    Scheduling Appointments  [facility]  Call: [phone]    [place]  Call: [phone]    [facility]  Call: [phone]    For Gastroenterology referrals   [facility]   Clinics and Surgery Center, 4th Floor   [address] [place] [address]   Appointments: [phone]    WHERE TO GO FOR CARE?    Clinic    Make an appointment if you:       Are sick (cold, cough, flu, sore throat, earache or in pain).       Have a small injury (sprain, small cut, burn or broken bone).       Need a physical exam, Pap smear, vaccine or prescription refill.       Have questions about your health or medicines.    To reach us:      Call [phone] ([phone]). Open 24 hours every day. (For counseling services, call [phone].)    Log into [employer] at [employer]. (Visit [email or web address] to create an account.) Hospital emergency room    An emergency is a serious or life- threatening problem that must be treated right away.    Call 084 or get to the hospital if you have:      Very bad or sudden:            - Chest pain or pressure         - Bleeding         - Head or belly pain         - Dizziness or trouble seeing, walking or                          Speaking      Problems breathing      Blood in your vomit or you are coughing up blood      A major injury (knocked out, loss of a finger or limb, rape, broken bone protruding from skin)    A mental health crisis. (Or call the Mental Health Crisis line at 1-681.540.9673 or Suicide Prevention Hotline at 1-759.693.9591.)    Open 24 hours every day. You don't need an appointment.     Urgent care    Visit urgent care for sickness or small injuries when the clinic is closed. You don't need an appointment. To check hours or find an urgent care near you, visit [email or web address]. Online care    Get  online care from [facility] for more than 70 common problems, like colds, allergies and infections. Open 24 hours every day at:   [email or web address]   Need help deciding?    For advice about where to be seen, you may call your clinic and ask to speak with a nurse. We're here for you 24 hours every day.         If you are deaf or hard of hearing, please let us know. We provide many free services including sign language interpreters, oral interpreters, TTYs, telephone amplifiers, note takers and written materials.

## 2019-07-13 ASSESSMENT — ANXIETY QUESTIONNAIRES: GAD7 TOTAL SCORE: 4

## 2019-07-15 ENCOUNTER — THERAPY VISIT (OUTPATIENT)
Dept: PHYSICAL THERAPY | Facility: CLINIC | Age: 39
End: 2019-07-15
Payer: COMMERCIAL

## 2019-07-15 DIAGNOSIS — M54.2 NECK PAIN: ICD-10-CM

## 2019-07-15 PROCEDURE — 97112 NEUROMUSCULAR REEDUCATION: CPT | Mod: GP | Performed by: PHYSICAL THERAPIST

## 2019-07-15 PROCEDURE — 97140 MANUAL THERAPY 1/> REGIONS: CPT | Mod: GP | Performed by: PHYSICAL THERAPIST

## 2019-07-26 DIAGNOSIS — K25.7 CHRONIC GASTRIC ULCER WITHOUT HEMORRHAGE AND WITHOUT PERFORATION: ICD-10-CM

## 2019-07-26 RX ORDER — OMEPRAZOLE 40 MG/1
CAPSULE, DELAYED RELEASE ORAL
Qty: 60 CAPSULE | Refills: 11 | Status: SHIPPED | OUTPATIENT
Start: 2019-07-26 | End: 2020-07-20

## 2019-07-30 DIAGNOSIS — Z13.220 LIPID SCREENING: ICD-10-CM

## 2019-07-30 DIAGNOSIS — Z94.4 LIVER REPLACED BY TRANSPLANT (H): ICD-10-CM

## 2019-08-03 ENCOUNTER — THERAPY VISIT (OUTPATIENT)
Dept: PHYSICAL THERAPY | Facility: CLINIC | Age: 39
End: 2019-08-03
Payer: COMMERCIAL

## 2019-08-03 DIAGNOSIS — Z13.220 LIPID SCREENING: ICD-10-CM

## 2019-08-03 DIAGNOSIS — Z94.4 LIVER REPLACED BY TRANSPLANT (H): ICD-10-CM

## 2019-08-03 DIAGNOSIS — M54.2 NECK PAIN: ICD-10-CM

## 2019-08-03 LAB
ALBUMIN SERPL-MCNC: 3.7 G/DL (ref 3.4–5)
ALBUMIN UR-MCNC: NEGATIVE MG/DL
ALP SERPL-CCNC: 120 U/L (ref 40–150)
ALT SERPL W P-5'-P-CCNC: 19 U/L (ref 0–50)
ANION GAP SERPL CALCULATED.3IONS-SCNC: 4 MMOL/L (ref 3–14)
APPEARANCE UR: CLEAR
AST SERPL W P-5'-P-CCNC: 27 U/L (ref 0–45)
BILIRUB DIRECT SERPL-MCNC: 0.2 MG/DL (ref 0–0.2)
BILIRUB SERPL-MCNC: 0.8 MG/DL (ref 0.2–1.3)
BILIRUB UR QL STRIP: NEGATIVE
BUN SERPL-MCNC: 10 MG/DL (ref 7–30)
CALCIUM SERPL-MCNC: 8.4 MG/DL (ref 8.5–10.1)
CHLORIDE SERPL-SCNC: 108 MMOL/L (ref 94–109)
CHOLEST SERPL-MCNC: 143 MG/DL
CO2 SERPL-SCNC: 26 MMOL/L (ref 20–32)
COLOR UR AUTO: ABNORMAL
CREAT SERPL-MCNC: 0.76 MG/DL (ref 0.52–1.04)
CREAT UR-MCNC: 14 MG/DL
ERYTHROCYTE [DISTWIDTH] IN BLOOD BY AUTOMATED COUNT: 11.3 % (ref 10–15)
GFR SERPL CREATININE-BSD FRML MDRD: >90 ML/MIN/{1.73_M2}
GLUCOSE SERPL-MCNC: 87 MG/DL (ref 70–99)
GLUCOSE UR STRIP-MCNC: NEGATIVE MG/DL
HCT VFR BLD AUTO: 37.5 % (ref 35–47)
HDLC SERPL-MCNC: 68 MG/DL
HGB BLD-MCNC: 12.9 G/DL (ref 11.7–15.7)
HGB UR QL STRIP: NEGATIVE
KETONES UR STRIP-MCNC: NEGATIVE MG/DL
LDLC SERPL CALC-MCNC: 62 MG/DL
LEUKOCYTE ESTERASE UR QL STRIP: NEGATIVE
MCH RBC QN AUTO: 34.6 PG (ref 26.5–33)
MCHC RBC AUTO-ENTMCNC: 34.4 G/DL (ref 31.5–36.5)
MCV RBC AUTO: 101 FL (ref 78–100)
NITRATE UR QL: NEGATIVE
NONHDLC SERPL-MCNC: 75 MG/DL
PH UR STRIP: 8 PH (ref 5–7)
PLATELET # BLD AUTO: 167 10E9/L (ref 150–450)
POTASSIUM SERPL-SCNC: 4.2 MMOL/L (ref 3.4–5.3)
PROT SERPL-MCNC: 6.8 G/DL (ref 6.8–8.8)
PROT UR-MCNC: <0.05 G/L
PROT/CREAT 24H UR: NORMAL G/G CR (ref 0–0.2)
RBC # BLD AUTO: 3.73 10E12/L (ref 3.8–5.2)
RBC #/AREA URNS AUTO: 0 /HPF (ref 0–2)
SODIUM SERPL-SCNC: 139 MMOL/L (ref 133–144)
SOURCE: ABNORMAL
SP GR UR STRIP: 1 (ref 1–1.03)
TACROLIMUS BLD-MCNC: 4 UG/L (ref 5–15)
TME LAST DOSE: ABNORMAL H
TRIGL SERPL-MCNC: 64 MG/DL
UROBILINOGEN UR STRIP-MCNC: 0 MG/DL (ref 0–2)
WBC # BLD AUTO: 4 10E9/L (ref 4–11)
WBC #/AREA URNS AUTO: <1 /HPF (ref 0–5)

## 2019-08-03 PROCEDURE — 97112 NEUROMUSCULAR REEDUCATION: CPT | Mod: GP | Performed by: PHYSICAL THERAPIST

## 2019-08-03 PROCEDURE — 97140 MANUAL THERAPY 1/> REGIONS: CPT | Mod: GP | Performed by: PHYSICAL THERAPIST

## 2019-08-13 ENCOUNTER — THERAPY VISIT (OUTPATIENT)
Dept: PHYSICAL THERAPY | Facility: CLINIC | Age: 39
End: 2019-08-13
Payer: COMMERCIAL

## 2019-08-13 DIAGNOSIS — M54.2 NECK PAIN: ICD-10-CM

## 2019-08-13 PROCEDURE — 97140 MANUAL THERAPY 1/> REGIONS: CPT | Mod: GP | Performed by: PHYSICAL THERAPIST

## 2019-08-13 PROCEDURE — 97110 THERAPEUTIC EXERCISES: CPT | Mod: GP | Performed by: PHYSICAL THERAPIST

## 2019-08-13 PROCEDURE — 97112 NEUROMUSCULAR REEDUCATION: CPT | Mod: GP | Performed by: PHYSICAL THERAPIST

## 2019-08-13 NOTE — PROGRESS NOTES
HISTORY:    Present symptoms:  Right upper arm (ant/post), UT and into neck; HA.  Pain quality (sharp/shooting/stabbing/aching/burning/cramping):   Ache, sharp intermittantly.  Paresthesia (yes/no):  no    Present since (onset date): approx 4 mths.     Symptoms (improving/unchanging/worsening):  Reports improvement for 2 weeks after starting rib mobilizations, then recent exacerbation so overall unchanging.    Symptoms commenced as a result of: no reports of change in activity or work.  Initially thought exacerbated when training for a marathon, but did not go away when she stopped running.  She thinks it exacerbates, but does not cause, her symptoms.   Condition occurred in the following environment:  unknown    Symptoms at onset (neck/arm/forearm/headache): anterior shoulder/upper arm into trap, neck, and side of face  Constant symptoms (neck/arm/forearm/headache): upper arm, trap  Intermittent symptoms (neck/arm/forearm/headache): HA    Symptoms are made worse with the following: cervical rotation, positioning of arm above/behind her or sleeping.  Symptoms are made better with the following: unloading neal head area, opening positions of the upper body    Disturbed sleep (yes/no): yes  Number of pillows: 1  Sleeping postures (prone/sup/side R/L): side    Previous episodes (0/1-5/6-10/11+): 0 Year of first episode: current    Previous history: Actively seeking treatment resources and very compliant with physical therapy, acupuncture, and chiropractic.  In PT focus has been on RTC stabilization and cervical rib mobilization .  She notes improvement in general, but continues to have exacerbations.  Her PT suspected rib dysfunction and restrictions through diaphragm and recommended assessment.  Acupuncture and chiro manip very temporary (<2 hrs).  She indicates consistent feeling of inability to move and recruit trunk musculature, rib flaring, and feeling of restrictions in thoracic and abdominal areas.  Her hx is  significant for a Liver transplant after acute onset of liver failure when she was 20 yrs old, and 2 c-sections.          EXAMINATION    Posture:   Sitting (good/fair/poor): poor- changes positions frequently, forward head positiong  Standing (good/fair/poor): fair     Protruded head (yes/no): yes    Wry Neck (right/left/nil):  no  Relevant (yes/no):  na     Correction of posture(better/worse/no effect): use of roll and support for feet improved      Neurological:    Motor Deficit:  Normal myotomal testing right UE   Reflexes:  norm  Sensory Deficit:  no   Dural signs:  no    Movement Loss:   Vic Mod Min Nil Pain   Protrusion     -   Flexion   x  Post neck   Retraction  x   Post neck   Extension    x - (better)   Lateral flexion R     nt   Lateral flexion L     nt   Rotation R  x   sidebending to compensate   Rotation L   x  Pulling right     Test Movements:   During: produces, abolishes, increases, decreases, no effect, centralizing, peripheralizing  After: better, worse, no better, no worse, no effect, centralized, peripheralized    Pretest symptoms sitting: right UE pain upper arm, post shld, UT   Symptoms During Symptoms After ROM increased ROM decreased No Effect   PRO        Rep PRO        RET Increases  neck No Worse         Rep RET Increases  neck    Dec upper arm    Imp right rotation     RET EXT        Rep RET EXT          Other Tests: rib flare R>L with sig restriction in chest expansion w inhalation    Provisional Classification:  Derangement - Asymmetrical, unilateral, symptoms above elbow and anterior chest wall dysfunction    Principle of Management:  Education: (Therapeutic Exercise) Pt education regarding four stages of healing: pain reduction, maintenance through exercise, recovery of function, and prevention of re-occurrence. Utilized prescription dosage of exercise theory, cut finger analogy, and scientific method to help patient understand purpose of exercise specificity and importance of  compliance with exercise.  Pt also educated in traffic light response to exercise, and self assessment to guide home exercise program.    Equipment provided:  Postural correction with instruct in use of lumbar roll and sitting posture when unsupported, w education provided re: impact of posture and body mechanics on symptom production. Pt encouraged to monitor this correlation as part of overall self management.  Mechanical therapy (Y/N):  Yes and additional treatment for longstanding restrictions through anterior chest wall.   Extension principle:  y   Lateral principle:  n  Flexion principle:  n   Other:  See above

## 2019-08-15 ENCOUNTER — MYC MEDICAL ADVICE (OUTPATIENT)
Dept: FAMILY MEDICINE | Facility: CLINIC | Age: 39
End: 2019-08-15

## 2019-08-16 NOTE — TELEPHONE ENCOUNTER
"Routing MyChart message to provider to please advise. Ok to order MRI or should patient follow up in clinic?    Patient saw you last in clinic on 7/12/19, I don't see any mention of patient's shoulder at that visit, note pasted below.    Sanaz Ayala RN    \"1. Panic disorder  - Phobia of vomiting   - Patient takes Xanax sparingly.  Refilled on 6/25 by her previous provider   - Ok to call for refills if needed in the future       2. Tinea pedis of left foot  - Discussed that fungal infections often need to be treated for multiple months  - Patient is not a good candidate for oral therapy given her history of liver transplant   - clotrimazole-betamethasone (LOTRISONE) 1-0.05 % external cream; Apply a thin layer to rash on right ankle twice daily for up to 6 months for fungal infection  Dispense: 45 g; Refill: 3     3. Immunosuppressed status (H)  - Transplant team requesting a derm visit since she's at increased risk for skin cancer due to immunosuppression   - DERMATOLOGY REFERRAL     Follow up as needed for preventive care     Nubia Jackson, DO  [facility]\"  "

## 2019-08-16 NOTE — PROGRESS NOTES
[facility] Initial Evaluation  Subjective:    Charito Wilcox being seen for shoulder/neck pain.   Date of Onset: 1.5 yrs ago. HPI: Documentation: Patient doesnt know.  and reported as 8/10 on pain scale. General health as reported by patient is good. Pertinent medical history includes:  Migraines/headaches and other (liver transplant 2000).      Current medications:  Anti-depressants.   Primary job tasks include:  Computer work, prolonged sitting, prolonged standing, pushing/pulling and repetitive tasks.           Patient is [employer].                           Objective:  System    Physical Exam    General     ROS    Assessment/Plan:

## 2019-08-23 ENCOUNTER — THERAPY VISIT (OUTPATIENT)
Dept: PHYSICAL THERAPY | Facility: CLINIC | Age: 39
End: 2019-08-23
Payer: COMMERCIAL

## 2019-08-23 DIAGNOSIS — M54.2 NECK PAIN: ICD-10-CM

## 2019-08-23 PROCEDURE — 97140 MANUAL THERAPY 1/> REGIONS: CPT | Mod: GP | Performed by: PHYSICAL THERAPIST

## 2019-08-23 PROCEDURE — 97110 THERAPEUTIC EXERCISES: CPT | Mod: GP | Performed by: PHYSICAL THERAPIST

## 2019-08-29 ENCOUNTER — THERAPY VISIT (OUTPATIENT)
Dept: PHYSICAL THERAPY | Facility: CLINIC | Age: 39
End: 2019-08-29
Payer: COMMERCIAL

## 2019-08-29 DIAGNOSIS — M54.2 NECK PAIN: ICD-10-CM

## 2019-08-29 PROCEDURE — 97140 MANUAL THERAPY 1/> REGIONS: CPT | Mod: GP | Performed by: PHYSICAL THERAPIST

## 2019-08-29 PROCEDURE — 97112 NEUROMUSCULAR REEDUCATION: CPT | Mod: GP | Performed by: PHYSICAL THERAPIST

## 2019-09-04 ENCOUNTER — THERAPY VISIT (OUTPATIENT)
Dept: PHYSICAL THERAPY | Facility: CLINIC | Age: 39
End: 2019-09-04
Payer: COMMERCIAL

## 2019-09-04 DIAGNOSIS — M54.2 NECK PAIN: ICD-10-CM

## 2019-09-04 PROCEDURE — 97112 NEUROMUSCULAR REEDUCATION: CPT | Mod: GP | Performed by: PHYSICAL THERAPIST

## 2019-09-04 PROCEDURE — 97140 MANUAL THERAPY 1/> REGIONS: CPT | Mod: GP | Performed by: PHYSICAL THERAPIST

## 2019-09-11 ENCOUNTER — THERAPY VISIT (OUTPATIENT)
Dept: PHYSICAL THERAPY | Facility: CLINIC | Age: 39
End: 2019-09-11
Payer: COMMERCIAL

## 2019-09-11 DIAGNOSIS — M54.2 NECK PAIN: ICD-10-CM

## 2019-09-11 PROCEDURE — 97140 MANUAL THERAPY 1/> REGIONS: CPT | Mod: GP | Performed by: PHYSICAL THERAPIST

## 2019-09-11 PROCEDURE — 97110 THERAPEUTIC EXERCISES: CPT | Mod: GP | Performed by: PHYSICAL THERAPIST

## 2019-09-17 DIAGNOSIS — Z94.4 LIVER REPLACED BY TRANSPLANT (H): Primary | ICD-10-CM

## 2019-09-18 ENCOUNTER — THERAPY VISIT (OUTPATIENT)
Dept: PHYSICAL THERAPY | Facility: CLINIC | Age: 39
End: 2019-09-18
Payer: COMMERCIAL

## 2019-09-18 DIAGNOSIS — M54.2 NECK PAIN: Primary | ICD-10-CM

## 2019-09-18 DIAGNOSIS — M54.2 NECK PAIN: ICD-10-CM

## 2019-09-18 DIAGNOSIS — Z94.4 LIVER REPLACED BY TRANSPLANT (H): ICD-10-CM

## 2019-09-18 LAB
ALBUMIN SERPL-MCNC: 3.8 G/DL (ref 3.4–5)
ALP SERPL-CCNC: 118 U/L (ref 40–150)
ALT SERPL W P-5'-P-CCNC: 25 U/L (ref 0–50)
AST SERPL W P-5'-P-CCNC: 32 U/L (ref 0–45)
BILIRUB DIRECT SERPL-MCNC: 0.2 MG/DL (ref 0–0.2)
BILIRUB SERPL-MCNC: 0.9 MG/DL (ref 0.2–1.3)
PROT SERPL-MCNC: 7.2 G/DL (ref 6.8–8.8)

## 2019-09-18 PROCEDURE — 97140 MANUAL THERAPY 1/> REGIONS: CPT | Mod: GP | Performed by: PHYSICAL THERAPIST

## 2019-09-18 PROCEDURE — 97110 THERAPEUTIC EXERCISES: CPT | Mod: GP | Performed by: PHYSICAL THERAPIST

## 2019-09-23 ENCOUNTER — THERAPY VISIT (OUTPATIENT)
Dept: PHYSICAL THERAPY | Facility: CLINIC | Age: 39
End: 2019-09-23
Payer: COMMERCIAL

## 2019-09-23 DIAGNOSIS — M54.2 NECK PAIN: ICD-10-CM

## 2019-09-23 PROCEDURE — 97140 MANUAL THERAPY 1/> REGIONS: CPT | Mod: GP | Performed by: PHYSICAL THERAPIST

## 2019-09-23 PROCEDURE — 97530 THERAPEUTIC ACTIVITIES: CPT | Mod: GP | Performed by: PHYSICAL THERAPIST

## 2019-10-02 ENCOUNTER — HEALTH MAINTENANCE LETTER (OUTPATIENT)
Age: 39
End: 2019-10-02

## 2019-10-02 ENCOUNTER — THERAPY VISIT (OUTPATIENT)
Dept: PHYSICAL THERAPY | Facility: CLINIC | Age: 39
End: 2019-10-02
Payer: COMMERCIAL

## 2019-10-02 DIAGNOSIS — M54.2 NECK PAIN: ICD-10-CM

## 2019-10-02 PROCEDURE — 97110 THERAPEUTIC EXERCISES: CPT | Mod: GP | Performed by: PHYSICAL THERAPIST

## 2019-10-02 PROCEDURE — 97140 MANUAL THERAPY 1/> REGIONS: CPT | Mod: GP | Performed by: PHYSICAL THERAPIST

## 2019-10-07 ENCOUNTER — THERAPY VISIT (OUTPATIENT)
Dept: PHYSICAL THERAPY | Facility: CLINIC | Age: 39
End: 2019-10-07
Payer: COMMERCIAL

## 2019-10-07 DIAGNOSIS — M54.2 NECK PAIN: ICD-10-CM

## 2019-10-07 PROCEDURE — 97140 MANUAL THERAPY 1/> REGIONS: CPT | Mod: GP | Performed by: PHYSICAL THERAPIST

## 2019-10-07 NOTE — PROGRESS NOTES
PROGRESS  REPORT    Progress reporting period is from 8/13/2019 to 10/7/2019.       SUBJECTIVE  Subjective changes noted by patient:  yes.  Subjective: Difficult week with travel and work stress.  Able to eliminate pain in trap with postural correction/chin retraction, but not able to maintain.    Current pain level is 2/10 Current Pain level: 2/10.     Previous pain level was  4/10  .   Changes in function:  Yes (See Goal flowsheet attached for changes in current functional level)  Adverse reaction to treatment or activity: None    OBJECTIVE  Changes noted in objective findings:  Yes  Objective: Imp ROM into cervical retraction/extension, w hypomobility through upper thoracic and anterior chest mm noted.  Per pt report MFR though ant structures including diaphragm dec shoulder pain.  Cervical rib testing positive.  Patient has made gains in perfromance (running) but cont to have itermittant exacerbations that impact progress.  She is wanting to disuss additional interventions with MD after imaging.     ASSESSMENT/PLAN  Updated problem list and treatment plan: Diagnosis 1:  Cervical pain  Pain -  manual therapy, self management, education, directional preference exercise and home program  Decreased ROM/flexibility - manual therapy and therapeutic exercise  Decreased joint mobility - manual therapy and therapeutic exercise  Impaired muscle performance - neuro re-education  Decreased function - therapeutic activities  Impaired posture - neuro re-education  STG/LTGs have been met or progress has been made towards goals:  Yes (See Goal flow sheet completed today.)  Assessment of Progress: The patient's condition has potential to improve.  Patient is meeting short term goals and is progressing towards long term goals.  Self Management Plans:  Patient has been instructed in a home treatment program.  Patient  has been instructed in self management of symptoms.  I have re-evaluated this patient and find that the nature,  scope, duration and intensity of the therapy is appropriate for the medical condition of the patient.  Charito continues to require the following intervention to meet STG and LTG's:  PT    Recommendations:  This patient would benefit from continued therapy.     Frequency:  1 X week, once daily  Duration:  for 6 weeks      This patient would benefit from further evaluation.    Please refer to the daily flowsheet for treatment today, total treatment time and time spent performing 1:1 timed codes.

## 2019-10-09 ENCOUNTER — ANCILLARY PROCEDURE (OUTPATIENT)
Dept: MRI IMAGING | Facility: CLINIC | Age: 39
End: 2019-10-09
Attending: FAMILY MEDICINE
Payer: COMMERCIAL

## 2019-10-09 DIAGNOSIS — G54.2 CERVICAL NERVE ROOT IMPINGEMENT: ICD-10-CM

## 2019-10-09 DIAGNOSIS — Z94.4 LIVER REPLACED BY TRANSPLANT (H): ICD-10-CM

## 2019-10-14 ENCOUNTER — THERAPY VISIT (OUTPATIENT)
Dept: PHYSICAL THERAPY | Facility: CLINIC | Age: 39
End: 2019-10-14
Payer: COMMERCIAL

## 2019-10-14 DIAGNOSIS — M54.2 NECK PAIN: ICD-10-CM

## 2019-10-14 PROCEDURE — 97140 MANUAL THERAPY 1/> REGIONS: CPT | Mod: GP | Performed by: PHYSICAL THERAPIST

## 2019-10-14 PROCEDURE — 97112 NEUROMUSCULAR REEDUCATION: CPT | Mod: GP | Performed by: PHYSICAL THERAPIST

## 2019-10-22 ENCOUNTER — TELEPHONE (OUTPATIENT)
Dept: TRANSPLANT | Facility: CLINIC | Age: 39
End: 2019-10-22

## 2019-10-22 NOTE — TELEPHONE ENCOUNTER
Charito sent a message via [employer] asking about what she thought was a recall on Prilosec due to possible increased risk of cancer. I confirmed with Neo Ma, Pharm [facility], that the recent media coverage was regarding Ranitidine (Zantac) not Prilosec (Omeprazole). Charito was informed of this.

## 2019-10-24 ENCOUNTER — THERAPY VISIT (OUTPATIENT)
Dept: PHYSICAL THERAPY | Facility: CLINIC | Age: 39
End: 2019-10-24
Payer: COMMERCIAL

## 2019-10-24 DIAGNOSIS — M54.2 NECK PAIN: ICD-10-CM

## 2019-10-24 PROCEDURE — 97530 THERAPEUTIC ACTIVITIES: CPT | Mod: GP | Performed by: PHYSICAL THERAPIST

## 2019-10-24 PROCEDURE — 97140 MANUAL THERAPY 1/> REGIONS: CPT | Mod: GP | Performed by: PHYSICAL THERAPIST

## 2019-10-24 NOTE — TELEPHONE ENCOUNTER
DIAGNOSIS: FU MRI - Per pt / dr lozano pt [place] from 10/30   APPOINTMENT DATE: 10/24/19   NOTES STATUS DETAILS   OFFICE NOTE from referring provider N/A    OFFICE NOTE from other specialist Internal    DISCHARGE SUMMARY from hospital N/A    DISCHARGE REPORT from the ER N/A    OPERATIVE REPORT N/A    MEDICATION LIST Internal    MRI Internal    CT SCAN N/A    XRAYS (IMAGES & REPORTS) Internal

## 2019-10-29 ENCOUNTER — PRE VISIT (OUTPATIENT)
Dept: ORTHOPEDICS | Facility: CLINIC | Age: 39
End: 2019-10-29

## 2019-10-29 ENCOUNTER — OFFICE VISIT (OUTPATIENT)
Dept: ORTHOPEDICS | Facility: CLINIC | Age: 39
End: 2019-10-29

## 2019-10-29 VITALS — WEIGHT: 118 LBS | BODY MASS INDEX: 21.71 KG/M2 | HEIGHT: 62 IN | RESPIRATION RATE: 16 BRPM

## 2019-10-29 DIAGNOSIS — M25.511 RIGHT SHOULDER PAIN, UNSPECIFIED CHRONICITY: Primary | ICD-10-CM

## 2019-10-29 RX ADMIN — ROPIVACAINE HYDROCHLORIDE 2 ML: 5 INJECTION, SOLUTION EPIDURAL; INFILTRATION; PERINEURAL at 09:28

## 2019-10-29 RX ADMIN — TRIAMCINOLONE ACETONIDE 40 MG: 40 INJECTION, SUSPENSION INTRA-ARTICULAR; INTRAMUSCULAR at 09:28

## 2019-10-29 RX ADMIN — ROPIVACAINE HYDROCHLORIDE 3 ML: 5 INJECTION, SOLUTION EPIDURAL; INFILTRATION; PERINEURAL at 09:28

## 2019-10-29 ASSESSMENT — MIFFLIN-ST. JEOR: SCORE: 1173.49

## 2019-10-29 NOTE — PROGRESS NOTES
[facility]  Orthopedics  William Castillo MD  10/29/2019     Name: Charito Wilcox  MRN: [id number]  Age: 39 year old  : 1980  Referring provider: Referred Self     Chief Complaint: Right shoulder pain     History of Present Illness:   Charito Wilcox is a 39 year old, female who presents today for evaluation of her right shoulder pain. The patient normally sees Dr. Bennett for her right shoulder and right sided neck pain which first began after running in a marathon over one year ago. She localizes her pain today over the right trapezius muscle and radiates into multiple spots including her axillary area. The patient has been attending physical therapy for over a year which she reports has been helping her symptoms, but has not brought about full resolution. Overall, before she began PT her pain was a 9/10. She uses a figure 8 brace as recommended by her physical therapist which she reports helps with her pain. The patient has tried chiropractic treatment with little relief. The patient has tried topical ibuprofen; she is unable to take oral ibuprofen or Tylenol because of her liver transplant. The patient has also used acupuncture which has helped relieve her pain. The patient has discussed the option of a steroid injection with her physical therapist. Her pain is exacerbated with sleeping, raising her left arm above midline, and with running. She admits to some stiffness in her shoulder as well, but she has worked with PT for this issue.     Review of Systems:   A 10-point review of systems was obtained and is negative except for as noted in the HPI.     Medications:   Current Outpatient Medications:      ALPRAZolam (XANAX) 0.5 MG tablet, Take 1 tablet (0.5 mg) by mouth 3 times daily as needed for anxiety 1 month supply, Disp: 30 tablet, Rfl: 0     azaTHIOprine (IMURAN) 50 MG tablet, Take 1 tablet (50 mg) by mouth daily, Disp: 90 tablet, Rfl: 3     clotrimazole-betamethasone (LOTRISONE) 1-0.05 %  external cream, Apply a thin layer to rash on right ankle twice daily for up to 6 months for fungal infection, Disp: 45 g, Rfl: 3     lactobacillus rhamnosus, GG, (CULTURELL) capsule, Take 1 capsule by mouth daily, Disp: , Rfl:      omeprazole (PRILOSEC) 40 MG DR capsule, TAKE 1 CAPSULE BY MOUTH TWICE DAILY BEFORE MEALS, TAKE 30-60 MINUTES PRIOR TO A MEAL, Disp: 60 capsule, Rfl: 11     ondansetron (ZOFRAN ODT) 4 MG ODT tab, Take 1-2 tablets (4-8 mg) by mouth 3 times daily as needed for nausea, Disp: 20 tablet, Rfl: 0     PARoxetine (PAXIL) 40 MG tablet, TAKE 1 AND ONE-HALF TABLET BY MOUTH AT BEDTIME, Disp: 45 tablet, Rfl: 3     polyethylene glycol (MIRALAX) powder, Take 17 g (1 capful) by mouth daily, Disp: 510 g, Rfl: 1     tacrolimus (GENERIC EQUIVALENT) 1 MG capsule, Take 1 capsule (1 mg) by mouth every 12 hours, Disp: 180 capsule, Rfl: 3     ursodiol (ACTIGALL) 300 MG capsule, Take 1 capsule (300 mg) by mouth 2 times daily, Disp: 180 capsule, Rfl: 3    Allergies:  Gluten meal and Milk protein extract     Past Medical History:  Acute pancreatitis  Anxiety   Esophageal reflux   Gastrointestinal ulcer   Irritable bowel syndrome   Liver replaced by transplant (H)  Papanicolaou smear of cervix with atypical squamous cells of undetermined significance (ASC-US), negative HPV     Past Surgical History:  C TRANSPLANT LIVER,HETERTOPIC    SECTION     SECTION    CHOLECYSTECTOMY    COLONOSCOPY 10/24/2018  ESOPHAGOSCOPY, GASTROSCOPY, DUODENOSCOPY (EGD), COMBINED   ESOPHAGOSCOPY, GASTROSCOPY, DUODENOSCOPY (EGD), COMBINED N/A 2016  ESOPHAGOSCOPY, GASTROSCOPY, DUODENOSCOPY (EGD), COMBINED N/A 2016  ESOPHAGOSCOPY, GASTROSCOPY, DUODENOSCOPY (EGD), COMBINED N/A 2018  HIP SURGERY Right 2016  TUBAL LIGATION 3/2010    Social History:  The patient admits to one glass a wine per month.     Family History:  CAD - mother  Hyperlipidemia - mother  Osteoporosis - mother  Arthritis - mother  Depression -  maternal grandmother  Anxiety disorder - maternal grandmother  Heart failure - maternal grandmother  Hypertension - maternal grandfather  Cerebrovascular disease - paternal grandmother  Breast cancer - paternal grandmother    Physical Examination:  General: Alert and oriented x 3.   Shoulder Right Left   Skin Normal Normal   Deformity None None   AC Joint Tenderness Mild None   Forward Flexion 170 170   Abduction 110 110   External Rotation 65 65   Warner Impingement Negative Negative   Neer Impingement Negative Negative   Apprehension Negative Negative   Relocation Negative Negative   Jerk Test Negative Negative   Keith's Positive  Negative   Speed's Test Positive Negative   Empty Can Negative Negative   Strength     Abduction 5/5 5/5   External Rotation 5/5 5/5   Lift Off 5/5 5/5   Belly Press Negative Negative   Neurovascular Status Intact Intact   C-Spine Normal ROM, non-tender Normal ROM, non-tender     Additional Findings:  Mild tenderness over acromion and greater tuberosity with more notable tenderness over biceptal groove  Most notable site of tenderness is over trapezius and extends from base of the neck out to distal clavicle     Imaging:   MR right shoulder w/o contrast (10/9/19)  In a background of tendinosis, there is a questionable focal low-grade  intrasubstance tear of the supraspinatus tendon versus insertional  cyst in the greater tuberosity.  Per radiology.    I have independently reviewed the above imaging studies; the results were discussed with the patient.     Assessment:   39 year old, female with right shoulder pain of unclear etiology. Pain is primarily over trapezius but cannot rule out biceps tendinopathy / superior labral pathology playing a role in the pain    Plan:   The risks and benefits of the available surgical and non-surgical treatment options were discussed with the patient.  Add physical therapy specifically for right shoulder per her physical therapist   Right subacromial  bursa injection was performed today       Large Joint Injection/Arthocentesis: R subacromial bursa  Date/Time: 10/29/2019 9:28 AM  Performed by: William Castillo MD  Authorized by: William Castillo MD     Indications:  Pain  Needle Size:  25 G  Guidance: landmark guided    Approach:  Posterolateral  Location:  Shoulder      Site:  R subacromial bursa  Medications:  40 mg triamcinolone 40 MG/ML; 2 mL ropivacaine 5 MG/ML; 3 mL ropivacaine 5 MG/ML  Procedure discussed: discussed risks, benefits, and alternatives    Consent Given by:  Patient  Timeout: timeout called immediately prior to procedure    Prep: patient was prepped and draped in usual sterile fashion     Additional 4mL of ropivacaine was utilized     Scribed by Lex Fields ATC, ATC for  on 10/29/19 at 9:30, based on providers statements to me.        Scribe Disclosure:  Alex RESENDIZ, am serving as a scribe to document services personally performed by William Castillo MD at this visit, based upon the provider's statements to me. All documentation has been reviewed by the aforementioned provider prior to being entered into the official medical record.

## 2019-10-29 NOTE — NURSING NOTE
[address] [address]  Dept: [phone]  ______________________________________________________________________________    Patient: Charito Wilcox   : 1980   MRN: [id number]   2019    INVASIVE PROCEDURE SAFETY CHECKLIST    Date: 10/29/19   Procedure:Right sub-acromial kenalog injection  Patient Name: Charito Wilcox  MRN: [id number]  YOB: 1980    Action: Complete sections as appropriate. Any discrepancy results in a HARD COPY until resolved.     PRE PROCEDURE:  Patient ID verified with 2 identifiers (name and  or MRN): Yes  Procedure and site verified with patient/designee (when able): Yes  Accurate consent documentation in medical record: Yes  H&P (or appropriate assessment) documented in medical record: Yes  H&P must be up to 20 days prior to procedure and updates within 24 hours of procedure as applicable: NA  Relevant diagnostic and radiology test results appropriately labeled and displayed as applicable: Yes  Procedure site(s) marked with provider initials: NA    TIMEOUT:  Time-Out performed immediately prior to starting procedure, including verbal and active participation of all team members addressing the following:Yes  * Correct patient identify  * Confirmed that the correct side and site are marked  * An accurate procedure consent form  * Agreement on the procedure to be done  * Correct patient position  * Relevant images and results are properly labeled and appropriately displayed  * The need to administer antibiotics or fluids for irrigation purposes during the procedure as applicable   * Safety precautions based on patient history or medication use    DURING PROCEDURE: Verification of correct person, site, and procedures any time the responsibility for care of the patient is transferred to another member of the care team.       Prior to injection, verified patient identity using patient's name  and date of birth.  Due to injection administration, patient instructed to remain in clinic for 15 minutes  afterwards, and to report any adverse reaction to me immediately.    Bursa injection was performed.      Drug Amount Wasted:  Yes: 11 mg/ml ropivacaine   Vial/Syringe: Single dose vial  Expiration Date:  3/22      Lex Fields ATC  October 29, 2019

## 2019-10-29 NOTE — LETTER
10/29/2019      RE: Charito Wilcox  [address]  [place] [place] [address]       [facility] [place]  Orthopedics  William Castillo MD  10/29/2019     Name: Charito Wlicox  MRN: [id number]  Age: 39 year old  : 1980  Referring provider: Referred Self     Chief Complaint: Right shoulder pain     History of Present Illness:   Charito Wilcox is a 39 year old, female who presents today for evaluation of her right shoulder pain. The patient normally sees Dr. Bennett for her right shoulder and right sided neck pain which first began after running in a marathon over one year ago. She localizes her pain today over the right trapezius muscle and radiates into multiple spots including her axillary area. The patient has been attending physical therapy for over a year which she reports has been helping her symptoms, but has not brought about full resolution. Overall, before she began PT her pain was a 9/10. She uses a figure 8 brace as recommended by her physical therapist which she reports helps with her pain. The patient has tried chiropractic treatment with little relief. The patient has tried topical ibuprofen; she is unable to take oral ibuprofen or Tylenol because of her liver transplant. The patient has also used acupuncture which has helped relieve her pain. The patient has discussed the option of a steroid injection with her physical therapist. Her pain is exacerbated with sleeping, raising her left arm above midline, and with running. She admits to some stiffness in her shoulder as well, but she has worked with PT for this issue.     Review of Systems:   A 10-point review of systems was obtained and is negative except for as noted in the HPI.     Medications:   Current Outpatient Medications:      ALPRAZolam (XANAX) 0.5 MG tablet, Take 1 tablet (0.5 mg) by mouth 3 times daily as needed for anxiety 1 month supply, Disp: 30 tablet, Rfl: 0     azaTHIOprine (IMURAN) 50 MG tablet, Take 1 tablet (50 mg) by  mouth daily, Disp: 90 tablet, Rfl: 3     clotrimazole-betamethasone (LOTRISONE) 1-0.05 % external cream, Apply a thin layer to rash on right ankle twice daily for up to 6 months for fungal infection, Disp: 45 g, Rfl: 3     lactobacillus rhamnosus, GG, (CULTURELL) capsule, Take 1 capsule by mouth daily, Disp: , Rfl:      omeprazole (PRILOSEC) 40 MG DR capsule, TAKE 1 CAPSULE BY MOUTH TWICE DAILY BEFORE MEALS, TAKE 30-60 MINUTES PRIOR TO A MEAL, Disp: 60 capsule, Rfl: 11     ondansetron (ZOFRAN ODT) 4 MG ODT tab, Take 1-2 tablets (4-8 mg) by mouth 3 times daily as needed for nausea, Disp: 20 tablet, Rfl: 0     PARoxetine (PAXIL) 40 MG tablet, TAKE 1 AND ONE-HALF TABLET BY MOUTH AT BEDTIME, Disp: 45 tablet, Rfl: 3     polyethylene glycol (MIRALAX) powder, Take 17 g (1 capful) by mouth daily, Disp: 510 g, Rfl: 1     tacrolimus (GENERIC EQUIVALENT) 1 MG capsule, Take 1 capsule (1 mg) by mouth every 12 hours, Disp: 180 capsule, Rfl: 3     ursodiol (ACTIGALL) 300 MG capsule, Take 1 capsule (300 mg) by mouth 2 times daily, Disp: 180 capsule, Rfl: 3    Allergies:  Gluten meal and Milk protein extract     Past Medical History:  Acute pancreatitis  Anxiety   Esophageal reflux   Gastrointestinal ulcer   Irritable bowel syndrome   Liver replaced by transplant (H)  Papanicolaou smear of cervix with atypical squamous cells of undetermined significance (ASC-US), negative HPV     Past Surgical History:  C TRANSPLANT LIVER,HETERTOPIC    SECTION     SECTION    CHOLECYSTECTOMY    COLONOSCOPY 10/24/2018  ESOPHAGOSCOPY, GASTROSCOPY, DUODENOSCOPY (EGD), COMBINED   ESOPHAGOSCOPY, GASTROSCOPY, DUODENOSCOPY (EGD), COMBINED N/A 2016  ESOPHAGOSCOPY, GASTROSCOPY, DUODENOSCOPY (EGD), COMBINED N/A 2016  ESOPHAGOSCOPY, GASTROSCOPY, DUODENOSCOPY (EGD), COMBINED N/A 2018  HIP SURGERY Right 2016  TUBAL LIGATION 3/2010    Social History:  The patient admits to one glass a wine per month.     Family History:  CAD -  mother  Hyperlipidemia - mother  Osteoporosis - mother  Arthritis - mother  Depression - maternal grandmother  Anxiety disorder - maternal grandmother  Heart failure - maternal grandmother  Hypertension - maternal grandfather  Cerebrovascular disease - paternal grandmother  Breast cancer - paternal grandmother    Physical Examination:  General: Alert and oriented x 3.   Shoulder Right Left   Skin Normal Normal   Deformity None None   AC Joint Tenderness Mild None   Forward Flexion 170 170   Abduction 110 110   External Rotation 65 65   Warner Impingement Negative Negative   Neer Impingement Negative Negative   Apprehension Negative Negative   Relocation Negative Negative   Jerk Test Negative Negative   Buckley's Positive  Negative   Speed's Test Positive Negative   Empty Can Negative Negative   Strength     Abduction 5/5 5/5   External Rotation 5/5 5/5   Lift Off 5/5 5/5   Belly Press Negative Negative   Neurovascular Status Intact Intact   C-Spine Normal ROM, non-tender Normal ROM, non-tender     Additional Findings:  Mild tenderness over acromion and greater tuberosity with more notable tenderness over biceptal groove  Most notable site of tenderness is over trapezius and extends from base of the neck out to distal clavicle     Imaging:   MR right shoulder w/o contrast (10/9/19)  In a background of tendinosis, there is a questionable focal low-grade  intrasubstance tear of the supraspinatus tendon versus insertional  cyst in the greater tuberosity.  Per radiology.    I have independently reviewed the above imaging studies; the results were discussed with the patient.     Assessment:   39 year old, female with right shoulder pain of unclear etiology. Pain is primarily over trapezius but cannot rule out biceps tendinopathy / superior labral pathology playing a role in the pain    Plan:   The risks and benefits of the available surgical and non-surgical treatment options were discussed with the patient.  Add  physical therapy specifically for right shoulder per her physical therapist   Right subacromial bursa injection was performed today       Large Joint Injection/Arthocentesis: R subacromial bursa  Date/Time: 10/29/2019 9:28 AM  Performed by: William Castillo MD  Authorized by: William Castillo MD     Indications:  Pain  Needle Size:  25 G  Guidance: landmark guided    Approach:  Posterolateral  Location:  Shoulder      Site:  R subacromial bursa  Medications:  40 mg triamcinolone 40 MG/ML; 2 mL ropivacaine 5 MG/ML; 3 mL ropivacaine 5 MG/ML  Procedure discussed: discussed risks, benefits, and alternatives    Consent Given by:  Patient  Timeout: timeout called immediately prior to procedure    Prep: patient was prepped and draped in usual sterile fashion     Additional 4mL of ropivacaine was utilized     Scribed by Lex Fields ATC, ATC for  on 10/29/19 at 9:30, based on providers statements to me.        Scribe Disclosure:  Alex RESENDIZ, am serving as a scribe to document services personally performed by William Castillo MD at this visit, based upon the provider's statements to me. All documentation has been reviewed by the aforementioned provider prior to being entered into the official medical record.       William Castillo MD

## 2019-11-01 RX ORDER — TRIAMCINOLONE ACETONIDE 40 MG/ML
40 INJECTION, SUSPENSION INTRA-ARTICULAR; INTRAMUSCULAR
Status: DISCONTINUED | OUTPATIENT
Start: 2019-10-29 | End: 2020-09-28

## 2019-11-01 RX ORDER — ROPIVACAINE HYDROCHLORIDE 5 MG/ML
2 INJECTION, SOLUTION EPIDURAL; INFILTRATION; PERINEURAL
Status: DISCONTINUED | OUTPATIENT
Start: 2019-10-29 | End: 2020-09-28

## 2019-11-01 RX ORDER — ROPIVACAINE HYDROCHLORIDE 5 MG/ML
3 INJECTION, SOLUTION EPIDURAL; INFILTRATION; PERINEURAL
Status: DISCONTINUED | OUTPATIENT
Start: 2019-10-29 | End: 2020-09-28

## 2019-11-06 ENCOUNTER — THERAPY VISIT (OUTPATIENT)
Dept: PHYSICAL THERAPY | Facility: CLINIC | Age: 39
End: 2019-11-06
Payer: COMMERCIAL

## 2019-11-06 DIAGNOSIS — M54.2 NECK PAIN: ICD-10-CM

## 2019-11-06 PROCEDURE — 97032 APPL MODALITY 1+ESTIM EA 15: CPT | Mod: GP | Performed by: PHYSICAL THERAPIST

## 2019-11-06 PROCEDURE — 97140 MANUAL THERAPY 1/> REGIONS: CPT | Mod: GP | Performed by: PHYSICAL THERAPIST

## 2019-11-06 PROCEDURE — 99207 C STAT DRY NEEDLING - IAM: CPT | Mod: 25 | Performed by: PHYSICAL THERAPIST

## 2019-11-06 NOTE — PROGRESS NOTES
Obtained informed consent.  Performed clean dry needling technique to the following musculature due to contributions to cervical pain:  Right UT  0.81f17xe needle supine pincer with 2-3 passes  Right pectoralis 0.79x98bv needle supine pincer with 2-3 passes  Prone CT junction bracket 0.25x40 mm needle with stim    No adverse events noted.    Izabela Campbell, PT

## 2019-11-12 DIAGNOSIS — F41.0 PANIC DISORDER: ICD-10-CM

## 2019-11-12 RX ORDER — PAROXETINE 40 MG/1
TABLET, FILM COATED ORAL
Qty: 135 TABLET | Refills: 0 | Status: SHIPPED | OUTPATIENT
Start: 2019-11-12 | End: 2020-02-06

## 2019-11-12 NOTE — TELEPHONE ENCOUNTER
PARoxetine (PAXIL) 40 MG tablet      Last Written Prescription Date:  5/16/19  Last Fill Quantity: 45,   # refills: 3  Last Office Visit : 4/30/19  Future Office visit: none    Refill to pharmacy.

## 2019-11-13 ENCOUNTER — THERAPY VISIT (OUTPATIENT)
Dept: PHYSICAL THERAPY | Facility: CLINIC | Age: 39
End: 2019-11-13
Payer: COMMERCIAL

## 2019-11-13 DIAGNOSIS — M54.2 NECK PAIN: ICD-10-CM

## 2019-11-13 PROCEDURE — 99207 C STAT DRY NEEDLING - IAM: CPT | Mod: 25 | Performed by: PHYSICAL THERAPIST

## 2019-11-13 PROCEDURE — 97140 MANUAL THERAPY 1/> REGIONS: CPT | Mod: GP | Performed by: PHYSICAL THERAPIST

## 2019-11-13 PROCEDURE — 97110 THERAPEUTIC EXERCISES: CPT | Mod: GP | Performed by: PHYSICAL THERAPIST

## 2019-11-13 NOTE — PROGRESS NOTES
Obtained informed consent.  Performed clean dry needling technique to the following musculature due to contributions to cervical pain:  Right UT and left  0.02o48hz needle supine pincer with 2-3 passes  Right and left pectoralis 0.02z03vc needle supine pincer with 2-3 passes  Prone CT junction bracket 0.25x40 mm needle sustained  Suboccipitals 0.97i43uu sustained     No adverse events noted.     Izabela Campbell, PT

## 2019-11-20 ENCOUNTER — THERAPY VISIT (OUTPATIENT)
Dept: PHYSICAL THERAPY | Facility: CLINIC | Age: 39
End: 2019-11-20
Payer: COMMERCIAL

## 2019-11-20 DIAGNOSIS — M54.2 NECK PAIN: ICD-10-CM

## 2019-11-20 PROCEDURE — 97140 MANUAL THERAPY 1/> REGIONS: CPT | Mod: GP | Performed by: PHYSICAL THERAPIST

## 2019-11-20 PROCEDURE — 97110 THERAPEUTIC EXERCISES: CPT | Mod: GP | Performed by: PHYSICAL THERAPIST

## 2019-11-20 PROCEDURE — 99207 C STAT DRY NEEDLING - IAM: CPT | Mod: 25 | Performed by: PHYSICAL THERAPIST

## 2019-11-21 NOTE — PROGRESS NOTES
Obtained informed consent.  Performed clean dry needling technique to the following musculature due to contributions to cervical pain:  Right UT and left  0.28v92ql needle supine pincer with 2-3 passes 20-30 mm depth  Right and left pectoralis 0.73n62af needle supine pincer with 2-3 passes 20 mm depth  Prone CT junction bracket 0.25x40 mm needle sustained 30 mm depth  Suboccipitals 0.79m27bd sustained 20 mm depth     No adverse events noted.     Izabela Campbell, PT

## 2019-11-26 ENCOUNTER — THERAPY VISIT (OUTPATIENT)
Dept: PHYSICAL THERAPY | Facility: CLINIC | Age: 39
End: 2019-11-26
Payer: COMMERCIAL

## 2019-11-26 DIAGNOSIS — M54.2 NECK PAIN: ICD-10-CM

## 2019-11-26 PROCEDURE — 97140 MANUAL THERAPY 1/> REGIONS: CPT | Mod: GP | Performed by: PHYSICAL THERAPIST

## 2019-11-26 PROCEDURE — 99207 C STAT DRY NEEDLING - IAM: CPT | Mod: 25 | Performed by: PHYSICAL THERAPIST

## 2019-11-26 PROCEDURE — 97110 THERAPEUTIC EXERCISES: CPT | Mod: GP | Performed by: PHYSICAL THERAPIST

## 2019-11-27 NOTE — PROGRESS NOTES
Obtained informed consent.  Performed clean dry needling technique to the following musculature due to contributions to cervical pain:  Right UT and left  0.52h85kc needle supine pincer with 2-3 passes 20-30 mm depth  Right and left pectoralis 0.04x48ds needle supine pincer with 2-3 passes 20 mm depth  Prone CT junction bracket 0.25x40 mm needle sustained 30 mm depth  Suboccipitals 0.42r09by sustained 20 mm depth     No adverse events noted.

## 2019-12-06 ENCOUNTER — THERAPY VISIT (OUTPATIENT)
Dept: PHYSICAL THERAPY | Facility: CLINIC | Age: 39
End: 2019-12-06
Payer: COMMERCIAL

## 2019-12-06 DIAGNOSIS — M54.2 NECK PAIN: ICD-10-CM

## 2019-12-06 PROCEDURE — 97535 SELF CARE MNGMENT TRAINING: CPT | Mod: GP | Performed by: PHYSICAL THERAPIST

## 2019-12-06 PROCEDURE — 97140 MANUAL THERAPY 1/> REGIONS: CPT | Mod: GP | Performed by: PHYSICAL THERAPIST

## 2019-12-06 PROCEDURE — 97110 THERAPEUTIC EXERCISES: CPT | Mod: GP | Performed by: PHYSICAL THERAPIST

## 2019-12-18 ENCOUNTER — THERAPY VISIT (OUTPATIENT)
Dept: PHYSICAL THERAPY | Facility: CLINIC | Age: 39
End: 2019-12-18
Payer: COMMERCIAL

## 2019-12-18 DIAGNOSIS — M54.2 NECK PAIN: ICD-10-CM

## 2019-12-18 PROCEDURE — 99207 C STAT DRY NEEDLING - IAM: CPT | Mod: 25 | Performed by: PHYSICAL THERAPIST

## 2019-12-18 PROCEDURE — 97110 THERAPEUTIC EXERCISES: CPT | Mod: GP | Performed by: PHYSICAL THERAPIST

## 2019-12-18 PROCEDURE — 97140 MANUAL THERAPY 1/> REGIONS: CPT | Mod: GP | Performed by: PHYSICAL THERAPIST

## 2019-12-26 NOTE — PROGRESS NOTES
Obtained informed consent.  Performed clean dry needling technique to the following musculature due to contributions to cervical pain:  Right UT and left  0.90c47no needle supine pincer with 2-3 passes 20-30 mm depth  Right and left pectoralis 0.97l87cs needle supine pincer with 2-3 passes 20 mm depth  Prone CT junction bracket 0.25x40 mm needle sustained 30 mm depth  Suboccipitals 0.70i16se sustained 20 mm depth     No adverse events noted.

## 2019-12-30 ENCOUNTER — THERAPY VISIT (OUTPATIENT)
Dept: PHYSICAL THERAPY | Facility: CLINIC | Age: 39
End: 2019-12-30
Payer: COMMERCIAL

## 2019-12-30 ENCOUNTER — TELEPHONE (OUTPATIENT)
Dept: TRANSPLANT | Facility: CLINIC | Age: 39
End: 2019-12-30

## 2019-12-30 DIAGNOSIS — M54.2 NECK PAIN: ICD-10-CM

## 2019-12-30 PROCEDURE — 97140 MANUAL THERAPY 1/> REGIONS: CPT | Mod: GP | Performed by: PHYSICAL THERAPIST

## 2019-12-30 PROCEDURE — 97110 THERAPEUTIC EXERCISES: CPT | Mod: GP | Performed by: PHYSICAL THERAPIST

## 2019-12-30 PROCEDURE — 99207 C STAT DRY NEEDLING - IAM: CPT | Mod: 25 | Performed by: PHYSICAL THERAPIST

## 2019-12-31 NOTE — PROGRESS NOTES
Obtained informed consent.  Performed clean dry needling technique to the following musculature due to contributions to cervical pain:  Right UT and left  0.64v50oj needle supine pincer with 2-3 passes 20-30 mm depth  Right and left pectoralis 0.54w10nm needle supine pincer with 2-3 passes 20 mm depth.    Lily Campbell, PT

## 2019-12-31 NOTE — TELEPHONE ENCOUNTER
Patient Call: Voicemail  Date/Time: 12/30/19 4:00pm  Reason for call: Patient requesting to come in 12/31 for blood draw- would need orders placed for this.

## 2019-12-31 NOTE — TELEPHONE ENCOUNTER
Returned Charito's call. She did not answer. I left her a message to call me back if there was further assistance needed. She has current lab orders in our [facility] system so if she uses an [facility] lab she has orders, if she is going to an outside facility she would need to let us know.

## 2020-01-03 DIAGNOSIS — Z94.4 LIVER REPLACED BY TRANSPLANT (H): ICD-10-CM

## 2020-01-03 DIAGNOSIS — Z13.220 LIPID SCREENING: ICD-10-CM

## 2020-01-03 LAB
ALBUMIN SERPL-MCNC: 3.8 G/DL (ref 3.4–5)
ALP SERPL-CCNC: 133 U/L (ref 40–150)
ALT SERPL W P-5'-P-CCNC: 22 U/L (ref 0–50)
ANION GAP SERPL CALCULATED.3IONS-SCNC: 3 MMOL/L (ref 3–14)
AST SERPL W P-5'-P-CCNC: 24 U/L (ref 0–45)
BILIRUB DIRECT SERPL-MCNC: 0.1 MG/DL (ref 0–0.2)
BILIRUB SERPL-MCNC: 0.5 MG/DL (ref 0.2–1.3)
BUN SERPL-MCNC: 15 MG/DL (ref 7–30)
CALCIUM SERPL-MCNC: 8.8 MG/DL (ref 8.5–10.1)
CHLORIDE SERPL-SCNC: 108 MMOL/L (ref 94–109)
CO2 SERPL-SCNC: 28 MMOL/L (ref 20–32)
CREAT SERPL-MCNC: 0.66 MG/DL (ref 0.52–1.04)
ERYTHROCYTE [DISTWIDTH] IN BLOOD BY AUTOMATED COUNT: 11.6 % (ref 10–15)
GFR SERPL CREATININE-BSD FRML MDRD: >90 ML/MIN/{1.73_M2}
GLUCOSE SERPL-MCNC: 76 MG/DL (ref 70–99)
HCT VFR BLD AUTO: 40.6 % (ref 35–47)
HGB BLD-MCNC: 14.1 G/DL (ref 11.7–15.7)
MCH RBC QN AUTO: 35.3 PG (ref 26.5–33)
MCHC RBC AUTO-ENTMCNC: 34.7 G/DL (ref 31.5–36.5)
MCV RBC AUTO: 102 FL (ref 78–100)
PLATELET # BLD AUTO: 183 10E9/L (ref 150–450)
POTASSIUM SERPL-SCNC: 4.2 MMOL/L (ref 3.4–5.3)
PROT SERPL-MCNC: 7.1 G/DL (ref 6.8–8.8)
RBC # BLD AUTO: 3.99 10E12/L (ref 3.8–5.2)
SODIUM SERPL-SCNC: 139 MMOL/L (ref 133–144)
TACROLIMUS BLD-MCNC: 4.5 UG/L (ref 5–15)
TME LAST DOSE: ABNORMAL H
WBC # BLD AUTO: 5.6 10E9/L (ref 4–11)

## 2020-01-06 ENCOUNTER — THERAPY VISIT (OUTPATIENT)
Dept: PHYSICAL THERAPY | Facility: CLINIC | Age: 40
End: 2020-01-06
Payer: COMMERCIAL

## 2020-01-06 DIAGNOSIS — M54.2 NECK PAIN: ICD-10-CM

## 2020-01-06 PROCEDURE — 97110 THERAPEUTIC EXERCISES: CPT | Mod: GP | Performed by: PHYSICAL THERAPIST

## 2020-01-06 PROCEDURE — 97140 MANUAL THERAPY 1/> REGIONS: CPT | Mod: GP | Performed by: PHYSICAL THERAPIST

## 2020-01-10 ENCOUNTER — OFFICE VISIT (OUTPATIENT)
Dept: ORTHOPEDICS | Facility: CLINIC | Age: 40
End: 2020-01-10
Payer: COMMERCIAL

## 2020-01-10 VITALS
HEIGHT: 62 IN | HEART RATE: 85 BPM | BODY MASS INDEX: 22.74 KG/M2 | SYSTOLIC BLOOD PRESSURE: 114 MMHG | WEIGHT: 123.6 LBS | DIASTOLIC BLOOD PRESSURE: 76 MMHG

## 2020-01-10 DIAGNOSIS — M54.2 NECK PAIN: Primary | ICD-10-CM

## 2020-01-10 ASSESSMENT — MIFFLIN-ST. JEOR: SCORE: 1198.9

## 2020-01-10 NOTE — PROGRESS NOTES
"   Subjective:   Charito Wilcox is a 38 year old female here with continued right neck and shoulder pain.  She is not running anymore because she felt as though her pain was becoming unmanageable.  She has started getting nerve pain with dry needling so has stopped this and feels that it has improved a bit.  Nerve pain traveled up her neck and across her upper trap.  Has continued pain located around her AC joint and anterior humerus around her biceps tendon.  She received a subacromial injection this fall and that it calmed some of her pain down a bit, but did not last longer than a month.  She is unable to tell me if she had complete resolution of pain within the first few hours of getting that injection.  No significant numbness or tingling down her arm.  No color or temperature changes in her right arm compared to her left.    Looking back she feels like when PT was focusing on her first rib more she was getting more improvement in her pain management.   Many of the PT remedies are helpful for a short duration of time.  She does feel that Lily focusing on her diaphragm and other scar tissue in her trunk is helpful.      PAST MEDICAL, SOCIAL, SURGICAL AND FAMILY HISTORY: Unchanged from last visit.      ALLERGIES: She is allergic to gluten meal and milk protein extract.    CURRENT MEDICATIONS: She has a current medication list which includes the following prescription(s): alprazolam, azathioprine, clotrimazole-betamethasone, lactobacillus rhamnosus (gg), omeprazole, ondansetron, paroxetine, polyethylene glycol, tacrolimus, and ursodiol, and the following Facility-Administered Medications: ropivacaine, ropivacaine, and triamcinolone.     REVIEW OF SYSTEMS: 6 point review of systems is negative except as noted above.     Exam:   /76 (BP Location: Right arm, Patient Position: Sitting, Cuff Size: Adult Regular)   Pulse 85   Ht 1.575 m (5' 2\")   Wt 56.1 kg (123 lb 9.6 oz)   BMI 22.61 kg/m     "   CONSTITUTIONAL: healthy, alert and no distress  HEAD: Normocephalic. No masses, lesions, tenderness or abnormalities  SKIN: large scar from her liver transplant on her abdomen  GAIT: normal  NEUROLOGIC: Non-focal  PSYCHIATRIC: affect normal/bright and mentation appears normal.    MUSCULOSKELETAL:  C-spine: FROM.  Negative Spurlings, but painful in her paraspinal musculature on the right.  Significant spasm of upper trap, levator and SCM/scalenes.  Negative Gareth.    R shoulder: FROM and strength.  TTP over AC joint and biceps tendon.  Some discomfort with empty can, but no weakness. Discomfort with Speed's and Yrgason's.  Pain with cross body, Neer's and Warner.     Assessment/Plan:   Charito Wilcox is here today to follow-up on right-sided neck and shoulder pain.  She continues to struggle with this and is hoping that there are other options to help better identify and manage her pain.  She does not have any radicular symptoms coming from her cervical spine.  Reviewed cervical spine MRI which was unremarkable.  Reviewed shoulder MRI which demonstrates some mild tendinopathy at the supraspinatus tendon, as well as some fluid signal and possible cyst around the origin of the biceps tendon and proximal biceps tendon.  She is tender along her biceps tendon, as well as her AC joint.  She has significant muscle spasming over her upper trapezius, scalenes, SCM and levator musculature on the right.  Discussed with him that there are still some options that we can pursue to try and determine what is her pain generator.  She does not have any overt signs or symptoms that would be suggestive of thoracic outlet syndrome, and likely some nerve pain could be contributed by her muscle spasms.  Discussed that it would be reasonable for us to try either trigger point injections, AC joint injection, or biceps tendon sheath injection to better identify, and hopefully give her some pain relief.  Discussed that this should be  spread out so that we can have clear answers of what is working or not.  Decided to initiate trigger point injections (upper trapezius) today as she seems to have most of her pain localized to the muscle bellies of her upper trap.    Procedure;  Trigger point corticosteroid injection x5  After risks, benefits and complications of steroid injection were discussed, the patient  elected to proceed. Informed consent was obtained. Using sterile technique, the area was first prepped with an alcohol swab.    A 25 gauge, 1 1/2 inch needle was used to inject a total of 10 ml of Ropivacaine, 2 ml into each injection. 4 injections into the trapezius R and 1 injection into the levator scapulae on the RIGHT. Pt.  tolerated the procedure well without complications.  p injection instructions given.  Pt reported that ihelped improve the discomfort.     -Continue topical forms of pain management  -Take it easy today and if feeling okay tomorrow try some activities  -Return in 2 weeks to evaluate.  Could consider repeat trigger point injections with steroid, AC joint injection or biceps tendon sheath injection.  -Continue PT as she has been for now.    Patient seen and discussed with Dr. Bennett.     Nitza Owens DO  Primary Care Sports Medicine Fellow

## 2020-01-10 NOTE — LETTER
1/10/2020      RE: Charito Wilcox  [address]  [facility] [address]          Subjective:   Charito Wilcox is a 38 year old female here with continued right neck and shoulder pain.  She is not running anymore because she felt as though her pain was becoming unmanageable.  She has started getting nerve pain with dry needling so has stopped this and feels that it has improved a bit.  Nerve pain traveled up her neck and across her upper trap.  Has continued pain located around her AC joint and anterior humerus around her biceps tendon.  She received a subacromial injection this fall and that it calmed some of her pain down a bit, but did not last longer than a month.  She is unable to tell me if she had complete resolution of pain within the first few hours of getting that injection.  No significant numbness or tingling down her arm.  No color or temperature changes in her right arm compared to her left.    Looking back she feels like when PT was focusing on her first rib more she was getting more improvement in her pain management.   Many of the PT remedies are helpful for a short duration of time.  She does feel that Lily focusing on her diaphragm and other scar tissue in her trunk is helpful.      PAST MEDICAL, SOCIAL, SURGICAL AND FAMILY HISTORY: Unchanged from last visit.      ALLERGIES: She is allergic to gluten meal and milk protein extract.    CURRENT MEDICATIONS: She has a current medication list which includes the following prescription(s): alprazolam, azathioprine, clotrimazole-betamethasone, lactobacillus rhamnosus (gg), omeprazole, ondansetron, paroxetine, polyethylene glycol, tacrolimus, and ursodiol, and the following Facility-Administered Medications: ropivacaine, ropivacaine, and triamcinolone.     REVIEW OF SYSTEMS: 6 point review of systems is negative except as noted above.     Exam:   /76 (BP Location: Right arm, Patient Position: Sitting, Cuff Size: Adult Regular)    "Pulse 85   Ht 1.575 m (5' 2\")   Wt 56.1 kg (123 lb 9.6 oz)   BMI 22.61 kg/m        CONSTITUTIONAL: healthy, alert and no distress  HEAD: Normocephalic. No masses, lesions, tenderness or abnormalities  SKIN: large scar from her liver transplant on her abdomen  GAIT: normal  NEUROLOGIC: Non-focal  PSYCHIATRIC: affect normal/bright and mentation appears normal.    MUSCULOSKELETAL:  C-spine: FROM.  Negative Spurlings, but painful in her paraspinal musculature on the right.  Significant spasm of upper trap, levator and SCM/scalenes.  Negative Gareth.    R shoulder: FROM and strength.  TTP over AC joint and biceps tendon.  Some discomfort with empty can, but no weakness. Discomfort with Speed's and Yrgason's.  Pain with cross body, Neer's and Warner.     Assessment/Plan:   Charito Wilcox is here today to follow-up on right-sided neck and shoulder pain.  She continues to struggle with this and is hoping that there are other options to help better identify and manage her pain.  She does not have any radicular symptoms coming from her cervical spine.  Reviewed cervical spine MRI which was unremarkable.  Reviewed shoulder MRI which demonstrates some mild tendinopathy at the supraspinatus tendon, as well as some fluid signal and possible cyst around the origin of the biceps tendon and proximal biceps tendon.  She is tender along her biceps tendon, as well as her AC joint.  She has significant muscle spasming over her upper trapezius, scalenes, SCM and levator musculature on the right.  Discussed with him that there are still some options that we can pursue to try and determine what is her pain generator.  She does not have any overt signs or symptoms that would be suggestive of thoracic outlet syndrome, and likely some nerve pain could be contributed by her muscle spasms.  Discussed that it would be reasonable for us to try either trigger point injections, AC joint injection, or biceps tendon sheath injection to better " identify, and hopefully give her some pain relief.  Discussed that this should be spread out so that we can have clear answers of what is working or not.  Decided to initiate trigger point injections (upper trapezius) today as she seems to have most of her pain localized to the muscle bellies of her upper trap.    Procedure;  Trigger point corticosteroid injection x5  After risks, benefits and complications of steroid injection were discussed, the patient  elected to proceed. Informed consent was obtained. Using sterile technique, the area was first prepped with an alcohol swab.    A 25 gauge, 1 1/2 inch needle was used to inject a total of 10 ml of Ropivacaine, 2 ml into each injection. 4 injections into the trapezius R and 1 injection into the levator scapulae on the RIGHT. Pt.  tolerated the procedure well without complications.  p injection instructions given.  Pt reported that ihelped improve the discomfort.     -Continue topical forms of pain management  -Take it easy today and if feeling okay tomorrow try some activities  -Return in 2 weeks to evaluate.  Could consider repeat trigger point injections with steroid, AC joint injection or biceps tendon sheath injection.  -Continue PT as she has been for now.    Patient seen and discussed with Dr. Bennett.     Nitza Owens DO  Primary Care Sports Medicine Fellow      Attending Note:   I have personally examined this patient and have reviewed the clinical presentation and progress note with the fellow. I agree with the treatment plan as outlined. The plan was formulated with the fellow on the day of the patient's visit. I have reviewed all imaging with the fellow and agree with the findings in the documentation. I have supervised the trigger point injections.     Zulma Bennett MD, CAQ, CCD  [facility]  Sports Medicine and Bone Health      Zulma Bennett MD

## 2020-01-10 NOTE — NURSING NOTE
[address] [address]  Dept: [phone]  ______________________________________________________________________________    Patient: Charito Wilcox   : 1980   MRN: [id number]   January 10, 2020    INVASIVE PROCEDURE SAFETY CHECKLIST    Date: 1/10/2020  Procedure: Right upper trapezius trigger point ropivacaine injection  Patient Name: Charito Wilcox  MRN: [id number]  YOB: 1980    Action: Complete sections as appropriate. Any discrepancy results in a HARD COPY until resolved.     PRE PROCEDURE:  Patient ID verified with 2 identifiers (name and  or MRN): Yes  Procedure and site verified with patient/designee (when able): Yes  Accurate consent documentation in medical record: Yes  H&P (or appropriate assessment) documented in medical record: Yes  H&P must be up to 20 days prior to procedure and updates within 24 hours of procedure as applicable: NA  Relevant diagnostic and radiology test results appropriately labeled and displayed as applicable: Yes  Procedure site(s) marked with provider initials: NA    TIMEOUT:  Time-Out performed immediately prior to starting procedure, including verbal and active participation of all team members addressing the following:Yes  * Correct patient identify  * Confirmed that the correct side and site are marked  * An accurate procedure consent form  * Agreement on the procedure to be done  * Correct patient position  * Relevant images and results are properly labeled and appropriately displayed  * The need to administer antibiotics or fluids for irrigation purposes during the procedure as applicable   * Safety precautions based on patient history or medication use    DURING PROCEDURE: Verification of correct person, site, and procedures any time the responsibility for care of the patient is transferred to another member of the care team.       Prior to injection, verified patient identity  using patient's name and date of birth.  Due to injection administration, patient instructed to remain in clinic for 15 minutes  afterwards, and to report any adverse reaction to me immediately.    Trigger point injection was performed.     Drug Amount Wasted:  Yes: 10 mg/ml ropivacaine  Vial/Syringe: Single dose vial  Expiration Date:  03/22  10 mL of ropivacaine 0.5% used.  NDC: 63250-610-78  LOT: [id number]    Stacy Espinoza, ATC  January 10, 2020

## 2020-01-10 NOTE — LETTER
1/10/2020      RE: Charito Wilcox  [address]  [facility] [address]          Subjective:   Charito Wilcox is a 38 year old female here with continued right neck and shoulder pain.  She is not running anymore because she felt as though her pain was becoming unmanageable.  She has started getting nerve pain with dry needling so has stopped this and feels that it has improved a bit.  Nerve pain traveled up her neck and across her upper trap.  Has continued pain located around her AC joint and anterior humerus around her biceps tendon.  She received a subacromial injection this fall and that it calmed some of her pain down a bit, but did not last longer than a month.  She is unable to tell me if she had complete resolution of pain within the first few hours of getting that injection.  No significant numbness or tingling down her arm.  No color or temperature changes in her right arm compared to her left.    Looking back she feels like when PT was focusing on her first rib more she was getting more improvement in her pain management.   Many of the PT remedies are helpful for a short duration of time.  She does feel that Lily focusing on her diaphragm and other scar tissue in her trunk is helpful.      PAST MEDICAL, SOCIAL, SURGICAL AND FAMILY HISTORY: Unchanged from last visit.      ALLERGIES: She is allergic to gluten meal and milk protein extract.    CURRENT MEDICATIONS: She has a current medication list which includes the following prescription(s): alprazolam, azathioprine, clotrimazole-betamethasone, lactobacillus rhamnosus (gg), omeprazole, ondansetron, paroxetine, polyethylene glycol, tacrolimus, and ursodiol, and the following Facility-Administered Medications: ropivacaine, ropivacaine, and triamcinolone.     REVIEW OF SYSTEMS: 6 point review of systems is negative except as noted above.     Exam:   /76 (BP Location: Right arm, Patient Position: Sitting, Cuff Size: Adult Regular)   Pulse  "85   Ht 1.575 m (5' 2\")   Wt 56.1 kg (123 lb 9.6 oz)   BMI 22.61 kg/m        CONSTITUTIONAL: healthy, alert and no distress  HEAD: Normocephalic. No masses, lesions, tenderness or abnormalities  SKIN: large scar from her liver transplant on her abdomen  GAIT: normal  NEUROLOGIC: Non-focal  PSYCHIATRIC: affect normal/bright and mentation appears normal.    MUSCULOSKELETAL:  C-spine: FROM.  Negative Spurlings, but painful in her paraspinal musculature on the right.  Significant spasm of upper trap, levator and SCM/scalenes.  Negative Gareth.    R shoulder: FROM and strength.  TTP over AC joint and biceps tendon.  Some discomfort with empty can, but no weakness. Discomfort with Speed's and Yrgason's.  Pain with cross body, Neer's and Warner.     Assessment/Plan:   Charito Wilcox is here today to follow-up on right-sided neck and shoulder pain.  She continues to struggle with this and is hoping that there are other options to help better identify and manage her pain.  She does not have any radicular symptoms coming from her cervical spine.  Reviewed cervical spine MRI which was unremarkable.  Reviewed shoulder MRI which demonstrates some mild tendinopathy at the supraspinatus tendon, as well as some fluid signal and possible cyst around the origin of the biceps tendon and proximal biceps tendon.  She is tender along her biceps tendon, as well as her AC joint.  She has significant muscle spasming over her upper trapezius, scalenes, SCM and levator musculature on the right.  Discussed with him that there are still some options that we can pursue to try and determine what is her pain generator.  She does not have any overt signs or symptoms that would be suggestive of thoracic outlet syndrome, and likely some nerve pain could be contributed by her muscle spasms.  Discussed that it would be reasonable for us to try either trigger point injections, AC joint injection, or biceps tendon sheath injection to better " identify, and hopefully give her some pain relief.  Discussed that this should be spread out so that we can have clear answers of what is working or not.  Decided to initiate trigger point injections (upper trapezius) today as she seems to have most of her pain localized to the muscle bellies of her upper trap.    Procedure;  Trigger point corticosteroid injection x5  After risks, benefits and complications of steroid injection were discussed, the patient  elected to proceed. Informed consent was obtained. Using sterile technique, the area was first prepped with an alcohol swab.    A 25 gauge, 1 1/2 inch needle was used to inject a total of 10 ml of Ropivacaine, 2 ml into each injection. 4 injections into the trapezius R and 1 injection into the levator scapulae on the RIGHT. Pt.  tolerated the procedure well without complications.  p injection instructions given.  Pt reported that ihelped improve the discomfort.     -Continue topical forms of pain management  -Take it easy today and if feeling okay tomorrow try some activities  -Return in 2 weeks to evaluate.  Could consider repeat trigger point injections with steroid, AC joint injection or biceps tendon sheath injection.  -Continue PT as she has been for now.    Patient seen and discussed with Dr. Bennett.     Nitza Owens DO  Primary Care Sports Medicine Fellow      Attending Note:   I have personally examined this patient and have reviewed the clinical presentation and progress note with the fellow. I agree with the treatment plan as outlined. The plan was formulated with the fellow on the day of the patient's visit. I have reviewed all imaging with the fellow and agree with the findings in the documentation. I have supervised the trigger point injections.     Zulma Bennett MD, CAQ, CCD  [facility]  Sports Medicine and Bone Health    Zulma Bennett MD

## 2020-01-13 RX ORDER — ROPIVACAINE HYDROCHLORIDE 2 MG/ML
10 INJECTION, SOLUTION EPIDURAL; INFILTRATION; PERINEURAL ONCE
Status: CANCELLED | OUTPATIENT
Start: 2020-01-13 | End: 2020-01-13

## 2020-01-18 RX ORDER — ROPIVACAINE HYDROCHLORIDE 5 MG/ML
10 INJECTION, SOLUTION EPIDURAL; INFILTRATION; PERINEURAL ONCE
Status: DISCONTINUED | OUTPATIENT
Start: 2020-01-18 | End: 2020-09-28

## 2020-01-19 NOTE — PROGRESS NOTES
Attending Note:   I have personally examined this patient and have reviewed the clinical presentation and progress note with the fellow. I agree with the treatment plan as outlined. The plan was formulated with the fellow on the day of the patient's visit. I have reviewed all imaging with the fellow and agree with the findings in the documentation. I have supervised the trigger point injections.     Zulma Bennett MD, CAQ, CCD  [facility]  Sports Medicine and Bone Health

## 2020-01-20 ENCOUNTER — OFFICE VISIT (OUTPATIENT)
Dept: URGENT CARE | Facility: URGENT CARE | Age: 40
End: 2020-01-20
Payer: COMMERCIAL

## 2020-01-20 ENCOUNTER — HOSPITAL ENCOUNTER (EMERGENCY)
Facility: CLINIC | Age: 40
Discharge: HOME | End: 2020-01-20
Attending: EMERGENCY MEDICINE | Admitting: EMERGENCY MEDICINE
Payer: COMMERCIAL

## 2020-01-20 VITALS
BODY MASS INDEX: 22.5 KG/M2 | DIASTOLIC BLOOD PRESSURE: 79 MMHG | OXYGEN SATURATION: 100 % | HEART RATE: 67 BPM | WEIGHT: 127 LBS | HEIGHT: 63 IN | RESPIRATION RATE: 16 BRPM | SYSTOLIC BLOOD PRESSURE: 119 MMHG | TEMPERATURE: 98.7 F

## 2020-01-20 VITALS
HEART RATE: 91 BPM | WEIGHT: 122.4 LBS | DIASTOLIC BLOOD PRESSURE: 71 MMHG | OXYGEN SATURATION: 99 % | TEMPERATURE: 99.2 F | BODY MASS INDEX: 22.39 KG/M2 | SYSTOLIC BLOOD PRESSURE: 111 MMHG | RESPIRATION RATE: 16 BRPM

## 2020-01-20 DIAGNOSIS — D84.9 IMMUNOSUPPRESSED STATUS (H): ICD-10-CM

## 2020-01-20 DIAGNOSIS — N76.0 VAGINITIS AND VULVOVAGINITIS: ICD-10-CM

## 2020-01-20 DIAGNOSIS — R30.0 DYSURIA: Primary | ICD-10-CM

## 2020-01-20 DIAGNOSIS — B35.3 TINEA PEDIS OF LEFT FOOT: ICD-10-CM

## 2020-01-20 DIAGNOSIS — N89.8 VAGINAL DISCHARGE: ICD-10-CM

## 2020-01-20 DIAGNOSIS — Z94.4 LIVER REPLACED BY TRANSPLANT (H): ICD-10-CM

## 2020-01-20 LAB
ALBUMIN SERPL-MCNC: 4.4 G/DL (ref 3.4–5)
ALBUMIN UR-MCNC: NEGATIVE MG/DL
ALBUMIN UR-MCNC: NEGATIVE MG/DL
ALP SERPL-CCNC: 152 U/L (ref 40–150)
ALT SERPL W P-5'-P-CCNC: 25 U/L (ref 0–50)
ANION GAP SERPL CALCULATED.3IONS-SCNC: 6 MMOL/L (ref 3–14)
APPEARANCE UR: CLEAR
APPEARANCE UR: CLEAR
AST SERPL W P-5'-P-CCNC: 35 U/L (ref 0–45)
BACTERIA #/AREA URNS HPF: ABNORMAL /HPF
BASOPHILS # BLD AUTO: 0.1 10E9/L (ref 0–0.2)
BASOPHILS NFR BLD AUTO: 0.8 %
BILIRUB SERPL-MCNC: 0.5 MG/DL (ref 0.2–1.3)
BILIRUB UR QL STRIP: NEGATIVE
BILIRUB UR QL STRIP: NEGATIVE
BUN SERPL-MCNC: 15 MG/DL (ref 7–30)
CALCIUM SERPL-MCNC: 8.9 MG/DL (ref 8.5–10.1)
CHLORIDE SERPL-SCNC: 106 MMOL/L (ref 94–109)
CO2 SERPL-SCNC: 27 MMOL/L (ref 20–32)
COLOR UR AUTO: NORMAL
COLOR UR AUTO: YELLOW
CREAT SERPL-MCNC: 0.7 MG/DL (ref 0.52–1.04)
DIFFERENTIAL METHOD BLD: ABNORMAL
EOSINOPHIL # BLD AUTO: 0.2 10E9/L (ref 0–0.7)
EOSINOPHIL NFR BLD AUTO: 2.6 %
ERYTHROCYTE [DISTWIDTH] IN BLOOD BY AUTOMATED COUNT: 11.4 % (ref 10–15)
GFR SERPL CREATININE-BSD FRML MDRD: >90 ML/MIN/{1.73_M2}
GLUCOSE SERPL-MCNC: 105 MG/DL (ref 70–99)
GLUCOSE UR STRIP-MCNC: NEGATIVE MG/DL
GLUCOSE UR STRIP-MCNC: NEGATIVE MG/DL
HCG UR QL: NEGATIVE
HCT VFR BLD AUTO: 43.5 % (ref 35–47)
HGB BLD-MCNC: 15.1 G/DL (ref 11.7–15.7)
HGB UR QL STRIP: NEGATIVE
HGB UR QL STRIP: NEGATIVE
IMM GRANULOCYTES # BLD: 0 10E9/L (ref 0–0.4)
IMM GRANULOCYTES NFR BLD: 0.3 %
INTERNAL QC OK POCT: YES
KETONES UR STRIP-MCNC: NEGATIVE MG/DL
KETONES UR STRIP-MCNC: NEGATIVE MG/DL
LEUKOCYTE ESTERASE UR QL STRIP: ABNORMAL
LEUKOCYTE ESTERASE UR QL STRIP: NEGATIVE
LIPASE SERPL-CCNC: 277 U/L (ref 73–393)
LYMPHOCYTES # BLD AUTO: 2.2 10E9/L (ref 0.8–5.3)
LYMPHOCYTES NFR BLD AUTO: 30.1 %
MCH RBC QN AUTO: 35 PG (ref 26.5–33)
MCHC RBC AUTO-ENTMCNC: 34.7 G/DL (ref 31.5–36.5)
MCV RBC AUTO: 101 FL (ref 78–100)
MONOCYTES # BLD AUTO: 0.6 10E9/L (ref 0–1.3)
MONOCYTES NFR BLD AUTO: 8.4 %
NEUTROPHILS # BLD AUTO: 4.3 10E9/L (ref 1.6–8.3)
NEUTROPHILS NFR BLD AUTO: 57.8 %
NITRATE UR QL: NEGATIVE
NITRATE UR QL: NEGATIVE
NON-SQ EPI CELLS #/AREA URNS LPF: ABNORMAL /LPF
NRBC # BLD AUTO: 0 10*3/UL
NRBC BLD AUTO-RTO: 0 /100
PH UR STRIP: 6 PH (ref 5–7)
PH UR STRIP: 6.5 PH (ref 5–7)
PLATELET # BLD AUTO: 209 10E9/L (ref 150–450)
POTASSIUM SERPL-SCNC: 3.8 MMOL/L (ref 3.4–5.3)
PROT SERPL-MCNC: 8.2 G/DL (ref 6.8–8.8)
RBC # BLD AUTO: 4.32 10E12/L (ref 3.8–5.2)
RBC #/AREA URNS AUTO: <1 /HPF (ref 0–2)
RBC #/AREA URNS AUTO: ABNORMAL /HPF
SODIUM SERPL-SCNC: 139 MMOL/L (ref 133–144)
SOURCE: ABNORMAL
SOURCE: NORMAL
SP GR UR STRIP: 1 (ref 1–1.03)
SP GR UR STRIP: 1.01 (ref 1–1.03)
SPECIMEN SOURCE: NORMAL
SPECIMEN SOURCE: NORMAL
SQUAMOUS #/AREA URNS AUTO: 1 /HPF (ref 0–1)
UROBILINOGEN UR STRIP-ACNC: 0.2 EU/DL (ref 0.2–1)
UROBILINOGEN UR STRIP-MCNC: NORMAL MG/DL (ref 0–2)
WBC # BLD AUTO: 7.4 10E9/L (ref 4–11)
WBC #/AREA URNS AUTO: 0 /HPF (ref 0–5)
WBC #/AREA URNS AUTO: ABNORMAL /HPF
WET PREP SPEC: NORMAL

## 2020-01-20 PROCEDURE — 87210 SMEAR WET MOUNT SALINE/INK: CPT | Performed by: EMERGENCY MEDICINE

## 2020-01-20 PROCEDURE — 87591 N.GONORRHOEAE DNA AMP PROB: CPT | Performed by: EMERGENCY MEDICINE

## 2020-01-20 PROCEDURE — 25800030 ZZH RX IP 258 OP 636: Performed by: EMERGENCY MEDICINE

## 2020-01-20 PROCEDURE — 81025 URINE PREGNANCY TEST: CPT | Performed by: EMERGENCY MEDICINE

## 2020-01-20 PROCEDURE — 99215 OFFICE O/P EST HI 40 MIN: CPT | Performed by: PHYSICIAN ASSISTANT

## 2020-01-20 PROCEDURE — 96360 HYDRATION IV INFUSION INIT: CPT | Performed by: EMERGENCY MEDICINE

## 2020-01-20 PROCEDURE — 99284 EMERGENCY DEPT VISIT MOD MDM: CPT | Mod: Z6 | Performed by: EMERGENCY MEDICINE

## 2020-01-20 PROCEDURE — 87210 SMEAR WET MOUNT SALINE/INK: CPT | Performed by: PHYSICIAN ASSISTANT

## 2020-01-20 PROCEDURE — 81001 URINALYSIS AUTO W/SCOPE: CPT | Performed by: EMERGENCY MEDICINE

## 2020-01-20 PROCEDURE — 83690 ASSAY OF LIPASE: CPT | Performed by: EMERGENCY MEDICINE

## 2020-01-20 PROCEDURE — 81001 URINALYSIS AUTO W/SCOPE: CPT | Performed by: PHYSICIAN ASSISTANT

## 2020-01-20 PROCEDURE — 96361 HYDRATE IV INFUSION ADD-ON: CPT | Performed by: EMERGENCY MEDICINE

## 2020-01-20 PROCEDURE — 87491 CHLMYD TRACH DNA AMP PROBE: CPT | Performed by: EMERGENCY MEDICINE

## 2020-01-20 PROCEDURE — 87040 BLOOD CULTURE FOR BACTERIA: CPT | Performed by: INTERNAL MEDICINE

## 2020-01-20 PROCEDURE — 80053 COMPREHEN METABOLIC PANEL: CPT | Performed by: EMERGENCY MEDICINE

## 2020-01-20 PROCEDURE — 87086 URINE CULTURE/COLONY COUNT: CPT | Performed by: PHYSICIAN ASSISTANT

## 2020-01-20 PROCEDURE — 85025 COMPLETE CBC W/AUTO DIFF WBC: CPT | Performed by: EMERGENCY MEDICINE

## 2020-01-20 PROCEDURE — 99284 EMERGENCY DEPT VISIT MOD MDM: CPT | Mod: 25 | Performed by: EMERGENCY MEDICINE

## 2020-01-20 RX ORDER — BETAMETHASONE DIPROPIONATE 0.5 MG/G
CREAM TOPICAL 2 TIMES DAILY
Qty: 45 G | Refills: 0 | Status: SHIPPED | OUTPATIENT
Start: 2020-01-20 | End: 2024-04-29

## 2020-01-20 RX ORDER — OXYCODONE HYDROCHLORIDE 5 MG/1
5 TABLET ORAL EVERY 6 HOURS PRN
Qty: 8 TABLET | Refills: 0 | Status: SHIPPED | OUTPATIENT
Start: 2020-01-20 | End: 2020-02-06

## 2020-01-20 RX ADMIN — SODIUM CHLORIDE 1000 ML: 9 INJECTION, SOLUTION INTRAVENOUS at 15:29

## 2020-01-20 ASSESSMENT — ENCOUNTER SYMPTOMS
DIZZINESS: 0
PALPITATIONS: 0
ENDOCRINE NEGATIVE: 1
DYSURIA: 1
VOMITING: 0
FLANK PAIN: 1
SORE THROAT: 0
LIGHT-HEADEDNESS: 0
MYALGIAS: 0
HEMATURIA: 0
NECK STIFFNESS: 0
POLYDIPSIA: 0
NEUROLOGICAL NEGATIVE: 1
ACTIVITY CHANGE: 0
CARDIOVASCULAR NEGATIVE: 1
HEADACHES: 0
NAUSEA: 0
FEVER: 1
FREQUENCY: 1
CHILLS: 1
SHORTNESS OF BREATH: 0
RESPIRATORY NEGATIVE: 1
DIARRHEA: 0
ADENOPATHY: 0
FEVER: 0
FLANK PAIN: 1
RHINORRHEA: 0
ABDOMINAL PAIN: 0
NECK PAIN: 0
GASTROINTESTINAL NEGATIVE: 1
WEAKNESS: 0
COUGH: 0
FATIGUE: 0

## 2020-01-20 ASSESSMENT — MIFFLIN-ST. JEOR: SCORE: 1230.2

## 2020-01-20 NOTE — ED AVS SNAPSHOT
[facility], [place], Emergency Department  [address] [place] [id number]  Phone:  [phone]                                    Charito Wilcox   MRN: [id number]    Department:  [facility], Emergency Department   Date of Visit:  1/20/2020           After Visit Summary Signature Page    I have received my discharge instructions, and my questions have been answered. I have discussed any challenges I see with this plan with the nurse or doctor.    ..........................................................................................................................................  Patient/Patient Representative Signature      ..........................................................................................................................................  Patient Representative Print Name and Relationship to Patient    ..................................................               ................................................  Date                                   Time    ..........................................................................................................................................  Reviewed by Signature/Title    ...................................................              ..............................................  Date                                               Time          22EPIC Rev 08/18

## 2020-01-20 NOTE — ED NOTES
"ED Triage Provider Note  [facility]  Encounter Date: Jan 20, 2020    History:  Chief Complaint   Patient presents with     Flank Pain     bilateral     Charito Wilcox is a 39 year old female who presents to the ED with pelvic pain, vaginal dryness and burning, and flank pain L>R. She as history of liver transplant at age 20. She was seen in urgent care. She had normal UA. She had temperature of 99.5. She did not have pelvic exam. She has chills. She has no nausea, vomiting, diarrhea, cough, shortness of breath, abdominal distension, leg pain or swelling..       Review of Systems:  Flank pain and pelvic pain.    Exam:  /84   Temp 99  F (37.2  C) (Oral)   Ht 1.6 m (5' 3\")   Wt 57.6 kg (127 lb)   LMP 01/05/2020 (Exact Date)   SpO2 100%   BMI 22.50 kg/m    General: No acute distress. Appears stated age.   Cardio: Regular rate, extremities well perfused  Resp: Normal work of breathing, grossly normal respiratory rate. Lungs clear.  Neuro: Alert. CN II-XII grossly intact. Grossly intact strength.   Mild left CVA tenderness and mild suprapubic tenderness.    Medical Decision Making:  Patient arriving to the ED with problem as above. A medical screening exam was performed. CBC, Chemistry, BC urine orders initiated from Triage. The patient is appropriate to wait in triage.      AYAD VELAZQUEZ MD on 1/20/2020 at 4:00 PM     Ayad Velazquez MD  01/20/20 1603    "

## 2020-01-20 NOTE — ED TRIAGE NOTES
Presents with bilateral flank pain, dysuria, urinary frequency since Friday. Patient reports she's also had intermittent low grade fevers. Hx liver transplant in 2000.

## 2020-01-20 NOTE — PROGRESS NOTES
Chief Complaint:    Chief Complaint   Patient presents with     UTI     started to get burning sensation yesterday morning, itchy, cramping, and chills-did used some cream last night for yeast      Flank Pain     both sides pain started yesterday        HPI:  Charito Wilcox is a 39 year old female who has symptoms of bilateral flank pain, fever, chills, dysuria, frequency and burning for 1 day(s).  she denies back pain, nausea, vomiting, fever and chills, flank pain, vaginal discharge, and vaginal odor.    Patient has a complicated medical Hx including Liver transplant and immunocompromised.    She denies any dizziness, chest pain, nausea, vomiting, or diarrhea.    ROS:      Review of Systems   Constitutional: Positive for chills and fever. Negative for activity change and fatigue.   HENT: Negative for congestion, ear pain, rhinorrhea and sore throat.    Respiratory: Negative.  Negative for cough and shortness of breath.    Cardiovascular: Negative.  Negative for chest pain and palpitations.   Gastrointestinal: Negative.  Negative for abdominal pain, diarrhea, nausea and vomiting.   Endocrine: Negative.  Negative for polydipsia and polyuria.   Genitourinary: Positive for dysuria, flank pain, frequency and urgency. Negative for hematuria, pelvic pain, vaginal discharge and vaginal pain.   Musculoskeletal: Negative for myalgias, neck pain and neck stiffness.   Allergic/Immunologic: Negative for immunocompromised state.   Neurological: Negative.  Negative for dizziness, weakness, light-headedness and headaches.   Hematological: Negative for adenopathy.       Family History   Family History   Problem Relation Age of Onset     Coronary Artery Disease Mother      Hyperlipidemia Mother      Osteoporosis Mother      Arthritis Mother      Depression Maternal Grandmother      Anxiety Disorder Maternal Grandmother      Heart Failure Maternal Grandmother      Hypertension Maternal Grandfather      Cerebrovascular Disease  Paternal Grandmother      Breast Cancer Paternal Grandmother      Gastrointestinal Disease No family hx of      C.A.D. No family hx of      Cancer - colorectal No family hx of      Prostate Cancer No family hx of      Alcohol/Drug No family hx of         Problem history  Patient Active Problem List   Diagnosis     Liver replaced by transplant (H)     Esophageal reflux     CARDIOVASCULAR SCREENING; LDL GOAL LESS THAN 160     Panic disorder     IBS (irritable bowel syndrome)     Immunosuppressed status (H)     PMS (premenstrual syndrome)     Localized superficial swelling, mass, or lump     Abdominal pain     Anxiety     Irritable bowel syndrome     Neck pain        Allergies  Allergies   Allergen Reactions     Gluten Meal Nausea and Vomiting     Milk Protein Extract Nausea and Vomiting        Social History  Social History     Socioeconomic History     Marital status:      Spouse name: Not on file     Number of children: Not on file     Years of education: Not on file     Highest education level: Not on file   Occupational History     Occupation:    Social Needs     Financial resource strain: Not on file     Food insecurity:     Worry: Not on file     Inability: Not on file     Transportation needs:     Medical: Not on file     Non-medical: Not on file   Tobacco Use     Smoking status: Never Smoker     Smokeless tobacco: Never Used   Substance and Sexual Activity     Alcohol use: Yes     Comment: glass of wine up to once a month     Drug use: No     Sexual activity: Yes     Partners: Male     Birth control/protection: Female Surgical     Comment: tubal ligation   Lifestyle     Physical activity:     Days per week: Not on file     Minutes per session: Not on file     Stress: Not on file   Relationships     Social connections:     Talks on phone: Not on file     Gets together: Not on file     Attends Mu-ism service: Not on file     Active member of club or organization: Not on file     Attends  meetings of clubs or organizations: Not on file     Relationship status: Not on file     Intimate partner violence:     Fear of current or ex partner: Not on file     Emotionally abused: Not on file     Physically abused: Not on file     Forced sexual activity: Not on file   Other Topics Concern     Parent/sibling w/ CABG, MI or angioplasty before 65F 55M? No   Social History Narrative    Marathons, cross country skis, exercises 5 times a week. Two children (10 and 8 years old).  (male). Part time at [employer].        Current Meds    Current Outpatient Medications:      ALPRAZolam (XANAX) 0.5 MG tablet, Take 1 tablet (0.5 mg) by mouth 3 times daily as needed for anxiety 1 month supply, Disp: 30 tablet, Rfl: 0     azaTHIOprine (IMURAN) 50 MG tablet, Take 1 tablet (50 mg) by mouth daily, Disp: 90 tablet, Rfl: 3     lactobacillus rhamnosus, GG, (CULTURELL) capsule, Take 1 capsule by mouth daily, Disp: , Rfl:      omeprazole (PRILOSEC) 40 MG DR capsule, TAKE 1 CAPSULE BY MOUTH TWICE DAILY BEFORE MEALS, TAKE 30-60 MINUTES PRIOR TO A MEAL, Disp: 60 capsule, Rfl: 11     ondansetron (ZOFRAN ODT) 4 MG ODT tab, Take 1-2 tablets (4-8 mg) by mouth 3 times daily as needed for nausea, Disp: 20 tablet, Rfl: 0     PARoxetine (PAXIL) 40 MG tablet, TAKE ONE AND ONE-HALF TABLET BY MOUTH AT BEDTIME, Disp: 135 tablet, Rfl: 0     polyethylene glycol (MIRALAX) powder, Take 17 g (1 capful) by mouth daily, Disp: 510 g, Rfl: 1     tacrolimus (GENERIC EQUIVALENT) 1 MG capsule, Take 1 capsule (1 mg) by mouth every 12 hours, Disp: 180 capsule, Rfl: 3     ursodiol (ACTIGALL) 300 MG capsule, Take 1 capsule (300 mg) by mouth 2 times daily, Disp: 180 capsule, Rfl: 3     clotrimazole-betamethasone (LOTRISONE) 1-0.05 % external cream, Apply a thin layer to rash on right ankle twice daily for up to 6 months for fungal infection (Patient not taking: Reported on 1/20/2020), Disp: 45 g, Rfl: 3    Current Facility-Administered Medications:       ropivacaine (NAROPIN) injection 10 mL, 10 mL, Intramuscular, Once, Zulma Bennett MD     ropivacaine (NAROPIN) injection 2 mL, 2 mL, , , William Castillo MD, 2 mL at 10/29/19 0928     ropivacaine (NAROPIN) injection 3 mL, 3 mL, , , William Castillo MD, 3 mL at 10/29/19 0928     triamcinolone (KENALOG-40) injection 40 mg, 40 mg, , , William Castillo MD, 40 mg at 10/29/19 0928     OBJECTIVE     Vital signs noted and reviewed by Nato Gómez PA-C  /71 (BP Location: Left arm, Patient Position: Sitting, Cuff Size: Adult Regular)   Pulse 91   Temp 99.2  F (37.3  C) (Oral)   Resp 16   Wt 55.5 kg (122 lb 6.4 oz)   LMP 01/05/2020 (Exact Date)   SpO2 99%   BMI 22.39 kg/m       Physical Exam  Vitals signs and nursing note reviewed.   Constitutional:       General: She is not in acute distress.     Appearance: Normal appearance. She is well-developed. She is not ill-appearing, toxic-appearing or diaphoretic.   HENT:      Head: Normocephalic and atraumatic.      Right Ear: Tympanic membrane and external ear normal.      Left Ear: Tympanic membrane and external ear normal.   Eyes:      Pupils: Pupils are equal, round, and reactive to light.   Neck:      Musculoskeletal: Normal range of motion and neck supple.   Cardiovascular:      Rate and Rhythm: Normal rate and regular rhythm.      Heart sounds: Normal heart sounds. No murmur. No friction rub. No gallop.    Pulmonary:      Effort: Pulmonary effort is normal. No respiratory distress.      Breath sounds: Normal breath sounds. No wheezing or rales.   Chest:      Chest wall: No tenderness.   Abdominal:      General: Bowel sounds are normal. There is no distension.      Palpations: Abdomen is soft. Abdomen is not rigid. There is no mass.      Tenderness: There is no abdominal tenderness. There is no guarding or rebound. Negative signs include Sun's sign and McBurney's sign.   Lymphadenopathy:      Cervical: No cervical  adenopathy.   Skin:     General: Skin is warm and dry.   Neurological:      Mental Status: She is alert and oriented to person, place, and time.      Cranial Nerves: No cranial nerve deficit.      Deep Tendon Reflexes: Reflexes are normal and symmetric.   Psychiatric:         Behavior: Behavior normal. Behavior is cooperative.         Thought Content: Thought content normal.         Judgment: Judgment normal.               Labs:     Results for orders placed or performed in visit on 01/20/20   *UA reflex to Microscopic and Culture ([place] and [facility] (except [place] and [place])     Status: Abnormal   Result Value Ref Range    Color Urine Yellow     Appearance Urine Clear     Glucose Urine Negative NEG^Negative mg/dL    Bilirubin Urine Negative NEG^Negative    Ketones Urine Negative NEG^Negative mg/dL    Specific Gravity Urine 1.010 1.003 - 1.035    Blood Urine Negative NEG^Negative    pH Urine 6.0 5.0 - 7.0 pH    Protein Albumin Urine Negative NEG^Negative mg/dL    Urobilinogen Urine 0.2 0.2 - 1.0 EU/dL    Nitrite Urine Negative NEG^Negative    Leukocyte Esterase Urine Trace (A) NEG^Negative    Source Midstream Urine    Urine Microscopic     Status: Abnormal   Result Value Ref Range    WBC Urine 0 - 5 OTO5^0 - 5 /HPF    RBC Urine O - 2 OTO2^O - 2 /HPF    Squamous Epithelial /LPF Urine Few FEW^Few /LPF    Bacteria Urine Few (A) NEG^Negative /HPF   Wet prep     Status: None   Result Value Ref Range    Specimen Description Vagina     Wet Prep WBC'S seen  Rare       Wet Prep No Trichomonas seen     Wet Prep No clue cells seen     Wet Prep No yeast seen            ASSESSMENT     1. Dysuria    2. Vaginal discharge    3. Immunosuppressed status (H)    4. Liver replaced by transplant (H)           PLAN  Patient presents for bilateral flank pain, fever, chills, dysuria, and frequency.  She has a complicated medical Hx including liver transplant and immunosuppressed status.  Urinalysis discussed with patient.   This was unremarkable for UTI.  We will call with culture results if positive for growth..  Wet prep was negative  Fever, chills and flank pain in immunocompromised patient.  At this time, I cannot rule out early sepsis, or pyelonephritis.  Patient instructed to go to the ED now for further evaluation.    Patient refused EMS transport and will go by private vehicle.  Patient verbalized understanding and agreed with this plan.  Patient discharged in stable condition.          Nato Gómez PA-C  1/20/2020, 1:59 PM

## 2020-01-21 LAB
BACTERIA SPEC CULT: NORMAL
C TRACH DNA SPEC QL NAA+PROBE: NEGATIVE
N GONORRHOEA DNA SPEC QL NAA+PROBE: NEGATIVE
SPECIMEN SOURCE: NORMAL

## 2020-01-21 NOTE — ED PROVIDER NOTES
"    [place] EMERGENCY DEPARTMENT ([facility])  January 20, 2020  History     Chief Complaint   Patient presents with     Flank Pain     bilateral     The history is provided by the patient and medical records.     Charito Wilcox is a 39 year old female with a past medical history for cholecystectomy liver replaced by transplant, EGD (02/06/2015), immunosuppressed status, esophageal reflux, and IBS who presents to the Emergency Department for complaints of left flank pain and pelvic pain.     Per patient's chart review, the patient was seen at [facility] today for dysuria. Of note, patient was negative for UTI. Wet prep was negative. Patient was advised to go to the ED for further evaluation.     Here, patient complains of left sided flank pain that started last night with associated vaginal dryness and vaginal burning sensation. She denies vaginal bleeding. Patient reports of pelvic pain as well that has been intermittent for the past month. She is afebrile. Patient states she has not taken anything for the pain. States she can not take Tylenol due to liver problems and can not take ibuprofen either due to side effects of gatrointestinal ulcer. Patient endorses of previous pelvic pain the past where she was prescribed some full moon cream that improved her symptoms. Patient states she was diagnosed with acute liver failure (transplant recevied) about 20 years ago from \"unknown causes\". Patient states she is still following up with her liver transplant team about every 6 months.     Past Medical History:   Diagnosis Date     Acute pancreatitis 9/9/2016     Anxiety     anxiety over vomiting, nausea is main trigger     Esophageal reflux      Gastrointestinal ulcer      Irritable bowel syndrome      Liver replaced by transplant (H) 2000    liver failure of unknown etiology     Papanicolaou smear of cervix with atypical squamous cells of undetermined significance (ASC-US)     negative HPV "     Past Surgical History:   Procedure Laterality Date     C TRANSPLANT LIVER,HETERTOPIC      had appendectomy, gallbladder out with transplant.       SECTION  2008      SECTION  3/2010     CHOLECYSTECTOMY      with liver transplant     COLONOSCOPY N/A 10/24/2018    Procedure: Colonoscopy;  Surgeon: Ottoniel Martin MD;  Location: [facility] OR     ESOPHAGOSCOPY, GASTROSCOPY, DUODENOSCOPY (EGD), COMBINED Left 2015    Procedure: COMBINED ESOPHAGOSCOPY, GASTROSCOPY, DUODENOSCOPY (EGD), BIOPSY SINGLE OR MULTIPLE;  Surgeon: Ottoniel Martin MD;  Location: [facility] GI     ESOPHAGOSCOPY, GASTROSCOPY, DUODENOSCOPY (EGD), COMBINED N/A 2016    Procedure: COMBINED ESOPHAGOSCOPY, GASTROSCOPY, DUODENOSCOPY (EGD), BIOPSY SINGLE OR MULTIPLE;  Surgeon: Homar Adams MD;  Location: [facility] GI     ESOPHAGOSCOPY, GASTROSCOPY, DUODENOSCOPY (EGD), COMBINED N/A 2016    Procedure: COMBINED ESOPHAGOSCOPY, GASTROSCOPY, DUODENOSCOPY (EGD), BIOPSY SINGLE OR MULTIPLE;  Surgeon: Homar Adams MD;  Location: [facility] GI     ESOPHAGOSCOPY, GASTROSCOPY, DUODENOSCOPY (EGD), COMBINED N/A 2018    Procedure: COMBINED ESOPHAGOSCOPY, GASTROSCOPY, DUODENOSCOPY (EGD);;  Surgeon: Daryl Blank MD;  Location: [facility]     HIP SURGERY Right 2016    Torn labrum repair      TUBAL LIGATION  3/2010     Family History   Problem Relation Age of Onset     Coronary Artery Disease Mother      Hyperlipidemia Mother      Osteoporosis Mother      Arthritis Mother      Depression Maternal Grandmother      Anxiety Disorder Maternal Grandmother      Heart Failure Maternal Grandmother      Hypertension Maternal Grandfather      Cerebrovascular Disease Paternal Grandmother      Breast Cancer Paternal Grandmother      Gastrointestinal Disease No family hx of      C.A.D. No family hx of      Cancer - colorectal No family hx of      Prostate Cancer No family hx of      Alcohol/Drug No family hx of   "    Social History     Tobacco Use     Smoking status: Never Smoker     Smokeless tobacco: Never Used   Substance Use Topics     Alcohol use: Yes     Comment: glass of wine up to once a month     Current Facility-Administered Medications   Medication     ropivacaine (NAROPIN) injection 10 mL     ropivacaine (NAROPIN) injection 2 mL     ropivacaine (NAROPIN) injection 3 mL     triamcinolone (KENALOG-40) injection 40 mg     Current Outpatient Medications   Medication     azaTHIOprine (IMURAN) 50 MG tablet     betamethasone dipropionate (DIPROSONE) 0.05 % external cream     omeprazole (PRILOSEC) 40 MG DR capsule     oxyCODONE (ROXICODONE) 5 MG tablet     tacrolimus (GENERIC EQUIVALENT) 1 MG capsule     ursodiol (ACTIGALL) 300 MG capsule     ALPRAZolam (XANAX) 0.5 MG tablet     lactobacillus rhamnosus, GG, (CULTURELL) capsule     ondansetron (ZOFRAN ODT) 4 MG ODT tab     PARoxetine (PAXIL) 40 MG tablet     polyethylene glycol (MIRALAX) powder     Allergies   Allergen Reactions     Gluten Meal Nausea and Vomiting     Milk Protein Extract Nausea and Vomiting     I have reviewed the Medications, Allergies, Past Medical and Surgical History, and Social History in the Epic system.    Review of Systems   Constitutional: Negative for fever.   HENT: Negative for congestion.    Eyes: Negative for redness.   Respiratory: Negative for shortness of breath.    Cardiovascular: Negative for chest pain.   Gastrointestinal: Negative for abdominal pain.   Genitourinary: Positive for flank pain (Left) and pelvic pain. Negative for difficulty urinating and vaginal bleeding.   Musculoskeletal: Negative for arthralgias and neck stiffness.   Skin: Negative for color change.   Neurological: Negative for headaches.   Psychiatric/Behavioral: Negative for confusion.   All other systems reviewed and are negative.      Physical Exam   BP: 131/84  Pulse: 67  Heart Rate: 87  Temp: 99  F (37.2  C)  Resp: 16  Height: 160 cm (5' 3\")  Weight: 57.6 kg " (127 lb)  SpO2: 100 %      Physical Exam  Vitals signs and nursing note reviewed. Exam conducted with a chaperone present.   Constitutional:       General: She is not in acute distress.     Appearance: Normal appearance. She is not diaphoretic.   HENT:      Head: Atraumatic.      Mouth/Throat:      Pharynx: No oropharyngeal exudate.   Eyes:      General: No scleral icterus.     Pupils: Pupils are equal, round, and reactive to light.   Cardiovascular:      Heart sounds: Normal heart sounds.   Pulmonary:      Effort: No respiratory distress.      Breath sounds: Normal breath sounds.   Abdominal:      General: Bowel sounds are normal. There is no distension.      Palpations: Abdomen is soft.      Tenderness: There is no abdominal tenderness. There is no right CVA tenderness or left CVA tenderness.   Genitourinary:     General: Normal vulva.      Exam position: Lithotomy position.      Cervix: No cervical motion tenderness, discharge or erythema.      Adnexa:         Right: No tenderness.          Left: No tenderness.        Rectum: Normal.      Comments: Dryness of b/l vaginal walls, no discharge.    Musculoskeletal:         General: No tenderness.   Skin:     General: Skin is warm.      Findings: No rash.   Neurological:      Mental Status: She is alert.         ED Course   6:38 PM  The patient was seen and examined by Mike Sullivan DO in [facility].      Procedures        Results for orders placed or performed during the hospital encounter of 01/20/20 (from the past 24 hour(s))   CBC with platelets differential   Result Value Ref Range    WBC 7.4 4.0 - 11.0 10e9/L    RBC Count 4.32 3.8 - 5.2 10e12/L    Hemoglobin 15.1 11.7 - 15.7 g/dL    Hematocrit 43.5 35.0 - 47.0 %     (H) 78 - 100 fl    MCH 35.0 (H) 26.5 - 33.0 pg    MCHC 34.7 31.5 - 36.5 g/dL    RDW 11.4 10.0 - 15.0 %    Platelet Count 209 150 - 450 10e9/L    Diff Method Automated Method     % Neutrophils 57.8 %    % Lymphocytes 30.1 %    % Monocytes 8.4 %    %  Eosinophils 2.6 %    % Basophils 0.8 %    % Immature Granulocytes 0.3 %    Nucleated RBCs 0 0 /100    Absolute Neutrophil 4.3 1.6 - 8.3 10e9/L    Absolute Lymphocytes 2.2 0.8 - 5.3 10e9/L    Absolute Monocytes 0.6 0.0 - 1.3 10e9/L    Absolute Eosinophils 0.2 0.0 - 0.7 10e9/L    Absolute Basophils 0.1 0.0 - 0.2 10e9/L    Abs Immature Granulocytes 0.0 0 - 0.4 10e9/L    Absolute Nucleated RBC 0.0    Comprehensive metabolic panel   Result Value Ref Range    Sodium 139 133 - 144 mmol/L    Potassium 3.8 3.4 - 5.3 mmol/L    Chloride 106 94 - 109 mmol/L    Carbon Dioxide 27 20 - 32 mmol/L    Anion Gap 6 3 - 14 mmol/L    Glucose 105 (H) 70 - 99 mg/dL    Urea Nitrogen 15 7 - 30 mg/dL    Creatinine 0.70 0.52 - 1.04 mg/dL    GFR Estimate >90 >60 mL/min/[1.73_m2]    GFR Estimate If Black >90 >60 mL/min/[1.73_m2]    Calcium 8.9 8.5 - 10.1 mg/dL    Bilirubin Total 0.5 0.2 - 1.3 mg/dL    Albumin 4.4 3.4 - 5.0 g/dL    Protein Total 8.2 6.8 - 8.8 g/dL    Alkaline Phosphatase 152 (H) 40 - 150 U/L    ALT 25 0 - 50 U/L    AST 35 0 - 45 U/L   Lipase   Result Value Ref Range    Lipase 277 73 - 393 U/L   UA with Microscopic   Result Value Ref Range    Color Urine Light Yellow     Appearance Urine Clear     Glucose Urine Negative NEG^Negative mg/dL    Bilirubin Urine Negative NEG^Negative    Ketones Urine Negative NEG^Negative mg/dL    Specific Gravity Urine 1.005 1.003 - 1.035    Blood Urine Negative NEG^Negative    pH Urine 6.5 5.0 - 7.0 pH    Protein Albumin Urine Negative NEG^Negative mg/dL    Urobilinogen mg/dL Normal 0.0 - 2.0 mg/dL    Nitrite Urine Negative NEG^Negative    Leukocyte Esterase Urine Negative NEG^Negative    Source Midstream Urine     WBC Urine 0 0 - 5 /HPF    RBC Urine <1 0 - 2 /HPF    Squamous Epithelial /HPF Urine 1 0 - 1 /HPF   hCG qual urine POCT   Result Value Ref Range    HCG Qual Urine Negative neg    Internal QC OK Yes    Blood Culture ONE site   Result Value Ref Range    Specimen Description Blood Unspecified  Site     Special Requests Aerobic and anaerobic bottles received     Culture Micro No growth after 1 hour    Wet prep   Result Value Ref Range    Specimen Description Vagina     Wet Prep No Trichomonas seen     Wet Prep No clue cells seen     Wet Prep No yeast seen     Wet Prep Rare  PMNs seen             Labs Ordered and Resulted from Time of ED Arrival Up to the Time of Departure from the ED   CBC WITH PLATELETS DIFFERENTIAL - Abnormal; Notable for the following components:       Result Value     (*)     MCH 35.0 (*)     All other components within normal limits   COMPREHENSIVE METABOLIC PANEL - Abnormal; Notable for the following components:    Glucose 105 (*)     Alkaline Phosphatase 152 (*)     All other components within normal limits   HCG QUAL URINE POCT - Normal   ROUTINE UA WITH MICROSCOPIC   LIPASE   BLOOD CULTURE   WET PREP   CHLAMYDIA TRACHOMATIS PCR   NEISSERIA GONORRHOEAE PCR            Assessments & Plan (with Medical Decision Making)   Patient presented to the emergency department with complaint of pelvic pain radiating up into the bilateral flanks.  She is most concerned, however, about vaginal discomfort and dryness.    DDx:  Cystitis, pyelonephritis, kidney stone, atrophic vaginitis, bacterial vaginosis, STI    On arrival, she has normal vital signs.  Her abdomen is soft and nontender.  She has no CVA tenderness.  Laboratory work-up is largely unremarkable.  Urinalysis is normal.  Kidney function normal.  Check LFTs given that she has a history of liver transplant, these are within normal range.  Not concern for transplant rejection or other complication.  On pelvic exam there is some mild dryness/irritation of the vaginal walls, consistent with likely atrophic vaginitis.  She states she has had this previously and was treated with Lotrisone cream.  Wet mount is negative, not concerned about fungal infection.  Additionally, antifungals would be contraindicated in someone with a liver  transplant.  Therefore we will treat monotherapy with betamethasone cream as this is worked for her in the past.  She is to follow-up with her OB/GYN.  If she does not note improvement, may switch to topical estrogens however this should be done cautiously given hx of liver transplant.  Patient educated on reasons to return to the emergency department.  She understands and is comfortable with this plan.  I have reviewed the nursing notes.    I have reviewed the findings, diagnosis, plan and need for follow up with the patient.    Discharge Medication List as of 1/20/2020  8:36 PM      START taking these medications    Details   betamethasone dipropionate (DIPROSONE) 0.05 % external cream Apply topically 2 times dailyDisp-45 g, R-0Local Print      oxyCODONE (ROXICODONE) 5 MG tablet Take 1 tablet (5 mg) by mouth every 6 hours as needed for pain, Disp-8 tablet, R-0, Local Print             Final diagnoses:   Vaginitis and vulvovaginitis   I, Deandre Villanueva, am serving as a trained medical scribe to document services personally performed by Mike Sullivan DO, based on the provider's statements to me.      IMike DO, was physically present and have reviewed and verified the accuracy of this note documented by Deandre Villanueva.    1/20/2020   [facility], [place], EMERGENCY DEPARTMENT     Mike Sullivan DO  01/20/20 9522       Mike Sullivan DO  01/25/20 [id number]

## 2020-01-21 NOTE — RESULT ENCOUNTER NOTE
Final result for both N. Gonorrhoeae PCR and Chlamydia Trachomatis PCR are NEGATIVE.  No treatment or change in treatment per [facility] ED Lab Result protocol.

## 2020-01-21 NOTE — DISCHARGE INSTRUCTIONS
Please make an appointment to follow up with your OBGYN or you can call [facility]/Gyn[facility] (phone: [phone]) as soon as possible.

## 2020-01-23 ENCOUNTER — OFFICE VISIT (OUTPATIENT)
Dept: ORTHOPEDICS | Facility: CLINIC | Age: 40
End: 2020-01-23
Payer: COMMERCIAL

## 2020-01-23 VITALS — WEIGHT: 127 LBS | BODY MASS INDEX: 22.5 KG/M2 | HEIGHT: 63 IN

## 2020-01-23 DIAGNOSIS — M25.511 RIGHT SHOULDER PAIN, UNSPECIFIED CHRONICITY: ICD-10-CM

## 2020-01-23 DIAGNOSIS — M54.2 NECK PAIN: Primary | ICD-10-CM

## 2020-01-23 ASSESSMENT — MIFFLIN-ST. JEOR: SCORE: 1230.2

## 2020-01-23 ASSESSMENT — PAIN SCALES - GENERAL: PAINLEVEL: MILD PAIN (2)

## 2020-01-23 NOTE — PROGRESS NOTES
"   Subjective:   Charito Wilcox is a 38 year old female here with continued right neck and shoulder pain.  Feeling a lot better since her trigger point injections about 2 weeks ago at her last visit.  She kept notes on her phone about how she was feeling.  She felt immediate relief after leaving the clinic.  She has been able to do some light upper body lifting, elliptical using her arms, and a cycle class.  She has not tried running, but is planning on doing this over the weekend.      PAST MEDICAL, SOCIAL, SURGICAL AND FAMILY HISTORY: Unchanged from last visit.      ALLERGIES: She is allergic to gluten meal and milk protein extract.    CURRENT MEDICATIONS: She has a current medication list which includes the following prescription(s): alprazolam, azathioprine, betamethasone dipropionate, lactobacillus rhamnosus (gg), omeprazole, ondansetron, oxycodone, paroxetine, polyethylene glycol, tacrolimus, and ursodiol, and the following Facility-Administered Medications: ropivacaine, ropivacaine, ropivacaine, and triamcinolone.     REVIEW OF SYSTEMS: 6 point review of systems is negative except as noted above.     Exam:   Ht 1.6 m (5' 3\")   Wt 57.6 kg (127 lb)   LMP 01/05/2020 (Exact Date)   BMI 22.50 kg/m       CONSTITUTIONAL: healthy, alert and no distress  HEAD: Normocephalic. No masses, lesions, tenderness or abnormalities  SKIN: large scar from her liver transplant on her abdomen  GAIT: normal  NEUROLOGIC: Non-focal  PSYCHIATRIC: affect normal/bright and mentation appears normal.    MUSCULOSKELETAL:  C-spine: FROM.  Spasm of upper trap, levator and SCM/scalenes, much improved from last visit.   Assessment/Plan:   Charito Wilcox is here today to follow-up on right-sided neck and shoulder pain after trigger point injections.  Has had significant improvement in her pain and has been able to participate in some activities without difficulties.  She is hesitant to run, but will try this over the weekend.  She " still feels some of this tightness is in how she is breathing and possibly her mechanics with running.      -Continue PT with Lily  -Try to run over the weekend and will send a message on how that goes  -Can repeat trigger point injections (with steroid) if pain returns, consider just ropivacaine if she goes a significant amount of time without pain before symptoms return    Patient seen and discussed with Dr. Bennett.     Nitza Owens, DO  Primary Care Sports Medicine Fellow

## 2020-01-23 NOTE — LETTER
"  1/23/2020      RE: Charito Wilcox  [address]  [facility] [address]          Subjective:   Charito Wilcox is a 38 year old female here with continued right neck and shoulder pain.  Feeling a lot better since her trigger point injections about 2 weeks ago at her last visit.  She kept notes on her phone about how she was feeling.  She felt immediate relief after leaving the clinic.  She has been able to do some light upper body lifting, elliptical using her arms, and a cycle class.  She has not tried running, but is planning on doing this over the weekend.      PAST MEDICAL, SOCIAL, SURGICAL AND FAMILY HISTORY: Unchanged from last visit.      ALLERGIES: She is allergic to gluten meal and milk protein extract.    CURRENT MEDICATIONS: She has a current medication list which includes the following prescription(s): alprazolam, azathioprine, betamethasone dipropionate, lactobacillus rhamnosus (gg), omeprazole, ondansetron, oxycodone, paroxetine, polyethylene glycol, tacrolimus, and ursodiol, and the following Facility-Administered Medications: ropivacaine, ropivacaine, ropivacaine, and triamcinolone.     REVIEW OF SYSTEMS: 6 point review of systems is negative except as noted above.     Exam:   Ht 1.6 m (5' 3\")   Wt 57.6 kg (127 lb)   LMP 01/05/2020 (Exact Date)   BMI 22.50 kg/m        CONSTITUTIONAL: healthy, alert and no distress  HEAD: Normocephalic. No masses, lesions, tenderness or abnormalities  SKIN: large scar from her liver transplant on her abdomen  GAIT: normal  NEUROLOGIC: Non-focal  PSYCHIATRIC: affect normal/bright and mentation appears normal.    MUSCULOSKELETAL:  C-spine: FROM.  Spasm of upper trap, levator and SCM/scalenes, much improved from last visit.   Assessment/Plan:   Charito Wilcox is here today to follow-up on right-sided neck and shoulder pain after trigger point injections.  Has had significant improvement in her pain and has been able to participate in some activities " without difficulties.  She is hesitant to run, but will try this over the weekend.  She still feels some of this tightness is in how she is breathing and possibly her mechanics with running.      -Continue PT with Lily  -Try to run over the weekend and will send a message on how that goes  -Can repeat trigger point injections (with steroid) if pain returns, consider just ropivacaine if she goes a significant amount of time without pain before symptoms return    Patient seen and discussed with Dr. Bennett.     Nitza Owens DO  Primary Care Sports Medicine Fellow      Attending Note:   I have personally examined this patient and have reviewed the clinical presentation and progress note with the fellow. I agree with the treatment plan as outlined. The plan was formulated with the fellow on the day of the patient's visit.   Zulma Bennett MD, CAQ, CCD  [facility]  Sports Medicine and Bone Health    Nitza Owens DO

## 2020-01-24 NOTE — PROGRESS NOTES
Attending Note:   I have personally examined this patient and have reviewed the clinical presentation and progress note with the fellow. I agree with the treatment plan as outlined. The plan was formulated with the fellow on the day of the patient's visit.   Zulma Bennett MD, CAQ, CCD  [facility]  Sports Medicine and Bone Health

## 2020-01-25 ASSESSMENT — ENCOUNTER SYMPTOMS
HEADACHES: 0
NECK STIFFNESS: 0
ABDOMINAL PAIN: 0
EYE REDNESS: 0
SHORTNESS OF BREATH: 0
CONFUSION: 0
ARTHRALGIAS: 0
DIFFICULTY URINATING: 0
COLOR CHANGE: 0

## 2020-01-26 LAB
BACTERIA SPEC CULT: NO GROWTH
Lab: NORMAL
SPECIMEN SOURCE: NORMAL

## 2020-02-03 ENCOUNTER — THERAPY VISIT (OUTPATIENT)
Dept: PHYSICAL THERAPY | Facility: CLINIC | Age: 40
End: 2020-02-03
Payer: COMMERCIAL

## 2020-02-03 DIAGNOSIS — Z94.4 LIVER REPLACED BY TRANSPLANT (H): ICD-10-CM

## 2020-02-03 DIAGNOSIS — M54.2 NECK PAIN: ICD-10-CM

## 2020-02-03 PROCEDURE — 97140 MANUAL THERAPY 1/> REGIONS: CPT | Mod: GP | Performed by: PHYSICAL THERAPIST

## 2020-02-03 RX ORDER — URSODIOL 300 MG/1
CAPSULE ORAL
Qty: 180 CAPSULE | Refills: 3 | Status: SHIPPED | OUTPATIENT
Start: 2020-02-03 | End: 2021-01-29

## 2020-02-03 RX ORDER — TACROLIMUS 1 MG/1
CAPSULE ORAL
Qty: 180 CAPSULE | Refills: 3 | Status: SHIPPED | OUTPATIENT
Start: 2020-02-03 | End: 2021-02-08

## 2020-02-04 DIAGNOSIS — F41.0 PANIC DISORDER: ICD-10-CM

## 2020-02-06 ENCOUNTER — OFFICE VISIT (OUTPATIENT)
Dept: OBGYN | Facility: CLINIC | Age: 40
End: 2020-02-06
Attending: OBSTETRICS & GYNECOLOGY
Payer: COMMERCIAL

## 2020-02-06 VITALS
WEIGHT: 123.1 LBS | HEIGHT: 63 IN | BODY MASS INDEX: 21.81 KG/M2 | HEART RATE: 76 BPM | SYSTOLIC BLOOD PRESSURE: 110 MMHG | DIASTOLIC BLOOD PRESSURE: 75 MMHG

## 2020-02-06 DIAGNOSIS — N90.89 VULVAR IRRITATION: ICD-10-CM

## 2020-02-06 DIAGNOSIS — N89.8 VAGINAL DRYNESS: ICD-10-CM

## 2020-02-06 DIAGNOSIS — Z12.4 SCREENING FOR MALIGNANT NEOPLASM OF CERVIX: Primary | ICD-10-CM

## 2020-02-06 PROCEDURE — G0123 SCREEN CERV/VAG THIN LAYER: HCPCS | Performed by: OBSTETRICS & GYNECOLOGY

## 2020-02-06 PROCEDURE — 87624 HPV HI-RISK TYP POOLED RSLT: CPT | Performed by: OBSTETRICS & GYNECOLOGY

## 2020-02-06 PROCEDURE — G0145 SCR C/V CYTO,THINLAYER,RESCR: HCPCS | Performed by: OBSTETRICS & GYNECOLOGY

## 2020-02-06 PROCEDURE — G0463 HOSPITAL OUTPT CLINIC VISIT: HCPCS | Mod: 25

## 2020-02-06 RX ORDER — PAROXETINE 40 MG/1
TABLET, FILM COATED ORAL
Qty: 135 TABLET | Refills: 0 | Status: SHIPPED | OUTPATIENT
Start: 2020-02-06 | End: 2020-05-06

## 2020-02-06 RX ORDER — BETAMETHASONE DIPROPIONATE 0.5 MG/G
CREAM TOPICAL 2 TIMES DAILY
Qty: 45 G | Refills: 0 | Status: CANCELLED | OUTPATIENT
Start: 2020-02-06

## 2020-02-06 RX ORDER — MOMETASONE FUROATE 1 MG/G
CREAM TOPICAL DAILY
Qty: 45 G | Refills: 0 | Status: SHIPPED | OUTPATIENT
Start: 2020-02-06 | End: 2020-09-01

## 2020-02-06 ASSESSMENT — ANXIETY QUESTIONNAIRES
5. BEING SO RESTLESS THAT IT IS HARD TO SIT STILL: NOT AT ALL
1. FEELING NERVOUS, ANXIOUS, OR ON EDGE: SEVERAL DAYS
GAD7 TOTAL SCORE: 3
3. WORRYING TOO MUCH ABOUT DIFFERENT THINGS: NOT AT ALL
6. BECOMING EASILY ANNOYED OR IRRITABLE: SEVERAL DAYS
7. FEELING AFRAID AS IF SOMETHING AWFUL MIGHT HAPPEN: NOT AT ALL
2. NOT BEING ABLE TO STOP OR CONTROL WORRYING: SEVERAL DAYS

## 2020-02-06 ASSESSMENT — PATIENT HEALTH QUESTIONNAIRE - PHQ9
SUM OF ALL RESPONSES TO PHQ QUESTIONS 1-9: 2
5. POOR APPETITE OR OVEREATING: NOT AT ALL

## 2020-02-06 ASSESSMENT — MIFFLIN-ST. JEOR: SCORE: 1212.51

## 2020-02-06 NOTE — TELEPHONE ENCOUNTER
"    Requested Prescriptions   Pending Prescriptions Disp Refills     PARoxetine (PAXIL) 40 MG tablet [Pharmacy Med Name: PAROXETINE HCL 40 MG TABLET] 135 tablet 0     Sig: TAKE ONE AND ONE-HALF TABLET BY MOUTH AT BEDTIME       SSRIs Protocol Passed - 2/4/2020  2:22 AM        Passed - Recent (12 mo) or future (30 days) visit within the authorizing provider's specialty     Patient has had an office visit with the authorizing provider or a provider within the authorizing providers department within the previous 12 mos or has a future within next 30 days. See \"Patient Info\" tab in inbasket, or \"Choose Columns\" in Meds & Orders section of the refill encounter.              Passed - Medication is active on med list        Passed - Patient is age 18 or older        Passed - No active pregnancy on record        Passed - No positive pregnancy test in last 12 months        Prescription approved per [facility] Refill Protocol.    Heather Moran, RN, BSN, PHN  [facility] [place]    "

## 2020-02-06 NOTE — LETTER
"2/6/2020       RE: Charito Wilcox  [address]  [facility] [address]     Dear Colleague,    Thank you for referring your patient, Charito Wilcox, to the [facility] at [facility]. Please see a copy of my visit note below.    Gynecology Visit Note  2/6/20    Reason for visit: Vulvar irritation, Vaginal dryness, Pap smear    S: Patient is a 40 yo  with history of liver transplant who presents today to check in about some concerns.  Patient has had episodes occasionally of vulvar irritation that are helped by steroid ointment when used.  She was recently in ED for unrelated issues and mentioned this issue and was given betamethasone cream which took care of her symptoms.  She is wondering if she can have a prescription for this.  The patient has also notice some vaginal dryness, no itching.  Has not really tried anything and wondering if this is age related or anything to be concerned about.      O:  /75   Pulse 76   Ht 1.6 m (5' 3\")   Wt 55.8 kg (123 lb 1.6 oz)   LMP 01/31/2020   BMI 21.81 kg/m        General: NAD, A&OX3  Resp: Non-labored breathing  Pelvic: EGUBS wnl, No concerning skin changes or lesions, no architectural changes to suggest a dermatologic issue causing irritation, vagina with normal physiologic discharge, cervix without any lesions, pap smear collected    A/P: 40 yo  presents with concerns of vulvar irritation and vaginal dryness, overdue for pap  1) Vulvar irritation: Patient has used mid-potency steroid as needed and this has helped her symptoms.  Discussed she could use twice weekly to see if this prevents the flares she gets on occasion.  Refill given today.  2) Vulvar dryness: Discussed used of Replens to assist with symptoms  3) Cervical cancer screening: Pap with cotesting completed today  4) RTC with any concerns or for preventive care    Rachel García MD        "

## 2020-02-06 NOTE — PROGRESS NOTES
"Gynecology Visit Note  2/6/20    Reason for visit: Vulvar irritation, Vaginal dryness, Pap smear    S: Patient is a 38 yo  with history of liver transplant who presents today to check in about some concerns.  Patient has had episodes occasionally of vulvar irritation that are helped by steroid ointment when used.  She was recently in ED for unrelated issues and mentioned this issue and was given betamethasone cream which took care of her symptoms.  She is wondering if she can have a prescription for this.  The patient has also notice some vaginal dryness, no itching.  Has not really tried anything and wondering if this is age related or anything to be concerned about.      O:  /75   Pulse 76   Ht 1.6 m (5' 3\")   Wt 55.8 kg (123 lb 1.6 oz)   LMP 01/31/2020   BMI 21.81 kg/m       General: NAD, A&OX3  Resp: Non-labored breathing  Pelvic: EGUBS wnl, No concerning skin changes or lesions, no architectural changes to suggest a dermatologic issue causing irritation, vagina with normal physiologic discharge, cervix without any lesions, pap smear collected    A/P: 38 yo  presents with concerns of vulvar irritation and vaginal dryness, overdue for pap  1) Vulvar irritation: Patient has used mid-potency steroid as needed and this has helped her symptoms.  Discussed she could use twice weekly to see if this prevents the flares she gets on occasion.  Refill given today.  2) Vulvar dryness: Discussed used of Replens to assist with symptoms  3) Cervical cancer screening: Pap with cotesting completed today  4) RTC with any concerns or for preventive care    Rachel García MD  "

## 2020-02-07 ASSESSMENT — ANXIETY QUESTIONNAIRES: GAD7 TOTAL SCORE: 3

## 2020-02-11 LAB
COPATH REPORT: NORMAL
PAP: NORMAL

## 2020-02-14 ENCOUNTER — THERAPY VISIT (OUTPATIENT)
Dept: PHYSICAL THERAPY | Facility: CLINIC | Age: 40
End: 2020-02-14
Payer: COMMERCIAL

## 2020-02-14 DIAGNOSIS — M54.2 NECK PAIN: ICD-10-CM

## 2020-02-14 PROCEDURE — 97140 MANUAL THERAPY 1/> REGIONS: CPT | Mod: GP | Performed by: PHYSICAL THERAPIST

## 2020-02-14 PROCEDURE — 97112 NEUROMUSCULAR REEDUCATION: CPT | Mod: GP | Performed by: PHYSICAL THERAPIST

## 2020-03-01 ENCOUNTER — MYC MEDICAL ADVICE (OUTPATIENT)
Dept: ORTHOPEDICS | Facility: CLINIC | Age: 40
End: 2020-03-01

## 2020-03-04 ENCOUNTER — THERAPY VISIT (OUTPATIENT)
Dept: PHYSICAL THERAPY | Facility: CLINIC | Age: 40
End: 2020-03-04
Payer: COMMERCIAL

## 2020-03-04 DIAGNOSIS — M54.2 NECK PAIN: ICD-10-CM

## 2020-03-04 PROCEDURE — 97140 MANUAL THERAPY 1/> REGIONS: CPT | Mod: GP | Performed by: PHYSICAL THERAPIST

## 2020-03-04 PROCEDURE — 97110 THERAPEUTIC EXERCISES: CPT | Mod: GP | Performed by: PHYSICAL THERAPIST

## 2020-03-11 DIAGNOSIS — M54.2 NECK PAIN: Primary | ICD-10-CM

## 2020-03-11 RX ORDER — PREDNISONE 20 MG/1
40 TABLET ORAL DAILY
Qty: 10 TABLET | Refills: 0 | Status: SHIPPED | OUTPATIENT
Start: 2020-03-11 | End: 2020-03-16

## 2020-03-24 ENCOUNTER — MYC REFILL (OUTPATIENT)
Dept: FAMILY MEDICINE | Facility: CLINIC | Age: 40
End: 2020-03-24

## 2020-03-24 DIAGNOSIS — F41.0 PANIC DISORDER: ICD-10-CM

## 2020-03-25 ENCOUNTER — TELEPHONE (OUTPATIENT)
Dept: ORTHOPEDICS | Facility: CLINIC | Age: 40
End: 2020-03-25

## 2020-03-25 NOTE — TELEPHONE ENCOUNTER
Called and LVM stating that due to COVID19 we are going to have to reschedule her appointment with . I did offer her an appointment in May with , but it is subject to change. Our callback number was left.

## 2020-03-26 RX ORDER — ALPRAZOLAM 0.5 MG
0.5 TABLET ORAL 3 TIMES DAILY PRN
Qty: 30 TABLET | Refills: 0 | Status: SHIPPED | OUTPATIENT
Start: 2020-03-26 | End: 2020-09-01

## 2020-03-26 NOTE — TELEPHONE ENCOUNTER
Rescheduled on 5/7 at 2:20 with . She does know that it is subject to change with COVID19, she was understanding of this.

## 2020-03-27 NOTE — TELEPHONE ENCOUNTER
RECORDS RECEIVED FROM: **Defer/ called and LVM on 3/25**.  Trigger Point Injections For Neck and Rt Shoulder, Per Pt   DATE RECEIVED: May 7, 2020   NOTES STATUS DETAILS   OFFICE NOTE from referring provider Internal  Zulma Bennett MD      OFFICE NOTE from other specialist N/A    DISCHARGE SUMMARY from hospital N/A    DISCHARGE REPORT from the ER N/A    OPERATIVE REPORT N/A    MEDICATION LIST Internal    IMPLANT RECORD/STICKER N/A    LABS     CBC/DIFF N/A    CULTURES N/A    INJECTIONS DONE IN RADIOLOGY N/A    MRI Internal    CT SCAN N/A    XRAYS (IMAGES & REPORTS) Internal    TUMOR     PATHOLOGY  Slides & report N/A      03/27/20   8:24 AM   Pre-visit complete  Luda Glamm, CMA

## 2020-04-22 NOTE — PROGRESS NOTES
Discharge Note    Progress reporting period is from last progress note on 10/07/19 to Mar 4, 2020.    Charito failed to follow up and current status is unknown.  Please see information below for last relevant information on current status.  Patient seen for 29 visits.    SUBJECTIVE  Subjective changes noted by patient:  Feels like first rib pain- doing mobilization helps.    .  Current pain level is 3/10.     Previous pain level was   .   Changes in function:  Yes (See Goal flowsheet attached for changes in current functional level)  Adverse reaction to treatment or activity: None    OBJECTIVE  Changes noted in objective findings: Median nn tenssion present; general decrease in reactivity of symptoms after dec running     ASSESSMENT/PLAN  Diagnosis: Right Sided Neck Pain   Updated problem list and treatment plan:   Pain - HEP  Decreased ROM/flexibility - HEP  Decreased function - HEP  Impaired muscle performance - HEP  Impaired posture - HEP  Decreased joint mobility - HEP  STG/LTGs have been met or progress has been made towards goals:  Yes, please see goal flowsheet for most current information  Assessment of Progress: current status is unknown.    Last current status:     Self Management Plans:  HEP  I have re-evaluated this patient and find that the nature, scope, duration and intensity of the therapy is appropriate for the medical condition of the patient.  Charito continues to require the following intervention to meet STG and LTG's:  HEP.    Recommendations:  Discharge with current home program.  Patient to follow up with MD as needed.    Please refer to the daily flowsheet for treatment today, total treatment time and time spent performing 1:1 timed codes.    Izabela Campbell, PT, OCS, Cert MDT #[id number]

## 2020-04-24 PROBLEM — M54.2 NECK PAIN: Status: RESOLVED | Noted: 2019-06-13 | Resolved: 2020-04-24

## 2020-04-28 ENCOUNTER — TELEPHONE (OUTPATIENT)
Dept: ORTHOPEDICS | Facility: CLINIC | Age: 40
End: 2020-04-28

## 2020-04-28 NOTE — TELEPHONE ENCOUNTER
LVM to reschedule 5/7/20 appointment in response to COVID-19. Also offered opportunity to have virtual visit  if she is wanting to talk with the doctor since she won't be having an injection at this time.

## 2020-05-06 DIAGNOSIS — F41.0 PANIC DISORDER: ICD-10-CM

## 2020-05-07 ENCOUNTER — PRE VISIT (OUTPATIENT)
Dept: ORTHOPEDICS | Facility: CLINIC | Age: 40
End: 2020-05-07

## 2020-05-07 RX ORDER — PAROXETINE 40 MG/1
60 TABLET, FILM COATED ORAL AT BEDTIME
Qty: 135 TABLET | Refills: 0 | Status: SHIPPED | OUTPATIENT
Start: 2020-05-07 | End: 2020-08-06

## 2020-05-07 NOTE — TELEPHONE ENCOUNTER
Prescription approved per [facility] Refill Protocol.      Chrissie Oglesby RN  [facility]

## 2020-05-07 NOTE — TELEPHONE ENCOUNTER
PARoxetine (PAXIL) 40 MG tablet            7/12/2019 [facility]       Routing refill request to provider for review/approval because:  No longer seeing Dr Benito Clay( cost) per chart notes.   restablished [facility]

## 2020-06-02 DIAGNOSIS — Z94.4 LIVER REPLACED BY TRANSPLANT (H): ICD-10-CM

## 2020-06-02 RX ORDER — AZATHIOPRINE 50 MG/1
TABLET ORAL
Qty: 90 TABLET | Refills: 3 | Status: SHIPPED | OUTPATIENT
Start: 2020-06-02 | End: 2021-05-18

## 2020-06-18 ENCOUNTER — OFFICE VISIT (OUTPATIENT)
Dept: ORTHOPEDICS | Facility: CLINIC | Age: 40
End: 2020-06-18
Payer: COMMERCIAL

## 2020-06-18 VITALS — HEIGHT: 63 IN | BODY MASS INDEX: 22.47 KG/M2 | WEIGHT: 126.8 LBS

## 2020-06-18 DIAGNOSIS — M25.511 ARTHRALGIA OF RIGHT ACROMIOCLAVICULAR JOINT: ICD-10-CM

## 2020-06-18 DIAGNOSIS — G25.89 SCAPULAR DYSKINESIS: Primary | ICD-10-CM

## 2020-06-18 DIAGNOSIS — M75.91 SUPRASPINATUS TENDINITIS, RIGHT: ICD-10-CM

## 2020-06-18 ASSESSMENT — MIFFLIN-ST. JEOR: SCORE: 1229.29

## 2020-06-18 NOTE — PROGRESS NOTES
"   Subjective:   Charito Wilcox is a 38 year old female here for follow-up of right-sided shoulder pain.  She reports that this is the best that she has been feeling in a long time.  She reports about 70% improvement.  She is not getting headaches anymore and she feels as though the spasming of her upper trap is much improved.  She decided to start the P90 X program and just finished this yesterday.  She reports occasional superior right shoulder pain that sometimes radiates down her upper arm.  She cannot pinpoint any specific activities that make this pain worse.  She notes that this pain does improve and has not worsened over the last few months.  She still has not done any running she is still nervous to do so.  She is no longer meeting with physical therapy for manual therapies.  Her pain feels much more isolated than it has in the past.  She denies any numbness and tingling into her arm.  She feels as though her strength is maintained.    PAST MEDICAL, SOCIAL, SURGICAL AND FAMILY HISTORY: Unchanged from last visit.      ALLERGIES: She is allergic to gluten meal and milk protein extract.    CURRENT MEDICATIONS: She has a current medication list which includes the following prescription(s): alprazolam, azathioprine, betamethasone dipropionate, lactobacillus rhamnosus (gg), mometasone, omeprazole, paroxetine, polyethylene glycol, tacrolimus, and ursodiol, and the following Facility-Administered Medications: ropivacaine, ropivacaine, ropivacaine, and triamcinolone.     REVIEW OF SYSTEMS: 6 point review of systems is negative except as noted above.     Exam:   Ht 1.6 m (5' 3\")   Wt 57.5 kg (126 lb 12.8 oz)   BMI 22.46 kg/m       CONSTITUTIONAL: healthy, alert and no distress  HEAD: Normocephalic. No masses, lesions, tenderness or abnormalities  SKIN: large scar from her liver transplant on her abdomen  GAIT: normal  NEUROLOGIC: Non-focal  PSYCHIATRIC: affect normal/bright and mentation appears " normal.    MUSCULOSKELETAL:  C-spine: FROM.  Mild spasm of upper trap.    Right shoulder: Full range of motion without pain.  Mild winging of right scapula with dyskinesia with fatigue.  TTP over AC joint and supraspinatus insertion.  Pain and mild weakness with empty can testing, otherwise strength normal.  She does have some pulling and reproduced pain over the lateral upper arm with internal rotation.  Sensation intact.  Negative Neer's.   Assessment/Plan:   Charito Wilcox is here today to follow-up on right-sided neck and shoulder pain.  She reports that this is the best she has felt in some time.  She has taken it upon herself given COVID to do her own workouts (P90 X).  She has occasional pain over the superior aspect of her shoulder that sometimes radiates into her upper arm.  Sometimes this pain is directly over the AC joint.  She denies any pain that radiates past her elbow.  This pain is still quite manageable and she does have more days to not that this is not bothersome to her.    -PT focusing on scapular mechanics and RC  -Follow up in 4 weeks if not improving or worsening    Patient seen and discussed with Dr. Bennett.     Nitza Owens DO  Primary Care Sports Medicine Fellow

## 2020-06-18 NOTE — PROGRESS NOTES
Attending Note:   I have personally examined this patient and have reviewed the clinical presentation and progress note with the fellow. I agree with the treatment plan as outlined. The plan was formulated with the fellow on the day of the patient's visit.   Zulma Bennett MD, CAQ, CCD  [facility]  Sports Medicine and Bone Health

## 2020-06-18 NOTE — LETTER
"  6/18/2020      RE: Charito Wilcox  [address]  [facility] [address]          Subjective:   Charito Wilcox is a 38 year old female here for follow-up of right-sided shoulder pain.  She reports that this is the best that she has been feeling in a long time.  She reports about 70% improvement.  She is not getting headaches anymore and she feels as though the spasming of her upper trap is much improved.  She decided to start the P90 X program and just finished this yesterday.  She reports occasional superior right shoulder pain that sometimes radiates down her upper arm.  She cannot pinpoint any specific activities that make this pain worse.  She notes that this pain does improve and has not worsened over the last few months.  She still has not done any running she is still nervous to do so.  She is no longer meeting with physical therapy for manual therapies.  Her pain feels much more isolated than it has in the past.  She denies any numbness and tingling into her arm.  She feels as though her strength is maintained.    PAST MEDICAL, SOCIAL, SURGICAL AND FAMILY HISTORY: Unchanged from last visit.      ALLERGIES: She is allergic to gluten meal and milk protein extract.    CURRENT MEDICATIONS: She has a current medication list which includes the following prescription(s): alprazolam, azathioprine, betamethasone dipropionate, lactobacillus rhamnosus (gg), mometasone, omeprazole, paroxetine, polyethylene glycol, tacrolimus, and ursodiol, and the following Facility-Administered Medications: ropivacaine, ropivacaine, ropivacaine, and triamcinolone.     REVIEW OF SYSTEMS: 6 point review of systems is negative except as noted above.     Exam:   Ht 1.6 m (5' 3\")   Wt 57.5 kg (126 lb 12.8 oz)   BMI 22.46 kg/m       CONSTITUTIONAL: healthy, alert and no distress  HEAD: Normocephalic. No masses, lesions, tenderness or abnormalities  SKIN: large scar from her liver transplant on her abdomen  GAIT: " normal  NEUROLOGIC: Non-focal  PSYCHIATRIC: affect normal/bright and mentation appears normal.    MUSCULOSKELETAL:  C-spine: FROM.  Mild spasm of upper trap.    Right shoulder: Full range of motion without pain.  Mild winging of right scapula with dyskinesia with fatigue.  TTP over AC joint and supraspinatus insertion.  Pain and mild weakness with empty can testing, otherwise strength normal.  She does have some pulling and reproduced pain over the lateral upper arm with internal rotation.  Sensation intact.  Negative Neer's.   Assessment/Plan:   Charito Wilcox is here today to follow-up on right-sided neck and shoulder pain.  She reports that this is the best she has felt in some time.  She has taken it upon herself given COVID to do her own workouts (P90 X).  She has occasional pain over the superior aspect of her shoulder that sometimes radiates into her upper arm.  Sometimes this pain is directly over the AC joint.  She denies any pain that radiates past her elbow.  This pain is still quite manageable and she does have more days to not that this is not bothersome to her.    -PT focusing on scapular mechanics and RC  -Follow up in 4 weeks if not improving or worsening    Patient seen and discussed with Dr. Bennett.     Nitza Owens DO  Primary Care Sports Medicine Fellow      Attending Note:   I have personally examined this patient and have reviewed the clinical presentation and progress note with the fellow. I agree with the treatment plan as outlined. The plan was formulated with the fellow on the day of the patient's visit.   Zulma Bennett MD, CAQ, CCD  [facility]  Sports Medicine and Bone Health    Nitza Owens DO

## 2020-06-20 ENCOUNTER — VIRTUAL VISIT (OUTPATIENT)
Dept: FAMILY MEDICINE | Facility: OTHER | Age: 40
End: 2020-06-20

## 2020-06-20 ENCOUNTER — TELEPHONE (OUTPATIENT)
Dept: TRANSPLANT | Facility: CLINIC | Age: 40
End: 2020-06-20

## 2020-06-20 NOTE — TELEPHONE ENCOUNTER
Charito called to determine where she could go for COVID testing. She did do walks with her family for [place]. Charito did state they wore masks. Charito reprots that a couple days ago she had a cough, but thought it could be allergy related. Yesterday the cough started to get worse. She does not feel short of breath. She did have one episode that she did feel short of breath when walking up the stairs. Her temp is 96.5.   Her neighbor was positive but she did not see her neighbor at all. Charito's children did play basketball with the neighbors children. Charito's  has a cough and her daughter could not taste something that was spicy. Charito will touch base with her PCP or [facility] to set up COVID testing. Charito aware if she is short of breath or any difficulty breathing she will go to the ED.

## 2020-06-20 NOTE — PROGRESS NOTES
"Date: 2020 11:44:40  Clinician: Jareth Doll  Clinician NPI: [id number]  Patient: Charito Wilcox  Patient : 1980  Patient Address: [address] [place] [id number]  Patient Phone: [phone]  Visit Protocol: URI  Patient Summary:  Charito is a 39 year old ( : 1980 ) female who initiated a Visit for COVID-19 (Coronavirus) evaluation and screening. When asked the question \"Please sign me up to receive news, health information and promotions from [facility].\", Charito responded \"No\".    Charito states her symptoms started gradually 3-4 days ago.   Her symptoms consist of a sore throat, enlarged lymph nodes, malaise, myalgia, facial pain or pressure, a cough, nasal congestion, rhinitis, a headache, and chills. She is experiencing mild difficulty breathing with activities but can speak normally in full sentences.   Symptom details     Nasal secretions: The color of her mucus is white.    Cough: Charito coughs almost every minute and her cough is not more bothersome at night. Phlegm comes into her throat when she coughs. She does not believe her cough is caused by post-nasal drip. The color of the phlegm is white.     Sore throat: Charito reports having mild throat pain (1-3 on a 10 point pain scale), does not have exudate on her tonsils, and can swallow liquids. The lymph nodes in her neck are enlarged. A rash has not appeared on the skin since the sore throat started.     Facial pain or pressure: The facial pain or pressure feels worse when bending over or leaning forward.     Headache: She states the headache is mild (1-3 on a 10 point pain scale).      Charito denies having wheezing, nausea, teeth pain, ageusia, diarrhea, anosmia, fever, vomiting, and ear pain. She also denies having recent facial or sinus surgery in the past 60 days, double sickening (worsening symptoms after initial improvement), and taking antibiotic medication for the symptoms.   Precipitating events  Within the past week, Charito has not " been exposed to someone with strep throat. She has not recently been exposed to someone with influenza. Charito has been in close contact with the following high risk individuals: immunocompromised people, adults 65 or older, and people with asthma, heart disease or diabetes.   Pertinent COVID-19 (Coronavirus) information  In the past 14 days, Charito has not worked in a congregate living setting.   She does not work or volunteer as healthcare worker or a  and does not work or volunteer in a healthcare facility.   Charito also has not lived in a congregate living setting in the past 14 days. She does not live with a healthcare worker.   Charito has not had a close contact with a laboratory-confirmed COVID-19 patient within 14 days of symptom onset.   Pertinent medical history  Charito does not get yeast infections when she takes antibiotics.   Charito does not need a return to work/school note.   Weight: 120 lbs   Charito does not smoke or use smokeless tobacco.   She denies pregnancy and denies breastfeeding. She is currently menstruating.   Additional information as reported by the patient (free text): I had a liver transplant 19 years ago. I was at a protest last weekend.   Weight: 120 lbs    MEDICATIONS: omeprazole oral, paroxetine oral, ursodiol oral, tacrolimus oral, azathioprine oral, ALLERGIES: NKDA  Clinician Response:  Dear Charito,   Your symptoms show that you may have coronavirus (COVID-19). This illness can cause fever, cough and trouble breathing. Many people get a mild case and get better on their own. Some people can get very sick.  What should I do?  We would like to test you for this virus.   1. Please call [phone] to schedule your visit. Explain that you were [facility] to have a COVID-19 test. Be ready to share your [facility] visit ID number.  The following will serve as your written order for this COVID Test, ordered by me, for the indication of suspected COVID [Z20.828]: The test will be  "ordered in [employer], our electronic health record, after you are scheduled. It will show as ordered and authorized by Vu Middleton MD.  Order: COVID-19 (Coronavirus) PCR for SYMPTOMATIC testing from [facility].      2. When it's time for your COVID test:  Stay at least 6 feet away from others. (If someone will drive you to your test, stay in the backseat, as far away from the  as you can.)   Cover your mouth and nose with a mask, tissue or washcloth.  Go straight to the testing site. Don't make any stops on the way there or back.      3.Starting now: Stay home and away from others (self-isolate) until:   You've had no fever---and no medicine that reduces fever---for 3 full days (72 hours). And...   Your other symptoms have gotten better. For example, your cough or breathing has improved. And...   At least 10 days have passed since your symptoms started.       During this time, don't leave the house except for testing or medical care.   Stay in your own room, even for meals. Use your own bathroom if you can.   Stay away from others in your home. No hugging, kissing or shaking hands. No visitors.  Don't go to work, school or anywhere else.    Clean \"high touch\" surfaces often (doorknobs, counters, handles, etc.). Use a household cleaning spray or wipes. You'll find a full list of  on the EPA website: www.epa.gov/pesticide-registration/list-n-disinfectants-use-against-sars-cov-2.   Cover your mouth and nose with a mask, tissue or washcloth to avoid spreading germs.  Wash your hands and face often. Use soap and water.  Caregivers in these groups are at risk for severe illness due to COVID-19:  o People 65 years and older  o People who live in a nursing home or long-term care facility  o People with chronic disease (lung, heart, cancer, diabetes, kidney, liver, immunologic)  o People who have a weakened immune system, including those who:   Are in cancer treatment  Take medicine that weakens the immune system, such " as corticosteroids  Had a bone marrow or organ transplant  Have an immune deficiency  Have poorly controlled HIV or AIDS  Are obese (body mass index of 40 or higher)  Smoke regularly   o Caregivers should wear gloves while washing dishes, handling laundry and cleaning bedrooms and bathrooms.  o Use caution when washing and drying laundry: Don't shake dirty laundry, and use the warmest water setting that you can.  o For more tips, go to www.cdc.gov/coronavirus/2019-ncov/downloads/10Things.pdf.      How can I take care of myself?   Get lots of rest. Drink extra fluids (unless a doctor has told you not to).   Take Tylenol (acetaminophen) for fever or pain. If you have liver or kidney problems, ask your family doctor if it's okay to take Tylenol.   Adults can take either:    650 mg (two 325 mg pills) every 4 to 6 hours, or...   1,000 mg (two 500 mg pills) every 8 hours as needed.    Note: Don't take more than 3,000 mg in one day. Acetaminophen is found in many medicines (both prescribed and over-the-counter medicines). Read all labels to be sure you don't take too much.   For children, check the Tylenol bottle for the right dose. The dose is based on the child's age or weight.    If you have other health problems (like cancer, heart failure, an organ transplant or severe kidney disease): Call your specialty clinic if you don't feel better in the next 2 days.       Know when to call 911. Emergency warning signs include:    Trouble breathing or shortness of breath Pain or pressure in the chest that doesn't go away Feeling confused like you haven't felt before, or not being able to wake up Bluish-colored lips or face.  Where can I get more information?    [facility] -- About COVID-19: [email or web address]   CDC -- What to Do If You're Sick: www.cdc.gov/coronavirus/2019-ncov/about/steps-when-sick.html   CDC -- Ending Home Isolation: www.cdc.gov/coronavirus/2019-ncov/hcp/disposition-in-home-patients.html    CDC -- Caring for Someone: www.cdc.gov/coronavirus/2019-ncov/if-you-are-sick/care-for-someone.html   [facility] -- Interim Guidance for Hospital Discharge to Home: www.health.[facility].[place]/diseases/coronavirus/hcp/hospdischarge.pdf   [facility] clinical trials (COVID-19 research studies): clinicalaffairs.[facility]-clinical-trials    Below are the COVID-19 hotlines at the [place] Department of Health ([facility]). Interpreters are available.    For health questions: Call 746-926-9859 or 1-713.257.3776 (7 a.m. to 7 p.m.) For questions about schools and childcare: Call 193-549-5138 or 1-685.424.1540 (7 a.m. to 7 p.m.)    Diagnosis: Acute upper respiratory infection, unspecified  Diagnosis ICD: J06.9

## 2020-06-21 DIAGNOSIS — Z20.822 SUSPECTED COVID-19 VIRUS INFECTION: Primary | ICD-10-CM

## 2020-06-21 PROCEDURE — 99000 SPECIMEN HANDLING OFFICE-LAB: CPT | Performed by: FAMILY MEDICINE

## 2020-06-21 PROCEDURE — U0003 INFECTIOUS AGENT DETECTION BY NUCLEIC ACID (DNA OR RNA); SEVERE ACUTE RESPIRATORY SYNDROME CORONAVIRUS 2 (SARS-COV-2) (CORONAVIRUS DISEASE [COVID-19]), AMPLIFIED PROBE TECHNIQUE, MAKING USE OF HIGH THROUGHPUT TECHNOLOGIES AS DESCRIBED BY CMS-2020-01-R: HCPCS | Mod: 90 | Performed by: FAMILY MEDICINE

## 2020-06-22 LAB
SARS-COV-2 RNA SPEC QL NAA+PROBE: NOT DETECTED
SPECIMEN SOURCE: NORMAL

## 2020-07-07 ENCOUNTER — THERAPY VISIT (OUTPATIENT)
Dept: PHYSICAL THERAPY | Facility: CLINIC | Age: 40
End: 2020-07-07
Attending: STUDENT IN AN ORGANIZED HEALTH CARE EDUCATION/TRAINING PROGRAM
Payer: COMMERCIAL

## 2020-07-07 DIAGNOSIS — G25.89 SCAPULAR DYSKINESIS: ICD-10-CM

## 2020-07-07 DIAGNOSIS — M75.91 SUPRASPINATUS TENDINITIS, RIGHT: ICD-10-CM

## 2020-07-07 DIAGNOSIS — M25.511 ARTHRALGIA OF RIGHT ACROMIOCLAVICULAR JOINT: ICD-10-CM

## 2020-07-07 PROCEDURE — 97110 THERAPEUTIC EXERCISES: CPT | Mod: GP | Performed by: PHYSICAL THERAPIST

## 2020-07-07 PROCEDURE — 97112 NEUROMUSCULAR REEDUCATION: CPT | Mod: GP | Performed by: PHYSICAL THERAPIST

## 2020-07-07 PROCEDURE — 97530 THERAPEUTIC ACTIVITIES: CPT | Mod: GP | Performed by: PHYSICAL THERAPIST

## 2020-07-07 NOTE — PROGRESS NOTES
Progress Note    Progress reporting period is from initial eval to Jul 7, 2020.     Patient seen for Rxs Used: 30 visits.    SUBJECTIVE  Following up regarding right-sided shoulder pain.  She reports that this is the best that she has been feeling in a long time.  She reports about 70% improvement.  She is not getting headaches anymore and she feels as though the spasming of her upper trap is much improved.  She decided to start the P90 X program and just finished this yesterday.  She reports occasional superior right shoulder pain that sometimes radiates down her upper arm.  She cannot pinpoint any specific activities that make this pain worse.  She notes that this pain does improve and has not worsened over the last few months.  She still has not done any running she is still nervous to do so.  She is no longer meeting with physical therapy for manual therapies.  Her pain feels much more isolated than it has in the past.  She denies any numbness and tingling into her arm.  She feels as though her strength is maintained.  Had taken off running since January, now recently has restarted.   Also had checked in with Dr. Bennett who had her do a bout of prednisone which helped.   Changes in function:  Yes (See Goal flowsheet attached for changes in current functional level)  Adverse reaction to treatment or activity: None    OBJECTIVE  Full range of motion without pain.  Mild winging of right scapula with dyskinesia with fatigue.  TTP over AC joint and supraspinatus insertion.      ASSESSMENT/PLAN  Diagnosis: R sided neck pain, scapular dyskinesis   DIAGP:  There were no encounter diagnoses.  Updated problem list and treatment plan:   Diagnosis 1:  R sided neck pain, scapular dyskinesis, AC pain    Pain -  hot/cold therapy, manual therapy, splint/taping/bracing/orthotics and self management  Decreased ROM/flexibility - manual therapy, therapeutic exercise and home program  Decreased strength - therapeutic exercise,  therapeutic activities and home program  Impaired muscle performance - neuro re-education  Decreased function - therapeutic activities  Impaired posture - neuro re-education    STG/LTGs have been met or progress has been made towards goals:  Yes, please see goal flowsheet for most current information.    Assessment of Progress: The patient's condition is improving.  The patient's condition has potential to improve.  Self Management Plans:  Patient has been instructed in a home treatment program.  I have re-evaluated this patient and find that the nature, scope, duration and intensity of the therapy is appropriate for the medical condition of the patient.  Charito continues to require the following intervention to meet STG and LTG's:  PT.    Recommendations:  This patient would benefit from continued therapy.     Frequency:  1 X week, once daily  Duration:  for 6 weeks      Titus Rabago, PT, DPT, OCS    Please refer to the daily flowsheet for treatment today, total treatment time and time spent performing 1:1 timed codes.

## 2020-07-20 DIAGNOSIS — K25.7 CHRONIC GASTRIC ULCER WITHOUT HEMORRHAGE AND WITHOUT PERFORATION: ICD-10-CM

## 2020-07-20 RX ORDER — OMEPRAZOLE 40 MG/1
CAPSULE, DELAYED RELEASE ORAL
Qty: 60 CAPSULE | Refills: 11 | Status: SHIPPED | OUTPATIENT
Start: 2020-07-20

## 2020-07-20 NOTE — TELEPHONE ENCOUNTER
[facility] Call Center    Phone Message    May a detailed message be left on voicemail: yes     Reason for Call: Medication Refill Request    Has the patient contacted the pharmacy for the refill? Yes   Name of medication being requested:omeprazole (PRILOSEC) 40 MG DR capsule   Provider who prescribed the medication:   Pharmacy: [facility] [id number] [address]   Date medication is needed: asap         Action Taken: Message routed to:  [facility]): hep    Travel Screening: Not Applicable

## 2020-07-22 ENCOUNTER — THERAPY VISIT (OUTPATIENT)
Dept: PHYSICAL THERAPY | Facility: CLINIC | Age: 40
End: 2020-07-22
Attending: STUDENT IN AN ORGANIZED HEALTH CARE EDUCATION/TRAINING PROGRAM
Payer: COMMERCIAL

## 2020-07-22 DIAGNOSIS — M25.511 ARTHRALGIA OF RIGHT ACROMIOCLAVICULAR JOINT: ICD-10-CM

## 2020-07-22 DIAGNOSIS — G25.89 SCAPULAR DYSKINESIS: Primary | ICD-10-CM

## 2020-07-22 PROCEDURE — 97530 THERAPEUTIC ACTIVITIES: CPT | Mod: GP | Performed by: PHYSICAL THERAPIST

## 2020-07-22 PROCEDURE — 97112 NEUROMUSCULAR REEDUCATION: CPT | Mod: GP | Performed by: PHYSICAL THERAPIST

## 2020-07-22 PROCEDURE — 97110 THERAPEUTIC EXERCISES: CPT | Mod: GP | Performed by: PHYSICAL THERAPIST

## 2020-08-02 ENCOUNTER — TELEPHONE (OUTPATIENT)
Dept: INTERNAL MEDICINE | Facility: CLINIC | Age: 40
End: 2020-08-02

## 2020-08-02 DIAGNOSIS — F41.0 PANIC DISORDER: ICD-10-CM

## 2020-08-06 RX ORDER — PAROXETINE 40 MG/1
60 TABLET, FILM COATED ORAL AT BEDTIME
Qty: 135 TABLET | Refills: 0 | Status: SHIPPED | OUTPATIENT
Start: 2020-08-06 | End: 2020-09-01

## 2020-08-06 NOTE — TELEPHONE ENCOUNTER
Last Clinic Visit: 4/30/19, no upcoming appointments scheduled.  90 day refill provided, routed to clinic for follow up

## 2020-08-06 NOTE — TELEPHONE ENCOUNTER
Patient no longer seeing Tiffany Paredes at the [facility]. Patient is establish with Dr. Nubia Jackson DO at the [facility].

## 2020-08-18 ENCOUNTER — TELEPHONE (OUTPATIENT)
Dept: TRANSPLANT | Facility: CLINIC | Age: 40
End: 2020-08-18

## 2020-08-18 DIAGNOSIS — Z13.220 LIPID SCREENING: ICD-10-CM

## 2020-08-18 DIAGNOSIS — Z94.4 LIVER REPLACED BY TRANSPLANT (H): ICD-10-CM

## 2020-08-18 NOTE — TELEPHONE ENCOUNTER
Patient Call: Voicemail  Date/Time: 8/18/20 1:34PM  Reason for call: Pt calling to set up a lab appointment for tomorrow, 8/19. Would like to set it up for around 8:00am. Please call back.

## 2020-08-19 DIAGNOSIS — Z13.220 LIPID SCREENING: ICD-10-CM

## 2020-08-19 DIAGNOSIS — Z94.4 LIVER REPLACED BY TRANSPLANT (H): ICD-10-CM

## 2020-08-19 LAB
ALBUMIN SERPL-MCNC: 3.5 G/DL (ref 3.4–5)
ALBUMIN UR-MCNC: NEGATIVE MG/DL
ALP SERPL-CCNC: 136 U/L (ref 40–150)
ALT SERPL W P-5'-P-CCNC: 22 U/L (ref 0–50)
ANION GAP SERPL CALCULATED.3IONS-SCNC: 3 MMOL/L (ref 3–14)
APPEARANCE UR: CLEAR
AST SERPL W P-5'-P-CCNC: 24 U/L (ref 0–45)
BILIRUB DIRECT SERPL-MCNC: 0.1 MG/DL (ref 0–0.2)
BILIRUB SERPL-MCNC: 0.4 MG/DL (ref 0.2–1.3)
BILIRUB UR QL STRIP: NEGATIVE
BUN SERPL-MCNC: 11 MG/DL (ref 7–30)
CALCIUM SERPL-MCNC: 8.4 MG/DL (ref 8.5–10.1)
CHLORIDE SERPL-SCNC: 111 MMOL/L (ref 94–109)
CHOLEST SERPL-MCNC: 164 MG/DL
CO2 SERPL-SCNC: 27 MMOL/L (ref 20–32)
COLOR UR AUTO: YELLOW
CREAT SERPL-MCNC: 0.77 MG/DL (ref 0.52–1.04)
CREAT UR-MCNC: 117 MG/DL
ERYTHROCYTE [DISTWIDTH] IN BLOOD BY AUTOMATED COUNT: 11.7 % (ref 10–15)
GFR SERPL CREATININE-BSD FRML MDRD: >90 ML/MIN/{1.73_M2}
GLUCOSE SERPL-MCNC: 81 MG/DL (ref 70–99)
GLUCOSE UR STRIP-MCNC: NEGATIVE MG/DL
HCT VFR BLD AUTO: 39 % (ref 35–47)
HDLC SERPL-MCNC: 75 MG/DL
HGB BLD-MCNC: 13.5 G/DL (ref 11.7–15.7)
HGB UR QL STRIP: NEGATIVE
KETONES UR STRIP-MCNC: NEGATIVE MG/DL
LDLC SERPL CALC-MCNC: 75 MG/DL
LEUKOCYTE ESTERASE UR QL STRIP: NEGATIVE
MCH RBC QN AUTO: 35 PG (ref 26.5–33)
MCHC RBC AUTO-ENTMCNC: 34.6 G/DL (ref 31.5–36.5)
MCV RBC AUTO: 101 FL (ref 78–100)
NITRATE UR QL: NEGATIVE
NONHDLC SERPL-MCNC: 89 MG/DL
PH UR STRIP: 7 PH (ref 5–7)
PLATELET # BLD AUTO: 203 10E9/L (ref 150–450)
POTASSIUM SERPL-SCNC: 4.1 MMOL/L (ref 3.4–5.3)
PROT SERPL-MCNC: 7.1 G/DL (ref 6.8–8.8)
PROT UR-MCNC: 0.19 G/L
PROT/CREAT 24H UR: 0.16 G/G CR (ref 0–0.2)
RBC # BLD AUTO: 3.86 10E12/L (ref 3.8–5.2)
RBC #/AREA URNS AUTO: 1 /HPF (ref 0–2)
SODIUM SERPL-SCNC: 141 MMOL/L (ref 133–144)
SOURCE: NORMAL
SP GR UR STRIP: 1.02 (ref 1–1.03)
TACROLIMUS BLD-MCNC: 3.5 UG/L (ref 5–15)
TME LAST DOSE: ABNORMAL H
TRIGL SERPL-MCNC: 66 MG/DL
UROBILINOGEN UR STRIP-MCNC: 0 MG/DL (ref 0–2)
WBC # BLD AUTO: 4.2 10E9/L (ref 4–11)
WBC #/AREA URNS AUTO: <1 /HPF (ref 0–5)

## 2020-08-24 DIAGNOSIS — F41.0 PANIC DISORDER: ICD-10-CM

## 2020-08-24 RX ORDER — ALPRAZOLAM 0.5 MG
TABLET ORAL
Qty: 30 TABLET | Refills: 0 | OUTPATIENT
Start: 2020-08-24

## 2020-08-24 NOTE — TELEPHONE ENCOUNTER
Routing refill request to provider for review/approval because:  Drug not on the [facility] refill protocol     Heather Moran RN, BSN, PHN  [facility]: [place]

## 2020-08-25 ENCOUNTER — TELEPHONE (OUTPATIENT)
Dept: FAMILY MEDICINE | Facility: CLINIC | Age: 40
End: 2020-08-25

## 2020-08-25 DIAGNOSIS — Z94.4 LIVER REPLACED BY TRANSPLANT (H): ICD-10-CM

## 2020-08-25 DIAGNOSIS — R11.0 NAUSEA: ICD-10-CM

## 2020-08-25 RX ORDER — ONDANSETRON 4 MG/1
4-8 TABLET, ORALLY DISINTEGRATING ORAL 3 TIMES DAILY PRN
Qty: 20 TABLET | Refills: 0 | OUTPATIENT
Start: 2020-08-25

## 2020-08-25 NOTE — TELEPHONE ENCOUNTER
ondansetron (ZOFRAN ODT) 4 MG ODT tab (Discontinued)   This medication is discontinued        Last Written Prescription Date:  2/21/2019  Last Fill Quantity: 20 tabs,   # refills: 0  Last Office Visit: 7/12/2019  Future Office visit:       Routing refill request to provider for review/approval because:  Drug not active on patient's medication list

## 2020-08-25 NOTE — TELEPHONE ENCOUNTER
Medication never prescribed by PCP. No notes given as to why it was discontinued.     Per other refill request, patient overdue for visit - will need to schedule virtual visit for refills.     Heather Moran RN, BSN, PHN  [facility]: [place]

## 2020-08-25 NOTE — TELEPHONE ENCOUNTER
Reviewing refill request while PCP Dr. Jackson is out of office.    Patient last seen by Dr. Jackson 7/12/2019.  Patient will need a virtual visit to discuss request for controlled substance refill.  Please let her know.    Whit Lopez, LIGIA, APRN, CNP

## 2020-08-25 NOTE — TELEPHONE ENCOUNTER
Patient scheduled visit with Whit Lopez.   She did not need refill to get her through until appointment.     Marquita German MA

## 2020-08-31 ENCOUNTER — THERAPY VISIT (OUTPATIENT)
Dept: PHYSICAL THERAPY | Facility: CLINIC | Age: 40
End: 2020-08-31
Payer: COMMERCIAL

## 2020-08-31 DIAGNOSIS — G25.89 SCAPULAR DYSKINESIS: Primary | ICD-10-CM

## 2020-08-31 PROCEDURE — 97110 THERAPEUTIC EXERCISES: CPT | Mod: GP | Performed by: PHYSICAL THERAPIST

## 2020-08-31 PROCEDURE — 97140 MANUAL THERAPY 1/> REGIONS: CPT | Mod: GP | Performed by: PHYSICAL THERAPIST

## 2020-09-01 ENCOUNTER — OFFICE VISIT (OUTPATIENT)
Dept: FAMILY MEDICINE | Facility: CLINIC | Age: 40
End: 2020-09-01
Payer: COMMERCIAL

## 2020-09-01 VITALS
WEIGHT: 126 LBS | OXYGEN SATURATION: 97 % | SYSTOLIC BLOOD PRESSURE: 102 MMHG | HEART RATE: 85 BPM | RESPIRATION RATE: 20 BRPM | DIASTOLIC BLOOD PRESSURE: 60 MMHG | HEIGHT: 63 IN | BODY MASS INDEX: 22.32 KG/M2

## 2020-09-01 DIAGNOSIS — Z00.00 ROUTINE GENERAL MEDICAL EXAMINATION AT A HEALTH CARE FACILITY: Primary | ICD-10-CM

## 2020-09-01 DIAGNOSIS — F41.0 PANIC DISORDER: ICD-10-CM

## 2020-09-01 DIAGNOSIS — Z12.4 SCREENING FOR MALIGNANT NEOPLASM OF CERVIX: ICD-10-CM

## 2020-09-01 DIAGNOSIS — D84.9 IMMUNOSUPPRESSED STATUS (H): ICD-10-CM

## 2020-09-01 DIAGNOSIS — R11.0 NAUSEA: ICD-10-CM

## 2020-09-01 DIAGNOSIS — Z94.4 LIVER REPLACED BY TRANSPLANT (H): ICD-10-CM

## 2020-09-01 PROCEDURE — 99395 PREV VISIT EST AGE 18-39: CPT | Performed by: NURSE PRACTITIONER

## 2020-09-01 RX ORDER — ALPRAZOLAM 0.5 MG
0.5 TABLET ORAL 3 TIMES DAILY PRN
Qty: 30 TABLET | Refills: 0 | Status: SHIPPED | OUTPATIENT
Start: 2020-09-01

## 2020-09-01 RX ORDER — PAROXETINE 40 MG/1
60 TABLET, FILM COATED ORAL AT BEDTIME
Qty: 135 TABLET | Refills: 1 | Status: SHIPPED | OUTPATIENT
Start: 2020-09-01

## 2020-09-01 RX ORDER — ONDANSETRON 4 MG/1
4 TABLET, ORALLY DISINTEGRATING ORAL EVERY 8 HOURS PRN
Qty: 30 TABLET | Refills: 1 | Status: SHIPPED | OUTPATIENT
Start: 2020-09-01

## 2020-09-01 ASSESSMENT — MIFFLIN-ST. JEOR: SCORE: 1220.66

## 2020-09-01 NOTE — PROGRESS NOTES
SUBJECTIVE:   CC: Charito Wilcox is an 40 year old woman who presents for preventive health visit.     Healthy Habits:  4 services as fruits and veggies per day     Do you get at least three servings of calcium containing foods daily (dairy, green leafy vegetables, etc.)? yes    Amount of exercise or daily activities, outside of work: 5 day(s) per week    Problems taking medications regularly No    Medication side effects: No    Have you had an eye exam in the past two years? yes    Do you see a dentist twice per year? yes    Do you have sleep apnea, excessive snoring or daytime drowsiness? no    She is a liver transplant patient and followed by Dr. Maria.  Had liver transplant at age 20    Stays healthy, runs 5 days per week.  One daughter age 12 and one son age 10.    Anxiety: She does have history of anxiety which she is on paroxetine 60 mg daily which has been effective for her.  She does use Xanax on a rare occasion but infrequently.  She reports she has a phobia of nausea and this will also increase her anxiety.  She also has anxiety related to her health.   Zofran makes her constipated but it is effective if she has nausea.    If she has Dairy or gluten she will have nausea for a week.    Today's PHQ-2 Score:   PHQ-2 ( 1999 Pfizer) 9/1/2020 6/18/2020   Q1: Little interest or pleasure in doing things 0 0   Q2: Feeling down, depressed or hopeless 0 0   PHQ-2 Score 0 0   Q1: Little interest or pleasure in doing things - -   Q2: Feeling down, depressed or hopeless - -   PHQ-2 Score - -       Abuse: Current or Past(Physical, Sexual or Emotional)- NO  Do you feel safe in your environment? YES        Social History     Tobacco Use     Smoking status: Never Smoker     Smokeless tobacco: Never Used   Substance Use Topics     Alcohol use: Yes     Comment: glass of wine up to once a month     If you drink alcohol do you typically have >3 drinks per day or >7 drinks per week? No                     Reviewed orders  with patient.  Reviewed health maintenance and updated orders accordingly - Yes  BP Readings from Last 3 Encounters:   20 102/60   20 110/75   20 119/79    Wt Readings from Last 3 Encounters:   20 57.2 kg (126 lb)   20 57.5 kg (126 lb 12.8 oz)   20 55.8 kg (123 lb 1.6 oz)                  Patient Active Problem List   Diagnosis     Liver replaced by transplant (H)     Esophageal reflux     CARDIOVASCULAR SCREENING; LDL GOAL LESS THAN 160     Panic disorder     IBS (irritable bowel syndrome)     Immunosuppressed status (H)     PMS (premenstrual syndrome)     Localized superficial swelling, mass, or lump     Abdominal pain     Anxiety     Past Surgical History:   Procedure Laterality Date     C TRANSPLANT LIVER,HETERTOPIC      had appendectomy, gallbladder out with transplant.       SECTION  2008      SECTION  3/2010     CHOLECYSTECTOMY      with liver transplant     COLONOSCOPY N/A 10/24/2018    Procedure: Colonoscopy;  Surgeon: Ottoniel Martin MD;  [facility]     ESOPHAGOSCOPY, GASTROSCOPY, DUODENOSCOPY (EGD), COMBINED Left 2015    Procedure: COMBINED ESOPHAGOSCOPY, GASTROSCOPY, DUODENOSCOPY (EGD), BIOPSY SINGLE OR MULTIPLE;  Surgeon: Ottoniel Martin MD;  Location:  GI     ESOPHAGOSCOPY, GASTROSCOPY, DUODENOSCOPY (EGD), COMBINED N/A 2016    Procedure: COMBINED ESOPHAGOSCOPY, GASTROSCOPY, DUODENOSCOPY (EGD), BIOPSY SINGLE OR MULTIPLE;  Surgeon: Homar Adams MD;  Location:  GI     ESOPHAGOSCOPY, GASTROSCOPY, DUODENOSCOPY (EGD), COMBINED N/A 2016    Procedure: COMBINED ESOPHAGOSCOPY, GASTROSCOPY, DUODENOSCOPY (EGD), BIOPSY SINGLE OR MULTIPLE;  Surgeon: Homar Adams MD;  Location:  GI     ESOPHAGOSCOPY, GASTROSCOPY, DUODENOSCOPY (EGD), COMBINED N/A 2018    Procedure: COMBINED ESOPHAGOSCOPY, GASTROSCOPY, DUODENOSCOPY (EGD);;  Surgeon: Daryl Blank MD;  Location:  GI      HIP SURGERY Right 2/2016    Torn labrum repair      TUBAL LIGATION  3/2010       Social History     Tobacco Use     Smoking status: Never Smoker     Smokeless tobacco: Never Used   Substance Use Topics     Alcohol use: Yes     Comment: glass of wine up to once a month     Family History   Problem Relation Age of Onset     Coronary Artery Disease Mother      Hyperlipidemia Mother      Osteoporosis Mother      Arthritis Mother      Depression Maternal Grandmother      Anxiety Disorder Maternal Grandmother      Heart Failure Maternal Grandmother      Hypertension Maternal Grandfather      Cerebrovascular Disease Paternal Grandmother      Breast Cancer Paternal Grandmother      Gastrointestinal Disease No family hx of      C.A.D. No family hx of      Cancer - colorectal No family hx of      Prostate Cancer No family hx of      Alcohol/Drug No family hx of          Current Outpatient Medications   Medication Sig Dispense Refill     ALPRAZolam (XANAX) 0.5 MG tablet Take 1 tablet (0.5 mg) by mouth 3 times daily as needed for anxiety 1 month supply 30 tablet 0     azaTHIOprine (IMURAN) 50 MG tablet TAKE 1 TABLET BY MOUTH EVERY DAY 90 tablet 3     betamethasone dipropionate (DIPROSONE) 0.05 % external cream Apply topically 2 times daily 45 g 0     lactobacillus rhamnosus, GG, (CULTURELL) capsule Take 1 capsule by mouth daily       omeprazole (PRILOSEC) 40 MG DR capsule TAKE 1 CAPSULE BY MOUTH TWICE DAILY BEFORE MEALS, TAKE 30-60 MINUTES PRIOR TO A MEAL 60 capsule 11     ondansetron (ZOFRAN-ODT) 4 MG ODT tab Take 1 tablet (4 mg) by mouth every 8 hours as needed for nausea 30 tablet 1     PARoxetine (PAXIL) 40 MG tablet Take 1.5 tablets (60 mg) by mouth At Bedtime 135 tablet 1     polyethylene glycol (MIRALAX) powder Take 17 g (1 capful) by mouth daily 510 g 1     tacrolimus (GENERIC EQUIVALENT) 1 MG capsule TAKE 1 CAPSULE EVERY 12    HOURS 180 capsule 3     ursodiol (ACTIGALL) 300 MG capsule TAKE 1 CAPSULE TWICE DAILY 180  capsule 3     Allergies   Allergen Reactions     Gluten Meal Nausea and Vomiting     Milk Protein Extract Nausea and Vomiting       Mammogram not appropriate for this patient based on age.    Pertinent mammograms are reviewed under the imaging tab.    History of abnormal Pap smear:   Last 3 Pap and HPV Results:   PAP / HPV Latest Ref Rng & Units 2/6/2020 5/9/2018 5/1/2017   PAP - NIL NIL NIL   HPV 16 DNA NEG:Negative Negative Negative Negative   HPV 18 DNA NEG:Negative Negative Negative Negative   OTHER HR HPV NEG:Negative Negative Negative Negative     PAP / HPV Latest Ref Rng & Units 2/6/2020 5/9/2018 5/1/2017   PAP - NIL NIL NIL   HPV 16 DNA NEG:Negative Negative Negative Negative   HPV 18 DNA NEG:Negative Negative Negative Negative   OTHER HR HPV NEG:Negative Negative Negative Negative     Reviewed and updated as needed this visit by clinical staff  Tobacco  Allergies  Meds  Problems  Med Hx  Surg Hx  Fam Hx  Soc Hx          Reviewed and updated as needed this visit by Provider  Tobacco  Allergies  Meds  Problems  Med Hx  Surg Hx  Fam Hx            ROS:  CONSTITUTIONAL: NEGATIVE for fever, chills, change in weight  INTEGUMENTARY/SKIN: POSITIVE for rash right ankle, had this last year and it cleared up.  Came back the past month, she has been using Diprosone which has helped decrease the itching but she is unsure if the lesion is getting smaller.  Is seeing Dermatology in 3 weeks for skin check.  EYES: NEGATIVE for vision changes or irritation  ENT: NEGATIVE for ear, mouth and throat problems  RESP: NEGATIVE for significant cough or SOB  BREAST: NEGATIVE for masses, tenderness or discharge  CV: NEGATIVE for chest pain, palpitations or peripheral edema  GI: NEGATIVE for nausea, abdominal pain, heartburn, or change in bowel habits  : NEGATIVE for unusual urinary or vaginal symptoms. Periods are regular.  MUSCULOSKELETAL: NEGATIVE for significant arthralgias or myalgia  NEURO: NEGATIVE for weakness,  "dizziness or paresthesias  PSYCHIATRIC: POSITIVE foranxiety    OBJECTIVE:   /60 (BP Location: Right arm)   Pulse 85   Resp 20   Ht 1.6 m (5' 3\")   Wt 57.2 kg (126 lb)   SpO2 97%   BMI 22.32 kg/m    EXAM:  GENERAL: healthy, alert and no distress  EYES: Eyes grossly normal to inspection, PERRL and conjunctivae and sclerae normal  HENT: ear canals and TM's normal, nose and mouth without ulcers or lesions  NECK: no adenopathy, no asymmetry, masses, or scars and thyroid normal to palpation  RESP: lungs clear to auscultation - no rales, rhonchi or wheezes  BREAST: normal without masses, tenderness or nipple discharge and no palpable axillary masses or adenopathy  CV: regular rate and rhythm, normal S1 S2, no S3 or S4, no murmur, click or rub, no peripheral edema and peripheral pulses strong  ABDOMEN: soft, nontender, no hepatosplenomegaly, no masses and bowel sounds normal   (female): deferred  MS: no gross musculoskeletal defects noted, no edema  SKIN: erythematous raised fine plaque on right ankle  NEURO: Normal strength and tone, mentation intact and speech normal  PSYCH: mentation appears normal, affect normal/bright    Diagnostic Test Results:  Labs reviewed in Epic    ASSESSMENT/PLAN:   1. Routine general medical examination at a health care facility    2. Screening for malignant neoplasm of cervix  Last Pap 2/2020 and next due 2/2021    3. Panic disorder  Chronic, stable, continue current treatment.  Patient uses Xanax sparingly.    - ALPRAZolam (XANAX) 0.5 MG tablet; Take 1 tablet (0.5 mg) by mouth 3 times daily as needed for anxiety 1 month supply  Dispense: 30 tablet; Refill: 0  - PARoxetine (PAXIL) 40 MG tablet; Take 1.5 tablets (60 mg) by mouth At Bedtime  Dispense: 135 tablet; Refill: 1  - Follow-up in 6 months for recheck anxiety and Pap.    4. Nausea  No current symptoms.  Refilled Zofran as she uses as needed.  - ondansetron (ZOFRAN-ODT) 4 MG ODT tab; Take 1 tablet (4 mg) by mouth every 8 " "hours as needed for nausea  Dispense: 30 tablet; Refill: 1    5. Liver replaced by transplant (H)  6. Immunosuppressed status (H)  Patient will continue to follow with Dr. Maria.    Patient has upcoming appointment with Dermatology and she will add over the counter antifungal cream.    COUNSELING:   Reviewed preventive health counseling, as reflected in patient instructions       Regular exercise       Healthy diet/nutrition    Estimated body mass index is 22.32 kg/m  as calculated from the following:    Height as of this encounter: 1.6 m (5' 3\").    Weight as of this encounter: 57.2 kg (126 lb).        She reports that she has never smoked. She has never used smokeless tobacco.      Counseling Resources:  ATP IV Guidelines  Pooled Cohorts Equation Calculator  Breast Cancer Risk Calculator  BRCA-Related Cancer Risk Assessment: FHS-7 Tool  FRAX Risk Assessment  ICSI Preventive Guidelines  Dietary Guidelines for Americans, 2010  USDA's MyPlate  ASA Prophylaxis  Lung CA Screening    Whit Lopez NP  [facility]  "

## 2020-09-01 NOTE — PATIENT INSTRUCTIONS
Preventive Health Recommendations  Female Ages 26 - 39  Yearly exam:   See your health care provider every year in order to    Review health changes.     Discuss preventive care.      Review your medicines if you your doctor has prescribed any.    Until age 30: Get a Pap test every three years (more often if you have had an abnormal result).    After age 30: Talk to your doctor about whether you should have a Pap test every 3 years or have a Pap test with HPV screening every 5 years.   You do not need a Pap test if your uterus was removed (hysterectomy) and you have not had cancer.  You should be tested each year for STDs (sexually transmitted diseases), if you're at risk.   Talk to your provider about how often to have your cholesterol checked.  If you are at risk for diabetes, you should have a diabetes test (fasting glucose).  Shots: Get a flu shot each year. Get a tetanus shot every 10 years.   Nutrition:     Eat at least 5 servings of fruits and vegetables each day.    Eat whole-grain bread, whole-wheat pasta and brown rice instead of white grains and rice.    Get adequate Calcium and Vitamin D.     Lifestyle    Exercise at least 150 minutes a week (30 minutes a day, 5 days of the week). This will help you control your weight and prevent disease.    Limit alcohol to one drink per day.    No smoking.     Wear sunscreen to prevent skin cancer.    See your dentist every six months for an exam and cleaning.    [facility]     Discharged by : Jolene Glasgow CMA  If you have any questions regarding your visit please contact your care team:     Team [place]              Clinic Hours Telephone Number     Dr. Lencho Lopez, CHRISTOPHER   7am-7pm  Monday - Thursday   7am-5pm  Fridays  [phone]   (Appointment scheduling available 24/7)     RN Line  [phone] option 2     Urgent Care - [facility] and [facility] - 11am-9pm  Monday-Friday Saturday-Sunday- 9am-5pm     [place] -   5pm-9pm Monday-Friday Saturday-Sunday- 9am-5pm    [phone] - [place]    [phone] - [place]     For a Price Quote for your services, please call our Consumer Price Line at [phone].     What options do I have for visits at the clinic other than the traditional office visit?     To expand how we care for you, many of our providers are utilizing electronic visits (e-visits) and telephone visits, when medically appropriate, for interactions with their patients rather than a visit in the clinic. We also offer nurse visits for many medical concerns. Just like any other service, we will bill your insurance company for this type of visit based on time spent on the phone with your provider. Not all insurance companies cover these visits. Please check with your medical insurance if this type of visit is covered. You will be responsible for any charges that are not paid by your insurance.     E-visits via [employer]: generally incur a $45.00 fee.     Telephone visits:  Time spent on the phone: *charged based on time that is spent on the phone in increments of 10 minutes. Estimated cost:   5-10 mins $30.00   11-20 mins. $59.00   21-30 mins. $85.00       Use [employer] (secure email communication and access to your chart) to send your primary care provider a message or make an appointment. Ask someone on your Team how to sign up for [employer].     As always, Thank you for trusting us with your health care needs!      [facility]:    Scheduling Appointments  [facility]  Call: [phone]    [facility][1]  Call: [phone]    [facility]  Call: [phone]    For Gastroenterology referrals   [facility]   Clinics and Surgery [place], 4th Floor   [address] [place] [id number]   Appointments: [phone]    WHERE TO GO FOR  CARE?    Clinic    Make an appointment if you:       Are sick (cold, cough, flu, sore throat, earache or in pain).       Have a small injury (sprain, small cut, burn or broken bone).       Need a physical exam, Pap smear, vaccine or prescription refill.       Have questions about your health or medicines.    To reach us:      Call [phone] ([phone]). Open 24 hours every day. (For counseling services, call [phone].)    Log into [employer] at [employer]. (Visit [email or web address] to create an account.) Hospital emergency room    An emergency is a serious or life- threatening problem that must be treated right away.    Call 534 or get to the hospital if you have:      Very bad or sudden:            - Chest pain or pressure         - Bleeding         - Head or belly pain         - Dizziness or trouble seeing, walking or                          Speaking      Problems breathing      Blood in your vomit or you are coughing up blood      A major injury (knocked out, loss of a finger or limb, rape, broken bone protruding from skin)    A mental health crisis. (Or call the Mental Health Crisis line at 1-446.377.4071 or Suicide Prevention Hotline at 1-674.487.9337.)    Open 24 hours every day. You don't need an appointment.     Urgent care    Visit urgent care for sickness or small injuries when the clinic is closed. You don't need an appointment. To check hours or find an urgent care near you, visit [email or web address]. Online care    Get online care from [facility] for more than 70 common problems, like colds, allergies and infections. Open 24 hours every day at:   [email or web address]   Need help deciding?    For advice about where to be seen, you may call your clinic and ask to speak with a nurse. We're here for you 24 hours every day.         If you are deaf or hard of hearing, please let us know. We provide many free services including sign language interpreters, oral interpreters, TTYs, telephone  amplifiers, note takers and written materials.

## 2020-09-11 ENCOUNTER — TELEPHONE (OUTPATIENT)
Dept: TRANSPLANT | Facility: CLINIC | Age: 40
End: 2020-09-11

## 2020-09-11 NOTE — TELEPHONE ENCOUNTER
Annual chart review.     Charito is overdue for her annual hepatology appt with Dr. Maria. I do not have documentation of a dermatology visit and her most recent DEXA was 2014. [employer] message sent to Charito to notify her of this and assist in scheduling.

## 2020-09-28 ENCOUNTER — OFFICE VISIT (OUTPATIENT)
Dept: FAMILY MEDICINE | Facility: CLINIC | Age: 40
End: 2020-09-28
Payer: COMMERCIAL

## 2020-09-28 VITALS
SYSTOLIC BLOOD PRESSURE: 106 MMHG | OXYGEN SATURATION: 100 % | TEMPERATURE: 98.5 F | DIASTOLIC BLOOD PRESSURE: 78 MMHG | HEART RATE: 69 BPM | BODY MASS INDEX: 22.85 KG/M2 | WEIGHT: 129 LBS

## 2020-09-28 DIAGNOSIS — N76.0 ACUTE VAGINITIS: ICD-10-CM

## 2020-09-28 DIAGNOSIS — R30.0 DYSURIA: Primary | ICD-10-CM

## 2020-09-28 DIAGNOSIS — R20.8 BURNING SENSATION: ICD-10-CM

## 2020-09-28 LAB
ALBUMIN UR-MCNC: NEGATIVE MG/DL
APPEARANCE UR: CLEAR
BILIRUB UR QL STRIP: NEGATIVE
COLOR UR AUTO: YELLOW
GLUCOSE UR STRIP-MCNC: NEGATIVE MG/DL
HGB UR QL STRIP: NEGATIVE
KETONES UR STRIP-MCNC: NEGATIVE MG/DL
LEUKOCYTE ESTERASE UR QL STRIP: NEGATIVE
NITRATE UR QL: NEGATIVE
PH UR STRIP: 7 PH (ref 5–7)
SOURCE: NORMAL
SP GR UR STRIP: 1.01 (ref 1–1.03)
SPECIMEN SOURCE: NORMAL
UROBILINOGEN UR STRIP-ACNC: 0.2 EU/DL (ref 0.2–1)
WET PREP SPEC: NORMAL

## 2020-09-28 PROCEDURE — 81003 URINALYSIS AUTO W/O SCOPE: CPT | Performed by: FAMILY MEDICINE

## 2020-09-28 PROCEDURE — 99213 OFFICE O/P EST LOW 20 MIN: CPT | Performed by: FAMILY MEDICINE

## 2020-09-28 PROCEDURE — 87210 SMEAR WET MOUNT SALINE/INK: CPT | Performed by: FAMILY MEDICINE

## 2020-09-28 NOTE — PROGRESS NOTES
Subjective     Charito Wilcox is a 40 year old female who presents to clinic today for the following health issues:    HPI     Has been having a burning sensation , itching, no discharge or odor for past 1 week. No abdominal or flank pain, nausea or vomiting.  Has a little blood in urine last night and this alarmed her  No Hx of kidney stone  LMP; 2 weeks ago and is sure she is not pregnant  Has had some vaginal dryness in past.    Genitourinary - Female  Onset/Duration: 6 days  Description:   Painful urination (Dysuria): YES           Frequency: YES  Blood in urine (Hematuria): YES  Delay in urine (Hesitency): YES  Intensity: moderate, severe  Progression of Symptoms:  worsening  Accompanying Signs & Symptoms:  Fever/chills: no  Flank pain: YES  Nausea and vomiting: no  Vaginal symptoms: none and itching  Abdominal/Pelvic Pain: YES  History:   History of frequent UTI s: YES  History of kidney stones: no  Sexually Active: YES  Possibility of pregnancy: No  Precipitating or alleviating factors: None  Therapies tried and outcome: Increase fluid intake    Review of Systems   Constitutional, HEENT, cardiovascular, pulmonary and gi systems are negative, except as otherwise noted.      Objective    /78   Pulse 69   Temp 98.5  F (36.9  C) (Oral)   Wt 58.5 kg (129 lb)   SpO2 100%   BMI 22.85 kg/m    Body mass index is 22.85 kg/m .  Physical Exam   GENERAL: healthy, alert and no distress  RESP: lungs clear to auscultation - no rales, rhonchi or wheezes  CV: regular rate and rhythm, no murmur, click or rub, no peripheral edema  ABDOMEN: soft, nontender, no masses and bowel sounds normal.    Results for orders placed or performed in visit on 09/28/20   *UA reflex to Microscopic and Culture ([place] and [facility] (except [place] and [place])     Status: None    Specimen: Midstream Urine   Result Value Ref Range    Color Urine Yellow     Appearance Urine Clear     Glucose Urine Negative NEG^Negative mg/dL     Bilirubin Urine Negative NEG^Negative    Ketones Urine Negative NEG^Negative mg/dL    Specific Gravity Urine 1.010 1.003 - 1.035    Blood Urine Negative NEG^Negative    pH Urine 7.0 5.0 - 7.0 pH    Protein Albumin Urine Negative NEG^Negative mg/dL    Urobilinogen Urine 0.2 0.2 - 1.0 EU/dL    Nitrite Urine Negative NEG^Negative    Leukocyte Esterase Urine Negative NEG^Negative    Source Midstream Urine    Wet prep     Status: None    Specimen: Vagina   Result Value Ref Range    Specimen Description Vagina     Wet Prep No Trichomonas seen     Wet Prep No clue cells seen     Wet Prep No yeast seen     Wet Prep Few  WBC'S seen        Assessment & Plan     Charito was seen today for dysuria.    Diagnoses and all orders for this visit:    Dysuria  -     *UA reflex to Microscopic and Culture ([place] and [facility] (except [place] and [place])    Burning sensation/Acute vaginitis    Wet prep and UA normal. Possible some non specific irritation or atrophic vaginitis. Given no lab abnormality will try hygiene and if symptoms are persisting she will follow up with her Gyn provider for further assessment  -     Wet prep    Neo Skelton MD  [facility]

## 2020-10-10 ENCOUNTER — HOSPITAL ENCOUNTER (EMERGENCY)
Facility: HOSPITAL | Age: 40
Discharge: HOME | End: 2020-10-10
Attending: PHYSICIAN ASSISTANT | Admitting: PHYSICIAN ASSISTANT
Payer: COMMERCIAL

## 2020-10-10 VITALS
HEART RATE: 72 BPM | SYSTOLIC BLOOD PRESSURE: 115 MMHG | DIASTOLIC BLOOD PRESSURE: 79 MMHG | OXYGEN SATURATION: 100 % | RESPIRATION RATE: 16 BRPM | TEMPERATURE: 99.5 F

## 2020-10-10 DIAGNOSIS — B37.31 YEAST INFECTION OF THE VAGINA: Primary | ICD-10-CM

## 2020-10-10 LAB
ALBUMIN SERPL-MCNC: 3.7 G/DL (ref 3.4–5)
ALBUMIN UR-MCNC: NEGATIVE MG/DL
ALP SERPL-CCNC: 141 U/L (ref 40–150)
ALT SERPL W P-5'-P-CCNC: 20 U/L (ref 0–50)
ANION GAP SERPL CALCULATED.3IONS-SCNC: 5 MMOL/L (ref 3–14)
APPEARANCE UR: CLEAR
AST SERPL W P-5'-P-CCNC: 24 U/L (ref 0–45)
BACTERIA #/AREA URNS HPF: ABNORMAL /HPF
BASOPHILS # BLD AUTO: 0.1 10E9/L (ref 0–0.2)
BASOPHILS NFR BLD AUTO: 1.1 %
BILIRUB SERPL-MCNC: 0.3 MG/DL (ref 0.2–1.3)
BILIRUB UR QL STRIP: NEGATIVE
BUN SERPL-MCNC: 13 MG/DL (ref 7–30)
CALCIUM SERPL-MCNC: 8.5 MG/DL (ref 8.5–10.1)
CHLORIDE SERPL-SCNC: 107 MMOL/L (ref 94–109)
CO2 SERPL-SCNC: 27 MMOL/L (ref 20–32)
COLOR UR AUTO: ABNORMAL
CREAT SERPL-MCNC: 0.73 MG/DL (ref 0.52–1.04)
CRP SERPL-MCNC: <2.9 MG/L (ref 0–8)
DIFFERENTIAL METHOD BLD: ABNORMAL
EOSINOPHIL # BLD AUTO: 0.3 10E9/L (ref 0–0.7)
EOSINOPHIL NFR BLD AUTO: 4.1 %
ERYTHROCYTE [DISTWIDTH] IN BLOOD BY AUTOMATED COUNT: 11.5 % (ref 10–15)
ERYTHROCYTE [SEDIMENTATION RATE] IN BLOOD BY WESTERGREN METHOD: 7 MM/H (ref 0–20)
GFR SERPL CREATININE-BSD FRML MDRD: >90 ML/MIN/{1.73_M2}
GLUCOSE SERPL-MCNC: 95 MG/DL (ref 70–99)
GLUCOSE UR STRIP-MCNC: NORMAL MG/DL
HCT VFR BLD AUTO: 37.2 % (ref 35–47)
HGB BLD-MCNC: 13.5 G/DL (ref 11.7–15.7)
HGB UR QL STRIP: NEGATIVE
IMM GRANULOCYTES # BLD: 0 10E9/L (ref 0–0.4)
IMM GRANULOCYTES NFR BLD: 0.3 %
KETONES UR STRIP-MCNC: NEGATIVE MG/DL
LEUKOCYTE ESTERASE UR QL STRIP: ABNORMAL
LYMPHOCYTES # BLD AUTO: 2.1 10E9/L (ref 0.8–5.3)
LYMPHOCYTES NFR BLD AUTO: 33.1 %
MCH RBC QN AUTO: 34.6 PG (ref 26.5–33)
MCHC RBC AUTO-ENTMCNC: 36.3 G/DL (ref 31.5–36.5)
MCV RBC AUTO: 95 FL (ref 78–100)
MONOCYTES # BLD AUTO: 0.6 10E9/L (ref 0–1.3)
MONOCYTES NFR BLD AUTO: 9.5 %
NEUTROPHILS # BLD AUTO: 3.3 10E9/L (ref 1.6–8.3)
NEUTROPHILS NFR BLD AUTO: 51.9 %
NITRATE UR QL: NEGATIVE
NRBC # BLD AUTO: 0 10*3/UL
NRBC BLD AUTO-RTO: 0 /100
PH UR STRIP: 6 PH (ref 4.7–8)
PLATELET # BLD AUTO: 221 10E9/L (ref 150–450)
POTASSIUM SERPL-SCNC: 3.7 MMOL/L (ref 3.4–5.3)
PROT SERPL-MCNC: 7.3 G/DL (ref 6.8–8.8)
RBC # BLD AUTO: 3.9 10E12/L (ref 3.8–5.2)
RBC #/AREA URNS AUTO: <1 /HPF (ref 0–2)
SODIUM SERPL-SCNC: 139 MMOL/L (ref 133–144)
SOURCE: ABNORMAL
SP GR UR STRIP: 1 (ref 1–1.03)
SPECIMEN SOURCE: ABNORMAL
SQUAMOUS #/AREA URNS AUTO: 0 /HPF (ref 0–1)
UROBILINOGEN UR STRIP-MCNC: NORMAL MG/DL (ref 0–2)
WBC # BLD AUTO: 6.3 10E9/L (ref 4–11)
WBC #/AREA URNS AUTO: 18 /HPF (ref 0–5)
WET PREP SPEC: ABNORMAL

## 2020-10-10 PROCEDURE — 85025 COMPLETE CBC W/AUTO DIFF WBC: CPT | Performed by: PHYSICIAN ASSISTANT

## 2020-10-10 PROCEDURE — G0463 HOSPITAL OUTPT CLINIC VISIT: HCPCS

## 2020-10-10 PROCEDURE — 81001 URINALYSIS AUTO W/SCOPE: CPT | Performed by: PHYSICIAN ASSISTANT

## 2020-10-10 PROCEDURE — 87086 URINE CULTURE/COLONY COUNT: CPT | Performed by: PHYSICIAN ASSISTANT

## 2020-10-10 PROCEDURE — 87210 SMEAR WET MOUNT SALINE/INK: CPT | Performed by: PHYSICIAN ASSISTANT

## 2020-10-10 PROCEDURE — 36415 COLL VENOUS BLD VENIPUNCTURE: CPT | Performed by: PHYSICIAN ASSISTANT

## 2020-10-10 PROCEDURE — 86140 C-REACTIVE PROTEIN: CPT | Performed by: PHYSICIAN ASSISTANT

## 2020-10-10 PROCEDURE — 99213 OFFICE O/P EST LOW 20 MIN: CPT | Performed by: PHYSICIAN ASSISTANT

## 2020-10-10 PROCEDURE — 80053 COMPREHEN METABOLIC PANEL: CPT | Performed by: PHYSICIAN ASSISTANT

## 2020-10-10 PROCEDURE — 85652 RBC SED RATE AUTOMATED: CPT | Performed by: PHYSICIAN ASSISTANT

## 2020-10-10 RX ORDER — FLUCONAZOLE 150 MG/1
150 TABLET ORAL ONCE
Qty: 1 TABLET | Refills: 0 | Status: SHIPPED | OUTPATIENT
Start: 2020-10-10 | End: 2020-10-10 | Stop reason: DRUGHIGH

## 2020-10-10 RX ORDER — FLUCONAZOLE 150 MG/1
150 TABLET ORAL DAILY
Qty: 1 TABLET | Refills: 1 | Status: SHIPPED | OUTPATIENT
Start: 2020-10-10 | End: 2024-04-29

## 2020-10-10 ASSESSMENT — ENCOUNTER SYMPTOMS
FATIGUE: 0
FREQUENCY: 1
FLANK PAIN: 0
FEVER: 1
CONSTIPATION: 0
ABDOMINAL PAIN: 0
DYSURIA: 1
NAUSEA: 0
HEMATURIA: 0
DIARRHEA: 0
VOMITING: 0
CHILLS: 0

## 2020-10-10 NOTE — ED AVS SNAPSHOT
[facility]  [address] [address]  Phone: [phone]                                    Charito Wilcox   MRN: [id number]    Department: [facility]   Date of Visit: 10/10/2020           After Visit Summary Signature Page    I have received my discharge instructions, and my questions have been answered. I have discussed any challenges I see with this plan with the nurse or doctor.    ..........................................................................................................................................  Patient/Patient Representative Signature      ..........................................................................................................................................  Patient Representative Print Name and Relationship to Patient    ..................................................               ................................................  Date                                   Time    ..........................................................................................................................................  Reviewed by Signature/Title    ...................................................              ..............................................  Date                                               Time          22EPIC Rev 08/18

## 2020-10-10 NOTE — DISCHARGE INSTRUCTIONS
Thank you for allowing the urgent care to participate in your care. Please refer to your AVS for follow up and pain/symptoms management recommendations (I.e.: medications, helpful conservative treatment modalities, appropriate follow up if need to a specialist or family practice, etc.). Please return to urgent care if your symptoms change or worsen.     Discharge instructions:  -Follow up with PCP/OB in 3-5 days  -If you were prescribed a medication(s), please take this as prescribed/directed  -Monitor your symptoms, if changing/worsening, return to UC/ER or PCP for follow up    Yeast infection noted on wet prep today. All other labs were normal.     Diflucan sent to pharmacy.

## 2020-10-10 NOTE — ED PROVIDER NOTES
"  History     Chief Complaint   Patient presents with     Rule out Urinary Tract Infection     Vaginitis     The history is provided by the patient.     Charito Wilcox is a 40 year old female who presents with 3 week hx of vaginal dryness and burning. States she was evaluated September 28th - had a UA and wet prep completed that were WNL. No pelvic exam done at that time. Patient has tried OTC vaginal moisture cream and mometosone fuoroate 0.1% with slight relief. Patient denies urgency, reports increased frequency \"but I'm drinking tons of water.\" Denies flank pain or lower back pain. LMP 9/10/2020. No hx of STIs. Patient PMH significant for  hx of a liver transplant 20 years ago - is immunosuppressed with anti-rejection medications.     Allergies:  Allergies   Allergen Reactions     Gluten Meal Nausea and Vomiting     Milk Protein Extract Nausea and Vomiting       Problem List:    Patient Active Problem List    Diagnosis Date Noted     Nausea 09/01/2020     Priority: Medium     Has a phobia of nausea.  Uses Zofran as needed.       Anxiety      Priority: Medium     anxiety over vomiting, nausea is main trigger       Abdominal pain 08/18/2018     Priority: Medium     Localized superficial swelling, mass, or lump 03/01/2018     Priority: Medium     right forearm, stable, likely lipoma, can't rule out sebaceous cyst but less likely         PMS (premenstrual syndrome) 07/31/2014     Priority: Medium     Immunosuppressed status (H) 12/11/2012     Priority: Medium     IBS (irritable bowel syndrome) 07/03/2012     Priority: Medium     Panic disorder 08/06/2010     Priority: Medium     Lifelong  Emesis phobia  Has seen therapy on going  Very sparing use of alprazolam  Patient is followed by JORJE MULLINS for ongoing prescription of benzodiazepines.  All refills should be approved by this provider, or covering partner.    Medication(s): alprazolam, 0.5 mg.   Maximum quantity per month: 20  Clinic visit frequency " required: Q 6  months     Controlled substance agreement on file: No  Benzodiazepine use reviewed by psychiatry:  No    Last [facility] website verification:  done on 11-, 10/27/17   [email or web address]           CARDIOVASCULAR SCREENING; LDL GOAL LESS THAN 160 2010     Priority: Medium     Esophageal reflux      Priority: Medium     Liver replaced by transplant (H) 2005     Priority: Medium     In , due to liver failure of unknown etiology, possible autoimmune hepatitis  Dr. Maria - at [facility]  Follows annually with lab work q6 months          Past Medical History:    Past Medical History:   Diagnosis Date     Acute pancreatitis 2016     Anxiety      Esophageal reflux      Gastrointestinal ulcer      Irritable bowel syndrome      Liver replaced by transplant (H)      Papanicolaou smear of cervix with atypical squamous cells of undetermined significance (ASC-US)        Past Surgical History:    Past Surgical History:   Procedure Laterality Date     C TRANSPLANT LIVER,HETERTOPIC      had appendectomy, gallbladder out with transplant.       SECTION  2008      SECTION  3/2010     CHOLECYSTECTOMY      with liver transplant     COLONOSCOPY N/A 10/24/2018    Procedure: Colonoscopy;  Surgeon: Ottoniel Martin MD;  [facility]     ESOPHAGOSCOPY, GASTROSCOPY, DUODENOSCOPY (EGD), COMBINED Left 2015    Procedure: COMBINED ESOPHAGOSCOPY, GASTROSCOPY, DUODENOSCOPY (EGD), BIOPSY SINGLE OR MULTIPLE;  Surgeon: Ottoniel Martin MD;  Location:  GI     ESOPHAGOSCOPY, GASTROSCOPY, DUODENOSCOPY (EGD), COMBINED N/A 2016    Procedure: COMBINED ESOPHAGOSCOPY, GASTROSCOPY, DUODENOSCOPY (EGD), BIOPSY SINGLE OR MULTIPLE;  Surgeon: Homar Adams MD;  Location:  GI     ESOPHAGOSCOPY, GASTROSCOPY, DUODENOSCOPY (EGD), COMBINED N/A 2016    Procedure: COMBINED ESOPHAGOSCOPY, GASTROSCOPY, DUODENOSCOPY (EGD), BIOPSY SINGLE OR MULTIPLE;   "Surgeon: Homar Adams MD;  Location:  GI     ESOPHAGOSCOPY, GASTROSCOPY, DUODENOSCOPY (EGD), COMBINED N/A 8/20/2018    Procedure: COMBINED ESOPHAGOSCOPY, GASTROSCOPY, DUODENOSCOPY (EGD);;  Surgeon: Daryl Blank MD;  Location:  GI     HIP SURGERY Right 2/2016    Torn labrum repair      TUBAL LIGATION  3/2010       Family History:    Family History   Problem Relation Age of Onset     Coronary Artery Disease Mother      Hyperlipidemia Mother      Osteoporosis Mother      Arthritis Mother      Depression Maternal Grandmother      Anxiety Disorder Maternal Grandmother      Heart Failure Maternal Grandmother      Hypertension Maternal Grandfather      Cerebrovascular Disease Paternal Grandmother      Breast Cancer Paternal Grandmother      Gastrointestinal Disease No family hx of      C.A.D. No family hx of      Cancer - colorectal No family hx of      Prostate Cancer No family hx of      Alcohol/Drug No family hx of        Social History:  Marital Status:   [2]  Social History     Tobacco Use     Smoking status: Never Smoker     Smokeless tobacco: Never Used   Substance Use Topics     Alcohol use: Yes     Comment: glass of wine up to once a month     Drug use: No        Medications:         fluconazole (DIFLUCAN) 150 MG tablet       ALPRAZolam (XANAX) 0.5 MG tablet       azaTHIOprine (IMURAN) 50 MG tablet       betamethasone dipropionate (DIPROSONE) 0.05 % external cream       lactobacillus rhamnosus, GG, (CULTURELL) capsule       omeprazole (PRILOSEC) 40 MG DR capsule       ondansetron (ZOFRAN-ODT) 4 MG ODT tab       PARoxetine (PAXIL) 40 MG tablet       polyethylene glycol (MIRALAX) powder       tacrolimus (GENERIC EQUIVALENT) 1 MG capsule       ursodiol (ACTIGALL) 300 MG capsule          Review of Systems   Constitutional: Positive for fever. Negative for chills and fatigue.        Patient reports 99.5 is a \"fever for me\"   Gastrointestinal: Negative for abdominal pain, " constipation, diarrhea, nausea and vomiting.   Genitourinary: Positive for dysuria, frequency, pelvic pain, urgency, vaginal discharge and vaginal pain. Negative for flank pain, hematuria and vaginal bleeding.   All other systems reviewed and are negative.      Physical Exam   BP: 115/79  Pulse: 72  Temp: 99.5  F (37.5  C)  Resp: 16  SpO2: 100 %      Physical Exam  Vitals signs and nursing note reviewed.   Constitutional:       General: She is not in acute distress.     Appearance: She is normal weight. She is not ill-appearing.   Cardiovascular:      Rate and Rhythm: Normal rate.   Pulmonary:      Effort: Pulmonary effort is normal.   Abdominal:      General: Abdomen is flat.   Genitourinary:     General: Normal vulva.      Exam position: Lithotomy position.      Pubic Area: No rash.       Labia:         Right: No rash or lesion.         Left: No rash or lesion.       Vagina: Vaginal discharge and tenderness present. No bleeding or lesions.      Cervix: Discharge (thick white ) and erythema (mild) present. No lesion.   Neurological:      Mental Status: She is alert.   Psychiatric:         Behavior: Behavior is cooperative.         ED Course                 Results for orders placed or performed during the hospital encounter of 10/10/20 (from the past 24 hour(s))   UA with Microscopic reflex to Culture    Specimen: Midstream Urine   Result Value Ref Range    Color Urine Straw     Appearance Urine Clear     Glucose Urine NORMAL (A) NEG^Negative mg/dL    Bilirubin Urine Negative NEG^Negative    Ketones Urine Negative NEG^Negative mg/dL    Specific Gravity Urine 1.004 1.003 - 1.035    Blood Urine Negative NEG^Negative    pH Urine 6.0 4.7 - 8.0 pH    Protein Albumin Urine Negative NEG^Negative mg/dL    Urobilinogen mg/dL Normal 0.0 - 2.0 mg/dL    Nitrite Urine Negative NEG^Negative    Leukocyte Esterase Urine Moderate (A) NEG^Negative    Source Midstream Urine     WBC Urine 18 (H) 0 - 5 /HPF    RBC Urine <1 0 - 2 /HPF     Bacteria Urine None (A) NEG^Negative /HPF    Squamous Epithelial /HPF Urine 0 0 - 1 /HPF   Wet prep    Specimen: Vagina   Result Value Ref Range    Specimen Description Vagina     Wet Prep WBC'S seen  Moderate       Wet Prep No Trichomonas seen     Wet Prep No clue cells seen     Wet Prep Yeast seen (A)    CBC with platelets differential   Result Value Ref Range    WBC 6.3 4.0 - 11.0 10e9/L    RBC Count 3.90 3.8 - 5.2 10e12/L    Hemoglobin 13.5 11.7 - 15.7 g/dL    Hematocrit 37.2 35.0 - 47.0 %    MCV 95 78 - 100 fl    MCH 34.6 (H) 26.5 - 33.0 pg    MCHC 36.3 31.5 - 36.5 g/dL    RDW 11.5 10.0 - 15.0 %    Platelet Count 221 150 - 450 10e9/L    Diff Method Automated Method     % Neutrophils 51.9 %    % Lymphocytes 33.1 %    % Monocytes 9.5 %    % Eosinophils 4.1 %    % Basophils 1.1 %    % Immature Granulocytes 0.3 %    Nucleated RBCs 0 0 /100    Absolute Neutrophil 3.3 1.6 - 8.3 10e9/L    Absolute Lymphocytes 2.1 0.8 - 5.3 10e9/L    Absolute Monocytes 0.6 0.0 - 1.3 10e9/L    Absolute Eosinophils 0.3 0.0 - 0.7 10e9/L    Absolute Basophils 0.1 0.0 - 0.2 10e9/L    Abs Immature Granulocytes 0.0 0 - 0.4 10e9/L    Absolute Nucleated RBC 0.0    Comprehensive metabolic panel   Result Value Ref Range    Sodium 139 133 - 144 mmol/L    Potassium 3.7 3.4 - 5.3 mmol/L    Chloride 107 94 - 109 mmol/L    Carbon Dioxide 27 20 - 32 mmol/L    Anion Gap 5 3 - 14 mmol/L    Glucose 95 70 - 99 mg/dL    Urea Nitrogen 13 7 - 30 mg/dL    Creatinine 0.73 0.52 - 1.04 mg/dL    GFR Estimate >90 >60 mL/min/[1.73_m2]    GFR Estimate If Black >90 >60 mL/min/[1.73_m2]    Calcium 8.5 8.5 - 10.1 mg/dL    Bilirubin Total 0.3 0.2 - 1.3 mg/dL    Albumin 3.7 3.4 - 5.0 g/dL    Protein Total 7.3 6.8 - 8.8 g/dL    Alkaline Phosphatase 141 40 - 150 U/L    ALT 20 0 - 50 U/L    AST 24 0 - 45 U/L   CRP inflammation   Result Value Ref Range    CRP Inflammation <2.9 0.0 - 8.0 mg/L   Erythrocyte sedimentation rate auto   Result Value Ref Range    Sed Rate 7 0 -  20 mm/h       Medications - No data to display    Assessments & Plan (with Medical Decision Making)     I have reviewed the nursing notes.    I have reviewed the findings, diagnosis, plan and need for follow up with the patient.  Assessment:    Vaginal burning/dryness/discharge    Plan:    Pelvic exam  Wet prep - yeast present, moderate WBCs - RX for Diflucan 150mg x1  CBC, CRP, ESR - WNL  UA - moderate leukocyte esterase, WBC 18 - patient declines antibiotics until culture results        TOSHA Kramer student    Discharge Medication List as of 10/10/2020  4:32 PM      START taking these medications    Details   fluconazole (DIFLUCAN) 150 MG tablet Take 1 tablet (150 mg) by mouth daily, Disp-1 tablet, R-1, E-PrescribeTake one tablet, repeat in 3-5 days if symptoms still present             Final diagnoses:   Yeast infection of the vagina     Addendum: Patient was co-seen and managed with Carmen Schwartz, NP student. I agree with her note and treatment plan was co decided. As noted above, patient was treated with 1 time dose of Diflucan 150mg, she declined treatment for urine until culture returns, I am in agreement with this.     Ale Crisostomo PA-C    10/10/2020   [facility]

## 2020-10-10 NOTE — ED TRIAGE NOTES
Pt in for complaints of vaginal burning/pain ongoing for the last few weeks. concerned for possible vaginal infection vs UTI.

## 2020-10-12 LAB
BACTERIA SPEC CULT: ABNORMAL
SPECIMEN SOURCE: ABNORMAL

## 2020-11-17 ENCOUNTER — VIRTUAL VISIT (OUTPATIENT)
Dept: FAMILY MEDICINE | Facility: OTHER | Age: 40
End: 2020-11-17
Payer: COMMERCIAL

## 2020-11-17 PROCEDURE — 99421 OL DIG E/M SVC 5-10 MIN: CPT | Performed by: PHYSICIAN ASSISTANT

## 2020-11-18 ENCOUNTER — OFFICE VISIT (OUTPATIENT)
Dept: FAMILY MEDICINE | Facility: OTHER | Age: 40
End: 2020-11-18
Attending: FAMILY MEDICINE
Payer: COMMERCIAL

## 2020-11-18 DIAGNOSIS — Z20.822 SUSPECTED COVID-19 VIRUS INFECTION: Primary | ICD-10-CM

## 2020-11-18 DIAGNOSIS — Z20.822 SUSPECTED COVID-19 VIRUS INFECTION: ICD-10-CM

## 2020-11-18 PROCEDURE — U0003 INFECTIOUS AGENT DETECTION BY NUCLEIC ACID (DNA OR RNA); SEVERE ACUTE RESPIRATORY SYNDROME CORONAVIRUS 2 (SARS-COV-2) (CORONAVIRUS DISEASE [COVID-19]), AMPLIFIED PROBE TECHNIQUE, MAKING USE OF HIGH THROUGHPUT TECHNOLOGIES AS DESCRIBED BY CMS-2020-01-R: HCPCS | Performed by: FAMILY MEDICINE

## 2020-11-18 NOTE — PROGRESS NOTES
rhinitis, facial pain or pressure, myalgia, chills, malaise, a sore throat, ear pain, a headache, enlarged lymph nodes, a cough, and nasal congestion.  COVID testing complete

## 2020-11-18 NOTE — PROGRESS NOTES
"Date: 2020 19:43:42  Clinician: Caren Canales  Clinician NPI: [id number]  Patient: Charito Wilcox  Patient : 1980  Patient Address: [address] [place] [id number]  Patient Phone: [phone]  Visit Protocol: URI  Patient Summary:  Charito is a 40 year old ( : 1980 ) female who initiated a [facility] Visit for COVID-19 (Coronavirus) evaluation and screening. When asked the question \"Please sign me up to receive news, health information and promotions from [facility].\", Charito responded \"No\".    Charito states her symptoms started gradually 5-6 days ago. After her symptoms started, they improved and then got worse again.   Her symptoms consist of rhinitis, facial pain or pressure, myalgia, chills, malaise, a sore throat, ear pain, a headache, enlarged lymph nodes, a cough, and nasal congestion. She is experiencing mild difficulty breathing with activities but can speak normally in full sentences. Charito also feels feverish.   Symptom details     Nasal secretions: The color of her mucus is yellow.    Cough: Charito coughs a few times an hour and her cough is not more bothersome at night. Phlegm comes into her throat when she coughs. She believes her cough is caused by post-nasal drip. The color of the phlegm is yellow.     Sore throat: Charito reports having moderate throat pain (4-6 on a 10 point pain scale), does not have exudate on her tonsils, and can swallow liquids. The lymph nodes in her neck are enlarged. A rash has not appeared on the skin since the sore throat started.     Temperature: Her current temperature is 98.3 degrees Fahrenheit.     Facial pain or pressure: The facial pain or pressure does not feel worse when bending or leaning forward.     Headache: She states the headache is severe (7-9 on a 10 point pain scale).      Charito denies having vomiting, teeth pain, ageusia, diarrhea, wheezing, nausea, and anosmia. She also denies taking antibiotic medication in the past month, having recent facial " or sinus surgery in the past 60 days, and having a sinus infection within the past year.   Precipitating events  Within the past week, Charito has not been exposed to someone with strep throat. She has not recently been exposed to someone with influenza. Charito has been in close contact with the following high risk individuals: immunocompromised people, adults 65 or older, and people with asthma, heart disease or diabetes.   Pertinent COVID-19 (Coronavirus) information  Charito does not work or volunteer as healthcare worker or a . In the past 14 days, Charito has not worked or volunteered at a healthcare facility or group living setting.   In the past 14 days, she also has not lived in a congregate living setting.   Charito has not had a close contact with a laboratory-confirmed COVID-19 patient within 14 days of symptom onset.    Since December 2019, Charito has been tested for COVID-19 and has had upper respiratory infection (URI) or influenza-like illness.      Result of COVID-19 test: Negative     Date of her COVID-19 test: 06/20/2020     Date(s) of previous URI or influenza-like illness (free-text): 02/01/2020     Symptoms Charito experienced during previous URI or influenza-like illness as reported by the patient (free-text): Headache, body aches, sore throat        Pertinent medical history  Charito does not get yeast infections when she takes antibiotics.   Charito does not need a return to work/school note.   Weight: 120 lbs   Charito does not smoke or use smokeless tobacco.   She denies pregnancy and denies breastfeeding. She has menstruated in the past month.   Additional information as reported by the patient (free text): I had a liver transplant in 2020. So I take medication to suppress my immune system.   Weight: 120 lbs    MEDICATIONS: tacrolimus oral, azathioprine oral, omeprazole oral, paroxetine oral, ursodiol oral, ALLERGIES: NKDA  Clinician Response:  Dear Charito,   Your symptoms show that you may have  "coronavirus (COVID-19). This illness can cause fever, cough and trouble breathing. Many people get a mild case and get better on their own. Some people can get very sick.  What should I do?  We would like to test you for this virus.   1. Please call [phone] to schedule your visit. Explain that you were referred by [facility] to have a COVID-19 test. Be ready to share your [facility] visit ID number.  * If you need to schedule in [facility] please call [phone] or for [place] employees please call [phone].  * If you need to schedule in the [place] area please call [phone]. [place] employees call [phone].  The following will serve as your written order for this COVID Test, ordered by me, for the indication of suspected COVID [Z20.828]: The test will be ordered in [employer], our electronic health record, after you are scheduled. It will show as ordered and authorized by Vu Middleton MD.  Order: COVID-19 (Coronavirus) PCR for SYMPTOMATIC testing from [facility].   2. When it's time for your COVID test:  Stay at least 6 feet away from others. (If someone will drive you to your test, stay in the backseat, as far away from the  as you can.)   Cover your mouth and nose with a mask, tissue or washcloth.  Go straight to the testing site. Don't make any stops on the way there or back.      3.Starting now: Stay home and away from others (self-isolate) until:   You've had no fever---and no medicine that reduces fever---for one full day (24 hours). And...   Your other symptoms have gotten better. For example, your cough or breathing has improved. And...   At least 10 days have passed since your symptoms started.       During this time, don't leave the house except for testing or medical care.   Stay in your own room, even for meals. Use your own bathroom if you can.   Stay away from others in your home. No hugging, kissing or shaking hands. No visitors.  Don't go to work, school or anywhere else.    Clean \"high " "touch\" surfaces often (doorknobs, counters, handles, etc.). Use a household cleaning spray or wipes. You'll find a full list of  on the EPA website: www.epa.gov/pesticide-registration/list-n-disinfectants-use-against-sars-cov-2.   Cover your mouth and nose with a mask, tissue or washcloth to avoid spreading germs.  Wash your hands and face often. Use soap and water.  Caregivers in these groups are at risk for severe illness due to COVID-19:  o People 65 years and older  o People who live in a nursing home or long-term care facility  o People with chronic disease (lung, heart, cancer, diabetes, kidney, liver, immunologic)  o People who have a weakened immune system, including those who:   Are in cancer treatment  Take medicine that weakens the immune system, such as corticosteroids  Had a bone marrow or organ transplant  Have an immune deficiency  Have poorly controlled HIV or AIDS  Are obese (body mass index of 40 or higher)  Smoke regularly   o Caregivers should wear gloves while washing dishes, handling laundry and cleaning bedrooms and bathrooms.  o Use caution when washing and drying laundry: Don't shake dirty laundry, and use the warmest water setting that you can.  o For more tips, go to www.cdc.gov/coronavirus/2019-ncov/downloads/10Things.pdf.    4.Sign up for [employer]. We know it's scary to hear that you might have COVID-19. We want to track your symptoms to make sure you're okay over the next 2 weeks. Please look for an email from [employer]---this is a free, online program that we'll use to keep in touch. To sign up, follow the link in the email. Learn more at [email or web address]  How can I take care of myself?   Get lots of rest. Drink extra fluids (unless a doctor has told you not to).   Take Tylenol (acetaminophen) for fever or pain. If you have liver or kidney problems, ask your family doctor if it's okay to take Tylenol.   Adults can take either:    650 mg (two 325 mg " pills) every 4 to 6 hours, or...   1,000 mg (two 500 mg pills) every 8 hours as needed.    Note: Don't take more than 3,000 mg in one day. Acetaminophen is found in many medicines (both prescribed and over-the-counter medicines). Read all labels to be sure you don't take too much.   For children, check the Tylenol bottle for the right dose. The dose is based on the child's age or weight.    If you have other health problems (like cancer, heart failure, an organ transplant or severe kidney disease): Call your specialty clinic if you don't feel better in the next 2 days.       Know when to call 911. Emergency warning signs include:    Trouble breathing or shortness of breath Pain or pressure in the chest that doesn't go away Feeling confused like you haven't felt before, or not being able to wake up Bluish-colored lips or face.  Where can I get more information?   [facility] -- About COVID-19: [email or web address]   CDC -- What to Do If You're Sick: www.cdc.gov/coronavirus/2019-ncov/about/steps-when-sick.html   CDC -- Ending Home Isolation: www.cdc.gov/coronavirus/2019-ncov/hcp/disposition-in-home-patients.html   CDC -- Caring for Someone: www.cdc.gov/coronavirus/2019-ncov/if-you-are-sick/care-for-someone.html   [facility] -- Interim Guidance for Hospital Discharge to Home: www.health.[facility].mn.us/diseases/coronavirus/hcp/hospdischarge.pdf   [facility] clinical trials (COVID-19 research studies): clinicalaffairs.[facility]-clinical-trials    Below are the COVID-19 hotlines at the [facility] ([facility]). Interpreters are available.    For health questions: Call 546-233-4308 or 1-880.808.2648 (7 a.m. to 7 p.m.) For questions about schools and childcare: Call 556-320-2642 or 1-340.674.1482 (7 a.m. to 7 p.m.)    Diagnosis: Contact with and (suspected) exposure to other viral communicable diseases  Diagnosis ICD: Z20.828

## 2020-11-19 LAB
SARS-COV-2 RNA SPEC QL NAA+PROBE: NOT DETECTED
SPECIMEN SOURCE: NORMAL

## 2021-01-29 DIAGNOSIS — Z94.4 LIVER REPLACED BY TRANSPLANT (H): ICD-10-CM

## 2021-01-29 RX ORDER — URSODIOL 300 MG/1
CAPSULE ORAL
Qty: 180 CAPSULE | Refills: 3 | Status: SHIPPED | OUTPATIENT
Start: 2021-01-29 | End: 2023-01-20

## 2021-02-08 DIAGNOSIS — Z94.4 LIVER REPLACED BY TRANSPLANT (H): ICD-10-CM

## 2021-02-08 RX ORDER — TACROLIMUS 1 MG/1
1 CAPSULE ORAL EVERY 12 HOURS
Qty: 180 CAPSULE | Refills: 3 | Status: SHIPPED | OUTPATIENT
Start: 2021-02-08 | End: 2021-06-28

## 2021-04-19 ENCOUNTER — TELEPHONE (OUTPATIENT)
Dept: TRANSPLANT | Facility: CLINIC | Age: 41
End: 2021-04-19

## 2021-04-19 NOTE — TELEPHONE ENCOUNTER
Charito's TAC level on 4/15/2021 was 6.7. Current goal is 3-5. No documentation of last dose. [employer] message sent to Charito to clarify if this was an accurate TAC level.

## 2021-05-18 DIAGNOSIS — Z94.4 LIVER REPLACED BY TRANSPLANT (H): ICD-10-CM

## 2021-05-18 RX ORDER — AZATHIOPRINE 50 MG/1
50 TABLET ORAL DAILY
Qty: 90 TABLET | Refills: 3 | Status: SHIPPED | OUTPATIENT
Start: 2021-05-18

## 2021-05-27 ENCOUNTER — TRANSFERRED RECORDS (OUTPATIENT)
Dept: HEALTH INFORMATION MANAGEMENT | Facility: CLINIC | Age: 41
End: 2021-05-27

## 2021-06-02 ENCOUNTER — TRANSFERRED RECORDS (OUTPATIENT)
Dept: HEALTH INFORMATION MANAGEMENT | Facility: CLINIC | Age: 41
End: 2021-06-02

## 2021-06-02 ENCOUNTER — TRANSCRIBE ORDERS (OUTPATIENT)
Dept: OTHER | Age: 41
End: 2021-06-02

## 2021-06-02 ENCOUNTER — TELEPHONE (OUTPATIENT)
Dept: TRANSPLANT | Facility: CLINIC | Age: 41
End: 2021-06-02

## 2021-06-02 DIAGNOSIS — C44.629 SQUAMOUS CELL CARCINOMA OF ARM, LEFT: Primary | ICD-10-CM

## 2021-06-02 NOTE — TELEPHONE ENCOUNTER
Returned Charito's call. Charito has been diagnosed with Squamous cell carcinoma of (L) arm by biopsy from PCP. Her PCP has referred her to dermatology. Referral is pending, schedule request sent. Charito will call me if the appt is really far out so that I can discuss with providers. Dr. Maria notified as well.      Charito missed her last visit with Dr. Maria and is overdue for that as well, schedule request sent.

## 2021-06-03 ENCOUNTER — TELEPHONE (OUTPATIENT)
Dept: DERMATOLOGY | Facility: CLINIC | Age: 41
End: 2021-06-03
Payer: COMMERCIAL

## 2021-06-03 NOTE — TELEPHONE ENCOUNTER
[facility] Call Center    Phone Message    May a detailed message be left on voicemail: yes     Reason for Call: Appointment Intake    Referring Provider Name: Dr Tawanda Uriarte  Diagnosis and/or Symptoms: Diagnosed Squamous cell carcinoma of (L) arm - ASAP referral, per pts transplant team, thanks!    Action Taken: Message routed to:  [facility] Derm [facility]urg    Travel Screening: Not Applicable

## 2021-06-04 NOTE — TELEPHONE ENCOUNTER
FUTURE VISIT INFORMATION      FUTURE VISIT INFORMATION:    Date: 6.7.21    Time: 2:45    Location: Telephone  REFERRAL INFORMATION:    Referring provider:  Dr. Tawanda Uriarte    Referring providers clinic:  [facility]    Reason for visit/diagnosis  Diagnosed Squamous cell carcinoma of (L) arm     transplant patient    RECORDS REQUESTED FROM:       Clinic name Comments Records Status Imaging Status   [facility] 5.27.21  Dr. Uriarte  Path # [id number] CE CE

## 2021-06-07 ENCOUNTER — VIRTUAL VISIT (OUTPATIENT)
Dept: DERMATOLOGY | Facility: CLINIC | Age: 41
End: 2021-06-07
Payer: COMMERCIAL

## 2021-06-07 ENCOUNTER — PRE VISIT (OUTPATIENT)
Dept: DERMATOLOGY | Facility: CLINIC | Age: 41
End: 2021-06-07

## 2021-06-07 DIAGNOSIS — D04.62 SQUAMOUS CELL CARCINOMA IN SITU (SCCIS) OF SKIN OF LEFT UPPER ARM: Primary | ICD-10-CM

## 2021-06-07 PROCEDURE — 99213 OFFICE O/P EST LOW 20 MIN: CPT | Mod: 95 | Performed by: DERMATOLOGY

## 2021-06-07 ASSESSMENT — PAIN SCALES - GENERAL: PAINLEVEL: NO PAIN (0)

## 2021-06-07 NOTE — NURSING NOTE
Dermatology Rooming Note    Charito Wilcox's goals for this visit include:   Chief Complaint   Patient presents with     Derm Problem     SCC of the left arm     Jeffery Neri, ISMAEL

## 2021-06-07 NOTE — LETTER
6/7/2021       RE: Charito Wilcox  [address]  [facility] [address]     Dear Colleague,    Thank you for referring your patient, Charito Wilcox, to the [facility] DERMATOLOGIC SURGERY CLINIC [place] at [facility]. Please see a copy of my visit note below.    Mohs consult: SCC L arm    Patient reached on cell phone.  Total phone time = 10 minutes  Pathology from 5/27/21 appointment with PCP reviewed with patient.    Hx of Skin Cancer: No  Hx of Mohs Surgery: No  Transplant: Liver  Year of Liver Transplant:: 2000  Immunocompromised: Yes  Immunosuppressive medication(s): Prograf/Tacrolimus;Imuran/Azathioprine  Current Anticoagulant(s): None  Bleeding Disorder(s): No  Stent: No  Pacemaker: No  Defibrillator: No  Brain/Nerve Stimulator: No  Endocarditis/Rheumatic Fever Hx: No  Vascular graft: No  Congenital heart defect: No  Prosthetic Heart Valve: No  Lesion on Leg/Groin: No  Prosthetic Joint : No  Diabetic: No  HIV/AIDS: No  Hepatitis: No  Pregnant: No  Prior Problem with Local Anesthesia: No  Patient wears CPAP mask: No  Currently Hypertensive: No  Photoprotection: never  Sunburns: frequently  Tanning Bed Use: never  Current Tobacco Use: No  Current Alcohol Use: No  Extended Care Facility: No  Occupation:   Referring MD: Dr. Belkys Uriarte   needed?: No  Do you have mobility issues?: No  Do you use any assistive devices/DME?: No  Do you have any issues with lying for long periods of time?: No        SCC L arm -- In SOTR this tumor meets AUC.  Schedule Mohs.  Discussed r/b of procedure.  Likely will require intermediate or complex closure.    Sergo Carvalho MD

## 2021-06-15 NOTE — PROGRESS NOTES
Mohs consult: SCC L arm    Patient reached on cell phone.  Total phone time = 10 minutes  Pathology from 5/27/21 appointment with PCP reviewed with patient.    Hx of Skin Cancer: No  Hx of Mohs Surgery: No  Transplant: Liver  Year of Liver Transplant:: 2000  Immunocompromised: Yes  Immunosuppressive medication(s): Prograf/Tacrolimus;Imuran/Azathioprine  Current Anticoagulant(s): None  Bleeding Disorder(s): No  Stent: No  Pacemaker: No  Defibrillator: No  Brain/Nerve Stimulator: No  Endocarditis/Rheumatic Fever Hx: No  Vascular graft: No  Congenital heart defect: No  Prosthetic Heart Valve: No  Lesion on Leg/Groin: No  Prosthetic Joint : No  Diabetic: No  HIV/AIDS: No  Hepatitis: No  Pregnant: No  Prior Problem with Local Anesthesia: No  Patient wears CPAP mask: No  Currently Hypertensive: No  Photoprotection: never  Sunburns: frequently  Tanning Bed Use: never  Current Tobacco Use: No  Current Alcohol Use: No  Extended Care Facility: No  Occupation:   Referring MD: Dr. Belkys Uriarte   needed?: No  Do you have mobility issues?: No  Do you use any assistive devices/DME?: No  Do you have any issues with lying for long periods of time?: No        SCC L arm -- In SOTR this tumor meets AUC.  Schedule Mohs.  Discussed r/b of procedure.  Likely will require intermediate or complex closure.    Sergo Carvalho MD

## 2021-06-28 DIAGNOSIS — Z94.4 LIVER REPLACED BY TRANSPLANT (H): ICD-10-CM

## 2021-06-28 RX ORDER — TACROLIMUS 1 MG/1
1 CAPSULE ORAL EVERY 12 HOURS
Qty: 4 CAPSULE | Refills: 3 | Status: SHIPPED | OUTPATIENT
Start: 2021-06-28 | End: 2021-11-19

## 2021-06-29 ENCOUNTER — OFFICE VISIT (OUTPATIENT)
Dept: DERMATOLOGY | Facility: CLINIC | Age: 41
End: 2021-06-29
Payer: COMMERCIAL

## 2021-06-29 VITALS — HEART RATE: 82 BPM | DIASTOLIC BLOOD PRESSURE: 76 MMHG | SYSTOLIC BLOOD PRESSURE: 100 MMHG

## 2021-06-29 DIAGNOSIS — C44.629 SQUAMOUS CELL CARCINOMA, ARM, LEFT: Primary | ICD-10-CM

## 2021-06-29 PROCEDURE — 17313 MOHS 1 STAGE T/A/L: CPT | Mod: GC | Performed by: DERMATOLOGY

## 2021-06-29 PROCEDURE — 13121 CMPLX RPR S/A/L 2.6-7.5 CM: CPT | Mod: GC | Performed by: DERMATOLOGY

## 2021-06-29 ASSESSMENT — PAIN SCALES - GENERAL: PAINLEVEL: NO PAIN (0)

## 2021-06-29 NOTE — LETTER
6/29/2021       RE: Charito Wilcox  [address]  [facility] [address]     Dear Colleague,    Thank you for referring your patient, Charito Wilcox, to the [facility] DERMATOLOGIC SURGERY CLINIC [place] at [facility]. Please see a copy of my visit note below.    [facility] Dermatologic Surgery Clinic [place] Procedure Note      Date of Service:  Jun 29, 2021  Surgery: Mohs micrographic surgery    Case 1  Repair Type: Complex repair  Repair Size: 3.0 cm  Suture Material: 4-0 Monocryl  Tumor Type: SCC  Location: Let arm  Derm-Path Accession #: Outside report from [facility] [Case: [id number]]  PreOp Size: 1.0 cm x 1.3 cm  PostOp Size: 1.5 cm x 2.0 cm  Mohs Accession #:   Level of Defect: Fat      Procedure:  We discussed the principles of treatment and most likely complications including scarring, bleeding, infection, swelling, pain, crusting, nerve damage, large wound,  incomplete excision, wound dehiscence,  nerve damage, recurrence, and a second procedure may be recommended to obtain the best cosmetic or functional result.    Informed consent was obtained and the patient underwent the procedure as follows:  The patient was placed supine on the operating table.  The cancer was identified, outlined with a marker, and verified by the patient.  The entire surgical field was prepped with chlorhexidine.  The surgical site was anesthetized using lidocaine with epinephrine.      The area of clinically apparent tumor was not debulked. The layer of tissue was then surgically excised using a #15 blade and was then transferred onto a specimen sheet maintaining the orientation of the specimen. Hemostasis was obtained using electrocoagulation. The wound site was then covered with a dressing while the tissue samples were processed for examination.    The excised tissue was [facility] maintaining the tissue  orientation.  The tissue specimen was relaxed so that the entire surgical margin was in a a single horizontal plane for sectioning and inked for precise mapping.  A precise reference map was drawn to reflect the sectioning of the specimen, colored inking of the margins, and orientation on the patient. The tissue was processed using horizontal sectioning of the base and continuous peripheral margins.  The histopathologic sections were reviewed in conjunction with the reference map.    Total blocks: 1    Total slides:  2    There were no cancer cells visualized on examination, therefore Mohs surgery was complete.      REPAIR:     Due to one or more of the following factors, complex linear closure was required/indicated: Extensive undermining (equal to or greater than the maximum perpendicular width of the defect), exposure of underlying structure (bone, cartilage, tendon, named neurovascular), free margin involvement (helical rim, vermillion border, nostril rim), and/or placement of retention sutures.    A complex layered linear closure was selected as the procedure which would maximally preserve both function and cosmesis and for the following reasons: 1) the defect was widely undermined; 2) multiple deep plication and layered sutures placed; 3) wound size, depth, tension, and location.   After the excision of the tumor, the area was extensively and carefully undermined using blunt Metzenbaum scissors. Hemostasis was obtained with spot electrocautery and ligation of vessels where necessary. The subcutaneous and dermal layers were then closed with 4-0 Monocryl sutures. The epidermis was then carefully approximated along the length of the wound using 4-0 Monocryl running subcuticular sutures.     Estimated blood loss was less than 10 ml for all surgical sites. A sterile pressure dressing was applied and wound care instructions, with a written handout, were given. The patient was discharged from the Dermatologic Surgery  Center alert and ambulatory.    Sergo Carvalho MD staffed the patient and was present for the key portions of the above procedure.       Attestation signed by Sergo Carvalho MD at 7/13/2021 10:39 AM:    Attending attestation:  I was present for key elements of the procedure and immediately available for all other portions of the procedure.  I have reviewed the note and edited it as necessary.    Sergo Carvalho M.D.  Professor  Director of Dermatologic Surgery  Department of Dermatology  [facility]    Dermatology Surgery Clinic  [facility] [place] [address]

## 2021-06-29 NOTE — PROGRESS NOTES
[facility] Dermatologic Surgery Clinic [place] Procedure Note      Date of Service:  Jun 29, 2021  Surgery: Mohs micrographic surgery    Case 1  Repair Type: Complex repair  Repair Size: 3.0 cm  Suture Material: 4-0 Monocryl  Tumor Type: SCC  Location: Let arm  Derm-Path Accession #: Outside report from [facility] [Case: [id number]]  PreOp Size: 1.0 cm x 1.3 cm  PostOp Size: 1.5 cm x 2.0 cm  Mohs Accession #:   Level of Defect: Fat      Procedure:  We discussed the principles of treatment and most likely complications including scarring, bleeding, infection, swelling, pain, crusting, nerve damage, large wound,  incomplete excision, wound dehiscence,  nerve damage, recurrence, and a second procedure may be recommended to obtain the best cosmetic or functional result.    Informed consent was obtained and the patient underwent the procedure as follows:  The patient was placed supine on the operating table.  The cancer was identified, outlined with a marker, and verified by the patient.  The entire surgical field was prepped with chlorhexidine.  The surgical site was anesthetized using lidocaine with epinephrine.      The area of clinically apparent tumor was not debulked. The layer of tissue was then surgically excised using a #15 blade and was then transferred onto a specimen sheet maintaining the orientation of the specimen. Hemostasis was obtained using electrocoagulation. The wound site was then covered with a dressing while the tissue samples were processed for examination.    The excised tissue was [facility] maintaining the tissue orientation.  The tissue specimen was relaxed so that the entire surgical margin was in a a single horizontal plane for sectioning and inked for precise mapping.  A precise reference map was drawn to reflect the sectioning of the specimen, colored inking of the margins, and orientation on the patient. The tissue was processed using  horizontal sectioning of the base and continuous peripheral margins.  The histopathologic sections were reviewed in conjunction with the reference map.    Total blocks: 1    Total slides:  2    There were no cancer cells visualized on examination, therefore Mohs surgery was complete.      REPAIR:     Due to one or more of the following factors, complex linear closure was required/indicated: Extensive undermining (equal to or greater than the maximum perpendicular width of the defect), exposure of underlying structure (bone, cartilage, tendon, named neurovascular), free margin involvement (helical rim, vermillion border, nostril rim), and/or placement of retention sutures.    A complex layered linear closure was selected as the procedure which would maximally preserve both function and cosmesis and for the following reasons: 1) the defect was widely undermined; 2) multiple deep plication and layered sutures placed; 3) wound size, depth, tension, and location.   After the excision of the tumor, the area was extensively and carefully undermined using blunt Metzenbaum scissors. Hemostasis was obtained with spot electrocautery and ligation of vessels where necessary. The subcutaneous and dermal layers were then closed with 4-0 Monocryl sutures. The epidermis was then carefully approximated along the length of the wound using 4-0 Monocryl running subcuticular sutures.     Estimated blood loss was less than 10 ml for all surgical sites. A sterile pressure dressing was applied and wound care instructions, with a written handout, were given. The patient was discharged from the Dermatologic Surgery Center alert and ambulatory.    Sergo Carvalho MD staffed the patient and was present for the key portions of the above procedure.

## 2021-06-29 NOTE — PATIENT INSTRUCTIONS
Wound Care Instructions  I will experience scar, altered skin color, bleeding, swelling, pain, crusting and redness. I may experience altered sensation. Risks are excessive bleeding, infection, muscle weakness, thick (hypertrophic or keloidal) scar, and recurrence,. A second procedure may be recommended to obtain the best cosmetic or functional result.  Possible complications of any surgical procedure are bleeding, infection, scarring, alteration in skin color and sensation, muscle weakness in the area, wound dehiscence or seperation, or recurrence of the lesion or disease. On occasion, after healing, a secondary procedure or revision may be recommended in order to obtain the best cosmetic or functional result.   After your surgery, a pressure bandage will be placed over the area that has sutures. This will help prevent bleeding.   For the First 48 hours After Surgery:  1. Leave the pressure bandage on and keep it dry. If it should come loose, you may retape it, but do not take it off.  2. Relax and take it easy. Do not do any vigorous exercise, heavy lifting, or bending forward. This could cause the wound to bleed.  3. Post-operative pain is usually mild. You may take plain or extra strength Tylenol every 4 hours as needed (do not take more than 4,000mg in one day). Do not take any medicine that contains aspirin, ibuprofen or motrin unless you have been recommended these by a doctor.  Avoid alcohol and vitamin E as these may increase your tendency to bleed.  4. You may put an ice pack around the bandaged area for 20 minutes every 2-3 hours. This may help reduce swelling, bruising, and pain. Make sure the ice pack is waterproof so that the pressure bandage does not get wet.   5. You may see a small amount of drainage or blood on your pressure bandage. This is normal. However, if drainage or bleeding continues or saturates the bandage, you will need to apply firm pressure over the bandage with a washcloth for 15  minutes. If bleeding continues after applying pressure for 15 minutes then go to the nearest emergency room.  48 Hours After Surgery  Carefully remove the bandage and start daily wound care and dressing changes. You may also now shower and get the wound wet. Wash wound with a mild soap and water.  Use caution when washing the wound. Be gentle and do not let the forceful shower stream hit the wound directly.  PAT dry.  Daily Wound Care:  1. Wash wound with a mild soap and water.  Use caution when washing the wound, be gentle and do not let the forceful shower stream hit the wound directly.  2. PAT DRY.  3. Apply Vaseline (from a new container or tube) over the suture line with a Q-tip. It is very important to keep the wound continuously moist, as wounds heal best in a moist environment.  4.  Keep the site covered until sutures are removed, you can cover it with a Telfa (non-stick) dressing and tape or a band-aid.    5. If you are unable to keep wound covered, you must apply Vaseline every 2 - 3 hours (while awake) to ensure it is being kept moist for optimal healing. A dressing overnight is recommended to keep the area moist.   Call Us If:  1. You have pain that is not controlled with Tylenol.  2. You have signs or symptoms of an infection, such as: fever over 100 degrees F, redness, warmth, or foul-smelling or yellow/creamy drainage from the wound.  Who should I call with questions?    [facility]: [phone]     [facility]: [phone]    For urgent needs outside of business hours call the [facility] at [phone] and ask for the dermatology resident on call

## 2021-06-29 NOTE — NURSING NOTE
Charito Violet Wilcox's goals for this visit include:   Chief Complaint   Patient presents with     Derm Problem     Charito is here today for MOHS on her left arm due to SCC       She requests these members of her care team be copied on today's visit information:     PCP: Nubia Peter    Referring Provider:  No referring provider defined for this encounter.    /76 (BP Location: Left arm, Patient Position: Sitting, Cuff Size: Adult Regular)   Pulse 82     Do you need any medication refills at today's visit? No    Komal Gustafson LPN

## 2021-07-05 ENCOUNTER — TELEPHONE (OUTPATIENT)
Dept: TRANSPLANT | Facility: CLINIC | Age: 41
End: 2021-07-05

## 2021-07-05 NOTE — TELEPHONE ENCOUNTER
Received call from [facility] pharmacy regarding Ursodiol refill while pt is on vacation.  Refilled Ursodiol 300mg BID for 7 days for [facility]. Pt will get in touch with clinic tomorrow to get the mail-in refill.

## 2021-07-05 NOTE — TELEPHONE ENCOUNTER
Patient can not get in touch will [facility] mail order pharmacy   Patient Call: Medication Refill  Route to LPN  Instruct the patient to first contact their pharmacy. If they have called their pharmacy and require further assistance, route to LPN.    Pharmacy Name: [facility]  Pharmacy Location:[place] [place]  Name of Medication: Ursodiol 300 mg  When will the patient be out of this medication?: Less than 24 hours [facility] then page if no answer)  Patient is out and on vacation and needs a one time sent.

## 2021-07-10 PROBLEM — C44.629 SQUAMOUS CELL CARCINOMA, ARM, LEFT: Status: ACTIVE | Noted: 2021-06-29

## 2021-07-21 ENCOUNTER — OFFICE VISIT (OUTPATIENT)
Dept: GASTROENTEROLOGY | Facility: CLINIC | Age: 41
End: 2021-07-21
Attending: INTERNAL MEDICINE
Payer: COMMERCIAL

## 2021-07-21 VITALS
SYSTOLIC BLOOD PRESSURE: 124 MMHG | OXYGEN SATURATION: 98 % | HEART RATE: 87 BPM | WEIGHT: 121.8 LBS | DIASTOLIC BLOOD PRESSURE: 84 MMHG | BODY MASS INDEX: 21.58 KG/M2

## 2021-07-21 DIAGNOSIS — Z94.4 LIVER REPLACED BY TRANSPLANT (H): Primary | ICD-10-CM

## 2021-07-21 PROCEDURE — 99214 OFFICE O/P EST MOD 30 MIN: CPT | Performed by: INTERNAL MEDICINE

## 2021-07-21 ASSESSMENT — PAIN SCALES - GENERAL: PAINLEVEL: NO PAIN (0)

## 2021-07-21 NOTE — PROGRESS NOTES
HISTORY OF PRESENT ILLNESS:  I had the pleasure of seeing Charito Wilcox for followup in the Liver Transplant Clinic at the [facility] on 07/21/2021.  Ms. 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    For the most part, she is unchanged.  She does still have some ongoing aches and pains in a number of joints including her shoulders, her back and her hips.  She is generally not exercising a great deal.  She also complains of some abdominal discomfort.  She is on antireflux medication as well as MiraLax and she still does have some abdominal discomfort about 1 week of the month.    She also did have a skin cancer that was removed.  She reports her energy level is generally quite good.  They have recently moved up to the [facility].    She denies any fevers or chills, cough or shortness of breath.  She is fully vaccinated against COVID-19.  She denies any nausea or vomiting.  Her bowel habits are somewhat variable.  Her appetite has been good and her weight has been stable.  There otherwise have been no other new events since she was last seen.    Current Outpatient Medications   Medication     ALPRAZolam (XANAX) 0.5 MG tablet     azaTHIOprine (IMURAN) 50 MG tablet     lactobacillus rhamnosus, GG, (CULTURELL) capsule     omeprazole (PRILOSEC) 40 MG DR capsule     ondansetron (ZOFRAN-ODT) 4 MG ODT tab     PARoxetine (PAXIL) 40 MG tablet     polyethylene glycol (MIRALAX) powder     tacrolimus (GENERIC EQUIVALENT) 1 MG capsule     ursodiol (ACTIGALL) 300 MG capsule     betamethasone dipropionate (DIPROSONE) 0.05 % external cream     fluconazole (DIFLUCAN) 150 MG tablet     No current facility-administered medications for this visit.     /84   Pulse 87   Wt 55.2 kg (121 lb 12.8 oz)   SpO2 98%   BMI 21.58 kg/m      PHYSICAL EXAMINATION:  In general, she looks well.  HEENT exam shows no scleral icterus or temporal  muscle wasting.  Chest is clear.  Abdominal exam shows the incision is intact.  No masses or tenderness to palpation are present.  Her liver is 10 cm in span without left lobe enlargement.  No spleen tip is palpable.  Extremity exam shows no edema.  Skin exam shows no suspicious lesions.  Neurologic exam shows a mild tremor but otherwise is nonfocal.     Recent Results (from the past 168 hour(s))   Basic metabolic panel    Collection Time: 07/20/21  9:20 AM   Result Value Ref Range    Sodium (External) 138 137 - 145 mmol/L    Potassium (External) 4.3 3.5 - 5.1 mmol/L    Chloride (External) 104 98 - 107 mmol/L    CO2 (External) 24 22 - 30 mmol/L    Anion Gap (External) 9.4 8 - 16 mEq/L    Glucose (External) 110 (H) 75 - 100 mg/dL    Calcium (External) 8.9 8.4 - 10.2 mg/dL    Urea Nitrogen (External) 13 7 - 17 mg/dL    Creatinine (External) 0.70 0.52 - 1.04 mg/dL    BUN/Creatinine Ratio (External) 19     GFR Estimated (External) >60 >60 ml/min/1.73m2   Hepatic function panel    Collection Time: 07/20/21  9:20 AM   Result Value Ref Range    Albumin (External) 4.1 3.5 - 5.0 g/dL    Protein Total (External) 6.9 6.3 - 8.2 g/dL    Bilirubin Total (External) 1.1 0.2 - 1.3 mg/dL    Bilirubin Direct (External) 0.0 0.0 - 0.3 mg/dL    Alk Phosphatase (External) 86 38 - 126 U/L    AST (External) 31 14 - 36 U/L    ALT (External) 15 <35 U/L   CBC with Platelets & Differential    Collection Time: 07/20/21  9:20 AM   Result Value Ref Range    WBC Count (External) 4.4 (L) 4.5 - 11.0 thou/cu mm    RBC Count (External) 3.82 (L) 4.00 - 5.20 mil/cu mm    Hemoglobin (External) 13.2 12.0 - 16.0 g/dL    Hematocrit (External) 38.0 33.0 - 51.0 %    MCV (External) 100 80 - 100 fl    MCH (External) 34.6 (H) 26.0 - 34.0 pg    MCHC (External) 34.7 32.0 - 36.0 g/dL    RDW (External) 11.6 11.5 - 15.5 %    Platelet Count (External) 215 140 - 440 thou/cu mm    Nucleated RBCs (External) 0.0 %    % Neutrophils (External) 51.5 42.0 - 72.0 %    %  Lymphocytes (External) 29.7 20.0 - 44.0 %    % Monocytes (External) 11.5 <12.0 %    % Eosinophils (External) 5.3 <8.0 %    % Basophils (External) 1.8 <3.0 %    % Immature Granulocytes (External) 0.2 %    Absolute Neutrophils (External) 2.2 1.7 - 7.0 thou/cu mm    Absolute Lymphocytes (External) 1.3 0.9 - 2.9 thou/cu mm    Absolute Monocytes (External) 0.5 <0.9 thou/cu mm    Absolute Eosinophils (External) 0.2 <0.5 thou/cu mm    Absolute Basophils (External) 0.1 <0.3 thou/cu mm    Absolute Immature Granulocytes (External) 0.0 <=0.3 thou/cu mm      IMPRESSION:  Ms. Wilcox is 21 years status post liver transplantation for fulminant hepatic failure.  She really is doing quite well at this point in time.  She is on low-dose tacrolimus and azathioprine for her immunosuppression.  Her liver tests are completely normal and her kidney function is excellent as well.  I will not be making any change to her medical regimen.  I will see her back in the clinic again in 1 year.  She does have a dermatology appointment in December to continue to follow up on her history of skin cancer.    We did discuss COVID-19 and the fact that transplant patients do not respond as well as a normal population, particularly those who are on an antimetabolite.  There is a CDC committee that is meeting tomorrow who will hopefully make specific recommendations about how to assess immunity in transplant patients and what to do if it is determined that their immunity is inadequate.    Thank you very much for allowing me to participate in the care of this patient.  If you have any questions regarding my recommendations, please do not hesitate to contact me.         Roldan Maria MD      Professor of Medicine  [employer]      Executive Medical Director, Solid Organ Transplant Program  [facility].

## 2021-07-21 NOTE — LETTER
7/21/2021         RE: Charito Wilcox  [address]  [facility] [address]        Dear Colleague,    Thank you for referring your patient, Charito Wilcox, to the [facility]PATOLOG[facility] [place]. Please see a copy of my visit note below.    HISTORY OF PRESENT ILLNESS:  I had the pleasure of seeing Charito Wilcox for followup in the Liver Transplant Clinic at the [facility] on 07/21/2021.  Ms. 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    For the most part, she is unchanged.  She does still have some ongoing aches and pains in a number of joints including her shoulders, her back and her hips.  She is generally not exercising a great deal.  She also complains of some abdominal discomfort.  She is on antireflux medication as well as MiraLax and she still does have some abdominal discomfort about 1 week of the month.    She also did have a skin cancer that was removed.  She reports her energy level is generally quite good.  They have recently moved up to the [facility].    She denies any fevers or chills, cough or shortness of breath.  She is fully vaccinated against COVID-19.  She denies any nausea or vomiting.  Her bowel habits are somewhat variable.  Her appetite has been good and her weight has been stable.  There otherwise have been no other new events since she was last seen.    Current Outpatient Medications   Medication     ALPRAZolam (XANAX) 0.5 MG tablet     azaTHIOprine (IMURAN) 50 MG tablet     lactobacillus rhamnosus, GG, (CULTURELL) capsule     omeprazole (PRILOSEC) 40 MG DR capsule     ondansetron (ZOFRAN-ODT) 4 MG ODT tab     PARoxetine (PAXIL) 40 MG tablet     polyethylene glycol (MIRALAX) powder     tacrolimus (GENERIC EQUIVALENT) 1 MG capsule     ursodiol (ACTIGALL) 300 MG capsule     betamethasone dipropionate (DIPROSONE) 0.05 % external cream     fluconazole (DIFLUCAN) 150 MG  tablet     No current facility-administered medications for this visit.     /84   Pulse 87   Wt 55.2 kg (121 lb 12.8 oz)   SpO2 98%   BMI 21.58 kg/m      PHYSICAL EXAMINATION:  In general, she looks well.  HEENT exam shows no scleral icterus or temporal muscle wasting.  Chest is clear.  Abdominal exam shows the incision is intact.  No masses or tenderness to palpation are present.  Her liver is 10 cm in span without left lobe enlargement.  No spleen tip is palpable.  Extremity exam shows no edema.  Skin exam shows no suspicious lesions.  Neurologic exam shows a mild tremor but otherwise is nonfocal.     Recent Results (from the past 168 hour(s))   Basic metabolic panel    Collection Time: 07/20/21  9:20 AM   Result Value Ref Range    Sodium (External) 138 137 - 145 mmol/L    Potassium (External) 4.3 3.5 - 5.1 mmol/L    Chloride (External) 104 98 - 107 mmol/L    CO2 (External) 24 22 - 30 mmol/L    Anion Gap (External) 9.4 8 - 16 mEq/L    Glucose (External) 110 (H) 75 - 100 mg/dL    Calcium (External) 8.9 8.4 - 10.2 mg/dL    Urea Nitrogen (External) 13 7 - 17 mg/dL    Creatinine (External) 0.70 0.52 - 1.04 mg/dL    BUN/Creatinine Ratio (External) 19     GFR Estimated (External) >60 >60 ml/min/1.73m2   Hepatic function panel    Collection Time: 07/20/21  9:20 AM   Result Value Ref Range    Albumin (External) 4.1 3.5 - 5.0 g/dL    Protein Total (External) 6.9 6.3 - 8.2 g/dL    Bilirubin Total (External) 1.1 0.2 - 1.3 mg/dL    Bilirubin Direct (External) 0.0 0.0 - 0.3 mg/dL    Alk Phosphatase (External) 86 38 - 126 U/L    AST (External) 31 14 - 36 U/L    ALT (External) 15 <35 U/L   CBC with Platelets & Differential    Collection Time: 07/20/21  9:20 AM   Result Value Ref Range    WBC Count (External) 4.4 (L) 4.5 - 11.0 thou/cu mm    RBC Count (External) 3.82 (L) 4.00 - 5.20 mil/cu mm    Hemoglobin (External) 13.2 12.0 - 16.0 g/dL    Hematocrit (External) 38.0 33.0 - 51.0 %    MCV (External) 100 80 - 100 fl     MCH (External) 34.6 (H) 26.0 - 34.0 pg    MCHC (External) 34.7 32.0 - 36.0 g/dL    RDW (External) 11.6 11.5 - 15.5 %    Platelet Count (External) 215 140 - 440 thou/cu mm    Nucleated RBCs (External) 0.0 %    % Neutrophils (External) 51.5 42.0 - 72.0 %    % Lymphocytes (External) 29.7 20.0 - 44.0 %    % Monocytes (External) 11.5 <12.0 %    % Eosinophils (External) 5.3 <8.0 %    % Basophils (External) 1.8 <3.0 %    % Immature Granulocytes (External) 0.2 %    Absolute Neutrophils (External) 2.2 1.7 - 7.0 thou/cu mm    Absolute Lymphocytes (External) 1.3 0.9 - 2.9 thou/cu mm    Absolute Monocytes (External) 0.5 <0.9 thou/cu mm    Absolute Eosinophils (External) 0.2 <0.5 thou/cu mm    Absolute Basophils (External) 0.1 <0.3 thou/cu mm    Absolute Immature Granulocytes (External) 0.0 <=0.3 thou/cu mm      IMPRESSION:  Ms. Wilcox is 23 years status post liver transplantation for fulminant hepatic failure.  She really is doing quite well at this point in time.  She is on low-dose tacrolimus and azathioprine for her immunosuppression.  Her liver tests are completely normal and her kidney function is excellent as well.  I will not be making any change to her medical regimen.  I will see her back in the clinic again in 1 year.  She does have a dermatology appointment in December to continue to follow up on her history of skin cancer.    We did discuss COVID-19 and the fact that transplant patients do not respond as well as a normal population, particularly those who are on an antimetabolite.  There is a CDC committee that is meeting tomorrow who will hopefully make specific recommendations about how to assess immunity in transplant patients and what to do if it is determined that their immunity is inadequate.    Thank you very much for allowing me to participate in the care of this patient.  If you have any questions regarding my recommendations, please do not hesitate to contact me.         Roldan Maria MD      Professor of  Medicine  [employer]      Executive Medical Director, Solid Organ Transplant Program  [facility].            Again, thank you for allowing me to participate in the care of your patient.        Sincerely,        Roldan Maria MD

## 2021-07-29 ENCOUNTER — TELEPHONE (OUTPATIENT)
Dept: TRANSPLANT | Facility: CLINIC | Age: 41
End: 2021-07-29

## 2021-07-29 NOTE — TELEPHONE ENCOUNTER
Charito sent a [employer] message asking for us to review a few supplements and if it's OK for her to take. Information forwarded to Neo Ma, Pharm D who provided the following response.    Probiotic enzyme blend: Everything looks good in here besides the grape seed extract. Data is unclear, but it might cause fluctuations in your tacrolimus levels. A pure probiotic blend would likely be safer for you.     Orange/lemon/quince fruit: No data on Quince fruit in adults, but has been safely used in kids. I think just not a lot is known about this one. As such I cannot say it would be definitely safe for you to take.      [place] mussel: Several cases of possible toxic hepatitis and granulomatous hepatitis have been reported for patients taking the [place] green-lipped mussel extract, Seatone. Liver function abnormalities have also been reported. Definitely recommend against this one.      A lot of these things are okay in normal diet, etc, but when they distill them down you can end up getting adverse effects.     Neo Ma, Daniel  [facility] Pharmacist    [employer] message sent back to Charito recommending she NOT take any of the supplements she inquired about.

## 2021-09-04 ENCOUNTER — HEALTH MAINTENANCE LETTER (OUTPATIENT)
Age: 41
End: 2021-09-04

## 2021-09-22 ENCOUNTER — TELEPHONE (OUTPATIENT)
Dept: TRANSPLANT | Facility: CLINIC | Age: 41
End: 2021-09-22

## 2021-09-22 NOTE — TELEPHONE ENCOUNTER
Annual chart review completed and Charito is due for the following:    - annual fasting lipid profile  - annual urine protein    [employer] message sent to Charito to notify her.

## 2021-09-27 ENCOUNTER — TELEPHONE (OUTPATIENT)
Dept: TRANSPLANT | Facility: CLINIC | Age: 41
End: 2021-09-27

## 2021-09-27 NOTE — TELEPHONE ENCOUNTER
Charito had sent in a aCon message with an attachment for a couple supplements she would like to take. Message was forwarded to pharmacy who reviewed and responded with the following. Supplements contain the following: Vit K, Vit D3, and Choline. Message sent to Charito to inform her although I cautioned her on the Vit K as it can affect bleeding/clotting times.       This is Shawnee and I am a pharmacist covering Neo's inbox today. I don't see any issues with taking the supplements with her meds, so should be ok!   Thanks,   Shawnee Power, PharmD   Medication Therapy Management Pharmacist

## 2021-10-30 ENCOUNTER — HEALTH MAINTENANCE LETTER (OUTPATIENT)
Age: 41
End: 2021-10-30

## 2021-11-17 DIAGNOSIS — Z13.220 LIPID SCREENING: ICD-10-CM

## 2021-11-17 DIAGNOSIS — Z94.4 LIVER REPLACED BY TRANSPLANT (H): ICD-10-CM

## 2021-11-19 ENCOUNTER — TELEPHONE (OUTPATIENT)
Dept: TRANSPLANT | Facility: CLINIC | Age: 41
End: 2021-11-19
Payer: COMMERCIAL

## 2021-11-19 DIAGNOSIS — Z94.4 LIVER REPLACED BY TRANSPLANT (H): ICD-10-CM

## 2021-11-19 RX ORDER — TACROLIMUS 1 MG/1
CAPSULE ORAL
Qty: 270 CAPSULE | Refills: 3 | Status: SHIPPED | OUTPATIENT
Start: 2021-11-19 | End: 2021-11-22

## 2021-11-19 NOTE — TELEPHONE ENCOUNTER
Charito sent a message reporting itching and stated that she has spoken to other liver transplant patients who experienced itching when their TAC level was on the low side. Charito wanted to know if we could try to increase it. Dr. Maria reviewed the message and is agreeable to increasing dose slightly although he doesn't think that is the cause. Attempted to reach Charito to discuss, no answer, left her a message to call me back. Current TAC dose if 1 mg every 12 hours. Mychart message sent to increase to 1 mg every AM and 2 mg every PM and to check a level in a week.

## 2021-11-22 ENCOUNTER — TELEPHONE (OUTPATIENT)
Dept: TRANSPLANT | Facility: CLINIC | Age: 41
End: 2021-11-22
Payer: COMMERCIAL

## 2021-11-22 DIAGNOSIS — Z94.4 LIVER REPLACED BY TRANSPLANT (H): ICD-10-CM

## 2021-11-22 RX ORDER — TACROLIMUS 1 MG/1
CAPSULE ORAL
Qty: 270 CAPSULE | Refills: 3 | Status: SHIPPED | OUTPATIENT
Start: 2021-11-22 | End: 2024-07-05

## 2021-11-22 NOTE — TELEPHONE ENCOUNTER
Charito uses [facility], not local [facility] pharmacy. TAC RX sent to [facility].

## 2021-12-26 ENCOUNTER — TELEPHONE (OUTPATIENT)
Dept: TRANSPLANT | Facility: CLINIC | Age: 41
End: 2021-12-26
Payer: COMMERCIAL

## 2021-12-26 DIAGNOSIS — Z94.4 LIVER REPLACED BY TRANSPLANT (H): Primary | ICD-10-CM

## 2021-12-26 NOTE — TELEPHONE ENCOUNTER
Patient called stating that she was Covid positive she has minimal symptoms at this period of time she does state that she has back pain and some abdominal pain she is concerned about her liver patient is concerned she will go online and fill out form for monoclonal antibodies.  Dr. Adams was paged for further immunosuppression advice.

## 2022-01-03 ENCOUNTER — TELEPHONE (OUTPATIENT)
Dept: TRANSPLANT | Facility: CLINIC | Age: 42
End: 2022-01-03
Payer: COMMERCIAL

## 2022-01-03 NOTE — TELEPHONE ENCOUNTER
Called Charito to check in. She tested (+) for COVID 12/30/2021. She feels better but still pretty tired. She does get shortness of breath with activity but not in general. She did receive the monoclonal antibody infusion.     Main symptom is feeling tired and headaches which are also resolving.     Charito was experiencing pain on her side and seems to be resolved. Liver tests are stable. She will cancel the ultrasound that was ordered. Charito will get retested and notify me of the results. She is aware she will need to isolate longer due to her immunosuppressed status.

## 2022-01-18 ENCOUNTER — TELEPHONE (OUTPATIENT)
Dept: TRANSPLANT | Facility: CLINIC | Age: 42
End: 2022-01-18
Payer: COMMERCIAL

## 2022-01-18 NOTE — TELEPHONE ENCOUNTER
Called Charito to check in. She is still tired but is feeling better and started exercising a little bit. She said she's not 100% but definitely better. Charito will call me if there are any updates or she starts feeling worse again.

## 2022-01-19 ENCOUNTER — OFFICE VISIT (OUTPATIENT)
Dept: DERMATOLOGY | Facility: CLINIC | Age: 42
End: 2022-01-19
Payer: COMMERCIAL

## 2022-01-19 DIAGNOSIS — Z94.4 LIVER REPLACED BY TRANSPLANT (H): ICD-10-CM

## 2022-01-19 DIAGNOSIS — Z85.828 HISTORY OF NONMELANOMA SKIN CANCER: ICD-10-CM

## 2022-01-19 DIAGNOSIS — D18.01 CHERRY ANGIOMA: ICD-10-CM

## 2022-01-19 DIAGNOSIS — D22.9 MULTIPLE BENIGN NEVI: ICD-10-CM

## 2022-01-19 DIAGNOSIS — L81.4 SOLAR LENTIGO: ICD-10-CM

## 2022-01-19 DIAGNOSIS — L82.1 SEBORRHEIC KERATOSIS: ICD-10-CM

## 2022-01-19 DIAGNOSIS — L57.0 ACTINIC KERATOSIS: Primary | ICD-10-CM

## 2022-01-19 PROCEDURE — 99203 OFFICE O/P NEW LOW 30 MIN: CPT | Performed by: DERMATOLOGY

## 2022-01-19 ASSESSMENT — PAIN SCALES - GENERAL: PAINLEVEL: NO PAIN (0)

## 2022-01-19 NOTE — PATIENT INSTRUCTIONS
Start niacinamide 500 mg twice daily for chemoprevention        Cryotherapy    What is it?    Use of a very cold liquid, such as liquid nitrogen, to freeze and destroy abnormal skin cells that need to be removed    What should I expect?    Tenderness and redness    A small blister that might grow and fill with dark purple blood. There may be crusting.    More than one treatment may be needed if the lesions do not go away.    How do I care for the treated area?    Gently wash the area with your hands when bathing.    Use a thin layer of Vaseline to help with healing. You may use a Band-Aid.     The area should heal within 7-10 days and may leave behind a pink or lighter color.     Do not use an antibiotic or Neosporin ointment.     You may take acetaminophen (Tylenol) for pain.     Call your doctor if you have:    Severe pain    Signs of infection (warmth, redness, cloudy yellow drainage, and or a bad smell)    Questions or concerns    Who should I call with questions?       [facility]: [phone]       [facility]: [phone]       For urgent needs outside of business hours call the [facility] at [phone] and ask for the dermatology resident on call

## 2022-01-19 NOTE — LETTER
1/19/2022       RE: Charito Wilcox  [address]  [facility] [address]     Dear Colleague,    Thank you for referring your patient, Charito Wilcox, to the [facility] DERMATOLOGY [facility]IN[facility] [place] at [facility]. Please see a copy of my visit note below.    [facility] Dermatology  High Risk Non-Melanoma Skin Cancer Clinic Initial Consult Note  Encounter Date: Jan 19, 2022  Office Visit     Dermatology Problem List:  1. 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 Hx of liver transplant  - azathioprine/tacrolimus  3. Actinic Keratosis   - Cryotherapy applied to right cheek and nose today  4. Cherry Angiomas  5. 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   - We reviewed with the patient that due to the immunosuppressive medications used following transplant, solid organ transplant recipients are at a roughly 65-fold increased risk of squamous cell carcinoma, 20-fold increased risk of basal cell carcinoma, and 3.4 fold increased risk of melanoma compared to the general population.   - Based on the patient's risk factors and the Skin and Ultraviolet Neoplasia Transplant Risk Assessment Tool, the patient's risk of skin cancer following transplant is categorized as:  - Medium Risk: 5-Year Risk of 6.15% and 10-Year Risk of 13.73%  - We briefly reviewed prevention methods for reducing skin cancer after transplantation including avoidance of sun exposure, avoidance of indoor tanning beds or other indoor tanning devices, use of protective clothing (e.g. wide-brimmed hats, long sleeves, long pants), SPF 30 or greater sunscreen, and UV-protective sunglasses when outdoors.   - We discussed self skin examinations and partner-skin examinations.     # History of NMSC  SCC of upper left arm s/p MMS. No evidence of  recurrence on exam today  - NTD: No further management at this time.    # Actinic keratosis, right cheek and nose   - Cryotherapy applied to right cheek and nose today   - Counseled on how this is a precancerous lesion    # Benign lesions: Multiple benign nevi, solar lentigos, seborrheic keratoses, cherry angiomas. Explained to patient benign nature of lesion. No treatment is necessary at this time unless the lesion changes or becomes symptomatic.     - ABCDs of melanoma were discussed and self skin checks were advised.  - Sun precaution was advised including the use of sun screens of SPF 30 or higher, sun protective clothing, and avoidance of tanning beds.    Procedures Performed:   - Cryotherapy procedure note, location(s): right cheek and nose. After verbal consent and discussion of risks and benefits including, but not limited to, dyspigmentation/scar, blister, and pain, 3 lesion(s) was(were) treated with 1-2 mm freeze border for 1-2 cycles with liquid nitrogen. Post cryotherapy instructions were provided.    Follow-up: 1 year(s) in-person, or earlier for new or changing lesions    Staff and Medical Student:   Patient was discussed with Dr. Hernandez.     Katharine Ramesh, MS-4     Staff Physician:  I was present with the medical student who participated in the service and in the documentation of the note. I have verified the history and personally performed the physical exam and medical decision making. I agree with the assessment and plan of care as documented in the note.     The procedure(s) was(were) performed by myself.    Rashad Hernandez MD  Pronouns: he/him/his    Department of Dermatology  [facility]: Phone: [phone], Fax:[phone]  [facility]: Phone: [phone] Fax: [phone]  ____________________________________________    CC: Skin Check (full body skin check, spot on  face )      HPI:  Ms. Charito Wilcox is a(n) 41 year old female who presents today as a new patient for a full body skin examination. Patient had a liver transplant at 20 years of age; is currently on immunosuppression. She was previously seen in dermatology clinic by Dr. Carvalho on 06/29/2021 wherein mohs micrographic surgery was performed to remove an area of squamous cell carcinoma on her left upper arm. She also reports having areas on her right forearm and left upper leg frozen by her primary care provider on 05/27/21. Today, the patient notes some lesions of concern on her right cheek. States that she has had this dry, red patch since the summer around August that has not gone away; is not bothersome. She does get quite a bit of sun exposure as she lives at a camp and spends quite a bit of time outdoors. Notes that she does apply sunscreen with an SPF 30-50 regularly. Notes that she was not too skin conscious for the first ten years after her transplant, but has tried to be better then.    Patient is otherwise feeling well, without additional skin concerns.    Type of Organ Transplant: Liver  Reason for Transplant: Idiopathic liver failure  Date of Transplant: 2000  Immunosuppression Regimen: Azathioprine, Tacrolimus    Personal History of Skin Cancer:  Every been diagnosed with the following:   IF YES, (if known) indicate type, body location, year diagnosed, and treatment.    - Pre-skin cancers (e.g. actinic keratoses): NO  - Atypical nevi: NO  - Keratinocyte carcinomas (basal cell carcinoma or squamous cell carcinoma): YES; SCC of upper left arm s/p [facility] 06/2021  - Melanoma: NO  - Other type(s) of skin cancer: NO     Family History of Skin Cancer:  Any first-degree relative relative(s) diagnosed with the following:  IF YES, (if known) indicate relationship, type, and age at diagnosis.     - Keratinocyte carcinomas (basal cell carcinoma or squamous cell carcinoma): NO  - Melanoma: NO  - Other type of skin  cancer: NO    Skin Cancer Review of Systems:  - Fair skin (Granados Type I or II): YES  - Blue, green or ramon eyes: YES  - Light or red hair: YES  - Male sex assigned at birth: NO  - History of blistering sunburns: YES  - Use of indoor tanning devices or sunlamps: No   - If YES, indoor tanning device or sunlamp?   - If YES, approximate number of lifetime sessions:   - If YES, were these used before the age of 35?:  - Smoking History: No  - If YES, what is the pack-year smoking history?  - Ever lived in a sub-tropical region? No  - Any history of arsenic exposure (e.g. well water)? Yes - Current  - Any history of viral warts? No  - Have you received the HPV vaccine? NO  - Any occupational exposure to ultraviolet light exposure (e.g. construction work, agricultural work, ): No   - If YES, what was the profession?     Medication Review:  Is the patient currently taking any of the following medications?   IF YES, indicate which medication (if necessary).     - TNF-alpha inhibitor (e.g. etanercept, adalimumab, infliximab): NO  - Tetracycline antibiotic (e.g. minocycline, doxycycline, tetracycline): NO  - Thiazide-containing anti-hypertensive (e.g. hydrochlorothiazide): NO  - Angiotensin receptor blocker (e.g. losartan): NO  - Sulfonylurea (e.g. glipizide): NO  - Amiodarone: NO  - Diltiazem: NO  - Voriconazole: NO     Labs Reviewed:  N/A    Physical Exam:  Vitals: There were no vitals taken for this visit.  GEN: Well-developed, well-nourished, in no acute distress  SKIN: Full skin, which includes the head/face, both arms, chest, back, abdomen,both legs, genitalia and/or groin buttocks, digits and/or nails, was examined.  - There are erythematous macules with overyling adherent scale on the right cheek and nose   - Linear scar on left upper arm well-healed and with no evidence of recurrence   - There are two mobile, soft masses on the right inner forearm  - There are dome shaped bright red papules on the  chest and stomach   - Scattered brown macules on sun exposed areas  - No other lesions of concern on areas examined    Medications:  Current Outpatient Medications   Medication     ALPRAZolam (XANAX) 0.5 MG tablet     azaTHIOprine (IMURAN) 50 MG tablet     lactobacillus rhamnosus, GG, (CULTURELL) capsule     omeprazole (PRILOSEC) 40 MG DR capsule     PARoxetine (PAXIL) 40 MG tablet     polyethylene glycol (MIRALAX) powder     tacrolimus (GENERIC EQUIVALENT) 1 MG capsule     ursodiol (ACTIGALL) 300 MG capsule     betamethasone dipropionate (DIPROSONE) 0.05 % external cream     fluconazole (DIFLUCAN) 150 MG tablet     ondansetron (ZOFRAN-ODT) 4 MG ODT tab     No current facility-administered medications for this visit.      Past Medical History:   Patient Active Problem List   Diagnosis     Liver replaced by transplant (H)     Esophageal reflux     CARDIOVASCULAR SCREENING; LDL GOAL LESS THAN 160     Panic disorder     IBS (irritable bowel syndrome)     Immunosuppressed status (H)     PMS (premenstrual syndrome)     Localized superficial swelling, mass, or lump     Abdominal pain     Anxiety     Nausea     Squamous cell carcinoma, arm, left     Past Medical History:   Diagnosis Date     Acute pancreatitis 9/9/2016     Anxiety     anxiety over vomiting, nausea is main trigger     Esophageal reflux      Gastrointestinal ulcer      Irritable bowel syndrome      Liver replaced by transplant (H) 2000    liver failure of unknown etiology     Papanicolaou smear of cervix with atypical squamous cells of undetermined significance (ASC-US)     negative HPV     Squamous cell carcinoma, arm, left 6/29/2021

## 2022-01-19 NOTE — PROGRESS NOTES
[facility] Dermatology  High Risk Non-Melanoma Skin Cancer Clinic Initial Consult Note  Encounter Date: Jan 19, 2022  Office Visit     Dermatology Problem List:  1. 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 Hx of liver transplant  - azathioprine/tacrolimus  3. Actinic Keratosis   - Cryotherapy applied to right cheek and nose today  4. Cherry Angiomas  5. 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   - We reviewed with the patient that due to the immunosuppressive medications used following transplant, solid organ transplant recipients are at a roughly 65-fold increased risk of squamous cell carcinoma, 20-fold increased risk of basal cell carcinoma, and 3.4 fold increased risk of melanoma compared to the general population.   - Based on the patient's risk factors and the Skin and Ultraviolet Neoplasia Transplant Risk Assessment Tool, the patient's risk of skin cancer following transplant is categorized as:  - Medium Risk: 5-Year Risk of 6.15% and 10-Year Risk of 13.73%  - We briefly reviewed prevention methods for reducing skin cancer after transplantation including avoidance of sun exposure, avoidance of indoor tanning beds or other indoor tanning devices, use of protective clothing (e.g. wide-brimmed hats, long sleeves, long pants), SPF 30 or greater sunscreen, and UV-protective sunglasses when outdoors.   - We discussed self skin examinations and partner-skin examinations.     # History of NMSC  SCC of upper left arm s/p MMS. No evidence of recurrence on exam today  - NTD: No further management at this time.    # Actinic keratosis, right cheek and nose   - Cryotherapy applied to right cheek and nose today   - Counseled on how this is a precancerous lesion    # Benign lesions: Multiple benign nevi, solar lentigos, seborrheic keratoses, cherry angiomas.  Explained to patient benign nature of lesion. No treatment is necessary at this time unless the lesion changes or becomes symptomatic.     - ABCDs of melanoma were discussed and self skin checks were advised.  - Sun precaution was advised including the use of sun screens of SPF 30 or higher, sun protective clothing, and avoidance of tanning beds.    Procedures Performed:   - Cryotherapy procedure note, location(s): right cheek and nose. After verbal consent and discussion of risks and benefits including, but not limited to, dyspigmentation/scar, blister, and pain, 3 lesion(s) was(were) treated with 1-2 mm freeze border for 1-2 cycles with liquid nitrogen. Post cryotherapy instructions were provided.    Follow-up: 1 year(s) in-person, or earlier for new or changing lesions    Staff and Medical Student:   Patient was discussed with Dr. Hernandez.     Katharine Ramesh, MS-4     Staff Physician:  I was present with the medical student who participated in the service and in the documentation of the note. I have verified the history and personally performed the physical exam and medical decision making. I agree with the assessment and plan of care as documented in the note.     The procedure(s) was(were) performed by myself.    Rashad Hernandez MD  Pronouns: he/him/his    Department of Dermatology  [facility]: Phone: [phone], Fax:[phone]  [facility]: Phone: [phone] Fax: [phone]  ____________________________________________    CC: Skin Check (full body skin check, spot on face )      HPI:  Ms. Charito Wilcox is a(n) 41 year old female who presents today as a new patient for a full body skin examination. Patient had a liver transplant at 20 years of age; is currently on immunosuppression. She was previously seen in dermatology clinic by Dr. Carvalho on 06/29/2021 wherein mohs  micrographic surgery was performed to remove an area of squamous cell carcinoma on her left upper arm. She also reports having areas on her right forearm and left upper leg frozen by her primary care provider on 05/27/21. Today, the patient notes some lesions of concern on her right cheek. States that she has had this dry, red patch since the summer around August that has not gone away; is not bothersome. She does get quite a bit of sun exposure as she lives at a camp and spends quite a bit of time outdoors. Notes that she does apply sunscreen with an SPF 30-50 regularly. Notes that she was not too skin conscious for the first ten years after her transplant, but has tried to be better then.    Patient is otherwise feeling well, without additional skin concerns.    Type of Organ Transplant: Liver  Reason for Transplant: Idiopathic liver failure  Date of Transplant: 2000  Immunosuppression Regimen: Azathioprine, Tacrolimus    Personal History of Skin Cancer:  Every been diagnosed with the following:   IF YES, (if known) indicate type, body location, year diagnosed, and treatment.    - Pre-skin cancers (e.g. actinic keratoses): NO  - Atypical nevi: NO  - Keratinocyte carcinomas (basal cell carcinoma or squamous cell carcinoma): YES; SCC of upper left arm s/p [facility] 06/2021  - Melanoma: NO  - Other type(s) of skin cancer: NO     Family History of Skin Cancer:  Any first-degree relative relative(s) diagnosed with the following:  IF YES, (if known) indicate relationship, type, and age at diagnosis.     - Keratinocyte carcinomas (basal cell carcinoma or squamous cell carcinoma): NO  - Melanoma: NO  - Other type of skin cancer: NO    Skin Cancer Review of Systems:  - Fair skin (Granados Type I or II): YES  - Blue, green or ramon eyes: YES  - Light or red hair: YES  - Male sex assigned at birth: NO  - History of blistering sunburns: YES  - Use of indoor tanning devices or sunlamps: No   - If YES, indoor tanning device or  sunlamp?   - If YES, approximate number of lifetime sessions:   - If YES, were these used before the age of 35?:  - Smoking History: No  - If YES, what is the pack-year smoking history?  - Ever lived in a sub-tropical region? No  - Any history of arsenic exposure (e.g. well water)? Yes - Current  - Any history of viral warts? No  - Have you received the HPV vaccine? NO  - Any occupational exposure to ultraviolet light exposure (e.g. construction work, agricultural work, ): No   - If YES, what was the profession?     Medication Review:  Is the patient currently taking any of the following medications?   IF YES, indicate which medication (if necessary).     - TNF-alpha inhibitor (e.g. etanercept, adalimumab, infliximab): NO  - Tetracycline antibiotic (e.g. minocycline, doxycycline, tetracycline): NO  - Thiazide-containing anti-hypertensive (e.g. hydrochlorothiazide): NO  - Angiotensin receptor blocker (e.g. losartan): NO  - Sulfonylurea (e.g. glipizide): NO  - Amiodarone: NO  - Diltiazem: NO  - Voriconazole: NO     Labs Reviewed:  N/A    Physical Exam:  Vitals: There were no vitals taken for this visit.  GEN: Well-developed, well-nourished, in no acute distress  SKIN: Full skin, which includes the head/face, both arms, chest, back, abdomen,both legs, genitalia and/or groin buttocks, digits and/or nails, was examined.  - There are erythematous macules with overyling adherent scale on the right cheek and nose   - Linear scar on left upper arm well-healed and with no evidence of recurrence   - There are two mobile, soft masses on the right inner forearm  - There are dome shaped bright red papules on the chest and stomach   - Scattered brown macules on sun exposed areas  - No other lesions of concern on areas examined    Medications:  Current Outpatient Medications   Medication     ALPRAZolam (XANAX) 0.5 MG tablet     azaTHIOprine (IMURAN) 50 MG tablet     lactobacillus rhamnosus, GG, (CULTURELL) capsule      omeprazole (PRILOSEC) 40 MG DR capsule     PARoxetine (PAXIL) 40 MG tablet     polyethylene glycol (MIRALAX) powder     tacrolimus (GENERIC EQUIVALENT) 1 MG capsule     ursodiol (ACTIGALL) 300 MG capsule     betamethasone dipropionate (DIPROSONE) 0.05 % external cream     fluconazole (DIFLUCAN) 150 MG tablet     ondansetron (ZOFRAN-ODT) 4 MG ODT tab     No current facility-administered medications for this visit.      Past Medical History:   Patient Active Problem List   Diagnosis     Liver replaced by transplant (H)     Esophageal reflux     CARDIOVASCULAR SCREENING; LDL GOAL LESS THAN 160     Panic disorder     IBS (irritable bowel syndrome)     Immunosuppressed status (H)     PMS (premenstrual syndrome)     Localized superficial swelling, mass, or lump     Abdominal pain     Anxiety     Nausea     Squamous cell carcinoma, arm, left     Past Medical History:   Diagnosis Date     Acute pancreatitis 9/9/2016     Anxiety     anxiety over vomiting, nausea is main trigger     Esophageal reflux      Gastrointestinal ulcer      Irritable bowel syndrome      Liver replaced by transplant (H) 2000    liver failure of unknown etiology     Papanicolaou smear of cervix with atypical squamous cells of undetermined significance (ASC-US)     negative HPV     Squamous cell carcinoma, arm, left 6/29/2021

## 2022-07-20 ENCOUNTER — OFFICE VISIT (OUTPATIENT)
Dept: DERMATOLOGY | Facility: CLINIC | Age: 42
End: 2022-07-20
Payer: COMMERCIAL

## 2022-07-20 DIAGNOSIS — Z94.4 HISTORY OF LIVER TRANSPLANT (H): ICD-10-CM

## 2022-07-20 DIAGNOSIS — L82.0 INFLAMED SEBORRHEIC KERATOSIS: ICD-10-CM

## 2022-07-20 DIAGNOSIS — B35.3 TINEA PEDIS OF RIGHT FOOT: Primary | ICD-10-CM

## 2022-07-20 DIAGNOSIS — D22.9 MULTIPLE BENIGN NEVI: ICD-10-CM

## 2022-07-20 DIAGNOSIS — L81.4 SOLAR LENTIGO: ICD-10-CM

## 2022-07-20 DIAGNOSIS — Z85.828 HISTORY OF NONMELANOMA SKIN CANCER: ICD-10-CM

## 2022-07-20 DIAGNOSIS — L82.1 SEBORRHEIC KERATOSIS: ICD-10-CM

## 2022-07-20 DIAGNOSIS — D18.01 CHERRY ANGIOMA: ICD-10-CM

## 2022-07-20 PROCEDURE — 87210 SMEAR WET MOUNT SALINE/INK: CPT | Performed by: DERMATOLOGY

## 2022-07-20 PROCEDURE — 17110 DESTRUCTION B9 LES UP TO 14: CPT | Performed by: DERMATOLOGY

## 2022-07-20 PROCEDURE — 99213 OFFICE O/P EST LOW 20 MIN: CPT | Mod: 25 | Performed by: DERMATOLOGY

## 2022-07-20 RX ORDER — PRENATAL VIT 91/IRON/FOLIC/DHA 28-975-200
COMBINATION PACKAGE (EA) ORAL
Qty: 42 G | Refills: 11 | Status: SHIPPED | OUTPATIENT
Start: 2022-07-20

## 2022-07-20 RX ORDER — MULTIVITAMIN
1 TABLET ORAL DAILY
COMMUNITY
Start: 2022-03-03

## 2022-07-20 RX ORDER — MAGNESIUM GLUCONATE 30 MG(550)
TABLET ORAL
COMMUNITY
Start: 2022-03-03 | End: 2024-04-29

## 2022-07-20 ASSESSMENT — PAIN SCALES - GENERAL: PAINLEVEL: NO PAIN (0)

## 2022-07-20 NOTE — LETTER
7/20/2022       RE: Charito Wilcox  [address] [facility] [address]     Dear Colleague,    Thank you for referring your patient, Charito Wilcox, to the [facility] DERMATOLOGY CLINIC [place] at [facility]. Please see a copy of my visit note below.    [facility] Dermatology Note  Encounter Date: Jul 20, 2022  Office Visit     Dermatology Problem List:  1. 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 Hx of liver transplant  - azathioprine/tacrolimus  3. Actinic Keratosis   - s/p Cryotherapy   4. Lipoma  5. 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   - We reviewed with the patient that due to the immunosuppressive medications used following transplant, solid organ transplant recipients are at a roughly 65-fold increased risk of squamous cell carcinoma, 20-fold increased risk of basal cell carcinoma, and 3.4 fold increased risk of melanoma compared to the general population.   - Based on the patient's risk factors and the Skin and Ultraviolet Neoplasia Transplant Risk Assessment Tool, the patient's risk of skin cancer following transplant is categorized as:  - Medium Risk: 5-Year Risk of 6.15% and 10-Year Risk of 13.73%  - We briefly reviewed prevention methods for reducing skin cancer after transplantation including avoidance of sun exposure, avoidance of indoor tanning beds or other indoor tanning devices, use of protective clothing (e.g. wide-brimmed hats, long sleeves, long pants), SPF 30 or greater sunscreen, and UV-protective sunglasses when outdoors.   - We discussed self skin examinations and partner-skin examinations.      # History of NMSC  SCC of upper left arm s/p MMS. No evidence of recurrence on exam today  - NTD: No further management at this  time.     # Tinea pedis, R foot  Patient reports mild improvement after 4 days using a topical OTC anti-fungal. Additionally, KOH today is positive. Will prescribe a topical anti-fungal.    - KOH today on R heel to check for fungus, see procedure note below   - Start applying terbinafine 1% cream to the affected areas BID for 2-4 week. Then transition to 1 weekend per month for maintenance.     # Inflamed Seborrheic (verrucous) Keratoses, anterior neck  - Cryotherapy today, see procedure note below    # Benign lesions: Multiple benign nevi, solar lentigos, seborrheic keratoses, cherry angiomas. Explained to patient benign nature of lesion. No treatment is necessary at this time unless the lesion changes or becomes symptomatic.   - ABCDs of melanoma were discussed and self skin checks were advised.  - Sun precaution was advised including the use of sun screens of SPF 30 or higher, sun protective clothing, and avoidance of tanning beds    Procedures Performed:   - Cryotherapy procedure note, location(s): anterior neck. After verbal consent and discussion of risks and benefits including, but not limited to, dyspigmentation/scar, blister, and pain, 1 lesion(s) was(were) treated with 1-2 mm freeze border for 1-2 cycles with liquid nitrogen. Post cryotherapy instructions were provided.    - KOH prep was obtained and interpreted as positive.    Follow-up: 1 year(s) in-person, or earlier for new or changing lesions    Staff and Scribe:     Scribe Disclosure:  IASHOK, am serving as a scribe to document services personally performed by Rashad Hernandez MD based on data collection and the provider's statements to me.     Provider Disclosure:   The documentation recorded by the scribe accurately reflects the services I personally performed and the decisions made by me.    Rashad Hernandez MD    Department of Dermatology  [facility]: Phone:  [phone], Fax:[phone]  [facility]: Phone: [phone] Fax: [phone]  ____________________________________________    CC: Skin Check (Charito is here for a skin check and has spots of concern on her R forearm and face. )    HPI:  Ms. Charito Wilcox is a(n) 41 year old female who presents today as a return patient for [facility]. Patient was last seen in dermatology by myself on 1/19/22 at which point we informed the patient about her increased risk of skin cancer following her liver transplant. We treated actinic keratosis on her R cheek and nose with cryotherapy.      Patient reports new raised lesions on the arms but they do not bother her. Patient also had concerns about brown spot in crease of eye. She also has concerns about athletes foot on her heel and has been treating it for four days with an antifungal. She has been diligent about wearing sunscreen. Patient is otherwise feeling well, without additional skin concerns.    Labs Reviewed:  N/A    Physical Exam:  Vitals: There were no vitals taken for this visit.  SKIN: Full skin, which includes the head/face, both arms, chest, back, abdomen,both legs, genitalia and/or groin buttocks, digits and/or nails, was examined.  - Wearing nail polish on toenails.   - 3 mm well demarcated verrucous appearing papule on the anterior neck.   - R foot with light pink scaly thin plaques in moccasin distribution.   - There are dome shaped bright red papules on the trunk and extremities.   -Multiple regular brown pigmented macules and papules are identified on the trunk and extremities .   -Scattered brown macules on sun exposed areas.  -There are waxy stuck on tan to brown papules on the trunk and extremities.   - No other lesions of concern on areas examined.     Medications:  Current Outpatient Medications   Medication     ALPRAZolam (XANAX) 0.5 MG tablet     azaTHIOprine (IMURAN) 50 MG tablet     lactobacillus  rhamnosus, GG, (CULTURELL) capsule     Magnesium Gluconate 550 MG TABS     Multiple Vitamin (ONE-A-DAY ESSENTIAL) TABS     omeprazole (PRILOSEC) 40 MG DR capsule     PARoxetine (PAXIL) 40 MG tablet     polyethylene glycol (MIRALAX) 17 GM/Dose powder     tacrolimus (GENERIC EQUIVALENT) 1 MG capsule     ursodiol (ACTIGALL) 300 MG capsule     betamethasone dipropionate (DIPROSONE) 0.05 % external cream     fluconazole (DIFLUCAN) 150 MG tablet     ondansetron (ZOFRAN-ODT) 4 MG ODT tab     No current facility-administered medications for this visit.      Past Medical History:   Patient Active Problem List   Diagnosis     Liver replaced by transplant (H)     Esophageal reflux     CARDIOVASCULAR SCREENING; LDL GOAL LESS THAN 160     Panic disorder     IBS (irritable bowel syndrome)     Immunosuppressed status (H)     PMS (premenstrual syndrome)     Localized superficial swelling, mass, or lump     Abdominal pain     Anxiety     Nausea     Squamous cell carcinoma, arm, left     Past Medical History:   Diagnosis Date     Acute pancreatitis 9/9/2016     Anxiety     anxiety over vomiting, nausea is main trigger     Esophageal reflux      Gastrointestinal ulcer      Irritable bowel syndrome      Liver replaced by transplant (H) 2000    liver failure of unknown etiology     Papanicolaou smear of cervix with atypical squamous cells of undetermined significance (ASC-US)     negative HPV     Squamous cell carcinoma, arm, left 6/29/2021        CC Nubia Peter, DO  [address] [place] [address] on close of this encounter.

## 2022-07-20 NOTE — PROGRESS NOTES
[facility] Dermatology Note  Encounter Date: Jul 20, 2022  Office Visit     Dermatology Problem List:  1. 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 Hx of liver transplant  - azathioprine/tacrolimus  3. Actinic Keratosis   - s/p Cryotherapy   4. Lipoma  5. 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   - We reviewed with the patient that due to the immunosuppressive medications used following transplant, solid organ transplant recipients are at a roughly 65-fold increased risk of squamous cell carcinoma, 20-fold increased risk of basal cell carcinoma, and 3.4 fold increased risk of melanoma compared to the general population.   - Based on the patient's risk factors and the Skin and Ultraviolet Neoplasia Transplant Risk Assessment Tool, the patient's risk of skin cancer following transplant is categorized as:  - Medium Risk: 5-Year Risk of 6.15% and 10-Year Risk of 13.73%  - We briefly reviewed prevention methods for reducing skin cancer after transplantation including avoidance of sun exposure, avoidance of indoor tanning beds or other indoor tanning devices, use of protective clothing (e.g. wide-brimmed hats, long sleeves, long pants), SPF 30 or greater sunscreen, and UV-protective sunglasses when outdoors.   - We discussed self skin examinations and partner-skin examinations.      # History of NMSC  SCC of upper left arm s/p MMS. No evidence of recurrence on exam today  - NTD: No further management at this time.     # Tinea pedis, R foot  Patient reports mild improvement after 4 days using a topical OTC anti-fungal. Additionally, KOH today is positive. Will prescribe a topical anti-fungal.    - KOH today on R heel to check for fungus, see procedure note below   - Start applying terbinafine 1% cream to the affected areas  BID for 2-4 week. Then transition to 1 weekend per month for maintenance.     # Inflamed Seborrheic (verrucous) Keratoses, anterior neck  - Cryotherapy today, see procedure note below    # Benign lesions: Multiple benign nevi, solar lentigos, seborrheic keratoses, cherry angiomas. Explained to patient benign nature of lesion. No treatment is necessary at this time unless the lesion changes or becomes symptomatic.   - ABCDs of melanoma were discussed and self skin checks were advised.  - Sun precaution was advised including the use of sun screens of SPF 30 or higher, sun protective clothing, and avoidance of tanning beds    Procedures Performed:   - Cryotherapy procedure note, location(s): anterior neck. After verbal consent and discussion of risks and benefits including, but not limited to, dyspigmentation/scar, blister, and pain, 1 lesion(s) was(were) treated with 1-2 mm freeze border for 1-2 cycles with liquid nitrogen. Post cryotherapy instructions were provided.    - KOH prep was obtained and interpreted as positive.    Follow-up: 1 year(s) in-person, or earlier for new or changing lesions    Staff and Scribe:     Scribe Disclosure:  I, ASHOK GLASS, am serving as a scribe to document services personally performed by Rashad Hernandez MD based on data collection and the provider's statements to me.     Provider Disclosure:   The documentation recorded by the scribe accurately reflects the services I personally performed and the decisions made by me.    Rashad Hernandez MD    Department of Dermatology  [facility]: Phone: [phone], Fax:[phone]  [facility]: Phone: [phone] Fax: [phone]  ____________________________________________    CC: Skin Check (Charito is here for a skin check and has spots of concern on her R forearm and face. )    HPI:  Ms. Charito Wilcox is a(n) 41  year old female who presents today as a return patient for [facility]. Patient was last seen in dermatology by myself on 1/19/22 at which point we informed the patient about her increased risk of skin cancer following her liver transplant. We treated actinic keratosis on her R cheek and nose with cryotherapy.      Patient reports new raised lesions on the arms but they do not bother her. Patient also had concerns about brown spot in crease of eye. She also has concerns about athletes foot on her heel and has been treating it for four days with an antifungal. She has been diligent about wearing sunscreen. Patient is otherwise feeling well, without additional skin concerns.    Labs Reviewed:  N/A    Physical Exam:  Vitals: There were no vitals taken for this visit.  SKIN: Full skin, which includes the head/face, both arms, chest, back, abdomen,both legs, genitalia and/or groin buttocks, digits and/or nails, was examined.  - Wearing nail polish on toenails.   - 3 mm well demarcated verrucous appearing papule on the anterior neck.   - R foot with light pink scaly thin plaques in moccasin distribution.   - There are dome shaped bright red papules on the trunk and extremities.   -Multiple regular brown pigmented macules and papules are identified on the trunk and extremities .   -Scattered brown macules on sun exposed areas.  -There are waxy stuck on tan to brown papules on the trunk and extremities.   - No other lesions of concern on areas examined.     Medications:  Current Outpatient Medications   Medication     ALPRAZolam (XANAX) 0.5 MG tablet     azaTHIOprine (IMURAN) 50 MG tablet     lactobacillus rhamnosus, GG, (CULTURELL) capsule     Magnesium Gluconate 550 MG TABS     Multiple Vitamin (ONE-A-DAY ESSENTIAL) TABS     omeprazole (PRILOSEC) 40 MG DR capsule     PARoxetine (PAXIL) 40 MG tablet     polyethylene glycol (MIRALAX) 17 GM/Dose powder     tacrolimus (GENERIC EQUIVALENT) 1 MG capsule     ursodiol (ACTIGALL) 300 MG  capsule     betamethasone dipropionate (DIPROSONE) 0.05 % external cream     fluconazole (DIFLUCAN) 150 MG tablet     ondansetron (ZOFRAN-ODT) 4 MG ODT tab     No current facility-administered medications for this visit.      Past Medical History:   Patient Active Problem List   Diagnosis     Liver replaced by transplant (H)     Esophageal reflux     CARDIOVASCULAR SCREENING; LDL GOAL LESS THAN 160     Panic disorder     IBS (irritable bowel syndrome)     Immunosuppressed status (H)     PMS (premenstrual syndrome)     Localized superficial swelling, mass, or lump     Abdominal pain     Anxiety     Nausea     Squamous cell carcinoma, arm, left     Past Medical History:   Diagnosis Date     Acute pancreatitis 9/9/2016     Anxiety     anxiety over vomiting, nausea is main trigger     Esophageal reflux      Gastrointestinal ulcer      Irritable bowel syndrome      Liver replaced by transplant (H) 2000    liver failure of unknown etiology     Papanicolaou smear of cervix with atypical squamous cells of undetermined significance (ASC-US)     negative HPV     Squamous cell carcinoma, arm, left 6/29/2021        CC Nubia Peter, DO  [address] [place] [address] on close of this encounter.

## 2022-07-20 NOTE — PATIENT INSTRUCTIONS
Cryotherapy    What is it?  Use of a very cold liquid, such as liquid nitrogen, to freeze and destroy abnormal skin cells that need to be removed    What should I expect?  Tenderness and redness  A small blister that might grow and fill with dark purple blood. There may be crusting.  More than one treatment may be needed if the lesions do not go away.    How do I care for the treated area?  Gently wash the area with your hands when bathing.  Use a thin layer of Vaseline to help with healing. You may use a Band-Aid.   The area should heal within 7-10 days and may leave behind a pink or lighter color.   Do not use an antibiotic or Neosporin ointment.   You may take acetaminophen (Tylenol) for pain.     Call your doctor if you have:  Severe pain  Signs of infection (warmth, redness, cloudy yellow drainage, and or a bad smell)  Questions or concerns    Who should I call with questions?      [facility]: [phone]      [facility]: [phone]      For urgent needs outside of business hours call the [facility] at [phone] and ask for the dermatology resident on call   Patient Education     Checking for Skin Cancer  You can find cancer early by checking your skin each month. There are 3 kinds of skin cancer. They are melanoma, basal cell carcinoma, and squamous cell carcinoma. Doing monthly skin checks is the best way to find new marks or skin changes. Follow the instructions below for checking your skin.   The ABCDEs of checking moles for melanoma   Check your moles or growths for signs of melanoma using ABCDE:   Asymmetry: the sides of the mole or growth don t match  Border: the edges are ragged, notched, or blurred  Color: the color within the mole or growth varies  Diameter: the mole or growth is larger than 6 mm (size of a pencil eraser)  Evolving: the size, shape, or color of the mole or growth is changing (evolving is not shown in the  images below)    Checking for other types of skin cancer  Basal cell carcinoma or squamous cell carcinoma have symptoms such as:     A spot or mole that looks different from all other marks on your skin  Changes in how an area feels, such as itching, tenderness, or pain  Changes in the skin's surface, such as oozing, bleeding, or scaliness  A sore that does not heal  New swelling or redness beyond the border of a mole    Who s at risk?  Anyone can get skin cancer. But you are at greater risk if you have:   Fair skin, light-colored hair, or light-colored eyes  Many moles or abnormal moles on your skin  A history of sunburns from sunlight or tanning beds  A family history of skin cancer  A history of exposure to radiation or chemicals  A weakened immune system  If you have had skin cancer in the past, you are at risk for recurring skin cancer.   How to check your skin  Do your monthly skin checkups in front of a full-length mirror. Check all parts of your body, including your:   Head (ears, face, neck, and scalp)  Torso (front, back, and sides)  Arms (tops, undersides, upper, and lower armpits)  Hands (palms, backs, and fingers, including under the nails)  Buttocks and genitals  Legs (front, back, and sides)  Feet (tops, soles, toes, including under the nails, and between toes)  If you have a lot of moles, take digital photos of them each month. Make sure to take photos both up close and from a distance. These can help you see if any moles change over time.   Most skin changes are not cancer. But if you see any changes in your skin, call your doctor right away. Only he or she can diagnose a problem. If you have skin cancer, seeing your doctor can be the first step toward getting the treatment that could save your life.   [employer] last reviewed this educational content on 4/1/2019 2000-2020 [employer] [address] [place] [address], [place] [address]. All rights reserved. This information is not intended as  a substitute for professional medical care. Always follow your healthcare professional's instructions.       When should I call my doctor?  If you are worsening or not improving, please, contact us or seek urgent care as noted below.     Who should I call with questions (adults)?  [facility] (adult and pediatric): [phone]  [facility] (adult): [phone]  For urgent needs outside of business hours call the [facility] at [phone] and ask for the dermatology resident on call to be paged  If this is a medical emergency and you are unable to reach an ER, Call [phone]    Who should I call with questions (pediatric)?  [facility]- Pediatric Dermatology  Dr. Marion Rojas, Dr. Oralia Rios, Dr. Drea Muir, RANDY Sarmiento, Dr. Kaelyn Smith, Dr. Mindi C[place]ton & Dr. Td Lamar  Non-urgent nurse triage line; [phone]- Latasha and Neelima ROJAS Care Coordinators   Elidia (/Complex ) [phone]    If you need a prescription refill, please contact your pharmacy. Refills are approved or denied by our Physicians during normal business hours, Monday through Fridays  Per office policy, refills will not be granted if you have not been seen within the past year (or sooner depending on your child's condition)    Scheduling Information:  Pediatric Appointment Scheduling and Call Center [phone]  Radiology Scheduling- [phone]  Sedation Unit Scheduling- [phone]  [place] Scheduling- General [phone]; Pediatric Dermatology [phone]  Main  Services: [phone]  Slovenian: [phone]  Algerian: [phone]  Hmong/Nigerien/Polish: [phone]  Preadmission Nursing Department Fax Number: [phone] (Fax all pre-operative paperwork to this number)    For urgent matters arising during evenings, weekends, or holidays that cannot wait for normal business hours please  call [phone] and ask for the dermatology resident on call to be paged.

## 2022-07-20 NOTE — NURSING NOTE
Dermatology Rooming Note    Charito Mercedes Brenden's goals for this visit include:   Chief Complaint   Patient presents with     Skin Check     Charito is here for a skin check and has spots of concern on her R forearm and face.      William Richards, EMT

## 2022-08-23 ENCOUNTER — CARE COORDINATION (OUTPATIENT)
Dept: TRANSPLANT | Facility: CLINIC | Age: 42
End: 2022-08-23

## 2022-08-23 DIAGNOSIS — Z94.4 LIVER REPLACED BY TRANSPLANT (H): Primary | ICD-10-CM

## 2022-08-23 NOTE — PROGRESS NOTES
Annual chart review completed and Charito is due for the followin. Hepatology visit with .  2. Urinalysis, urine protein and fasting lipid profile.    Appt request sent for hepatology visit and Charito notified of the above via [employer] message.

## 2022-10-21 PROBLEM — B35.3 TINEA PEDIS: Status: ACTIVE | Noted: 2022-07-20

## 2022-10-21 PROBLEM — U07.1 CLINICAL DIAGNOSIS OF COVID-19: Status: ACTIVE | Noted: 2021-12-26

## 2022-10-22 ENCOUNTER — HEALTH MAINTENANCE LETTER (OUTPATIENT)
Age: 42
End: 2022-10-22

## 2023-01-20 ENCOUNTER — OFFICE VISIT (OUTPATIENT)
Dept: GASTROENTEROLOGY | Facility: CLINIC | Age: 43
End: 2023-01-20
Attending: INTERNAL MEDICINE
Payer: COMMERCIAL

## 2023-01-20 VITALS
TEMPERATURE: 98 F | BODY MASS INDEX: 22.11 KG/M2 | SYSTOLIC BLOOD PRESSURE: 110 MMHG | OXYGEN SATURATION: 100 % | WEIGHT: 124.8 LBS | DIASTOLIC BLOOD PRESSURE: 76 MMHG | HEART RATE: 80 BPM

## 2023-01-20 DIAGNOSIS — Z94.4 LIVER REPLACED BY TRANSPLANT (H): Primary | ICD-10-CM

## 2023-01-20 PROCEDURE — 99214 OFFICE O/P EST MOD 30 MIN: CPT | Mod: GC | Performed by: INTERNAL MEDICINE

## 2023-01-20 PROCEDURE — 99214 OFFICE O/P EST MOD 30 MIN: CPT | Performed by: INTERNAL MEDICINE

## 2023-01-20 RX ORDER — URSODIOL 300 MG/1
CAPSULE ORAL
Qty: 270 CAPSULE | Refills: 3 | Status: SHIPPED | OUTPATIENT
Start: 2023-01-20

## 2023-01-20 ASSESSMENT — PAIN SCALES - GENERAL: PAINLEVEL: NO PAIN (0)

## 2023-01-20 NOTE — PROGRESS NOTES
HISTORY OF PRESENT ILLNESS:  I had the pleasure of seeing Charito Wilcox for followup in the Liver Transplant Clinic at the [facility] on 1/20/23.  Ms. 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    Patient has been doing well. Is taking tacrolimus 1 mg BID, and azathioprine 50 mg daily without issue. Has generalized itching without rash since she gave birth 12 years ago. Has been on ursodiol which helped for a few years then having itching again. Taking antihistamine will resolve the itching. Some certain food including tomato would worsening the itching.    She still have some ongoing aches of right shoulders and RUQ abdomen. Has been on physical therapy which has been helping. She is on antireflux medication, omeprazole 40 mg BID, as well as MiraLax BID for constipation.    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     Regarding health maintenance, has been following with dermatology every 6 months. Had a skin cancer at left shoulder that was removed. Had colonoscopy in 2018 for constipation which was normal. Is updated on COVID vaccine with booster. Did not have flu shot last year.    She denies any fevers or chills, cough or shortness of breath.  She is fully vaccinated against COVID-19.  She denies any nausea or vomiting.  Her bowel habits are somewhat variable.  Her appetite has been good and her weight has been stable.  There otherwise have been no other new events since she was last seen. Now lives at the [facility].      Current Outpatient Medications   Medication     ALPRAZolam (XANAX) 0.5 MG tablet     azaTHIOprine (IMURAN) 50 MG tablet     lactobacillus rhamnosus, GG, (CULTURELL) capsule     Magnesium Gluconate 550 MG TABS     Multiple Vitamin (ONE-A-DAY ESSENTIAL) TABS     omeprazole (PRILOSEC) 40 MG DR capsule     PARoxetine (PAXIL)  40 MG tablet     polyethylene glycol (MIRALAX) 17 GM/Dose powder     tacrolimus (GENERIC EQUIVALENT) 1 MG capsule     UNABLE TO FIND     ursodiol (ACTIGALL) 300 MG capsule     betamethasone dipropionate (DIPROSONE) 0.05 % external cream     fluconazole (DIFLUCAN) 150 MG tablet     ondansetron (ZOFRAN-ODT) 4 MG ODT tab     terbinafine (LAMISIL) 1 % external cream     No current facility-administered medications for this visit.     /76   Pulse 80   Temp 98  F (36.7  C)   Wt 56.6 kg (124 lb 12.8 oz)   SpO2 100%   BMI 22.11 kg/m      PHYSICAL EXAMINATION:  In general, she looks well.  HEENT exam shows no scleral icterus or temporal muscle wasting.  Chest is clear.  Abdominal exam shows the incision is intact.  No masses or tenderness to palpation are present.  Her liver is 10 cm in span without left lobe enlargement.  No spleen tip is palpable.  Extremity exam shows no edema.  Skin exam shows no suspicious lesions.  Neurologic exam is without tremor.     No results found for this or any previous visit (from the past 168 hour(s)).   IMPRESSION:  Ms. Wilcox is 23 years status post liver transplantation for fulminant hepatic failure.  She is doing quite well at this point in time.  She is on low-dose tacrolimus and azathioprine for her immunosuppression.  Her liver tests are completely normal and her kidney function is excellent as well. Does not think the itching is related to liver disease given normal liver enzymes. However, will trial increase her ursodiol to 900 mg/day for itching. Advised to patient that memory problem is very common post transplant, is not progressive, and is most likely related to long duration of anesthesia and surgery. Advise having assistance with note/letitia.     Plan  - Increase ursodiol to 900 mg daily (15mg/kg)  - Continue tacrolimus 1 mg BID and azathioprine 500 mg daily  - Continue dermatology appointment to follow up on her history of skin cancer    Return to clinic in 1 year.       Thank you very much for allowing me to participate in the care of this patient.  If you have any questions regarding my recommendations, please do not hesitate to contact me.      Patient is seen and discussed with Dr. Maria.    Chinyere Lynn MD  Gastroenterology/Hepatology Fellow

## 2023-01-20 NOTE — LETTER
1/20/2023         RE: Charito Wilcox  [id number]  [facility] [address]        Dear Colleague,    Thank you for referring your patient, Charito Wilcox, to the [facility] HEPATOLOGY CLINIC [place]. Please see a copy of my visit note below.    HISTORY OF PRESENT ILLNESS:  I had the pleasure of seeing Charito Wilcox for followup in the Liver Transplant Clinic at the [facility] on 1/20/23.  Ms. 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    Patient has been doing well. Is taking tacrolimus 1 mg BID, and azathioprine 50 mg daily without issue. Has generalized itching without rash since she gave birth 12 years ago. Has been on ursodiol which helped for a few years then having itching again. Taking antihistamine will resolve the itching. Some certain food including tomato would worsening the itching.    She still have some ongoing aches of right shoulders and RUQ abdomen. Has been on physical therapy which has been helping. She is on antireflux medication, omeprazole 40 mg BID, as well as MiraLax BID for constipation.    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     Regarding health maintenance, has been following with dermatology every 6 months. Had a skin cancer at left shoulder that was removed. Had colonoscopy in 2018 for constipation which was normal. Is updated on COVID vaccine with booster. Did not have flu shot last year.    She denies any fevers or chills, cough or shortness of breath.  She is fully vaccinated against COVID-19.  She denies any nausea or vomiting.  Her bowel habits are somewhat variable.  Her appetite has been good and her weight has been stable.  There otherwise have been no other new events since she was last seen. Now lives at the [facility].      Current Outpatient Medications   Medication     ALPRAZolam  (XANAX) 0.5 MG tablet     azaTHIOprine (IMURAN) 50 MG tablet     lactobacillus rhamnosus, GG, (CULTURELL) capsule     Magnesium Gluconate 550 MG TABS     Multiple Vitamin (ONE-A-DAY ESSENTIAL) TABS     omeprazole (PRILOSEC) 40 MG DR capsule     PARoxetine (PAXIL) 40 MG tablet     polyethylene glycol (MIRALAX) 17 GM/Dose powder     tacrolimus (GENERIC EQUIVALENT) 1 MG capsule     UNABLE TO FIND     ursodiol (ACTIGALL) 300 MG capsule     betamethasone dipropionate (DIPROSONE) 0.05 % external cream     fluconazole (DIFLUCAN) 150 MG tablet     ondansetron (ZOFRAN-ODT) 4 MG ODT tab     terbinafine (LAMISIL) 1 % external cream     No current facility-administered medications for this visit.     /76   Pulse 80   Temp 98  F (36.7  C)   Wt 56.6 kg (124 lb 12.8 oz)   SpO2 100%   BMI 22.11 kg/m      PHYSICAL EXAMINATION:  In general, she looks well.  HEENT exam shows no scleral icterus or temporal muscle wasting.  Chest is clear.  Abdominal exam shows the incision is intact.  No masses or tenderness to palpation are present.  Her liver is 10 cm in span without left lobe enlargement.  No spleen tip is palpable.  Extremity exam shows no edema.  Skin exam shows no suspicious lesions.  Neurologic exam is without tremor.     No results found for this or any previous visit (from the past 168 hour(s)).   IMPRESSION:  Ms. Wilcox is 23 years status post liver transplantation for fulminant hepatic failure.  She is doing quite well at this point in time.  She is on low-dose tacrolimus and azathioprine for her immunosuppression.  Her liver tests are completely normal and her kidney function is excellent as well. Does not think the itching is related to liver disease given normal liver enzymes. However, will trial increase her ursodiol to 900 mg/day for itching. Advised to patient that memory problem is very common post transplant, is not progressive, and is most likely related to long duration of anesthesia and surgery. Advise  having assistance with note/letitia.     Plan  - Increase ursodiol to 900 mg daily (15mg/kg)  - Continue tacrolimus 1 mg BID and azathioprine 500 mg daily  - Continue dermatology appointment to follow up on her history of skin cancer    Return to clinic in 1 year.      Thank you very much for allowing me to participate in the care of this patient.  If you have any questions regarding my recommendations, please do not hesitate to contact me.      Patient is seen and discussed with Dr. Maria.    Chinyere Lynn MD  Gastroenterology/Hepatology Fellow        Attestation signed by Roldan Maria MD at 1/21/2023  7:53 PM:  The patient was seen and examined.  The above assessment and plan was developed jointly with the fellow.      I did spend total of 30 minutes (on the date of the encounter), including 20 minutes of face-to-face clinic time including counseling.  The rest of the time was spent in documentation and review of records.     Thank you very much for allowing me to participate in the care of this patient.  If you have any questions regarding recommendations, please do not hesitate to contact me.         Roldan Maria MD      Professor of Medicine  [employer]      Executive Medical Director, Solid Organ Transplant Program  [facility]        Again, thank you for allowing me to participate in the care of your patient.        Sincerely,        Roldan Maria MD

## 2023-01-20 NOTE — NURSING NOTE
Chief Complaint   Patient presents with     RECHECK     Return visit.     Blood pressure 110/76, pulse 80, temperature 98  F (36.7  C), weight 56.6 kg (124 lb 12.8 oz), SpO2 100 %, not currently breastfeeding.    RYAN ESPINOSA

## 2023-02-17 ENCOUNTER — TELEPHONE (OUTPATIENT)
Dept: TRANSPLANT | Facility: CLINIC | Age: 43
End: 2023-02-17
Payer: COMMERCIAL

## 2023-02-17 NOTE — TELEPHONE ENCOUNTER
Returned Dr. Uriarte's call.     Charito tested (+) for COVID and he is prescribing Paxlovid, start date 2/18/2023. I informed him that Charito will need to stop Tacrolimus and hold for the 5 days of Paxlovid, she should get a TAC level on day 6 and not restart her Tacrolimus until the level has been reviewed by me and instructed to restart. Dr. Uriarte verbalized understanding and will relay information to Charito. I'll check in with her as well, next week to verify she has lab appt.     Verified no interaction with Imuran.

## 2023-07-10 ENCOUNTER — TELEPHONE (OUTPATIENT)
Dept: GASTROENTEROLOGY | Facility: CLINIC | Age: 43
End: 2023-07-10
Payer: COMMERCIAL

## 2023-07-14 ENCOUNTER — TELEPHONE (OUTPATIENT)
Dept: GASTROENTEROLOGY | Facility: CLINIC | Age: 43
End: 2023-07-14
Payer: COMMERCIAL

## 2023-07-19 ENCOUNTER — OFFICE VISIT (OUTPATIENT)
Dept: DERMATOLOGY | Facility: CLINIC | Age: 43
End: 2023-07-19
Payer: COMMERCIAL

## 2023-07-19 DIAGNOSIS — L82.1 SEBORRHEIC KERATOSIS: ICD-10-CM

## 2023-07-19 DIAGNOSIS — L57.0 ACTINIC KERATOSIS: Primary | ICD-10-CM

## 2023-07-19 DIAGNOSIS — Z85.828 HISTORY OF NONMELANOMA SKIN CANCER: ICD-10-CM

## 2023-07-19 DIAGNOSIS — Z94.4 LIVER REPLACED BY TRANSPLANT (H): ICD-10-CM

## 2023-07-19 DIAGNOSIS — L81.4 SOLAR LENTIGO: ICD-10-CM

## 2023-07-19 DIAGNOSIS — D18.01 CHERRY ANGIOMA: ICD-10-CM

## 2023-07-19 DIAGNOSIS — L82.0 INFLAMED SEBORRHEIC KERATOSIS: ICD-10-CM

## 2023-07-19 DIAGNOSIS — D22.9 MULTIPLE BENIGN NEVI: ICD-10-CM

## 2023-07-19 PROCEDURE — 99213 OFFICE O/P EST LOW 20 MIN: CPT | Mod: 25 | Performed by: DERMATOLOGY

## 2023-07-19 PROCEDURE — 17110 DESTRUCTION B9 LES UP TO 14: CPT | Performed by: DERMATOLOGY

## 2023-07-19 PROCEDURE — 17003 DESTRUCT PREMALG LES 2-14: CPT | Mod: XS | Performed by: DERMATOLOGY

## 2023-07-19 PROCEDURE — 17000 DESTRUCT PREMALG LESION: CPT | Mod: XS | Performed by: DERMATOLOGY

## 2023-07-19 ASSESSMENT — PAIN SCALES - GENERAL: PAINLEVEL: NO PAIN (0)

## 2023-07-19 NOTE — LETTER
7/19/2023       RE: Charito Wilcox  [address] [facility] [address]     Dear Colleague,    Thank you for referring your patient, Charito Wilcox, to the [facility] DERMATOLOGY CLINIC [place] at [facility]. Please see a copy of my visit note below.    [facility] Dermatology Note  Encounter Date: Jul 19, 2023  Office Visit     Dermatology Problem List:  1. 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 Hx of liver transplant  - azathioprine/tacrolimus  3. Actinic Keratosis   - s/p Cryotherapy   4. Lipoma  5. 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   - We reviewed with the patient that due to the immunosuppressive medications used following transplant, solid organ transplant recipients are at a roughly 65-fold increased risk of squamous cell carcinoma, 20-fold increased risk of basal cell carcinoma, and 3.4 fold increased risk of melanoma compared to the general population.   - Based on the patient's risk factors and the Skin and Ultraviolet Neoplasia Transplant Risk Assessment Tool, the patient's risk of skin cancer following transplant is categorized as:  - Medium Risk: 5-Year Risk of 6.15% and 10-Year Risk of 13.73%  - We briefly reviewed prevention methods for reducing skin cancer after transplantation including avoidance of sun exposure, avoidance of indoor tanning beds or other indoor tanning devices, use of protective clothing (e.g. wide-brimmed hats, long sleeves, long pants), SPF 30 or greater sunscreen, and UV-protective sunglasses when outdoors.   - We discussed self skin examinations and partner-skin examinations.     # Actinic keratosis.  - Cryotherapy as below    # Inflamed/irritated seborrheic keratosis. Symptomatic.   - Cryotherapy as  below    # Hx NMSC. No evidence of clinical recurrence.   - Sun precaution was advised including the use of sun screens of SPF 30 or higher, sun protective clothing, and avoidance of tanning beds.  - Continue regular skin examinations.    #  Benign lesions: Multiple benign nevi, solar lentigos, seborrheic keratoses, cherry angiomas. Explained to patient benign nature of lesion. No treatment is necessary at this time unless the lesion changes or becomes symptomatic.   - ABCDs of melanoma were discussed and self skin checks were advised.  - Sun precaution was advised including the use of sun screens of SPF 30 or higher, sun protective clothing, and avoidance of tanning beds.    Procedures Performed:   - Cryotherapy procedure note, location(s): back, face. After verbal consent and discussion of risks and benefits including, but not limited to, dyspigmentation/scar, blister, and pain, 2 AKs and 1 inflamed SK lesion(s) was(were) treated with 1-2 mm freeze border for 1-2 cycles with liquid nitrogen. Post cryotherapy instructions were provided.    Follow-up: 1 year(s) in-person, or earlier for new or changing lesions    Staff:     Rashad Hernandez MD  Pronouns: he/him/his    Department of Dermatology  [facility]: Phone: [phone], Fax:[phone]  [facility]: Phone: [phone] Fax: [phone]  ____________________________________________    CC: Skin Check (Area of concern includes back)    HPI:  Ms. Charito Wilcox is a(n) 42 year old female who presents today as a return patient for FBSE.     Today, the patient reports the following areas of concern includin. Back- reports an itchy rough spot on the back along bra-line. Did discuss with PCP who felt c/w SK but did not have time at appointment for cryotherapy. Would like to discuss treatment options for removal.     The patient  otherwise denies any new or concerning lesions. No bleeding, painful, pruritic, or changing lesions. Health otherwise stable. No other skin concerns.       Patient is otherwise feeling well, without additional skin concerns.     Labs Reviewed:  N/A    Physical Exam:  Vitals: There were no vitals taken for this visit.  SKIN: Full skin, which includes the head/face, both arms, chest, back, abdomen,both legs, genitalia and/or groin buttocks, digits and/or nails, was examined.  - Brown macules on sun exposed areas  - Brown waxy, stuck-on appearing papules on face, trunk, and extremities, including excoriated lesion on the left back.   - Brown macules and papules on trunk and extremities without concerning dermoscopy features  - Red vascular papules on face, trunk and extremities.   - Well healed scars at prior skin cancer surgical sites without evidence of recurrence.   - Pink gritty papules on the left nasal tip and the right upper cutaneous lip near vermillion border.   - No other lesions of concern on areas examined.     Medications:  Current Outpatient Medications   Medication    ALPRAZolam (XANAX) 0.5 MG tablet    azaTHIOprine (IMURAN) 50 MG tablet    betamethasone dipropionate (DIPROSONE) 0.05 % external cream    fluconazole (DIFLUCAN) 150 MG tablet    lactobacillus rhamnosus, GG, (CULTURELL) capsule    Magnesium Gluconate 550 MG TABS    Multiple Vitamin (ONE-A-DAY ESSENTIAL) TABS    omeprazole (PRILOSEC) 40 MG DR capsule    ondansetron (ZOFRAN-ODT) 4 MG ODT tab    PARoxetine (PAXIL) 40 MG tablet    polyethylene glycol (MIRALAX) 17 GM/Dose powder    tacrolimus (GENERIC EQUIVALENT) 1 MG capsule    terbinafine (LAMISIL) 1 % external cream    UNABLE TO FIND    ursodiol (ACTIGALL) 300 MG capsule     No current facility-administered medications for this visit.      Past Medical History:   Patient Active Problem List   Diagnosis    Liver replaced by transplant (H)    Esophageal reflux    CARDIOVASCULAR SCREENING; LDL  GOAL LESS THAN 160    Panic disorder    IBS (irritable bowel syndrome)    Immunosuppressed status (H)    PMS (premenstrual syndrome)    Localized superficial swelling, mass, or lump    Abdominal pain    Anxiety    Nausea    Squamous cell carcinoma, arm, left    Clinical diagnosis of COVID-19    Tinea pedis     Past Medical History:   Diagnosis Date    Acute pancreatitis 9/9/2016    Anxiety     anxiety over vomiting, nausea is main trigger    Clinical diagnosis of COVID-19 12/26/2021    Esophageal reflux     Gastrointestinal ulcer     Irritable bowel syndrome     Liver replaced by transplant (H) 2000    liver failure of unknown etiology    Papanicolaou smear of cervix with atypical squamous cells of undetermined significance (ASC-US)     negative HPV    Squamous cell carcinoma, arm, left 6/29/2021    Tinea pedis 7/20/2022    Tinea pedis RIght foot       CC Nubia Peter MD  [address] [address] on close of this encounter.

## 2023-07-19 NOTE — PROGRESS NOTES
[facility] Dermatology Note  Encounter Date: Jul 19, 2023  Office Visit     Dermatology Problem List:  1. 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 Hx of liver transplant  - azathioprine/tacrolimus  3. Actinic Keratosis   - s/p Cryotherapy   4. Lipoma  5. 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   - We reviewed with the patient that due to the immunosuppressive medications used following transplant, solid organ transplant recipients are at a roughly 65-fold increased risk of squamous cell carcinoma, 20-fold increased risk of basal cell carcinoma, and 3.4 fold increased risk of melanoma compared to the general population.   - Based on the patient's risk factors and the Skin and Ultraviolet Neoplasia Transplant Risk Assessment Tool, the patient's risk of skin cancer following transplant is categorized as:  - Medium Risk: 5-Year Risk of 6.15% and 10-Year Risk of 13.73%  - We briefly reviewed prevention methods for reducing skin cancer after transplantation including avoidance of sun exposure, avoidance of indoor tanning beds or other indoor tanning devices, use of protective clothing (e.g. wide-brimmed hats, long sleeves, long pants), SPF 30 or greater sunscreen, and UV-protective sunglasses when outdoors.   - We discussed self skin examinations and partner-skin examinations.     # Actinic keratosis.  - Cryotherapy as below    # Inflamed/irritated seborrheic keratosis. Symptomatic.   - Cryotherapy as below    # Hx NMSC. No evidence of clinical recurrence.   - Sun precaution was advised including the use of sun screens of SPF 30 or higher, sun protective clothing, and avoidance of tanning beds.  - Continue regular skin examinations.    #  Benign lesions: Multiple benign nevi, solar lentigos, seborrheic keratoses,  cherry angiomas. Explained to patient benign nature of lesion. No treatment is necessary at this time unless the lesion changes or becomes symptomatic.   - ABCDs of melanoma were discussed and self skin checks were advised.  - Sun precaution was advised including the use of sun screens of SPF 30 or higher, sun protective clothing, and avoidance of tanning beds.    Procedures Performed:   - Cryotherapy procedure note, location(s): back, face. After verbal consent and discussion of risks and benefits including, but not limited to, dyspigmentation/scar, blister, and pain, 2 AKs and 1 inflamed SK lesion(s) was(were) treated with 1-2 mm freeze border for 1-2 cycles with liquid nitrogen. Post cryotherapy instructions were provided.    Follow-up: 1 year(s) in-person, or earlier for new or changing lesions    Staff:     Rashad Hernandez MD  Pronouns: he/him/his    Department of Dermatology  [facility]: Phone: [phone], Fax:[phone]  [facility]: Phone: [phone] Fax: [phone]  ____________________________________________    CC: Skin Check (Area of concern includes back)    HPI:  Ms. Charito Wilcox is a(n) 42 year old female who presents today as a return patient for FBSE.     Today, the patient reports the following areas of concern includin. Back- reports an itchy rough spot on the back along bra-line. Did discuss with PCP who felt c/w SK but did not have time at appointment for cryotherapy. Would like to discuss treatment options for removal.     The patient otherwise denies any new or concerning lesions. No bleeding, painful, pruritic, or changing lesions. Health otherwise stable. No other skin concerns.       Patient is otherwise feeling well, without additional skin concerns.     Labs Reviewed:  N/A    Physical Exam:  Vitals: There were no vitals taken for this  visit.  SKIN: Full skin, which includes the head/face, both arms, chest, back, abdomen,both legs, genitalia and/or groin buttocks, digits and/or nails, was examined.  - Brown macules on sun exposed areas  - Brown waxy, stuck-on appearing papules on face, trunk, and extremities, including excoriated lesion on the left back.   - Brown macules and papules on trunk and extremities without concerning dermoscopy features  - Red vascular papules on face, trunk and extremities.   - Well healed scars at prior skin cancer surgical sites without evidence of recurrence.   - Pink gritty papules on the left nasal tip and the right upper cutaneous lip near vermillion border.   - No other lesions of concern on areas examined.     Medications:  Current Outpatient Medications   Medication     ALPRAZolam (XANAX) 0.5 MG tablet     azaTHIOprine (IMURAN) 50 MG tablet     betamethasone dipropionate (DIPROSONE) 0.05 % external cream     fluconazole (DIFLUCAN) 150 MG tablet     lactobacillus rhamnosus, GG, (CULTURELL) capsule     Magnesium Gluconate 550 MG TABS     Multiple Vitamin (ONE-A-DAY ESSENTIAL) TABS     omeprazole (PRILOSEC) 40 MG DR capsule     ondansetron (ZOFRAN-ODT) 4 MG ODT tab     PARoxetine (PAXIL) 40 MG tablet     polyethylene glycol (MIRALAX) 17 GM/Dose powder     tacrolimus (GENERIC EQUIVALENT) 1 MG capsule     terbinafine (LAMISIL) 1 % external cream     UNABLE TO FIND     ursodiol (ACTIGALL) 300 MG capsule     No current facility-administered medications for this visit.      Past Medical History:   Patient Active Problem List   Diagnosis     Liver replaced by transplant (H)     Esophageal reflux     CARDIOVASCULAR SCREENING; LDL GOAL LESS THAN 160     Panic disorder     IBS (irritable bowel syndrome)     Immunosuppressed status (H)     PMS (premenstrual syndrome)     Localized superficial swelling, mass, or lump     Abdominal pain     Anxiety     Nausea     Squamous cell carcinoma, arm, left     Clinical diagnosis of  COVID-19     Tinea pedis     Past Medical History:   Diagnosis Date     Acute pancreatitis 9/9/2016     Anxiety     anxiety over vomiting, nausea is main trigger     Clinical diagnosis of COVID-19 12/26/2021     Esophageal reflux      Gastrointestinal ulcer      Irritable bowel syndrome      Liver replaced by transplant (H) 2000    liver failure of unknown etiology     Papanicolaou smear of cervix with atypical squamous cells of undetermined significance (ASC-US)     negative HPV     Squamous cell carcinoma, arm, left 6/29/2021     Tinea pedis 7/20/2022    Tinea pedis RIght foot       CC Nubia Peter MD  [address] [address] on close of this encounter.

## 2023-07-19 NOTE — NURSING NOTE
Chief Complaint   Patient presents with     Skin Check     Area of concern includes back     Fernando Parker, EMT

## 2023-09-19 DIAGNOSIS — Z94.4 LIVER REPLACED BY TRANSPLANT (H): Primary | ICD-10-CM

## 2023-11-05 ENCOUNTER — HEALTH MAINTENANCE LETTER (OUTPATIENT)
Age: 43
End: 2023-11-05

## 2023-11-13 NOTE — TELEPHONE ENCOUNTER
Called Charito to verify she went to the ED.  Left message for patient to return call to the RN hotline number at [phone], if there was anything we could do to help.  Lyndsay Segura RN    
Reason for call:  Patient reporting a symptom    Symptom or request: Patient had an IUD placed on 05/1/17 , today the patient is having some symptoms of severe burning,,   Patient would like to be advised of next steps    Duration (how long have symptoms been present): Since this morning    Have you been treated for this before? No    Additional comments: please call today    Phone Number patient can be reached at:  Home number on file [phone] (home)    Best Time:  today    Can we leave a detailed message on this number:  YES    Call taken on 5/2/2017 at 12:28 PM by Vernon Tariq  
Yes - the patient is able to be screened

## 2023-12-15 ENCOUNTER — TELEPHONE (OUTPATIENT)
Dept: GASTROENTEROLOGY | Facility: CLINIC | Age: 43
End: 2023-12-15
Payer: COMMERCIAL

## 2024-04-24 ENCOUNTER — TELEPHONE (OUTPATIENT)
Dept: GASTROENTEROLOGY | Facility: CLINIC | Age: 44
End: 2024-04-24
Payer: COMMERCIAL

## 2024-04-29 ENCOUNTER — VIRTUAL VISIT (OUTPATIENT)
Dept: GASTROENTEROLOGY | Facility: CLINIC | Age: 44
End: 2024-04-29
Attending: INTERNAL MEDICINE
Payer: COMMERCIAL

## 2024-04-29 DIAGNOSIS — Z94.4 LIVER REPLACED BY TRANSPLANT (H): Primary | ICD-10-CM

## 2024-04-29 PROCEDURE — 99215 OFFICE O/P EST HI 40 MIN: CPT | Mod: 95 | Performed by: INTERNAL MEDICINE

## 2024-04-29 ASSESSMENT — PAIN SCALES - GENERAL: PAINLEVEL: NO PAIN (0)

## 2024-04-29 NOTE — PROGRESS NOTES
Solid Organ Transplant Hepatology Follow-Up Visit:     HISTORY OF PRESENT ILLNESS:   I had the pleasure of seeing Charito Wilcox for followup in the Liver Clinic at the [facility] on April 29, 2024.  Ms. 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     For the most part, she is unchanged.  She does still have some ongoing aches and pains in a number of joints including her shoulders, her back and her hips.  She is trying to exercise more.  She also complains of some abdominal discomfort.  She is on antireflux medication as well as MiraLax and she still does have some abdominal discomfort about 1 week of the month. She reports her energy level is generally quite good.     She denies any fevers or chills, cough or shortness of breath.  She is fully vaccinated against COVID-19.  She denies any nausea or vomiting.  Her bowel habits are somewhat variable.  Her appetite has been good and her weight has been stable.  There otherwise have been no other new events since she was last seen.    Medications:   Current Outpatient Medications   Medication Sig Dispense Refill    ALPRAZolam (XANAX) 0.5 MG tablet Take 1 tablet (0.5 mg) by mouth 3 times daily as needed for anxiety 1 month supply 30 tablet 0    azaTHIOprine (IMURAN) 50 MG tablet Take 1 tablet (50 mg) by mouth daily 90 tablet 3    lactobacillus rhamnosus, GG, (CULTURELL) capsule Take 1 capsule by mouth daily      Multiple Vitamin (ONE-A-DAY ESSENTIAL) TABS Take 1 tablet by mouth daily      PARoxetine (PAXIL) 40 MG tablet Take 1.5 tablets (60 mg) by mouth At Bedtime 135 tablet 1    polyethylene glycol (MIRALAX) 17 GM/Dose powder Take 17 g (1 capful) by mouth daily 510 g 1    tacrolimus (GENERIC EQUIVALENT) 1 MG capsule Take 1 capsule (1 mg) by mouth every morning AND 2 capsules (2 mg) every evening. 270 capsule 3    ursodiol (ACTIGALL) 300 MG capsule Take 2 capsule PO in  the AM and 1 in the PM. 270 capsule 3    betamethasone dipropionate (DIPROSONE) 0.05 % external cream Apply topically 2 times daily (Patient not taking: Reported on 6/7/2021) 45 g 0    fluconazole (DIFLUCAN) 150 MG tablet Take 1 tablet (150 mg) by mouth daily (Patient not taking: Reported on 6/7/2021) 1 tablet 1    Magnesium Gluconate 550 MG TABS  (Patient not taking: Reported on 7/19/2023)      omeprazole (PRILOSEC) 40 MG DR capsule TAKE 1 CAPSULE BY MOUTH TWICE DAILY BEFORE MEALS, TAKE 30-60 MINUTES PRIOR TO A MEAL 60 capsule 11    ondansetron (ZOFRAN-ODT) 4 MG ODT tab Take 1 tablet (4 mg) by mouth every 8 hours as needed for nausea (Patient not taking: Reported on 1/20/2023) 30 tablet 1    terbinafine (LAMISIL) 1 % external cream Apply twice daily for 2-4 weeks as needed for rash on feet (Patient not taking: Reported on 1/20/2023) 42 g 11    UNABLE TO FIND MEDICATION NAME: Choline  supplement       No current facility-administered medications for this visit.        Vitals:   There were no vitals taken for this visit.    Physical Exam:   In general she looks quite well. HEENT exam shows no scleral icterus or temporal muscle wasting.  Extremity exam shows no edema. Skin exam shows no suspicious lesions. Neurologic exam is nonfocal.     Labs: Her most recent laboratory tests are from February 27 and showed her white count was 4.8, hemoglobin 12.9 and platelets were 229,000.  Her sodium is 137, potassium 4.5, BUN is 17 and creatinine is 0.68.  Her AST is 40, ALT is 20 and alkaline phosphatase is 179.  Her albumin is 3.7 and total protein is 6.7 and total bilirubin is 0.8 with a direct direct bilirubin is 0.4.    Imaging:   No images are attached to the encounter.     Assessment/Plan:   IMPRESSION:   Ms. Wilcox is almost 24 years status post liver transplantation for fulminant hepatic failure.  She really is doing well at this point in time.  She is on low-dose tacrolimus and azathioprine for her immunosuppression.   She asked about coming off of PPI and I did suggest he switch to Pepcid AC which she can take 1 twice a day.  Her kidney function is excellent.  Her liver tests have been elevated now on 2 consecutive occasions.  Her last ultrasound suggested there was some intra and extrahepatic bile duct dilation and I will get an MRCP to work that up.      I will not be making any other change to her medical regimen.  I will see her back in the clinic again in 1 year.  She is up-to-date with regard to vaccines and cancer screening.  She will ask her primary physician to vaccinate against hepatitis a and B which was not done because she was transplanted for acute liver failure.     I did spend a total of 40 minutes (on the date of the encounter), including 30 minutes of face-to-face clinic time including counseling. The rest of the time was spent in documentation and review of records     Thank you very much for allowing me to participate in the care of this patient.  If you have any questions regarding my recommendations, please do not hesitate to contact me.         Roldan Maria MD      Professor of Medicine  [employer] [facility] [employer]      Executive Medical Director, Solid Organ Transplant Program  [facility]    Virtual Visit Details    Type of service:  Video Visit     Start time: 3:25 PM    Stop time 3:50 PM    Originating Location (pt. Location): Home    Distant Location (provider location):  On-site  Platform used for Video Visit: Agustín

## 2024-04-29 NOTE — NURSING NOTE
Is the patient currently in the state of [place]? YES    Visit mode:VIDEO    If the visit is dropped, the patient can be reconnected by: VIDEO VISIT:  Send e-mail to at [email or web address]    Will anyone else be joining the visit? No  (If patient encounters technical issues they should call [phone])    How would you like to obtain your AVS? MyChart    Are changes needed to the allergy or medication list? Yes see med list, pt flagged meds for removal, also noted that tacrolimus dose is currently 1 mg BID. No taking one capsule in the morning and two capsules in the evening currently    Are refills needed on medications prescribed by this physician? NO    Rooming Documentation: Assigned questionnaire(s) completed .    Reason for visit: RECHECK     CHACORTA Cuellar

## 2024-04-29 NOTE — LETTER
4/29/2024         RE: Charito Wilcox  [address]  [place] [facility] [address]        Dear Colleague,    Thank you for referring your patient, Charito Wilcox, to the [facility] [place]. Please see a copy of my visit note below.    Solid Organ Transplant Hepatology Follow-Up Visit:     HISTORY OF PRESENT ILLNESS:   I had the pleasure of seeing Charito Wilcox for followup in the Liver Clinic at the [facility] on April 29, 2024.  Ms. 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     For the most part, she is unchanged.  She does still have some ongoing aches and pains in a number of joints including her shoulders, her back and her hips.  She is trying to exercise more.  She also complains of some abdominal discomfort.  She is on antireflux medication as well as MiraLax and she still does have some abdominal discomfort about 1 week of the month. She reports her energy level is generally quite good.     She denies any fevers or chills, cough or shortness of breath.  She is fully vaccinated against COVID-19.  She denies any nausea or vomiting.  Her bowel habits are somewhat variable.  Her appetite has been good and her weight has been stable.  There otherwise have been no other new events since she was last seen.    Medications:   Current Outpatient Medications   Medication Sig Dispense Refill     ALPRAZolam (XANAX) 0.5 MG tablet Take 1 tablet (0.5 mg) by mouth 3 times daily as needed for anxiety 1 month supply 30 tablet 0     azaTHIOprine (IMURAN) 50 MG tablet Take 1 tablet (50 mg) by mouth daily 90 tablet 3     lactobacillus rhamnosus, GG, (CULTURELL) capsule Take 1 capsule by mouth daily       Multiple Vitamin (ONE-A-DAY ESSENTIAL) TABS Take 1 tablet by mouth daily       PARoxetine (PAXIL) 40 MG tablet Take 1.5 tablets (60 mg) by mouth At Bedtime 135 tablet 1     polyethylene glycol  (MIRALAX) 17 GM/Dose powder Take 17 g (1 capful) by mouth daily 510 g 1     tacrolimus (GENERIC EQUIVALENT) 1 MG capsule Take 1 capsule (1 mg) by mouth every morning AND 2 capsules (2 mg) every evening. 270 capsule 3     ursodiol (ACTIGALL) 300 MG capsule Take 2 capsule PO in the AM and 1 in the PM. 270 capsule 3     betamethasone dipropionate (DIPROSONE) 0.05 % external cream Apply topically 2 times daily (Patient not taking: Reported on 6/7/2021) 45 g 0     fluconazole (DIFLUCAN) 150 MG tablet Take 1 tablet (150 mg) by mouth daily (Patient not taking: Reported on 6/7/2021) 1 tablet 1     Magnesium Gluconate 550 MG TABS  (Patient not taking: Reported on 7/19/2023)       omeprazole (PRILOSEC) 40 MG DR capsule TAKE 1 CAPSULE BY MOUTH TWICE DAILY BEFORE MEALS, TAKE 30-60 MINUTES PRIOR TO A MEAL 60 capsule 11     ondansetron (ZOFRAN-ODT) 4 MG ODT tab Take 1 tablet (4 mg) by mouth every 8 hours as needed for nausea (Patient not taking: Reported on 1/20/2023) 30 tablet 1     terbinafine (LAMISIL) 1 % external cream Apply twice daily for 2-4 weeks as needed for rash on feet (Patient not taking: Reported on 1/20/2023) 42 g 11     UNABLE TO FIND MEDICATION NAME: Choline  supplement       No current facility-administered medications for this visit.        Vitals:   There were no vitals taken for this visit.    Physical Exam:   In general she looks quite well. HEENT exam shows no scleral icterus or temporal muscle wasting.  Extremity exam shows no edema. Skin exam shows no suspicious lesions. Neurologic exam is nonfocal.     Labs: Her most recent laboratory tests are from February 27 and showed her white count was 4.8, hemoglobin 12.9 and platelets were 229,000.  Her sodium is 137, potassium 4.5, BUN is 17 and creatinine is 0.68.  Her AST is 40, ALT is 20 and alkaline phosphatase is 179.  Her albumin is 3.7 and total protein is 6.7 and total bilirubin is 0.8 with a direct direct bilirubin is 0.4.    Imaging:   No images are  attached to the encounter.     Assessment/Plan:   IMPRESSION:   Ms. Wilcox is almost 24 years status post liver transplantation for fulminant hepatic failure.  She really is doing well at this point in time.  She is on low-dose tacrolimus and azathioprine for her immunosuppression.  She asked about coming off of PPI and I did suggest he switch to Pepcid AC which she can take 1 twice a day.  Her kidney function is excellent.  Her liver tests have been elevated now on 2 consecutive occasions.  Her last ultrasound suggested there was some intra and extrahepatic bile duct dilation and I will get an MRCP to work that up.      I will not be making any other change to her medical regimen.  I will see her back in the clinic again in 1 year.  She is up-to-date with regard to vaccines and cancer screening.  She will ask her primary physician to vaccinate against hepatitis a and B which was not done because she was transplanted for acute liver failure.     I did spend a total of 40 minutes (on the date of the encounter), including 30 minutes of face-to-face clinic time including counseling. The rest of the time was spent in documentation and review of records     Thank you very much for allowing me to participate in the care of this patient.  If you have any questions regarding my recommendations, please do not hesitate to contact me.         Roldan Maria MD      Professor of Medicine  [employer]      Executive Medical Director, Solid Organ Transplant Program  [facility]    Virtual Visit Details    Type of service:  Video Visit     Start time: 3:25 PM    Stop time 3:50 PM    Originating Location (pt. Location): Home    Distant Location (provider location):  On-site  Platform used for Video Visit: [employer]      Again, thank you for allowing me to participate in the care of your patient.        Sincerely,        Roldan Maria MD

## 2024-07-05 DIAGNOSIS — Z94.4 LIVER REPLACED BY TRANSPLANT (H): ICD-10-CM

## 2024-07-05 RX ORDER — TACROLIMUS 1 MG/1
CAPSULE ORAL
Qty: 270 CAPSULE | Refills: 3 | Status: SHIPPED | OUTPATIENT
Start: 2024-07-05

## 2024-07-05 NOTE — TELEPHONE ENCOUNTER
ISSUE:   Tacrolimus IR level 2.7 on 7/3, goal 3-5, dose 1 mg AM and 2 mg PM  PLAN:   Call Patient and confirm this was an accurate 12-hour trough.   Verify Tacrolimus IR dose 1 mg AM and 2 mg PM.   Confirm no new medications or or missed doses.   Confirm no new illness / infection / diarrhea.   If accurate trough and accurate dose, increase Tacrolimus IR dose to 2 mg BID     Is this more than a 50% increase or decrease in current IS dose: No  If YES, justification:     Repeat labs in 1 months.  *If > 50% change in immunosuppression dose, repeat labs in 1 week.   Chelsie Tidwell RN    OUTCOME:   Spoke with Patient, they confirm accurate trough level and current dose 1 mg BID.   Patient confirmed dose change to 1 mg am, 2 mg pm.  Patient agreed to repeat labs in 1 months.   Orders sent to preferred pharmacy for dose change and lab for repeat labs.   Patient voiced understanding of plan.    Nicki Jiang LPN

## 2024-10-04 DIAGNOSIS — Z94.4 LIVER REPLACED BY TRANSPLANT (H): Primary | ICD-10-CM

## 2024-11-19 ENCOUNTER — VIRTUAL VISIT (OUTPATIENT)
Dept: PHARMACY | Facility: CLINIC | Age: 44
End: 2024-11-19
Payer: COMMERCIAL

## 2024-11-19 DIAGNOSIS — Z78.9 TAKES DIETARY SUPPLEMENTS: ICD-10-CM

## 2024-11-19 DIAGNOSIS — K21.9 GASTROESOPHAGEAL REFLUX DISEASE, UNSPECIFIED WHETHER ESOPHAGITIS PRESENT: ICD-10-CM

## 2024-11-19 DIAGNOSIS — K58.2 IRRITABLE BOWEL SYNDROME WITH BOTH CONSTIPATION AND DIARRHEA: Primary | ICD-10-CM

## 2024-11-19 PROCEDURE — 99207 PR NO CHARGE LOS: CPT | Mod: 93 | Performed by: PHARMACIST

## 2024-11-19 NOTE — PATIENT INSTRUCTIONS
"Recommendations from today's MTM visit:                                                      Recommend trying supplements one at a time for your GI symptoms:   Xylooligosaccharides powder, this is likely the main ingredient in the product. It is a prebiotic. Perhaps try this with Benefiber which is another non-bulking prebiotic.   Can try Green coffee extract next (NOT green tea)  Alpha lipoic acid and White mulberry are both fine as well.  Recommend Chromium if used to use <200mg.   Biocleanse and Probia5 do not have any major concerns, may just want to check a drug level after starting Probia5.     Follow-up: 1/14    It was great speaking with you today.  I value your experience and would be very thankful for your time in providing feedback in our clinic survey. In the next few days, you may receive an email or text message from [employer] with a link to a survey related to your  clinical pharmacist.\"     To schedule another MTM appointment, please call the clinic directly or you may call the MTM scheduling line at [phone] or toll-free at [phone].     My Clinical Pharmacist's contact information:                                                      Please feel free to contact me with any questions or concerns you have.      Neo Ma, PharmWALT  MTM Pharmacist    Phone: [phone]     "

## 2024-11-19 NOTE — PROGRESS NOTES
Disease State Management Encounter:                          Charito Wilcox is a 44 year old female called for a follow-up visit.      Reason for visit: Patient would like to start various supplements for her GI issues.     Supplements   Patient would like to try these supplements:               IBS:   Patient started the slim microbiome activating for 3 days which great improved her bowel movements throughout the day. She states her bowels were much better, but then wanted to confirm its safety with the team.   Miralax 17g daily.  Doesn't eat gluten, dairy, soy, corn. She gets nauseous if she eats these.   IF she eats fries and a burger or fatty foods gets itchy.   Has tried metamucil for 3 months straight, had take miralax 3 times daily to go to the bathroom as it constipated her. On a  FOBMAP diet.     GERD    Omeprazole 40mg twice daily  Ondansetron 4mg ODT prn  Patient reports no current symptoms.   Patient feels that current regimen is effective.  The patient does notice symptoms if they miss a dose. When she reduces the omeprazole dose it goes poorly.      Today's Vitals: There were no vitals taken for this visit.    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 I cannot recommend this supplement because of this. If she wanted to trial the safe ingredients that would be okay.     Tico Pruett:  -Chromium at 200mcg and above has had several cases of liver injury which resolved with discontinuation, but this product is in many MVI and wildly used without much issues at lower doses.   -Green coffee extract just has a lot of caffeine in it, can worsen GI sx.   -Garcinia- many cases of liver injury    Probio5:  -Grape seed extract may have effect on enzyme which metabolizes Tacrolimus     Recommend trying supplements one at a time for your GI symptoms:   Xylooligosaccharides powder, this is likely the main ingredient in the product.  It is a prebiotic. Perhaps try this with Benefiber which is another non-bulking prebiotic.   Can try Green coffee extract next (NOT green tea)  Alpha lipoic acid and White mulberry are both fine as well.  Recommend Chromium if used to use <200mg.   Biocleanse and Probia5 do not have any major concerns, may just want to check a drug level after starting Probia5.     Follow-up: 1/14    I spent 28 minutes with this patient today. All changes were made via collaborative practice agreement with Dr. aMria. A copy of the visit note was provided to the patient's provider(s).    A summary of these recommendations was sent via [employer].    Neo Ma, PharmD  [facility] Pharmacist    Phone: [phone]        Medication Therapy Recommendations  Takes dietary supplements   1 Rationale: Unsafe medication for the patient - Adverse medication event - Safety   Recommendation: Change Medication - XYLOOLIGOSACCHARIDES   Status: Accepted - no CPA Needed   Identified Date: 11/19/2024 Completed Date: 11/19/2024

## 2024-11-29 ENCOUNTER — ANCILLARY PROCEDURE (OUTPATIENT)
Dept: MRI IMAGING | Facility: CLINIC | Age: 44
End: 2024-11-29
Attending: INTERNAL MEDICINE
Payer: COMMERCIAL

## 2024-11-29 DIAGNOSIS — Z94.4 LIVER REPLACED BY TRANSPLANT (H): ICD-10-CM

## 2024-11-29 PROCEDURE — A9585 GADOBUTROL INJECTION: HCPCS | Performed by: RADIOLOGY

## 2024-11-29 PROCEDURE — 74183 MRI ABD W/O CNTR FLWD CNTR: CPT | Performed by: RADIOLOGY

## 2024-11-29 RX ORDER — GADOBUTROL 604.72 MG/ML
7.5 INJECTION INTRAVENOUS ONCE
Status: COMPLETED | OUTPATIENT
Start: 2024-11-29 | End: 2024-11-29

## 2024-11-29 RX ADMIN — GADOBUTROL 6 ML: 604.72 INJECTION INTRAVENOUS at 07:33

## 2024-11-29 NOTE — DISCHARGE INSTRUCTIONS
MRI Contrast Discharge Instructions    The IV contrast you received today will pass out of your body in your  urine. This will happen in the next 24 hours. You will not feel this process.  Your urine will not change color.    Drink at least 4 extra glasses of water or juice today (unless your doctor  has restricted your fluids). This reduces the stress on your kidneys.  You may take your regular medicines.    If you are on dialysis: It is best to have dialysis today.    If you have a reaction: Most reactions happen right away. If you have  any new symptoms after leaving the hospital (such as hives or swelling),  call your hospital at the correct number below. Or call your family doctor.  If you have breathing distress or wheezing, call 911.    Special instructions: ***    I have read and understand the above information.    Signature:______________________________________ Date:___________    Staff:__________________________________________ Date:___________     Time:__________    [place] Departments:    ___[place]: [phone]  ___[facility]: [phone]  ___[place]: [phone] ___[facility]: [phone]  ___[facility]: [phone]  ___[facility]: [phone]  ___[phone]  ___[facility]: [phone]  ___[phone]

## 2024-12-19 ENCOUNTER — TELEPHONE (OUTPATIENT)
Dept: GASTROENTEROLOGY | Facility: CLINIC | Age: 44
End: 2024-12-19
Payer: COMMERCIAL

## 2024-12-19 NOTE — TELEPHONE ENCOUNTER
Patient confirmed scheduled appointment:     Date: 4/25/25  Time: 9:45 am  Appointment Type: Liver Post-Return TXP HEPT  Provider: Dr. Roldan Maria  Location: [place]  Testing/imaging: Lab  Additional Notes:

## 2025-04-21 DIAGNOSIS — Z94.4 LIVER REPLACED BY TRANSPLANT (H): Primary | ICD-10-CM

## 2025-08-22 PROCEDURE — 99000 SPECIMEN HANDLING OFFICE-LAB: CPT | Performed by: PATHOLOGY

## 2025-08-22 PROCEDURE — 80197 ASSAY OF TACROLIMUS: CPT | Performed by: INTERNAL MEDICINE

## 2025-08-26 ENCOUNTER — TELEPHONE (OUTPATIENT)
Dept: SURGERY | Facility: OTHER | Age: 45
End: 2025-08-26
Payer: COMMERCIAL

## (undated) DEVICE — SUCTION MANIFOLD NEPTUNE 2 SYS 4 PORT 0702-020-000

## (undated) DEVICE — TAPE DURAPORE 3" SILK 1538-3

## (undated) DEVICE — ENDO CAP AND TUBING STERILE FOR ENDOGATOR  100130

## (undated) DEVICE — WIPE PREMOIST CLEANSING WASHCLOTHS 7988

## (undated) DEVICE — SPECIMEN CONTAINER 3OZ W/FORMALIN 59901

## (undated) DEVICE — SOL WATER IRRIG 1000ML BOTTLE 2F7114

## (undated) DEVICE — ENDO CONNECTOR ENDOGATOR AUX WATER JET FOR OLYMPUS SCOPE

## (undated) DEVICE — ENDO FORCEP ENDOJAW BIOPSY 2.8MMX230CM FB-220U

## (undated) RX ORDER — TRIAMCINOLONE ACETONIDE 40 MG/ML
INJECTION, SUSPENSION INTRA-ARTICULAR; INTRAMUSCULAR
Status: DISPENSED
Start: 2018-12-21

## (undated) RX ORDER — ONDANSETRON 2 MG/ML
INJECTION INTRAMUSCULAR; INTRAVENOUS
Status: DISPENSED
Start: 2018-08-20

## (undated) RX ORDER — DIPHENHYDRAMINE HYDROCHLORIDE 50 MG/ML
INJECTION INTRAMUSCULAR; INTRAVENOUS
Status: DISPENSED
Start: 2018-08-20

## (undated) RX ORDER — ROPIVACAINE HYDROCHLORIDE 5 MG/ML
INJECTION, SOLUTION EPIDURAL; INFILTRATION; PERINEURAL
Status: DISPENSED
Start: 2020-01-10

## (undated) RX ORDER — DIPHENHYDRAMINE HYDROCHLORIDE 50 MG/ML
INJECTION INTRAMUSCULAR; INTRAVENOUS
Status: DISPENSED
Start: 2018-10-24

## (undated) RX ORDER — LIDOCAINE HYDROCHLORIDE 10 MG/ML
INJECTION, SOLUTION EPIDURAL; INFILTRATION; INTRACAUDAL; PERINEURAL
Status: DISPENSED
Start: 2018-12-21

## (undated) RX ORDER — ROPIVACAINE HYDROCHLORIDE 5 MG/ML
INJECTION, SOLUTION EPIDURAL; INFILTRATION; PERINEURAL
Status: DISPENSED
Start: 2019-10-29

## (undated) RX ORDER — FENTANYL CITRATE 50 UG/ML
INJECTION, SOLUTION INTRAMUSCULAR; INTRAVENOUS
Status: DISPENSED
Start: 2018-08-20

## (undated) RX ORDER — FENTANYL CITRATE 50 UG/ML
INJECTION, SOLUTION INTRAMUSCULAR; INTRAVENOUS
Status: DISPENSED
Start: 2018-10-24

## (undated) RX ORDER — TRIAMCINOLONE ACETONIDE 40 MG/ML
INJECTION, SUSPENSION INTRA-ARTICULAR; INTRAMUSCULAR
Status: DISPENSED
Start: 2019-10-29

## (undated) RX ORDER — ONDANSETRON 2 MG/ML
INJECTION INTRAMUSCULAR; INTRAVENOUS
Status: DISPENSED
Start: 2018-10-24